# Patient Record
Sex: MALE | Race: WHITE | Employment: OTHER | URBAN - METROPOLITAN AREA
[De-identification: names, ages, dates, MRNs, and addresses within clinical notes are randomized per-mention and may not be internally consistent; named-entity substitution may affect disease eponyms.]

---

## 2017-01-04 PROBLEM — N13.8 BPH WITH OBSTRUCTION/LOWER URINARY TRACT SYMPTOMS: Status: ACTIVE | Noted: 2017-01-04

## 2017-01-04 PROBLEM — N40.1 BPH WITH OBSTRUCTION/LOWER URINARY TRACT SYMPTOMS: Status: ACTIVE | Noted: 2017-01-04

## 2017-01-05 ENCOUNTER — EPISODE CHANGES (OUTPATIENT)
Dept: CASE MANAGEMENT | Age: 82
End: 2017-01-05

## 2017-02-22 ENCOUNTER — OFFICE VISIT (OUTPATIENT)
Dept: FAMILY MEDICINE CLINIC | Age: 82
End: 2017-02-22

## 2017-02-22 VITALS
OXYGEN SATURATION: 98 % | HEIGHT: 68 IN | SYSTOLIC BLOOD PRESSURE: 132 MMHG | WEIGHT: 175 LBS | TEMPERATURE: 98.2 F | DIASTOLIC BLOOD PRESSURE: 80 MMHG | HEART RATE: 100 BPM | BODY MASS INDEX: 26.52 KG/M2 | RESPIRATION RATE: 18 BRPM

## 2017-02-22 DIAGNOSIS — J01.90 ACUTE SINUSITIS, RECURRENCE NOT SPECIFIED, UNSPECIFIED LOCATION: Primary | ICD-10-CM

## 2017-02-22 PROCEDURE — 1123F ACP DISCUSS/DSCN MKR DOCD: CPT | Performed by: FAMILY MEDICINE

## 2017-02-22 PROCEDURE — G8420 CALC BMI NORM PARAMETERS: HCPCS | Performed by: FAMILY MEDICINE

## 2017-02-22 PROCEDURE — G8598 ASA/ANTIPLAT THER USED: HCPCS | Performed by: FAMILY MEDICINE

## 2017-02-22 PROCEDURE — 99214 OFFICE O/P EST MOD 30 MIN: CPT | Performed by: FAMILY MEDICINE

## 2017-02-22 PROCEDURE — 1036F TOBACCO NON-USER: CPT | Performed by: FAMILY MEDICINE

## 2017-02-22 PROCEDURE — G8427 DOCREV CUR MEDS BY ELIG CLIN: HCPCS | Performed by: FAMILY MEDICINE

## 2017-02-22 PROCEDURE — 4040F PNEUMOC VAC/ADMIN/RCVD: CPT | Performed by: FAMILY MEDICINE

## 2017-02-22 PROCEDURE — G8484 FLU IMMUNIZE NO ADMIN: HCPCS | Performed by: FAMILY MEDICINE

## 2017-02-22 RX ORDER — AMOXICILLIN AND CLAVULANATE POTASSIUM 875; 125 MG/1; MG/1
1 TABLET, FILM COATED ORAL 2 TIMES DAILY
Qty: 20 TABLET | Refills: 0 | Status: SHIPPED | OUTPATIENT
Start: 2017-02-22 | End: 2017-03-04

## 2017-03-13 ENCOUNTER — OFFICE VISIT (OUTPATIENT)
Dept: FAMILY MEDICINE CLINIC | Age: 82
End: 2017-03-13

## 2017-03-13 VITALS
HEART RATE: 70 BPM | BODY MASS INDEX: 26.22 KG/M2 | DIASTOLIC BLOOD PRESSURE: 80 MMHG | RESPIRATION RATE: 16 BRPM | WEIGHT: 173 LBS | TEMPERATURE: 98.7 F | SYSTOLIC BLOOD PRESSURE: 134 MMHG | HEIGHT: 68 IN

## 2017-03-13 DIAGNOSIS — Z95.3 S/P AORTIC VALVE REPLACEMENT WITH PORCINE VALVE: ICD-10-CM

## 2017-03-13 DIAGNOSIS — E11.21 DIABETIC NEPHROPATHY ASSOCIATED WITH TYPE 2 DIABETES MELLITUS (HCC): ICD-10-CM

## 2017-03-13 DIAGNOSIS — I47.1 SVT (SUPRAVENTRICULAR TACHYCARDIA) (HCC): ICD-10-CM

## 2017-03-13 DIAGNOSIS — E78.1 HYPERTRIGLYCERIDEMIA: ICD-10-CM

## 2017-03-13 DIAGNOSIS — I25.10 CORONARY ARTERY DISEASE, NON-OCCLUSIVE: Chronic | ICD-10-CM

## 2017-03-13 DIAGNOSIS — I10 ESSENTIAL HYPERTENSION: ICD-10-CM

## 2017-03-13 DIAGNOSIS — E11.21 TYPE 2 DIABETES MELLITUS WITH DIABETIC NEPHROPATHY, WITHOUT LONG-TERM CURRENT USE OF INSULIN (HCC): Primary | ICD-10-CM

## 2017-03-13 LAB — HBA1C MFR BLD: 6.7 %

## 2017-03-13 PROCEDURE — 99214 OFFICE O/P EST MOD 30 MIN: CPT | Performed by: FAMILY MEDICINE

## 2017-03-13 PROCEDURE — 83036 HEMOGLOBIN GLYCOSYLATED A1C: CPT | Performed by: FAMILY MEDICINE

## 2017-03-13 PROCEDURE — G8420 CALC BMI NORM PARAMETERS: HCPCS | Performed by: FAMILY MEDICINE

## 2017-03-13 PROCEDURE — 4040F PNEUMOC VAC/ADMIN/RCVD: CPT | Performed by: FAMILY MEDICINE

## 2017-03-13 PROCEDURE — G8484 FLU IMMUNIZE NO ADMIN: HCPCS | Performed by: FAMILY MEDICINE

## 2017-03-13 PROCEDURE — G8598 ASA/ANTIPLAT THER USED: HCPCS | Performed by: FAMILY MEDICINE

## 2017-03-13 PROCEDURE — 1123F ACP DISCUSS/DSCN MKR DOCD: CPT | Performed by: FAMILY MEDICINE

## 2017-03-13 PROCEDURE — G8427 DOCREV CUR MEDS BY ELIG CLIN: HCPCS | Performed by: FAMILY MEDICINE

## 2017-03-13 PROCEDURE — 1036F TOBACCO NON-USER: CPT | Performed by: FAMILY MEDICINE

## 2017-03-13 RX ORDER — GLIMEPIRIDE 4 MG/1
4 TABLET ORAL
Qty: 90 TABLET | Refills: 1 | Status: SHIPPED | OUTPATIENT
Start: 2017-03-13 | End: 2017-09-18 | Stop reason: DRUGHIGH

## 2017-06-09 ENCOUNTER — OFFICE VISIT (OUTPATIENT)
Dept: FAMILY MEDICINE CLINIC | Age: 82
End: 2017-06-09

## 2017-06-09 VITALS
HEART RATE: 76 BPM | SYSTOLIC BLOOD PRESSURE: 136 MMHG | DIASTOLIC BLOOD PRESSURE: 82 MMHG | HEIGHT: 68 IN | TEMPERATURE: 97.7 F | WEIGHT: 176.8 LBS | RESPIRATION RATE: 16 BRPM | OXYGEN SATURATION: 96 % | BODY MASS INDEX: 26.8 KG/M2

## 2017-06-09 DIAGNOSIS — I10 ESSENTIAL HYPERTENSION: ICD-10-CM

## 2017-06-09 DIAGNOSIS — I25.10 CORONARY ARTERY DISEASE, NON-OCCLUSIVE: Chronic | ICD-10-CM

## 2017-06-09 DIAGNOSIS — E11.21 TYPE 2 DIABETES MELLITUS WITH DIABETIC NEPHROPATHY, WITHOUT LONG-TERM CURRENT USE OF INSULIN (HCC): Primary | ICD-10-CM

## 2017-06-09 DIAGNOSIS — Z95.3 S/P AORTIC VALVE REPLACEMENT WITH PORCINE VALVE: ICD-10-CM

## 2017-06-09 DIAGNOSIS — L57.0 AK (ACTINIC KERATOSIS): ICD-10-CM

## 2017-06-09 LAB — HBA1C MFR BLD: 6.4 %

## 2017-06-09 PROCEDURE — 83036 HEMOGLOBIN GLYCOSYLATED A1C: CPT | Performed by: FAMILY MEDICINE

## 2017-06-09 PROCEDURE — 1123F ACP DISCUSS/DSCN MKR DOCD: CPT | Performed by: FAMILY MEDICINE

## 2017-06-09 PROCEDURE — G8420 CALC BMI NORM PARAMETERS: HCPCS | Performed by: FAMILY MEDICINE

## 2017-06-09 PROCEDURE — G8598 ASA/ANTIPLAT THER USED: HCPCS | Performed by: FAMILY MEDICINE

## 2017-06-09 PROCEDURE — 4040F PNEUMOC VAC/ADMIN/RCVD: CPT | Performed by: FAMILY MEDICINE

## 2017-06-09 PROCEDURE — 99214 OFFICE O/P EST MOD 30 MIN: CPT | Performed by: FAMILY MEDICINE

## 2017-06-09 PROCEDURE — 1036F TOBACCO NON-USER: CPT | Performed by: FAMILY MEDICINE

## 2017-06-09 PROCEDURE — G8427 DOCREV CUR MEDS BY ELIG CLIN: HCPCS | Performed by: FAMILY MEDICINE

## 2017-07-05 DIAGNOSIS — E78.1 HYPERTRIGLYCERIDEMIA: ICD-10-CM

## 2017-07-05 RX ORDER — SIMVASTATIN 20 MG
TABLET ORAL
Qty: 90 TABLET | Refills: 1 | Status: SHIPPED | OUTPATIENT
Start: 2017-07-05 | End: 2018-01-25 | Stop reason: SDUPTHER

## 2017-09-18 ENCOUNTER — OFFICE VISIT (OUTPATIENT)
Dept: FAMILY MEDICINE CLINIC | Age: 82
End: 2017-09-18

## 2017-09-18 VITALS
BODY MASS INDEX: 26.83 KG/M2 | DIASTOLIC BLOOD PRESSURE: 86 MMHG | OXYGEN SATURATION: 97 % | TEMPERATURE: 97.6 F | HEART RATE: 80 BPM | RESPIRATION RATE: 16 BRPM | SYSTOLIC BLOOD PRESSURE: 136 MMHG | WEIGHT: 177 LBS | HEIGHT: 68 IN

## 2017-09-18 DIAGNOSIS — E11.21 TYPE 2 DIABETES MELLITUS WITH DIABETIC NEPHROPATHY, WITHOUT LONG-TERM CURRENT USE OF INSULIN (HCC): ICD-10-CM

## 2017-09-18 DIAGNOSIS — N39.41 URGE INCONTINENCE: ICD-10-CM

## 2017-09-18 DIAGNOSIS — E78.1 HYPERTRIGLYCERIDEMIA: ICD-10-CM

## 2017-09-18 DIAGNOSIS — Z00.00 MEDICARE ANNUAL WELLNESS VISIT, SUBSEQUENT: Primary | ICD-10-CM

## 2017-09-18 DIAGNOSIS — Z95.3 S/P AORTIC VALVE REPLACEMENT WITH PORCINE VALVE: ICD-10-CM

## 2017-09-18 DIAGNOSIS — I25.10 CORONARY ARTERY DISEASE, NON-OCCLUSIVE: Chronic | ICD-10-CM

## 2017-09-18 DIAGNOSIS — Z23 NEEDS FLU SHOT: ICD-10-CM

## 2017-09-18 DIAGNOSIS — I10 ESSENTIAL HYPERTENSION: ICD-10-CM

## 2017-09-18 LAB — HBA1C MFR BLD: 6.5 %

## 2017-09-18 PROCEDURE — 99214 OFFICE O/P EST MOD 30 MIN: CPT | Performed by: FAMILY MEDICINE

## 2017-09-18 PROCEDURE — G0439 PPPS, SUBSEQ VISIT: HCPCS | Performed by: FAMILY MEDICINE

## 2017-09-18 PROCEDURE — 1036F TOBACCO NON-USER: CPT | Performed by: FAMILY MEDICINE

## 2017-09-18 PROCEDURE — G8598 ASA/ANTIPLAT THER USED: HCPCS | Performed by: FAMILY MEDICINE

## 2017-09-18 PROCEDURE — G8417 CALC BMI ABV UP PARAM F/U: HCPCS | Performed by: FAMILY MEDICINE

## 2017-09-18 PROCEDURE — G8427 DOCREV CUR MEDS BY ELIG CLIN: HCPCS | Performed by: FAMILY MEDICINE

## 2017-09-18 PROCEDURE — 1123F ACP DISCUSS/DSCN MKR DOCD: CPT | Performed by: FAMILY MEDICINE

## 2017-09-18 PROCEDURE — 4040F PNEUMOC VAC/ADMIN/RCVD: CPT | Performed by: FAMILY MEDICINE

## 2017-09-18 PROCEDURE — 83036 HEMOGLOBIN GLYCOSYLATED A1C: CPT | Performed by: FAMILY MEDICINE

## 2017-09-18 RX ORDER — METFORMIN HYDROCHLORIDE 500 MG/1
TABLET, EXTENDED RELEASE ORAL
Qty: 270 TABLET | Refills: 1 | Status: SHIPPED | OUTPATIENT
Start: 2017-09-18 | End: 2018-06-26 | Stop reason: SDUPTHER

## 2017-09-18 RX ORDER — GLIMEPIRIDE 4 MG/1
2-4 TABLET ORAL
Qty: 90 TABLET | Refills: 1 | Status: SHIPPED | OUTPATIENT
Start: 2017-09-18 | End: 2017-12-29

## 2017-09-18 ASSESSMENT — LIFESTYLE VARIABLES
HOW OFTEN DURING THE LAST YEAR HAVE YOU HAD A FEELING OF GUILT OR REMORSE AFTER DRINKING: 0
HOW OFTEN DURING THE LAST YEAR HAVE YOU NEEDED AN ALCOHOLIC DRINK FIRST THING IN THE MORNING TO GET YOURSELF GOING AFTER A NIGHT OF HEAVY DRINKING: 0
AUDIT TOTAL SCORE: 2
HAS A RELATIVE, FRIEND, DOCTOR, OR ANOTHER HEALTH PROFESSIONAL EXPRESSED CONCERN ABOUT YOUR DRINKING OR SUGGESTED YOU CUT DOWN: 0
HOW OFTEN DURING THE LAST YEAR HAVE YOU FOUND THAT YOU WERE NOT ABLE TO STOP DRINKING ONCE YOU HAD STARTED: 0
HOW OFTEN DO YOU HAVE A DRINK CONTAINING ALCOHOL: 2
HOW OFTEN DURING THE LAST YEAR HAVE YOU FAILED TO DO WHAT WAS NORMALLY EXPECTED FROM YOU BECAUSE OF DRINKING: 0
HOW OFTEN DO YOU HAVE SIX OR MORE DRINKS ON ONE OCCASION: 0
HOW MANY STANDARD DRINKS CONTAINING ALCOHOL DO YOU HAVE ON A TYPICAL DAY: 0
HOW OFTEN DURING THE LAST YEAR HAVE YOU BEEN UNABLE TO REMEMBER WHAT HAPPENED THE NIGHT BEFORE BECAUSE YOU HAD BEEN DRINKING: 0
HAVE YOU OR SOMEONE ELSE BEEN INJURED AS A RESULT OF YOUR DRINKING: 0
AUDIT-C TOTAL SCORE: 2

## 2017-09-18 ASSESSMENT — PATIENT HEALTH QUESTIONNAIRE - PHQ9: SUM OF ALL RESPONSES TO PHQ QUESTIONS 1-9: 0

## 2017-09-18 ASSESSMENT — ANXIETY QUESTIONNAIRES: GAD7 TOTAL SCORE: 0

## 2017-10-09 ENCOUNTER — OFFICE VISIT (OUTPATIENT)
Dept: FAMILY MEDICINE CLINIC | Age: 82
End: 2017-10-09

## 2017-10-09 VITALS
WEIGHT: 180 LBS | SYSTOLIC BLOOD PRESSURE: 138 MMHG | RESPIRATION RATE: 20 BRPM | BODY MASS INDEX: 27.28 KG/M2 | DIASTOLIC BLOOD PRESSURE: 86 MMHG | HEIGHT: 68 IN | HEART RATE: 82 BPM | TEMPERATURE: 98.5 F

## 2017-10-09 DIAGNOSIS — L84 CALLUS OF FOOT: ICD-10-CM

## 2017-10-09 DIAGNOSIS — B07.9 VERRUCA WARTS (INFECTIOUS): Primary | ICD-10-CM

## 2017-10-09 PROCEDURE — 17110 DESTRUCTION B9 LES UP TO 14: CPT | Performed by: FAMILY MEDICINE

## 2017-10-09 PROCEDURE — G8427 DOCREV CUR MEDS BY ELIG CLIN: HCPCS | Performed by: FAMILY MEDICINE

## 2017-10-09 PROCEDURE — 1123F ACP DISCUSS/DSCN MKR DOCD: CPT | Performed by: FAMILY MEDICINE

## 2017-10-09 PROCEDURE — 4040F PNEUMOC VAC/ADMIN/RCVD: CPT | Performed by: FAMILY MEDICINE

## 2017-10-09 PROCEDURE — 1036F TOBACCO NON-USER: CPT | Performed by: FAMILY MEDICINE

## 2017-10-09 PROCEDURE — G8598 ASA/ANTIPLAT THER USED: HCPCS | Performed by: FAMILY MEDICINE

## 2017-10-09 PROCEDURE — 99213 OFFICE O/P EST LOW 20 MIN: CPT | Performed by: FAMILY MEDICINE

## 2017-10-09 PROCEDURE — G8484 FLU IMMUNIZE NO ADMIN: HCPCS | Performed by: FAMILY MEDICINE

## 2017-10-09 PROCEDURE — G8417 CALC BMI ABV UP PARAM F/U: HCPCS | Performed by: FAMILY MEDICINE

## 2017-10-09 PROCEDURE — 11055 PARING/CUTG B9 HYPRKER LES 1: CPT | Performed by: FAMILY MEDICINE

## 2017-10-09 NOTE — PROGRESS NOTES
Subjective:      Patient ID: Curly Kwan is a [de-identified] y.o. male. Blood pressure 138/86, pulse 82, temperature 98.5 °F (36.9 °C), temperature source Oral, resp. rate 20, height 5' 8\" (1.727 m), weight 180 lb (81.6 kg). HPI here for wart removal.  Pt of DR Devi Gottlieb, but she is not in today. Has had 2 lesions on his left hand over a year. Not getting bigger, but is not resolving with OTC remedy (salacylic acid). Is not painful, but is unsightly and people ask about it. Also has callus left 5th digit that is sometimes uncomfortable. Rubs on shoes. Has not tried any removal methods. Requests removal of both today    Takes asa 81 daily but no blood thinner. DM controlled well. Lab Results   Component Value Date    LABA1C 6.5 09/18/2017     Lab Results   Component Value Date    .9 12/20/2016      Patient Active Problem List   Diagnosis    HTN (hypertension)    Osteoarthritis    GERD (gastroesophageal reflux disease)    Benign prostatic hypertrophy    Allergic rhinitis, seasonal    Hypertriglyceridemia    Screening for eye condition    Colon polyps- last colon neg 1/13    Needs flu shot- declines    Diabetic nephropathy- pos microalb 10/11, on ARB    Urge incontinence    Coronary artery disease, non-occlusive    SVT (supraventricular tachycardia)- S/P ablation 6/13    Left knee DJD    S/P aortic valve replacement with porcine valve    Medicare annual wellness visit, subsequent    Type 2 diabetes mellitus with diabetic nephropathy, without long-term current use of insulin ([facility] [address]    BPH with obstruction/lower urinary tract symptoms    AK (actinic keratosis)      Body mass index is 27.37 kg/m².     Wt Readings from Last 3 Encounters:   10/09/17 180 lb (81.6 kg)   09/18/17 177 lb (80.3 kg)   06/09/17 176 lb 12.8 oz (80.2 kg)      BP Readings from Last 3 Encounters:   10/09/17 138/86   09/18/17 136/86   06/09/17 136/82      Current Outpatient Prescriptions   Medication Sig Dispense Refill    metFORMIN (GLUCOPHAGE-XR) 500 MG extended release tablet Take 2 in AM and 1 in  tablet 1    glimepiride (AMARYL) 4 MG tablet Take 0.5-1 tablets by mouth every morning (before breakfast) 90 tablet 1    simvastatin (ZOCOR) 20 MG tablet TAKE ONE TABLET BY MOUTH NIGHTLY 90 tablet 1    Calcium Carbonate Antacid (TUMS E-X PO) Take 2 tablets by mouth as needed      OLINDA CONTOUR TEST strip USE ONE STRIP TO CHECK GLUCOSE ONCE DAILY AS NEEDED 100 strip 5    lisinopril (PRINIVIL;ZESTRIL) 5 MG tablet Take 5 mg by mouth daily      tamsulosin (FLOMAX) 0.4 MG capsule Take 0.4 mg by mouth nightly       aspirin 81 MG EC tablet Take 81 mg by mouth daily. No current facility-administered medications for this visit. Immunization History   Administered Date(s) Administered    Pneumococcal 13-valent Conjugate ([id number]) 02/16/2016    Pneumococcal Polysaccharide ([id number]) 10/24/2008    Tetanus 08/01/2003        Family History   Problem Relation Age of Onset    Hypertension Mother     Stroke Mother      Social History     Social History    Marital status:      Spouse name: N/A    Number of children: N/A    Years of education: N/A     Occupational History    Not on file. Social History Main Topics    Smoking status: Never Smoker    Smokeless tobacco: Never Used      Comment: congrats, advised not to start    Alcohol use 1.2 oz/week     2 Standard drinks or equivalent per week      Comment: 1-2 beers/week    Drug use: No    Sexual activity: Not on file     Other Topics Concern    Not on file     Social History Narrative    Exercise: walking and cardiovascular equipment daily. Lives with spouse. Seatbelt use: Always. Living will:  yes,   copy on file.      Health Maintenance   Topic Date Due    DTaP/Tdap/Td vaccine (1 - Tdap) 08/02/2003    Flu vaccine (1) 09/01/2017    Lipid screen  12/20/2017    Diabetic hemoglobin A1C test  03/18/2018    Diabetic foot exam 06/09/2018    Diabetic retinal exam  09/29/2018    Zostavax vaccine  Addressed    Pneumococcal low/med risk  Completed         Review of Systems    Objective:   Physical Exam   Constitutional: He appears well-developed and well-nourished. No distress. Pulmonary/Chest: Effort normal.   Musculoskeletal: Normal range of motion. He exhibits no edema. Skin:   L dorsal hand with raised scaly hyperkeratotic pale lesion 1x0.7 cm, well described borders and no color variation or tenderness. Smaller 2 mm hyperkeratotic lesion at base of left thumb web space    L 5th toe, lateral hyperkeratotic 1 cm lesion that is quite thick. Nursing note and vitals reviewed. Assessment:      Verruca, multiple      Plan:       lesion(s) treated w/ liquid nitrogen x 2 cycles. Edu re: risk of blister formation,discomfort, scar, hypopigmentation. Discussed wound care. The toe lesion was pared with 15 blade first.  Advised OTC salicylic acid on this one with gentle file use (sterilize after each use) to keep form getting thick.

## 2017-11-10 ENCOUNTER — OFFICE VISIT (OUTPATIENT)
Dept: FAMILY MEDICINE CLINIC | Age: 82
End: 2017-11-10

## 2017-11-10 VITALS
HEART RATE: 66 BPM | RESPIRATION RATE: 22 BRPM | HEIGHT: 68 IN | SYSTOLIC BLOOD PRESSURE: 128 MMHG | DIASTOLIC BLOOD PRESSURE: 82 MMHG | WEIGHT: 179 LBS | BODY MASS INDEX: 27.13 KG/M2 | OXYGEN SATURATION: 98 %

## 2017-11-10 DIAGNOSIS — Z01.818 PRE-OP EXAM: Primary | ICD-10-CM

## 2017-11-10 DIAGNOSIS — H26.9 CATARACT OF BOTH EYES, UNSPECIFIED CATARACT TYPE: ICD-10-CM

## 2017-11-10 PROCEDURE — 93000 ELECTROCARDIOGRAM COMPLETE: CPT | Performed by: FAMILY MEDICINE

## 2017-11-10 PROCEDURE — 99214 OFFICE O/P EST MOD 30 MIN: CPT | Performed by: FAMILY MEDICINE

## 2017-11-10 ASSESSMENT — ENCOUNTER SYMPTOMS
COUGH: 0
SHORTNESS OF BREATH: 0
DIARRHEA: 0
CONSTIPATION: 0

## 2017-11-10 NOTE — PROGRESS NOTES
Subjective:      Ashley Villafuerte is a [de-identified] y.o.  male who presents to the office today for a preoperative consultation at the request of surgeon Dr. Sweetie Alcaraz who plans on performing cataract surgery on 11/21/17. This consultation is requested for the specific conditions prompting preoperative evaluation (i.e. because of potential affect on operative risk): ***.    Duration of problem:***  Assoc sx are:***   Alleviating factors are:***    Patient Active Problem List   Diagnosis    HTN (hypertension)    Osteoarthritis    GERD (gastroesophageal reflux disease)    Benign prostatic hyperplasia    Allergic rhinitis, seasonal    Hypertriglyceridemia    Screening for eye condition    Colon polyps- last colon neg 1/13    Needs flu shot- declines    Diabetic nephropathy- pos microalb 10/11, on ARB    Urge incontinence    Coronary artery disease, non-occlusive    SVT (supraventricular tachycardia)- S/P ablation 6/13    Left knee DJD    S/P aortic valve replacement with porcine valve    Medicare annual wellness visit, subsequent    Type 2 diabetes mellitus with diabetic nephropathy, without long-term current use of insulin ([facility] [address]    BPH with obstruction/lower urinary tract symptoms    AK (actinic keratosis)     Past Medical History:   Diagnosis Date    Allergic rhinitis, seasonal     Aortic regurgitation     2/12 ECHO EF 60%, mod AI    Colon polyps- last colon neg 11/06     Diabetic nephropathy- pos microalb 10/11, on ARB 10/5/2011    DM (diabetes mellitus), type 2 (HCC)     Enlarged prostate     GERD (gastroesophageal reflux disease)     Holosystolic murmur 8/0/4869    HTN (hypertension)     Hypertriglyceridemia     Osteoarthritis      Past Surgical History:   Procedure Laterality Date    ABLATION OF DYSRHYTHMIC FOCUS  2013    Dr. Mariah Barreto  2016    mosaic ultra porcine valve/medtronic    APPENDECTOMY  age 30    TONSILLECTOMY AND ADENOIDECTOMY  age 9   [facility] TOTAL KNEE ARTHROPLASTY Left 11/2015    TURP  01/03/2017    cysto, green light lazer TURP     Family History   Problem Relation Age of Onset    Hypertension Mother     Stroke Mother      Social History     Social History    Marital status:      Spouse name: N/A    Number of children: N/A    Years of education: N/A     Social History Main Topics    Smoking status: Never Smoker    Smokeless tobacco: Never Used      Comment: congrats, advised not to start    Alcohol use 1.2 oz/week     2 Standard drinks or equivalent per week      Comment: 1-2 beers/week    Drug use: No    Sexual activity: Not on file     Other Topics Concern    Not on file     Social History Narrative    Exercise: walking and cardiovascular equipment daily. Lives with spouse. Seatbelt use: Always. Living will:  yes,   copy on file. Allergies   Allergen Reactions    No Known Allergies      Prior to Visit Medications    Medication Sig Taking? Authorizing Provider   metFORMIN (GLUCOPHAGE-XR) 500 MG extended release tablet Take 2 in AM and 1 in PM Yes Ja Castillo MD   glimepiride (AMARYL) 4 MG tablet Take 0.5-1 tablets by mouth every morning (before breakfast) Yes Ja Castillo MD   simvastatin (ZOCOR) 20 MG tablet TAKE ONE TABLET BY MOUTH NIGHTLY Yes Ja Castillo MD   Calcium Carbonate Antacid (TUMS E-X PO) Take 2 tablets by mouth as needed Yes Historical Provider, MD   OLINDA CONTOUR TEST strip USE ONE STRIP  [address] AS NEEDED Yes Ja Castillo MD   lisinopril (PRINIVIL;ZESTRIL) 5 MG tablet Take 5 mg by mouth daily Yes Historical Provider, MD   tamsulosin (FLOMAX) 0.4 MG capsule Take 0.4 mg by mouth nightly  Yes Historical Provider, MD   oxybutynin (DITROPAN) 5 MG tablet Take 1 tablet by mouth 2 times daily as needed for Other (bladder). Ja Castillo MD   aspirin 81 MG EC tablet Take 81 mg by mouth daily. Historical Provider, MD       Planned anesthesia is {Procedures; anesthesia:812}.   The patient has the following known anesthesia issues: {problems:64193}  Patient has a bleeding risk of : {Ros, bleedin}    Review of Systems      Objective:     Physical Exam    Predictors of intubation difficulty:   Morbid obesity? {Responses; yes**/no:85146::\"no\"}   Anatomically abnormal facies? {Responses; yes**/no:97215::\"no\"}   Short, thick neck? {Responses; yes**/no:79901::\"no\"}   Neck range of motion: {Norm/abn:86061::\"normal\"}   Dentition: {Preopdent:24215}    Cardiographics  ECG: normal sinus rhythm, no changes from previous EKG***  Echocardiogram: ***    Lab Review   Office Visit on 2017   Component Date Value    Hemoglobin A1C 2017 6.5           Assessment:      *** with planned surgery as above. Difficulty with intubation {Actions; is/will/was/not:92161} anticipated. Cardiac Risk Estimation: per the Revised Cardiac Risk Index (Circ. 100:1043, 1999), the patient's risk factors for cardiac complications include {GKD6:45621}, putting him in: {BPU9:90468}       Plan:   Ok to proceed with the procedure. Low cardiac risk. Pt. advised to stop all Rx except anti-hypertensives the a.m. of surgery. ***Hold lisinopril the day of surgery    Patient advised to hold blood thinning medication (including aspirin and NSAIDs) 7 days prior to procedure. Prophylaxis for cardiac events with perioperative beta-blockers: {Preop beta:10668}    Deep vein thrombosis prophylaxis postoperatively:regimen to be chosen by surgical team if applicable. Other measures: Postoperative incentive spirometry to prevent pneumonia.

## 2017-12-04 DIAGNOSIS — E11.21 TYPE 2 DIABETES MELLITUS WITH DIABETIC NEPHROPATHY (HCC): ICD-10-CM

## 2017-12-08 ENCOUNTER — TELEPHONE (OUTPATIENT)
Dept: FAMILY MEDICINE CLINIC | Age: 82
End: 2017-12-08

## 2017-12-08 NOTE — TELEPHONE ENCOUNTER
BRITNI is calling to tell Dr. Jani Sandoval that the pt has blood in his stool the color was bright red. WOBIJU is staring that he her  has a stomach ache and wants to know what should she do. Please contact the pt please. BRITNI   CB# [phone]      Please advise. Thanks.

## 2017-12-19 ENCOUNTER — HOSPITAL ENCOUNTER (OUTPATIENT)
Dept: ENDOSCOPY | Age: 82
Discharge: HOME | End: 2017-12-19
Attending: INTERNAL MEDICINE | Admitting: INTERNAL MEDICINE

## 2017-12-19 VITALS
OXYGEN SATURATION: 100 % | SYSTOLIC BLOOD PRESSURE: 134 MMHG | RESPIRATION RATE: 16 BRPM | DIASTOLIC BLOOD PRESSURE: 82 MMHG | TEMPERATURE: 97.9 F | HEART RATE: 69 BPM

## 2017-12-19 LAB
GLUCOSE BLD-MCNC: 133 MG/DL (ref 70–99)
PERFORMED ON: ABNORMAL

## 2017-12-19 RX ORDER — SODIUM CHLORIDE 0.9 % (FLUSH) 0.9 %
10 SYRINGE (ML) INJECTION PRN
Status: DISCONTINUED | OUTPATIENT
Start: 2017-12-19 | End: 2017-12-20 | Stop reason: HOSPADM

## 2017-12-19 RX ORDER — SODIUM CHLORIDE 9 MG/ML
INJECTION, SOLUTION INTRAVENOUS CONTINUOUS
Status: DISCONTINUED | OUTPATIENT
Start: 2017-12-19 | End: 2017-12-20 | Stop reason: HOSPADM

## 2017-12-19 RX ORDER — SODIUM CHLORIDE 0.9 % (FLUSH) 0.9 %
10 SYRINGE (ML) INJECTION EVERY 12 HOURS SCHEDULED
Status: DISCONTINUED | OUTPATIENT
Start: 2017-12-19 | End: 2017-12-20 | Stop reason: HOSPADM

## 2017-12-19 RX ADMIN — SODIUM CHLORIDE: 9 INJECTION, SOLUTION INTRAVENOUS at 07:21

## 2017-12-19 ASSESSMENT — PAIN SCALES - GENERAL
PAINLEVEL_OUTOF10: 0
PAINLEVEL_OUTOF10: 0

## 2017-12-19 ASSESSMENT — PAIN SCALES - WONG BAKER: WONGBAKER_NUMERICALRESPONSE: 0

## 2017-12-19 ASSESSMENT — ENCOUNTER SYMPTOMS: SHORTNESS OF BREATH: 0

## 2017-12-19 ASSESSMENT — LIFESTYLE VARIABLES: SMOKING_STATUS: 0

## 2017-12-19 NOTE — ANESTHESIA PRE-OP
riri JIMENEZ     Social History   Substance Use Topics    Smoking status: Never Smoker    Smokeless tobacco: Never Used      Comment: congrats, advised not to start    Alcohol use 1.2 oz/week     2 Standard drinks or equivalent per week      Comment: 1-2 beers/week     Medications  Current Outpatient Prescriptions on File Prior to Encounter   Medication Sig Dispense Refill    OLINDA CONTOUR TEST strip USE ONE STRIP TO CHECK GLUCOSE ONCE DAILY AS NEEDED 50 strip 11    metFORMIN (GLUCOPHAGE-XR) 500 MG extended release tablet Take 2 in AM and 1 in  tablet 1    glimepiride (AMARYL) 4 MG tablet Take 0.5-1 tablets by mouth every morning (before breakfast) 90 tablet 1    simvastatin (ZOCOR) 20 MG tablet TAKE ONE TABLET BY MOUTH NIGHTLY 90 tablet 1    Calcium Carbonate Antacid (TUMS E-X PO) Take 2 tablets by mouth as needed      lisinopril (PRINIVIL;ZESTRIL) 5 MG tablet Take 5 mg by mouth daily      [DISCONTINUED] oxybutynin (DITROPAN) 5 MG tablet Take 1 tablet by mouth 2 times daily as needed for Other (bladder). 180 tablet 3    aspirin 81 MG EC tablet Take 81 mg by mouth daily. No current facility-administered medications on file prior to encounter. Current Outpatient Prescriptions   Medication Sig Dispense Refill    OLINDA CONTOUR TEST strip USE ONE STRIP TO CHECK GLUCOSE ONCE DAILY AS NEEDED 50 strip 11    metFORMIN (GLUCOPHAGE-XR) 500 MG extended release tablet Take 2 in AM and 1 in  tablet 1    glimepiride (AMARYL) 4 MG tablet Take 0.5-1 tablets by mouth every morning (before breakfast) 90 tablet 1    simvastatin (ZOCOR) 20 MG tablet TAKE ONE TABLET BY MOUTH NIGHTLY 90 tablet 1    Calcium Carbonate Antacid (TUMS E-X PO) Take 2 tablets by mouth as needed      lisinopril (PRINIVIL;ZESTRIL) 5 MG tablet Take 5 mg by mouth daily      aspirin 81 MG EC tablet Take 81 mg by mouth daily.          Current Facility-Administered Medications   Medication Dose Route Frequency Provider Last Rate Last Dose    0.9 % sodium chloride infusion   Intravenous Continuous Ayla Lane MD        sodium chloride flush 0.9 % injection 10 mL  10 mL Intravenous 2 times per day Ayla Lane MD        sodium chloride flush 0.9 % injection 10 mL  10 mL Intravenous PRN Ayla Lane MD         Vital Signs (Current) There were no vitals filed for this visit. Vital Signs Statistics (for past 48 hrs)     No Data Recorded    BP Readings from Last 3 Encounters:   12/08/17 (!) 160/88   11/10/17 128/82   10/09/17 138/86     BMI  There is no height or weight on file to calculate BMI. Estimated body mass index is 26.61 kg/m² as calculated from the following:    Height as of 12/13/17: 5' 8\" (1.727 m). Weight as of 12/13/17: 175 lb (79.4 kg). CBC   Lab Results   Component Value Date    WBC 5.7 12/08/2017    RBC 4.79 12/08/2017    HGB 12.0 12/08/2017    HCT 37.0 12/08/2017    MCV 77.3 12/08/2017    RDW 15.2 12/08/2017     12/08/2017     CMP    Lab Results   Component Value Date     12/08/2017    K 4.4 12/08/2017     12/08/2017    CO2 25 12/08/2017    BUN 24 12/08/2017    CREATININE 1.2 12/08/2017    GFRAA >60 12/08/2017    GFRAA >60 03/13/2013    AGRATIO 1.4 12/08/2017    LABGLOM 58 12/08/2017    LABGLOM 54.0 06/07/2011    GLUCOSE 299 12/08/2017    GLUCOSE 138 06/07/2011    PROT 6.3 12/08/2017    PROT 7.0 08/13/2012    CALCIUM 9.1 12/08/2017    BILITOT 0.3 12/08/2017    ALKPHOS 87 12/08/2017    AST 12 12/08/2017    ALT 7 12/08/2017     BMP    Lab Results   Component Value Date     12/08/2017    K 4.4 12/08/2017     12/08/2017    CO2 25 12/08/2017    BUN 24 12/08/2017    CREATININE 1.2 12/08/2017    CALCIUM 9.1 12/08/2017    GFRAA >60 12/08/2017    GFRAA >60 03/13/2013    LABGLOM 58 12/08/2017    LABGLOM 54.0 06/07/2011    GLUCOSE 299 12/08/2017    GLUCOSE 138 06/07/2011     POCGlucose  No results for input(s): GLUCOSE in the last 72 hours.    Coags    Lab Results Component Value Date    PROTIME 12.2 12/08/2017    INR 1.08 12/08/2017    APTT 30.5 43/63/4003     HCG (If Applicable) No results found for: PREGTESTUR, PREGSERUM, HCG, HCGQUANT   ABGs No results found for: PHART, PO2ART, GKT3VEB, JPJ1UFB, BEART, Z0WIKULK   Type & Screen (If Applicable)  No results found for: LABABO, LABRH                         BMI: Wt Readings from Last 3 Encounters:       NPO Status:  > 8h                          Anesthesia Evaluation  Patient summary reviewed and Nursing notes reviewed no history of anesthetic complications:   Airway: Mallampati: II  TM distance: >3 FB   Neck ROM: full  Mouth opening: > = 3 FB Dental: normal exam         Pulmonary:Negative Pulmonary ROS and normal exam  breath sounds clear to auscultation      (-) pneumonia, COPD, asthma, shortness of breath, recent URI, sleep apnea and not a current smoker                           Cardiovascular:  Exercise tolerance: good (>4 METS),   (+) hypertension:, valvular problems/murmurs (s/p AVR): AS, CAD:, dysrhythmias (s/p ablation): SVT, hyperlipidemia    (-) CABG/stent,  CHF, PND and murmur      Rhythm: regular  Rate: normal           Beta Blocker:  Not on Beta Blocker         Neuro/Psych:   Negative Neuro/Psych ROS     (-) seizures, neuromuscular disease, TIA, CVA, headaches, psychiatric history and depression/anxiety            GI/Hepatic/Renal:   (+) GERD:, bowel prep,      (-) hiatal hernia, liver disease and no renal disease       Endo/Other:    (+) Type II DM, , .    (-) hypothyroidism, hyperthyroidism, blood dyscrasia, arthritis               Abdominal:         (-) obese Abdomen: soft. Vascular: negative vascular ROS. - PVD, DVT and PE. Anesthesia Plan      MAC     ASA 3       Induction: intravenous. MIPS: Prophylactic antiemetics administered. Anesthetic plan and risks discussed with patient. Plan discussed with CRNA.                   This pre-anesthesia assessment may

## 2017-12-22 ENCOUNTER — TELEPHONE (OUTPATIENT)
Dept: FAMILY MEDICINE CLINIC | Age: 82
End: 2017-12-22

## 2017-12-29 DIAGNOSIS — E11.21 TYPE 2 DIABETES MELLITUS WITH DIABETIC NEPHROPATHY, WITHOUT LONG-TERM CURRENT USE OF INSULIN (HCC): ICD-10-CM

## 2017-12-29 RX ORDER — GLIMEPIRIDE 4 MG/1
TABLET ORAL
Qty: 90 TABLET | Refills: 1 | Status: SHIPPED | OUTPATIENT
Start: 2017-12-29 | End: 2018-10-01 | Stop reason: SDUPTHER

## 2018-01-02 ENCOUNTER — OFFICE VISIT (OUTPATIENT)
Dept: SURGERY | Age: 83
End: 2018-01-02

## 2018-01-02 VITALS — DIASTOLIC BLOOD PRESSURE: 90 MMHG | BODY MASS INDEX: 27.52 KG/M2 | SYSTOLIC BLOOD PRESSURE: 160 MMHG | WEIGHT: 181 LBS

## 2018-01-02 DIAGNOSIS — C18.6 MALIGNANT NEOPLASM OF DESCENDING COLON (HCC): Primary | ICD-10-CM

## 2018-01-02 PROCEDURE — 1123F ACP DISCUSS/DSCN MKR DOCD: CPT | Performed by: SURGERY

## 2018-01-02 PROCEDURE — 99204 OFFICE O/P NEW MOD 45 MIN: CPT | Performed by: SURGERY

## 2018-01-02 PROCEDURE — G8427 DOCREV CUR MEDS BY ELIG CLIN: HCPCS | Performed by: SURGERY

## 2018-01-02 PROCEDURE — 4040F PNEUMOC VAC/ADMIN/RCVD: CPT | Performed by: SURGERY

## 2018-01-02 PROCEDURE — 1036F TOBACCO NON-USER: CPT | Performed by: SURGERY

## 2018-01-02 PROCEDURE — G8598 ASA/ANTIPLAT THER USED: HCPCS | Performed by: SURGERY

## 2018-01-02 PROCEDURE — G8484 FLU IMMUNIZE NO ADMIN: HCPCS | Performed by: SURGERY

## 2018-01-02 PROCEDURE — G8417 CALC BMI ABV UP PARAM F/U: HCPCS | Performed by: SURGERY

## 2018-01-04 ENCOUNTER — PAT TELEPHONE (OUTPATIENT)
Dept: PREADMISSION TESTING | Age: 83
End: 2018-01-04

## 2018-01-05 ENCOUNTER — PAT TELEPHONE (OUTPATIENT)
Dept: PREADMISSION TESTING | Age: 83
End: 2018-01-05

## 2018-01-08 ENCOUNTER — SURG/PROC ORDERS (OUTPATIENT)
Dept: ANESTHESIOLOGY | Age: 83
End: 2018-01-08

## 2018-01-08 VITALS — HEIGHT: 68 IN | WEIGHT: 175 LBS | BODY MASS INDEX: 26.52 KG/M2

## 2018-01-08 RX ORDER — SODIUM CHLORIDE 0.9 % (FLUSH) 0.9 %
10 SYRINGE (ML) INJECTION EVERY 12 HOURS SCHEDULED
Status: CANCELLED | OUTPATIENT
Start: 2018-01-08

## 2018-01-08 RX ORDER — SODIUM CHLORIDE 0.9 % (FLUSH) 0.9 %
10 SYRINGE (ML) INJECTION PRN
Status: CANCELLED | OUTPATIENT
Start: 2018-01-08

## 2018-01-08 RX ORDER — SODIUM CHLORIDE 9 MG/ML
INJECTION, SOLUTION INTRAVENOUS CONTINUOUS
Status: CANCELLED | OUTPATIENT
Start: 2018-01-08

## 2018-01-08 NOTE — PRE-PROCEDURE INSTRUCTIONS
C-Difficile admission screening and protocol:     * Admitted with diarrhea? no     *Prior history of C-Diff. In last 3 months? no     *Antibiotic use in the past 6-8 weeks? no     *Prior hospitalization or nursing home in the last month?     no

## 2018-01-09 PROBLEM — C18.6 MALIGNANT NEOPLASM OF DESCENDING COLON (HCC): Status: ACTIVE | Noted: 2018-01-09

## 2018-01-09 ASSESSMENT — ENCOUNTER SYMPTOMS: ANAL BLEEDING: 1

## 2018-01-09 NOTE — PROGRESS NOTES
Subjective:      Patient ID: Cintia Berger is a 81 y.o. male. HPI  Chief Complaint: colon mass    Patient referred by Dr. Paige Tomlin (PCP is Dr. Jeneal Goodell)  for evaluation of a colon mass. Patient reports symptoms of blood per rectum. Symptoms were first noted last month prompting evaluation in ER. Previous evaluation includes CT showing a mass in the distal descending colon. Subsequent colonoscopy revealed a mass which is adenocarcinoma on biopsy. Patient has a history of polyps but now prior surgery on the abdomen. Will plan following treatment: left colectomy.     Past Medical History:   Diagnosis Date    Allergic rhinitis, seasonal     Aortic regurgitation     2/12 ECHO EF 60%, mod AI    Colon polyps- last colon neg 11/06     Diabetic nephropathy- pos microalb 10/11, on ARB 10/5/2011    DM (diabetes mellitus), type 2 (HCC)     Enlarged prostate     GERD (gastroesophageal reflux disease)     Holosystolic murmur 1/7/6942    HTN (hypertension)     Hypertriglyceridemia     Osteoarthritis        Past Surgical History:   Procedure Laterality Date    ABLATION OF DYSRHYTHMIC FOCUS  2013    Dr. Jeanne Tucker  2016    mosaic ultra porcine valve/medtronic    APPENDECTOMY  age 30    CATARACT REMOVAL Bilateral 11/2017    COLONOSCOPY  12/19/2017    EYE SURGERY      JOINT REPLACEMENT      TONSILLECTOMY AND ADENOIDECTOMY  age 10    TOTAL KNEE ARTHROPLASTY Left 11/2015    TURP  01/03/2017    cysto, green light lazer TURP       Current Outpatient Prescriptions   Medication Sig Dispense Refill    glimepiride (AMARYL) 4 MG tablet TAKE ONE TABLET BY MOUTH IN THE MORNING BEFORE BREAKFAST 90 tablet 1    OLINDA CONTOUR TEST strip USE ONE STRIP TO CHECK GLUCOSE ONCE DAILY AS NEEDED 50 strip 11    metFORMIN (GLUCOPHAGE-XR) 500 MG extended release tablet Take 2 in AM and 1 in PM (Patient taking differently: 1,000 mg daily (with breakfast) Take 2 in AM and 1 in PM) 270 tablet 1    simvastatin (ZOCOR)

## 2018-01-10 PROBLEM — C18.6 CANCER OF DESCENDING COLON (HCC): Status: ACTIVE | Noted: 2018-01-10

## 2018-01-25 ENCOUNTER — OFFICE VISIT (OUTPATIENT)
Dept: SURGERY | Age: 83
End: 2018-01-25

## 2018-01-25 VITALS — BODY MASS INDEX: 26 KG/M2 | WEIGHT: 171 LBS

## 2018-01-25 DIAGNOSIS — E78.1 HYPERTRIGLYCERIDEMIA: ICD-10-CM

## 2018-01-25 DIAGNOSIS — C18.6 MALIGNANT NEOPLASM OF DESCENDING COLON (HCC): Primary | ICD-10-CM

## 2018-01-25 PROCEDURE — 99024 POSTOP FOLLOW-UP VISIT: CPT | Performed by: SURGERY

## 2018-01-26 RX ORDER — SIMVASTATIN 20 MG
TABLET ORAL
Qty: 90 TABLET | Refills: 1 | Status: SHIPPED | OUTPATIENT
Start: 2018-01-26 | End: 2018-08-16 | Stop reason: SDUPTHER

## 2018-01-29 ENCOUNTER — OFFICE VISIT (OUTPATIENT)
Dept: FAMILY MEDICINE CLINIC | Age: 83
End: 2018-01-29

## 2018-01-29 VITALS
BODY MASS INDEX: 25.76 KG/M2 | HEIGHT: 68 IN | DIASTOLIC BLOOD PRESSURE: 80 MMHG | WEIGHT: 170 LBS | HEART RATE: 86 BPM | RESPIRATION RATE: 16 BRPM | SYSTOLIC BLOOD PRESSURE: 126 MMHG | TEMPERATURE: 97.7 F

## 2018-01-29 DIAGNOSIS — E78.1 HYPERTRIGLYCERIDEMIA: ICD-10-CM

## 2018-01-29 DIAGNOSIS — E11.21 TYPE 2 DIABETES MELLITUS WITH DIABETIC NEPHROPATHY, WITHOUT LONG-TERM CURRENT USE OF INSULIN (HCC): ICD-10-CM

## 2018-01-29 DIAGNOSIS — I25.10 CORONARY ARTERY DISEASE, NON-OCCLUSIVE: Chronic | ICD-10-CM

## 2018-01-29 DIAGNOSIS — N28.89 LEFT RENAL MASS: ICD-10-CM

## 2018-01-29 DIAGNOSIS — D64.9 ANEMIA, UNSPECIFIED TYPE: ICD-10-CM

## 2018-01-29 DIAGNOSIS — C18.6 MALIGNANT NEOPLASM OF DESCENDING COLON (HCC): Primary | Chronic | ICD-10-CM

## 2018-01-29 DIAGNOSIS — I10 ESSENTIAL HYPERTENSION: ICD-10-CM

## 2018-01-29 DIAGNOSIS — Z95.3 S/P AORTIC VALVE REPLACEMENT WITH PORCINE VALVE: ICD-10-CM

## 2018-01-29 DIAGNOSIS — C18.6 MALIGNANT NEOPLASM OF DESCENDING COLON (HCC): Chronic | ICD-10-CM

## 2018-01-29 DIAGNOSIS — Z23 NEEDS FLU SHOT: ICD-10-CM

## 2018-01-29 LAB
A/G RATIO: 1.1 (ref 1.1–2.2)
ALBUMIN SERPL-MCNC: 3.7 G/DL (ref 3.4–5)
ALP BLD-CCNC: 89 U/L (ref 40–129)
ALT SERPL-CCNC: 9 U/L (ref 10–40)
ANION GAP SERPL CALCULATED.3IONS-SCNC: 16 MMOL/L (ref 3–16)
AST SERPL-CCNC: 10 U/L (ref 15–37)
BASOPHILS ABSOLUTE: 0.1 K/UL (ref 0–0.2)
BASOPHILS RELATIVE PERCENT: 0.7 %
BILIRUB SERPL-MCNC: <0.2 MG/DL (ref 0–1)
BUN BLDV-MCNC: 20 MG/DL (ref 7–20)
CALCIUM SERPL-MCNC: 8.8 MG/DL (ref 8.3–10.6)
CHLORIDE BLD-SCNC: 100 MMOL/L (ref 99–110)
CO2: 26 MMOL/L (ref 21–32)
CREAT SERPL-MCNC: 1.2 MG/DL (ref 0.8–1.3)
EOSINOPHILS ABSOLUTE: 0.1 K/UL (ref 0–0.6)
EOSINOPHILS RELATIVE PERCENT: 1.8 %
FERRITIN: 29.2 NG/ML (ref 30–400)
GFR AFRICAN AMERICAN: >60
GFR NON-AFRICAN AMERICAN: 58
GLOBULIN: 3.3 G/DL
GLUCOSE BLD-MCNC: 231 MG/DL (ref 70–99)
HCT VFR BLD CALC: 32.7 % (ref 40.5–52.5)
HEMOGLOBIN: 10.4 G/DL (ref 13.5–17.5)
IRON SATURATION: 9 % (ref 20–50)
IRON: 33 UG/DL (ref 59–158)
LYMPHOCYTES ABSOLUTE: 1.6 K/UL (ref 1–5.1)
LYMPHOCYTES RELATIVE PERCENT: 20.4 %
MCH RBC QN AUTO: 23.1 PG (ref 26–34)
MCHC RBC AUTO-ENTMCNC: 31.8 G/DL (ref 31–36)
MCV RBC AUTO: 72.7 FL (ref 80–100)
MONOCYTES ABSOLUTE: 0.6 K/UL (ref 0–1.3)
MONOCYTES RELATIVE PERCENT: 7.2 %
NEUTROPHILS ABSOLUTE: 5.6 K/UL (ref 1.7–7.7)
NEUTROPHILS RELATIVE PERCENT: 69.9 %
PDW BLD-RTO: 15.5 % (ref 12.4–15.4)
PLATELET # BLD: 570 K/UL (ref 135–450)
PMV BLD AUTO: 8 FL (ref 5–10.5)
POTASSIUM SERPL-SCNC: 5 MMOL/L (ref 3.5–5.1)
RBC # BLD: 4.5 M/UL (ref 4.2–5.9)
SODIUM BLD-SCNC: 142 MMOL/L (ref 136–145)
TOTAL IRON BINDING CAPACITY: 357 UG/DL (ref 260–445)
TOTAL PROTEIN: 7 G/DL (ref 6.4–8.2)
WBC # BLD: 8 K/UL (ref 4–11)

## 2018-01-29 PROCEDURE — 99495 TRANSJ CARE MGMT MOD F2F 14D: CPT | Performed by: FAMILY MEDICINE

## 2018-01-30 LAB
ESTIMATED AVERAGE GLUCOSE: 139.9 MG/DL
HBA1C MFR BLD: 6.5 %

## 2018-01-31 ENCOUNTER — NURSE ONLY (OUTPATIENT)
Dept: FAMILY MEDICINE CLINIC | Age: 83
End: 2018-01-31

## 2018-01-31 DIAGNOSIS — Z23 NEEDS FLU SHOT: Primary | ICD-10-CM

## 2018-01-31 PROCEDURE — G0008 ADMIN INFLUENZA VIRUS VAC: HCPCS | Performed by: FAMILY MEDICINE

## 2018-01-31 PROCEDURE — 90662 IIV NO PRSV INCREASED AG IM: CPT | Performed by: FAMILY MEDICINE

## 2018-02-01 DIAGNOSIS — D50.8 OTHER IRON DEFICIENCY ANEMIA: Primary | ICD-10-CM

## 2018-02-01 PROBLEM — D64.9 ABSOLUTE ANEMIA: Status: ACTIVE | Noted: 2018-02-01

## 2018-02-01 RX ORDER — FERROUS SULFATE 325(65) MG
325 TABLET ORAL 2 TIMES DAILY
Qty: 200 TABLET | Refills: 3 | Status: SHIPPED | OUTPATIENT
Start: 2018-02-01 | End: 2018-05-01 | Stop reason: DRUGHIGH

## 2018-02-08 ENCOUNTER — OFFICE VISIT (OUTPATIENT)
Dept: SURGERY | Age: 83
End: 2018-02-08

## 2018-02-08 DIAGNOSIS — C18.6 MALIGNANT NEOPLASM OF DESCENDING COLON (HCC): Primary | ICD-10-CM

## 2018-02-08 PROCEDURE — 99024 POSTOP FOLLOW-UP VISIT: CPT | Performed by: SURGERY

## 2018-02-12 NOTE — PROGRESS NOTES
Subjective:      Patient ID: Maryse Head is a 81 y.o. male. HPI   Doing well. NO pain. Incision is well healed. Eating better, bowels are almost normal. Seeing Oncology this week. Follow up in three weeks. Review of Systems    Objective:   Physical Exam    Assessment:      1.  Malignant neoplasm of descending colon ([address]             Plan:      Follow up in three weeks

## 2018-03-01 ENCOUNTER — OFFICE VISIT (OUTPATIENT)
Dept: SURGERY | Age: 83
End: 2018-03-01

## 2018-03-01 DIAGNOSIS — C18.6 MALIGNANT NEOPLASM OF DESCENDING COLON (HCC): Primary | ICD-10-CM

## 2018-03-01 PROCEDURE — 99024 POSTOP FOLLOW-UP VISIT: CPT | Performed by: SURGERY

## 2018-03-20 NOTE — PROGRESS NOTES
Subjective:      Patient ID: Talia Tompkins is a 81 y.o. male. HPI  Doing well overall. No pain. Eating well. Bowels are almost back to normal. Incision is well healed. Follow up with me as needed    Review of Systems    Objective:   Physical Exam    Assessment:      1.  Malignant neoplasm of descending colon ([address]             Plan:      Follow up with me as needed

## 2018-04-17 ENCOUNTER — TELEPHONE (OUTPATIENT)
Dept: FAMILY MEDICINE CLINIC | Age: 83
End: 2018-04-17

## 2018-04-17 DIAGNOSIS — E11.69 TYPE 2 DIABETES MELLITUS WITH OTHER SPECIFIED COMPLICATION, WITHOUT LONG-TERM CURRENT USE OF INSULIN (HCC): Primary | ICD-10-CM

## 2018-05-01 ENCOUNTER — OFFICE VISIT (OUTPATIENT)
Dept: FAMILY MEDICINE CLINIC | Age: 83
End: 2018-05-01

## 2018-05-01 VITALS
WEIGHT: 173 LBS | DIASTOLIC BLOOD PRESSURE: 80 MMHG | BODY MASS INDEX: 26.22 KG/M2 | SYSTOLIC BLOOD PRESSURE: 144 MMHG | HEIGHT: 68 IN | HEART RATE: 70 BPM | TEMPERATURE: 98.5 F | RESPIRATION RATE: 16 BRPM

## 2018-05-01 DIAGNOSIS — E78.1 HYPERTRIGLYCERIDEMIA: ICD-10-CM

## 2018-05-01 DIAGNOSIS — I25.10 CORONARY ARTERY DISEASE, NON-OCCLUSIVE: Chronic | ICD-10-CM

## 2018-05-01 DIAGNOSIS — E11.21 TYPE 2 DIABETES MELLITUS WITH DIABETIC NEPHROPATHY, WITHOUT LONG-TERM CURRENT USE OF INSULIN (HCC): Primary | ICD-10-CM

## 2018-05-01 DIAGNOSIS — D50.9 IRON DEFICIENCY ANEMIA, UNSPECIFIED IRON DEFICIENCY ANEMIA TYPE: ICD-10-CM

## 2018-05-01 DIAGNOSIS — I10 ESSENTIAL HYPERTENSION: ICD-10-CM

## 2018-05-01 DIAGNOSIS — E11.21 TYPE 2 DIABETES MELLITUS WITH DIABETIC NEPHROPATHY, WITHOUT LONG-TERM CURRENT USE OF INSULIN (HCC): ICD-10-CM

## 2018-05-01 LAB
A/G RATIO: 1.5 (ref 1.1–2.2)
ALBUMIN SERPL-MCNC: 4.4 G/DL (ref 3.4–5)
ALP BLD-CCNC: 97 U/L (ref 40–129)
ALT SERPL-CCNC: 9 U/L (ref 10–40)
ANION GAP SERPL CALCULATED.3IONS-SCNC: 17 MMOL/L (ref 3–16)
AST SERPL-CCNC: 16 U/L (ref 15–37)
BASOPHILS ABSOLUTE: 0 K/UL (ref 0–0.2)
BASOPHILS RELATIVE PERCENT: 0.5 %
BILIRUB SERPL-MCNC: 0.5 MG/DL (ref 0–1)
BUN BLDV-MCNC: 20 MG/DL (ref 7–20)
CALCIUM SERPL-MCNC: 9.4 MG/DL (ref 8.3–10.6)
CHLORIDE BLD-SCNC: 101 MMOL/L (ref 99–110)
CHOLESTEROL, TOTAL: 157 MG/DL (ref 0–199)
CO2: 24 MMOL/L (ref 21–32)
CREAT SERPL-MCNC: 1.1 MG/DL (ref 0.8–1.3)
EOSINOPHILS ABSOLUTE: 0.1 K/UL (ref 0–0.6)
EOSINOPHILS RELATIVE PERCENT: 0.8 %
GFR AFRICAN AMERICAN: >60
GFR NON-AFRICAN AMERICAN: >60
GLOBULIN: 3 G/DL
GLUCOSE BLD-MCNC: 141 MG/DL (ref 70–99)
HCT VFR BLD CALC: 46.4 % (ref 40.5–52.5)
HDLC SERPL-MCNC: 59 MG/DL (ref 40–60)
HEMOGLOBIN: 15.4 G/DL (ref 13.5–17.5)
LDL CHOLESTEROL CALCULATED: 78 MG/DL
LYMPHOCYTES ABSOLUTE: 2.4 K/UL (ref 1–5.1)
LYMPHOCYTES RELATIVE PERCENT: 35.1 %
MCH RBC QN AUTO: 26.1 PG (ref 26–34)
MCHC RBC AUTO-ENTMCNC: 33.1 G/DL (ref 31–36)
MCV RBC AUTO: 78.8 FL (ref 80–100)
MONOCYTES ABSOLUTE: 0.7 K/UL (ref 0–1.3)
MONOCYTES RELATIVE PERCENT: 10 %
NEUTROPHILS ABSOLUTE: 3.7 K/UL (ref 1.7–7.7)
NEUTROPHILS RELATIVE PERCENT: 53.6 %
PDW BLD-RTO: 19.4 % (ref 12.4–15.4)
PLATELET # BLD: 237 K/UL (ref 135–450)
PMV BLD AUTO: 8.4 FL (ref 5–10.5)
POTASSIUM SERPL-SCNC: 5.3 MMOL/L (ref 3.5–5.1)
RBC # BLD: 5.89 M/UL (ref 4.2–5.9)
SODIUM BLD-SCNC: 142 MMOL/L (ref 136–145)
TOTAL PROTEIN: 7.4 G/DL (ref 6.4–8.2)
TRIGL SERPL-MCNC: 98 MG/DL (ref 0–150)
VLDLC SERPL CALC-MCNC: 20 MG/DL
WBC # BLD: 6.9 K/UL (ref 4–11)

## 2018-05-01 PROCEDURE — 1036F TOBACCO NON-USER: CPT | Performed by: FAMILY MEDICINE

## 2018-05-01 PROCEDURE — 4040F PNEUMOC VAC/ADMIN/RCVD: CPT | Performed by: FAMILY MEDICINE

## 2018-05-01 PROCEDURE — G8417 CALC BMI ABV UP PARAM F/U: HCPCS | Performed by: FAMILY MEDICINE

## 2018-05-01 PROCEDURE — G8427 DOCREV CUR MEDS BY ELIG CLIN: HCPCS | Performed by: FAMILY MEDICINE

## 2018-05-01 PROCEDURE — 99214 OFFICE O/P EST MOD 30 MIN: CPT | Performed by: FAMILY MEDICINE

## 2018-05-01 PROCEDURE — G8598 ASA/ANTIPLAT THER USED: HCPCS | Performed by: FAMILY MEDICINE

## 2018-05-01 PROCEDURE — 1123F ACP DISCUSS/DSCN MKR DOCD: CPT | Performed by: FAMILY MEDICINE

## 2018-05-01 RX ORDER — FERROUS SULFATE 325(65) MG
325 TABLET ORAL DAILY
Status: SHIPPED | COMMUNITY
Start: 2018-05-01 | End: 2018-09-19

## 2018-05-02 LAB
ESTIMATED AVERAGE GLUCOSE: 131.2 MG/DL
HBA1C MFR BLD: 6.2 %

## 2018-06-26 DIAGNOSIS — E11.21 TYPE 2 DIABETES MELLITUS WITH DIABETIC NEPHROPATHY, WITHOUT LONG-TERM CURRENT USE OF INSULIN (HCC): ICD-10-CM

## 2018-06-26 DIAGNOSIS — N39.41 URGE INCONTINENCE: ICD-10-CM

## 2018-06-27 RX ORDER — METFORMIN HYDROCHLORIDE 500 MG/1
TABLET, EXTENDED RELEASE ORAL
Qty: 270 TABLET | Refills: 1 | Status: SHIPPED | OUTPATIENT
Start: 2018-06-27 | End: 2019-03-20 | Stop reason: SDUPTHER

## 2018-08-07 ENCOUNTER — HOSPITAL ENCOUNTER (OUTPATIENT)
Dept: CT IMAGING | Age: 83
Discharge: HOME | End: 2018-08-07
Attending: INTERNAL MEDICINE | Admitting: INTERNAL MEDICINE

## 2018-08-07 DIAGNOSIS — C18.7 MALIGNANT NEOPLASM OF SIGMOID COLON (HCC): ICD-10-CM

## 2018-08-07 DIAGNOSIS — C18.7 ADENOCARCINOMA OF SIGMOID COLON (HCC): ICD-10-CM

## 2018-08-07 LAB
GFR AFRICAN AMERICAN: 59
GFR NON-AFRICAN AMERICAN: 49
PERFORMED ON: ABNORMAL
POC CREATININE: 1.4 MG/DL (ref 0.8–1.3)
POC SAMPLE TYPE: ABNORMAL

## 2018-08-16 DIAGNOSIS — E78.1 HYPERTRIGLYCERIDEMIA: ICD-10-CM

## 2018-08-16 RX ORDER — SIMVASTATIN 20 MG
TABLET ORAL
Qty: 90 TABLET | Refills: 1 | Status: SHIPPED | OUTPATIENT
Start: 2018-08-16 | End: 2019-03-04 | Stop reason: SDUPTHER

## 2018-09-19 ENCOUNTER — OFFICE VISIT (OUTPATIENT)
Dept: FAMILY MEDICINE CLINIC | Age: 83
End: 2018-09-19

## 2018-09-19 VITALS
SYSTOLIC BLOOD PRESSURE: 118 MMHG | BODY MASS INDEX: 26.83 KG/M2 | RESPIRATION RATE: 16 BRPM | HEIGHT: 68 IN | WEIGHT: 177 LBS | TEMPERATURE: 98.2 F | DIASTOLIC BLOOD PRESSURE: 70 MMHG | HEART RATE: 68 BPM

## 2018-09-19 DIAGNOSIS — J30.2 SEASONAL ALLERGIC RHINITIS, UNSPECIFIED TRIGGER: ICD-10-CM

## 2018-09-19 DIAGNOSIS — E11.21 TYPE 2 DIABETES MELLITUS WITH DIABETIC NEPHROPATHY, WITHOUT LONG-TERM CURRENT USE OF INSULIN (HCC): ICD-10-CM

## 2018-09-19 DIAGNOSIS — K21.9 GASTROESOPHAGEAL REFLUX DISEASE WITHOUT ESOPHAGITIS: ICD-10-CM

## 2018-09-19 DIAGNOSIS — R39.11 BENIGN PROSTATIC HYPERPLASIA WITH URINARY HESITANCY: ICD-10-CM

## 2018-09-19 DIAGNOSIS — C18.6 MALIGNANT NEOPLASM OF DESCENDING COLON (HCC): Chronic | ICD-10-CM

## 2018-09-19 DIAGNOSIS — M17.12 PRIMARY OSTEOARTHRITIS OF LEFT KNEE: ICD-10-CM

## 2018-09-19 DIAGNOSIS — E11.21 DIABETIC NEPHROPATHY ASSOCIATED WITH TYPE 2 DIABETES MELLITUS (HCC): ICD-10-CM

## 2018-09-19 DIAGNOSIS — E78.1 HYPERTRIGLYCERIDEMIA: ICD-10-CM

## 2018-09-19 DIAGNOSIS — I10 ESSENTIAL HYPERTENSION: ICD-10-CM

## 2018-09-19 DIAGNOSIS — Z23 NEED FOR VACCINATION FOR ZOSTER: ICD-10-CM

## 2018-09-19 DIAGNOSIS — I25.10 CORONARY ARTERY DISEASE, NON-OCCLUSIVE: Chronic | ICD-10-CM

## 2018-09-19 DIAGNOSIS — Z00.00 MEDICARE ANNUAL WELLNESS VISIT, SUBSEQUENT: Primary | ICD-10-CM

## 2018-09-19 DIAGNOSIS — D50.9 IRON DEFICIENCY ANEMIA, UNSPECIFIED IRON DEFICIENCY ANEMIA TYPE: ICD-10-CM

## 2018-09-19 DIAGNOSIS — Z95.3 S/P AORTIC VALVE REPLACEMENT WITH PORCINE VALVE: ICD-10-CM

## 2018-09-19 DIAGNOSIS — N40.1 BENIGN PROSTATIC HYPERPLASIA WITH URINARY HESITANCY: ICD-10-CM

## 2018-09-19 DIAGNOSIS — Z96.652 HISTORY OF KNEE REPLACEMENT, TOTAL, LEFT: ICD-10-CM

## 2018-09-19 PROBLEM — L57.0 AK (ACTINIC KERATOSIS): Status: RESOLVED | Noted: 2017-06-09 | Resolved: 2018-09-19

## 2018-09-19 PROBLEM — D64.9 ABSOLUTE ANEMIA: Status: RESOLVED | Noted: 2018-02-01 | Resolved: 2018-09-19

## 2018-09-19 LAB — HBA1C MFR BLD: 6 %

## 2018-09-19 PROCEDURE — 4040F PNEUMOC VAC/ADMIN/RCVD: CPT | Performed by: FAMILY MEDICINE

## 2018-09-19 PROCEDURE — G0439 PPPS, SUBSEQ VISIT: HCPCS | Performed by: FAMILY MEDICINE

## 2018-09-19 PROCEDURE — 99214 OFFICE O/P EST MOD 30 MIN: CPT | Performed by: FAMILY MEDICINE

## 2018-09-19 PROCEDURE — G8427 DOCREV CUR MEDS BY ELIG CLIN: HCPCS | Performed by: FAMILY MEDICINE

## 2018-09-19 PROCEDURE — 1101F PT FALLS ASSESS-DOCD LE1/YR: CPT | Performed by: FAMILY MEDICINE

## 2018-09-19 PROCEDURE — G8417 CALC BMI ABV UP PARAM F/U: HCPCS | Performed by: FAMILY MEDICINE

## 2018-09-19 PROCEDURE — G8598 ASA/ANTIPLAT THER USED: HCPCS | Performed by: FAMILY MEDICINE

## 2018-09-19 PROCEDURE — 1123F ACP DISCUSS/DSCN MKR DOCD: CPT | Performed by: FAMILY MEDICINE

## 2018-09-19 PROCEDURE — 1036F TOBACCO NON-USER: CPT | Performed by: FAMILY MEDICINE

## 2018-09-19 PROCEDURE — 83036 HEMOGLOBIN GLYCOSYLATED A1C: CPT | Performed by: FAMILY MEDICINE

## 2018-09-19 RX ORDER — LISINOPRIL 10 MG/1
10 TABLET ORAL DAILY
COMMUNITY
End: 2019-03-20 | Stop reason: SDUPTHER

## 2018-09-19 ASSESSMENT — LIFESTYLE VARIABLES
HOW OFTEN DURING THE LAST YEAR HAVE YOU HAD A FEELING OF GUILT OR REMORSE AFTER DRINKING: 0
HAVE YOU OR SOMEONE ELSE BEEN INJURED AS A RESULT OF YOUR DRINKING: 0
AUDIT-C TOTAL SCORE: 2
HAS A RELATIVE, FRIEND, DOCTOR, OR ANOTHER HEALTH PROFESSIONAL EXPRESSED CONCERN ABOUT YOUR DRINKING OR SUGGESTED YOU CUT DOWN: 0
HOW OFTEN DURING THE LAST YEAR HAVE YOU NEEDED AN ALCOHOLIC DRINK FIRST THING IN THE MORNING TO GET YOURSELF GOING AFTER A NIGHT OF HEAVY DRINKING: 0
HOW OFTEN DURING THE LAST YEAR HAVE YOU BEEN UNABLE TO REMEMBER WHAT HAPPENED THE NIGHT BEFORE BECAUSE YOU HAD BEEN DRINKING: 0
AUDIT TOTAL SCORE: 2
HOW OFTEN DO YOU HAVE A DRINK CONTAINING ALCOHOL: 2
HOW OFTEN DURING THE LAST YEAR HAVE YOU FOUND THAT YOU WERE NOT ABLE TO STOP DRINKING ONCE YOU HAD STARTED: 0
HOW MANY STANDARD DRINKS CONTAINING ALCOHOL DO YOU HAVE ON A TYPICAL DAY: 0
HOW OFTEN DURING THE LAST YEAR HAVE YOU FAILED TO DO WHAT WAS NORMALLY EXPECTED FROM YOU BECAUSE OF DRINKING: 0
HOW OFTEN DO YOU HAVE SIX OR MORE DRINKS ON ONE OCCASION: 0

## 2018-09-19 ASSESSMENT — ANXIETY QUESTIONNAIRES: GAD7 TOTAL SCORE: 0

## 2018-09-19 ASSESSMENT — PATIENT HEALTH QUESTIONNAIRE - PHQ9
SUM OF ALL RESPONSES TO PHQ QUESTIONS 1-9: 0
SUM OF ALL RESPONSES TO PHQ QUESTIONS 1-9: 0

## 2018-09-19 NOTE — PATIENT INSTRUCTIONS
FYI: While Medicare provides you with a FREE ANNUAL PREVENTIVE PHYSICAL, this visit does NOT include management of chronic medical problems or physical examination. Dr. Dago Medrano usually does a combination visit if you have other medical problems so you don't have to come back for another visit. However, this means that there will be a co-pay. INSTRUCTIONS  NEXT APPOINTMENT: Please schedule check-up in 6 months. · PLEASE TAKE THIS FORM TO CHECK-OUT WINDOW TO SCHEDULE NEXT VISIT. · Please get annual dilated eye exam to screen for diabetic retinopathy which can lead to vision loss. Ask for report to be faxed to [phone]. · OK to take ginko biloba for dementia prevention. Not harmful, unsure if helps. Patient Education       Personalized Preventive Plan for Dagoberto Smith - 9/19/2018  Medicare offers a range of preventive health benefits. Some of the tests and screenings are paid in full while other may be subject to a deductible, co-insurance, and/or copay. Some of these benefits include a comprehensive review of your medical history including lifestyle, illnesses that may run in your family, and various assessments and screenings as appropriate. After reviewing your medical record and screening and assessments performed today your provider may have ordered immunizations, labs, imaging, and/or referrals for you. A list of these orders (if applicable) as well as your Preventive Care list are included within your After Visit Summary for your review. Other Preventive Recommendations:    · A preventive eye exam performed by an eye specialist is recommended every 1-2 years to screen for glaucoma; cataracts, macular degeneration, and other eye disorders. · A preventive dental visit is recommended every 6 months. · Try to get at least 150 minutes of exercise per week or 10,000 steps per day on a pedometer .   · Order or download the FREE \"Exercise & Physical Activity: Your Everyday Guide\" from The

## 2018-09-19 NOTE — PROGRESS NOTES
Patient Care Team:  Nikita Haider MD as PCP - General (Family Medicine)  Nikita Haider MD as PCP - S Attributed Provider  Emery Jane MD as Consulting Physician (Cardiology)    The following problems were reviewed today and where indicated follow up appointments were made and/or referrals ordered. Positive Risk Factor Screenings with Interventions:     General Health:  General  In general, how would you say your health is?: Very Good  In the past 7 days, have you experienced any of the following?: (!) New or Increased Pain  Do you get the social and emotional support that you need?: Yes  Do you have a Living Will?: Yes  General Health Risk Interventions:  · Pain issues: see HPI and A/P about knee    Current Health Maintenance Status  Immunization History   Administered Date(s) Administered    Influenza, High Dose (Fluzone 65 yrs and older) 01/31/2018, 09/12/2018    Pneumococcal 13-valent Conjugate (Rangel Moons) 02/16/2016    Pneumococcal Polysaccharide ([id number]) 10/24/2008    Tetanus 08/01/2003     Health Maintenance   Topic Date Due    Shingles Vaccine (1 of 2 - 2 Dose Series) 11/11/1986    DTaP/Tdap/Td vaccine (1 - Tdap) 08/02/2003    Diabetic foot exam  06/09/2018    Diabetic retinal exam  09/29/2018    A1C test (Diabetic or Prediabetic)  11/01/2018    Lipid screen  05/01/2019    Potassium monitoring  05/01/2019    Creatinine monitoring  05/01/2019    Flu vaccine  Completed    Pneumococcal low/med risk  Completed     Recommendations for Preventive Services Due: see orders. Recommended screening schedule for the next 5-10 years is provided to the patient in written form: see Patient Instructions/AVS.    PHYSICAL EXAM:  VITALS:  Blood pressure is Excellent. BP Readings from Last 5 Encounters:   09/19/18 118/70   05/01/18 (!) 144/80   01/29/18 126/80   01/15/18 (!) 145/80   01/02/18 (!) 160/90     Weight is increased.    Wt Readings from Last 5 Encounters:   09/19/18 177 lb (80.3 kg)

## 2018-10-01 DIAGNOSIS — E11.21 TYPE 2 DIABETES MELLITUS WITH DIABETIC NEPHROPATHY, WITHOUT LONG-TERM CURRENT USE OF INSULIN (HCC): ICD-10-CM

## 2018-10-01 RX ORDER — GLIMEPIRIDE 4 MG/1
TABLET ORAL
Qty: 90 TABLET | Refills: 1 | Status: SHIPPED | OUTPATIENT
Start: 2018-10-01 | End: 2019-03-04 | Stop reason: SDUPTHER

## 2018-12-10 DIAGNOSIS — E11.21 TYPE 2 DIABETES MELLITUS WITH DIABETIC NEPHROPATHY (HCC): ICD-10-CM

## 2018-12-12 ENCOUNTER — ANESTHESIA EVENT (OUTPATIENT)
Dept: ENDOSCOPY | Age: 83
End: 2018-12-12
Payer: MEDICARE

## 2018-12-12 DIAGNOSIS — E11.21 TYPE 2 DIABETES MELLITUS WITH DIABETIC NEPHROPATHY, WITHOUT LONG-TERM CURRENT USE OF INSULIN (HCC): Primary | ICD-10-CM

## 2018-12-13 ENCOUNTER — ANESTHESIA (OUTPATIENT)
Dept: ENDOSCOPY | Age: 83
End: 2018-12-13
Payer: MEDICARE

## 2018-12-13 ENCOUNTER — HOSPITAL ENCOUNTER (OUTPATIENT)
Age: 83
Setting detail: OUTPATIENT SURGERY
Discharge: HOME | End: 2018-12-13
Attending: INTERNAL MEDICINE | Admitting: INTERNAL MEDICINE
Payer: MEDICARE

## 2018-12-13 ENCOUNTER — TELEPHONE (OUTPATIENT)
Dept: FAMILY MEDICINE CLINIC | Age: 83
End: 2018-12-13

## 2018-12-13 VITALS
HEIGHT: 68 IN | WEIGHT: 176 LBS | BODY MASS INDEX: 26.67 KG/M2 | RESPIRATION RATE: 18 BRPM | OXYGEN SATURATION: 100 % | SYSTOLIC BLOOD PRESSURE: 161 MMHG | DIASTOLIC BLOOD PRESSURE: 87 MMHG | HEART RATE: 65 BPM | TEMPERATURE: 98.5 F

## 2018-12-13 VITALS — SYSTOLIC BLOOD PRESSURE: 108 MMHG | DIASTOLIC BLOOD PRESSURE: 69 MMHG | OXYGEN SATURATION: 97 %

## 2018-12-13 DIAGNOSIS — E11.21 TYPE 2 DIABETES MELLITUS WITH DIABETIC NEPHROPATHY, WITHOUT LONG-TERM CURRENT USE OF INSULIN (HCC): ICD-10-CM

## 2018-12-13 DIAGNOSIS — Z86.010 HISTORY OF COLON POLYPS: ICD-10-CM

## 2018-12-13 LAB
GLUCOSE BLD-MCNC: 118 MG/DL (ref 70–99)
PERFORMED ON: ABNORMAL

## 2018-12-13 PROCEDURE — 7100000011 HC PHASE II RECOVERY - ADDTL 15 MIN: Performed by: INTERNAL MEDICINE

## 2018-12-13 PROCEDURE — 2709999900 HC NON-CHARGEABLE SUPPLY: Performed by: INTERNAL MEDICINE

## 2018-12-13 PROCEDURE — 2500000003 HC RX 250 WO HCPCS

## 2018-12-13 PROCEDURE — 88305 TISSUE EXAM BY PATHOLOGIST: CPT

## 2018-12-13 PROCEDURE — 2500000003 HC RX 250 WO HCPCS: Performed by: NURSE ANESTHETIST, CERTIFIED REGISTERED

## 2018-12-13 PROCEDURE — 3700000000 HC ANESTHESIA ATTENDED CARE: Performed by: INTERNAL MEDICINE

## 2018-12-13 PROCEDURE — 2580000003 HC RX 258: Performed by: ANESTHESIOLOGY

## 2018-12-13 PROCEDURE — 7100000010 HC PHASE II RECOVERY - FIRST 15 MIN: Performed by: INTERNAL MEDICINE

## 2018-12-13 PROCEDURE — 3609010600 HC COLONOSCOPY POLYPECTOMY SNARE/COLD BIOPSY: Performed by: INTERNAL MEDICINE

## 2018-12-13 PROCEDURE — 6360000002 HC RX W HCPCS: Performed by: NURSE ANESTHETIST, CERTIFIED REGISTERED

## 2018-12-13 RX ORDER — PROPOFOL 10 MG/ML
INJECTION, EMULSION INTRAVENOUS PRN
Status: DISCONTINUED | OUTPATIENT
Start: 2018-12-13 | End: 2018-12-13 | Stop reason: SDUPTHER

## 2018-12-13 RX ORDER — LABETALOL HYDROCHLORIDE 5 MG/ML
INJECTION, SOLUTION INTRAVENOUS
Status: COMPLETED
Start: 2018-12-13 | End: 2018-12-13

## 2018-12-13 RX ORDER — LIDOCAINE HYDROCHLORIDE 20 MG/ML
INJECTION, SOLUTION INFILTRATION; PERINEURAL PRN
Status: DISCONTINUED | OUTPATIENT
Start: 2018-12-13 | End: 2018-12-13 | Stop reason: SDUPTHER

## 2018-12-13 RX ORDER — SODIUM CHLORIDE 9 MG/ML
INJECTION, SOLUTION INTRAVENOUS CONTINUOUS
Status: DISCONTINUED | OUTPATIENT
Start: 2018-12-13 | End: 2018-12-13 | Stop reason: HOSPADM

## 2018-12-13 RX ORDER — SODIUM CHLORIDE 0.9 % (FLUSH) 0.9 %
10 SYRINGE (ML) INJECTION PRN
Status: DISCONTINUED | OUTPATIENT
Start: 2018-12-13 | End: 2018-12-13 | Stop reason: HOSPADM

## 2018-12-13 RX ORDER — SODIUM CHLORIDE 0.9 % (FLUSH) 0.9 %
10 SYRINGE (ML) INJECTION EVERY 12 HOURS SCHEDULED
Status: DISCONTINUED | OUTPATIENT
Start: 2018-12-13 | End: 2018-12-13 | Stop reason: HOSPADM

## 2018-12-13 RX ORDER — LABETALOL HYDROCHLORIDE 5 MG/ML
5 INJECTION, SOLUTION INTRAVENOUS ONCE
Status: COMPLETED | OUTPATIENT
Start: 2018-12-13 | End: 2018-12-13

## 2018-12-13 RX ADMIN — LIDOCAINE HYDROCHLORIDE 50 MG: 20 INJECTION, SOLUTION INFILTRATION; PERINEURAL at 12:48

## 2018-12-13 RX ADMIN — SODIUM CHLORIDE: 9 INJECTION, SOLUTION INTRAVENOUS at 12:15

## 2018-12-13 RX ADMIN — LABETALOL HYDROCHLORIDE 5 MG: 5 INJECTION, SOLUTION INTRAVENOUS at 12:20

## 2018-12-13 RX ADMIN — LABETALOL HYDROCHLORIDE 5 MG: 5 INJECTION INTRAVENOUS at 12:20

## 2018-12-13 RX ADMIN — PROPOFOL 50 MG: 10 INJECTION, EMULSION INTRAVENOUS at 12:55

## 2018-12-13 RX ADMIN — PROPOFOL 90 MG: 10 INJECTION, EMULSION INTRAVENOUS at 12:48

## 2018-12-13 ASSESSMENT — PAIN - FUNCTIONAL ASSESSMENT: PAIN_FUNCTIONAL_ASSESSMENT: 0-10

## 2018-12-13 ASSESSMENT — PAIN SCALES - GENERAL
PAINLEVEL_OUTOF10: 0

## 2018-12-13 NOTE — ANESTHESIA PRE PROCEDURE
chloride flush 0.9 % injection 10 mL  10 mL Intravenous PRN Montes Minor MD        famotidine (PEPCID) injection 20 mg  20 mg Intravenous Once Montse Minor MD           Allergies:     Allergies   Allergen Reactions    No Known Allergies        Problem List:    Patient Active Problem List   Diagnosis Code    HTN (hypertension) I10    Osteoarthritis M19.90    GERD (gastroesophageal reflux disease) K21.9    Allergic rhinitis, seasonal J30.2    Hypertriglyceridemia E78.1    Needs flu shot Z23    Diabetic nephropathy- pos microalb 10/11, on ARB E11.21    Urge incontinence N39.41    Coronary artery disease, non-occlusive I25.10    History of paroxysmal supraventricular tachycardia- S/P ablation 6/13 Z86.79    S/P aortic valve replacement with porcine valve Z95.3    Type 2 diabetes mellitus with diabetic nephropathy, 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    BPH with obstruction/lower urinary tract symptoms N40.1, N13.8    Malignant neoplasm of descending colon ([facility]- 1/18 left colectomy, T3N0 C18.6    Left renal mass 2.3 cm enhancing by CT, stable, per hemonc N28.89    History of knee replacement, total, left V37.103       Past Medical History:        Diagnosis Date    Allergic rhinitis, seasonal     Aortic regurgitation     2/12 ECHO EF 60%, mod AI    CAD (coronary artery disease)     Cancer ([facility]     Colon polyps- last colon neg 11/06     Diabetic nephropathy- pos microalb 10/11, on ARB 10/5/2011    DM (diabetes mellitus), type 2 (HCC)     Enlarged prostate     GERD (gastroesophageal reflux disease)     Holosystolic murmur 9/2/6178    HTN (hypertension)     Hypertriglyceridemia     Osteoarthritis        Past Surgical History:        Procedure Laterality Date    ABLATION OF DYSRHYTHMIC FOCUS  2013    Dr. John Beck  2016    mosaic ultra porcine valve/medtronic    APPENDECTOMY  age 30    CATARACT REMOVAL Bilateral 11/2017   

## 2018-12-13 NOTE — H&P
[facility]   Pre-operative History and Physical    Patient: Eladio Munguia  : 1935  Acct#:         HISTORY OF PRESENT ILLNESS:    The patient is a 81 y.o. male  who presents with history of colon cancer  Past Medical History:        Diagnosis Date    Allergic rhinitis, seasonal     Aortic regurgitation      ECHO EF 60%, mod AI    CAD (coronary artery disease)     Cancer ([facility]     Colon polyps- last colon neg      Diabetic nephropathy- pos microalb 10/11, on ARB 10/5/2011    DM (diabetes mellitus), type 2 ([address]     Enlarged prostate     GERD (gastroesophageal reflux disease)     Holosystolic murmur 3/8/1997    HTN (hypertension)     Hypertriglyceridemia     Osteoarthritis      Past Surgical History:        Procedure Laterality Date    ABLATION OF DYSRHYTHMIC FOCUS      Dr. Kacie West      mosaic ultra porcine valve/medtronic    APPENDECTOMY  age 30    CATARACT REMOVAL Bilateral 2017    COLONOSCOPY  2017    EYE SURGERY      JOINT REPLACEMENT      TONSILLECTOMY AND ADENOIDECTOMY  age 10    TOTAL KNEE ARTHROPLASTY Left 2015    TURP  2017    cysto, green light lazer TURP     Medications Prior to Admission:   No current facility-administered medications on file prior to encounter. Current Outpatient Prescriptions on File Prior to Encounter   Medication Sig Dispense Refill    glimepiride (AMARYL) 4 MG tablet TAKE ONE TABLET BY MOUTH IN THE MORNING BEFORE BREAKFAST 90 tablet 1    diclofenac sodium 1 % GEL Apply 2 g topically 2 times daily 1 Tube 5    Calcium Carbonate Antacid (TUMS E-X PO) Take 2 tablets by mouth as needed      aspirin 81 MG EC tablet Take 81 mg by mouth daily.         lisinopril (PRINIVIL;ZESTRIL) 10 MG tablet Take 10 mg by mouth daily      simvastatin (ZOCOR) 20 MG tablet TAKE ONE TABLET BY MOUTH NIGHTLY 90 tablet 1    metFORMIN (GLUCOPHAGE-XR) 500 MG extended release tablet TAKE TWO TABLETS BY MOUTH IN

## 2019-03-04 DIAGNOSIS — E11.21 TYPE 2 DIABETES MELLITUS WITH DIABETIC NEPHROPATHY, WITHOUT LONG-TERM CURRENT USE OF INSULIN (HCC): ICD-10-CM

## 2019-03-04 DIAGNOSIS — E78.1 HYPERTRIGLYCERIDEMIA: ICD-10-CM

## 2019-03-05 RX ORDER — SIMVASTATIN 20 MG
TABLET ORAL
Qty: 90 TABLET | Refills: 1 | Status: SHIPPED | OUTPATIENT
Start: 2019-03-05 | End: 2019-07-03 | Stop reason: SDUPTHER

## 2019-03-05 RX ORDER — GLIMEPIRIDE 4 MG/1
TABLET ORAL
Qty: 90 TABLET | Refills: 1 | Status: SHIPPED | OUTPATIENT
Start: 2019-03-05 | End: 2019-07-03 | Stop reason: SDUPTHER

## 2019-03-20 ENCOUNTER — OFFICE VISIT (OUTPATIENT)
Dept: FAMILY MEDICINE CLINIC | Age: 84
End: 2019-03-20
Payer: MEDICARE

## 2019-03-20 VITALS
SYSTOLIC BLOOD PRESSURE: 152 MMHG | WEIGHT: 178 LBS | RESPIRATION RATE: 18 BRPM | TEMPERATURE: 97.8 F | OXYGEN SATURATION: 97 % | HEIGHT: 68 IN | BODY MASS INDEX: 26.98 KG/M2 | HEART RATE: 77 BPM | DIASTOLIC BLOOD PRESSURE: 100 MMHG

## 2019-03-20 DIAGNOSIS — I10 ESSENTIAL HYPERTENSION: Primary | ICD-10-CM

## 2019-03-20 DIAGNOSIS — N39.41 URGE INCONTINENCE: ICD-10-CM

## 2019-03-20 DIAGNOSIS — E11.21 TYPE 2 DIABETES MELLITUS WITH DIABETIC NEPHROPATHY, WITHOUT LONG-TERM CURRENT USE OF INSULIN (HCC): ICD-10-CM

## 2019-03-20 DIAGNOSIS — E78.1 HYPERTRIGLYCERIDEMIA: ICD-10-CM

## 2019-03-20 DIAGNOSIS — I25.10 CORONARY ARTERY DISEASE, NON-OCCLUSIVE: Chronic | ICD-10-CM

## 2019-03-20 LAB — HBA1C MFR BLD: 6.1 %

## 2019-03-20 PROCEDURE — 99214 OFFICE O/P EST MOD 30 MIN: CPT | Performed by: FAMILY MEDICINE

## 2019-03-20 PROCEDURE — 83036 HEMOGLOBIN GLYCOSYLATED A1C: CPT | Performed by: FAMILY MEDICINE

## 2019-03-20 RX ORDER — METFORMIN HYDROCHLORIDE 500 MG/1
TABLET, EXTENDED RELEASE ORAL
Qty: 270 TABLET | Refills: 1 | COMMUNITY
Start: 2019-03-20 | End: 2019-07-03 | Stop reason: SDUPTHER

## 2019-03-20 RX ORDER — LISINOPRIL 20 MG/1
20 TABLET ORAL DAILY
Qty: 90 TABLET | Refills: 3 | Status: SHIPPED | OUTPATIENT
Start: 2019-03-20 | End: 2019-07-03 | Stop reason: SDUPTHER

## 2019-03-20 RX ORDER — BLOOD-GLUCOSE METER
1 EACH MISCELLANEOUS DAILY
Qty: 1 KIT | Refills: 0 | Status: SHIPPED | OUTPATIENT
Start: 2019-03-20 | End: 2022-01-01

## 2019-03-20 RX ORDER — LANCETS
1 EACH MISCELLANEOUS DAILY
Qty: 100 EACH | Refills: 5 | Status: SHIPPED | OUTPATIENT
Start: 2019-03-20 | End: 2022-01-01

## 2019-03-20 ASSESSMENT — PATIENT HEALTH QUESTIONNAIRE - PHQ9
SUM OF ALL RESPONSES TO PHQ QUESTIONS 1-9: 0
SUM OF ALL RESPONSES TO PHQ9 QUESTIONS 1 & 2: 0
1. LITTLE INTEREST OR PLEASURE IN DOING THINGS: 0
SUM OF ALL RESPONSES TO PHQ QUESTIONS 1-9: 0
2. FEELING DOWN, DEPRESSED OR HOPELESS: 0

## 2019-07-03 ENCOUNTER — OFFICE VISIT (OUTPATIENT)
Dept: FAMILY MEDICINE CLINIC | Age: 84
End: 2019-07-03
Payer: MEDICARE

## 2019-07-03 VITALS
BODY MASS INDEX: 27.62 KG/M2 | SYSTOLIC BLOOD PRESSURE: 140 MMHG | DIASTOLIC BLOOD PRESSURE: 90 MMHG | HEIGHT: 67 IN | HEART RATE: 67 BPM | WEIGHT: 176 LBS | OXYGEN SATURATION: 98 %

## 2019-07-03 DIAGNOSIS — E11.21 DIABETIC NEPHROPATHY ASSOCIATED WITH TYPE 2 DIABETES MELLITUS (HCC): Chronic | ICD-10-CM

## 2019-07-03 DIAGNOSIS — I25.10 CORONARY ARTERY DISEASE, NON-OCCLUSIVE: Chronic | ICD-10-CM

## 2019-07-03 DIAGNOSIS — E11.21 TYPE 2 DIABETES MELLITUS WITH DIABETIC NEPHROPATHY, WITHOUT LONG-TERM CURRENT USE OF INSULIN (HCC): Primary | Chronic | ICD-10-CM

## 2019-07-03 DIAGNOSIS — E78.1 HYPERTRIGLYCERIDEMIA: Chronic | ICD-10-CM

## 2019-07-03 LAB
CHP ED QC CHECK: NORMAL
GLUCOSE BLD-MCNC: 148 MG/DL
HBA1C MFR BLD: 6.4 %

## 2019-07-03 PROCEDURE — 99214 OFFICE O/P EST MOD 30 MIN: CPT | Performed by: FAMILY MEDICINE

## 2019-07-03 PROCEDURE — 83036 HEMOGLOBIN GLYCOSYLATED A1C: CPT | Performed by: FAMILY MEDICINE

## 2019-07-03 PROCEDURE — 82962 GLUCOSE BLOOD TEST: CPT | Performed by: FAMILY MEDICINE

## 2019-07-03 RX ORDER — SIMVASTATIN 20 MG
TABLET ORAL
Qty: 90 TABLET | Refills: 3 | Status: SHIPPED | OUTPATIENT
Start: 2019-07-03 | End: 2020-10-05

## 2019-07-03 RX ORDER — METFORMIN HYDROCHLORIDE 500 MG/1
TABLET, EXTENDED RELEASE ORAL
Qty: 180 TABLET | Refills: 0 | Status: ON HOLD | OUTPATIENT
Start: 2019-07-03 | End: 2019-09-27 | Stop reason: HOSPADM

## 2019-07-03 RX ORDER — GLIMEPIRIDE 4 MG/1
TABLET ORAL
Qty: 90 TABLET | Refills: 1 | Status: SHIPPED | OUTPATIENT
Start: 2019-07-03 | End: 2020-01-17 | Stop reason: SDUPTHER

## 2019-07-03 RX ORDER — LISINOPRIL 20 MG/1
20 TABLET ORAL DAILY
Qty: 90 TABLET | Refills: 1 | Status: SHIPPED | OUTPATIENT
Start: 2019-07-03 | End: 2019-08-23 | Stop reason: SDUPTHER

## 2019-07-03 NOTE — PATIENT INSTRUCTIONS
MA: Please recheck blood pressure. Let me know if > 140/90 before they leave. Thanks! INSTRUCTIONS  NEXT APPOINTMENT: Please schedule annual complete physical (30 minutes) in 3 months. · PLEASE TAKE THIS FORM TO CHECK-OUT WINDOW TO SCHEDULE NEXT VISIT. · PLEASE GET BLOODWORK DRAWN TODAY ON FIRST FLOOR in 170. Take orders with you. RESULTS- most blood tests back in couple days. We will call you if any problems. If bloodwork good, you will get letter in mail or notified thru [address] (if signed up) within 2 weeks. If you do not, please call office. · Please get flu vaccine when available in fall. Can get either at this office or at stores such as [employer].

## 2019-07-05 DIAGNOSIS — I25.10 CORONARY ARTERY DISEASE, NON-OCCLUSIVE: Chronic | ICD-10-CM

## 2019-07-05 DIAGNOSIS — E78.1 HYPERTRIGLYCERIDEMIA: Chronic | ICD-10-CM

## 2019-07-05 DIAGNOSIS — E11.21 DIABETIC NEPHROPATHY ASSOCIATED WITH TYPE 2 DIABETES MELLITUS (HCC): Chronic | ICD-10-CM

## 2019-07-05 DIAGNOSIS — E11.21 TYPE 2 DIABETES MELLITUS WITH DIABETIC NEPHROPATHY, WITHOUT LONG-TERM CURRENT USE OF INSULIN (HCC): Chronic | ICD-10-CM

## 2019-07-05 LAB
A/G RATIO: 1.5 (ref 1.1–2.2)
ALBUMIN SERPL-MCNC: 4 G/DL (ref 3.4–5)
ALP BLD-CCNC: 77 U/L (ref 40–129)
ALT SERPL-CCNC: 11 U/L (ref 10–40)
ANION GAP SERPL CALCULATED.3IONS-SCNC: 9 MMOL/L (ref 3–16)
AST SERPL-CCNC: 13 U/L (ref 15–37)
BASOPHILS ABSOLUTE: 0 K/UL (ref 0–0.2)
BASOPHILS RELATIVE PERCENT: 0.5 %
BILIRUB SERPL-MCNC: 0.6 MG/DL (ref 0–1)
BUN BLDV-MCNC: 20 MG/DL (ref 7–20)
CALCIUM SERPL-MCNC: 9.2 MG/DL (ref 8.3–10.6)
CHLORIDE BLD-SCNC: 105 MMOL/L (ref 99–110)
CHOLESTEROL, TOTAL: 130 MG/DL (ref 0–199)
CO2: 26 MMOL/L (ref 21–32)
CREAT SERPL-MCNC: 1.3 MG/DL (ref 0.8–1.3)
EOSINOPHILS ABSOLUTE: 0.1 K/UL (ref 0–0.6)
EOSINOPHILS RELATIVE PERCENT: 1.2 %
GFR AFRICAN AMERICAN: >60
GFR NON-AFRICAN AMERICAN: 53
GLOBULIN: 2.7 G/DL
GLUCOSE BLD-MCNC: 138 MG/DL (ref 70–99)
HCT VFR BLD CALC: 44.9 % (ref 40.5–52.5)
HDLC SERPL-MCNC: 55 MG/DL (ref 40–60)
HEMOGLOBIN: 15.1 G/DL (ref 13.5–17.5)
LDL CHOLESTEROL CALCULATED: 55 MG/DL
LYMPHOCYTES ABSOLUTE: 1.7 K/UL (ref 1–5.1)
LYMPHOCYTES RELATIVE PERCENT: 29.5 %
MCH RBC QN AUTO: 30.3 PG (ref 26–34)
MCHC RBC AUTO-ENTMCNC: 33.6 G/DL (ref 31–36)
MCV RBC AUTO: 90.2 FL (ref 80–100)
MONOCYTES ABSOLUTE: 0.5 K/UL (ref 0–1.3)
MONOCYTES RELATIVE PERCENT: 8.4 %
NEUTROPHILS ABSOLUTE: 3.5 K/UL (ref 1.7–7.7)
NEUTROPHILS RELATIVE PERCENT: 60.4 %
PDW BLD-RTO: 14.1 % (ref 12.4–15.4)
PLATELET # BLD: 247 K/UL (ref 135–450)
PMV BLD AUTO: 8.9 FL (ref 5–10.5)
POTASSIUM SERPL-SCNC: 5.2 MMOL/L (ref 3.5–5.1)
RBC # BLD: 4.98 M/UL (ref 4.2–5.9)
SODIUM BLD-SCNC: 140 MMOL/L (ref 136–145)
TOTAL PROTEIN: 6.7 G/DL (ref 6.4–8.2)
TRIGL SERPL-MCNC: 102 MG/DL (ref 0–150)
VLDLC SERPL CALC-MCNC: 20 MG/DL
WBC # BLD: 5.9 K/UL (ref 4–11)

## 2019-08-12 ENCOUNTER — HOSPITAL ENCOUNTER (OUTPATIENT)
Dept: CT IMAGING | Age: 84
Discharge: HOME | End: 2019-08-12
Payer: MEDICARE

## 2019-08-12 DIAGNOSIS — C18.7 MALIGNANT NEOPLASM OF SIGMOID COLON (HCC): ICD-10-CM

## 2019-08-12 PROCEDURE — 74177 CT ABD & PELVIS W/CONTRAST: CPT

## 2019-08-12 PROCEDURE — 6360000004 HC RX CONTRAST MEDICATION: Performed by: INTERNAL MEDICINE

## 2019-08-12 RX ADMIN — IOHEXOL 50 ML: 240 INJECTION, SOLUTION INTRATHECAL; INTRAVASCULAR; INTRAVENOUS; ORAL at 10:10

## 2019-08-12 RX ADMIN — IOPAMIDOL 75 ML: 755 INJECTION, SOLUTION INTRAVENOUS at 11:10

## 2019-08-14 ENCOUNTER — TELEPHONE (OUTPATIENT)
Dept: FAMILY MEDICINE CLINIC | Age: 84
End: 2019-08-14

## 2019-08-14 LAB
BASOPHILS ABSOLUTE: 0.01 /ΜL
BASOPHILS RELATIVE PERCENT: 0.2 %
EOSINOPHILS ABSOLUTE: 0.11 /ΜL
EOSINOPHILS RELATIVE PERCENT: 2.2 %
HCT VFR BLD CALC: 46.2 % (ref 41–53)
HEMOGLOBIN: 15.3 G/DL (ref 13.5–17.5)
LYMPHOCYTES ABSOLUTE: 1.35 /ΜL
LYMPHOCYTES RELATIVE PERCENT: 27.4 %
MCH RBC QN AUTO: 30.1 PG
MCHC RBC AUTO-ENTMCNC: 33.1 G/DL
MCV RBC AUTO: 90.9 FL
MONOCYTES ABSOLUTE: 0.39 /ΜL
MONOCYTES RELATIVE PERCENT: 7.9 %
NEUTROPHILS ABSOLUTE: 3.07 /ΜL
NEUTROPHILS RELATIVE PERCENT: 62.3 %
PLATELET # BLD: 166 K/ΜL
PMV BLD AUTO: NORMAL FL
RBC # BLD: 5.08 10^6/ΜL
WBC # BLD: 4.9 10^3/ML

## 2019-08-14 NOTE — TELEPHONE ENCOUNTER
Pt called in regards to his bp running high. Dr Mary Gloria office called earlier giving all the information. see previous note. Pt wants to see dr Magnus Mccloud and pt is aware dr Magnus Mccloud is out.         Please advise

## 2019-08-14 NOTE — TELEPHONE ENCOUNTER
Spoke with pt he will bring in readings that he has been taking at home as well as his bp cuff.  Spoke with radiology and they said radiologist reviewed scans this morning and said there were no signs of AAA

## 2019-08-14 NOTE — TELEPHONE ENCOUNTER
Please have patient bring in his home blood sugar readings for my review at his appt. If he monitors his BP at home, he should also bring in his BP readings and also his BP cuff. Patient has history of AAA. Had recent CT scan and/pelvis and his AAA was not described. Please call radiology department and ask that they review the scan for his AAA to see if it has enlarged.

## 2019-08-15 ENCOUNTER — OFFICE VISIT (OUTPATIENT)
Dept: FAMILY MEDICINE CLINIC | Age: 84
End: 2019-08-15
Payer: MEDICARE

## 2019-08-15 VITALS
DIASTOLIC BLOOD PRESSURE: 110 MMHG | SYSTOLIC BLOOD PRESSURE: 165 MMHG | BODY MASS INDEX: 28.19 KG/M2 | HEART RATE: 92 BPM | WEIGHT: 180 LBS | RESPIRATION RATE: 18 BRPM

## 2019-08-15 DIAGNOSIS — I10 ESSENTIAL HYPERTENSION: ICD-10-CM

## 2019-08-15 DIAGNOSIS — R73.9 HYPERGLYCEMIA: ICD-10-CM

## 2019-08-15 DIAGNOSIS — E11.21 TYPE 2 DIABETES MELLITUS WITH DIABETIC NEPHROPATHY, WITHOUT LONG-TERM CURRENT USE OF INSULIN (HCC): Chronic | ICD-10-CM

## 2019-08-15 LAB
ANION GAP SERPL CALCULATED.3IONS-SCNC: 13 MMOL/L (ref 3–16)
BUN BLDV-MCNC: 20 MG/DL (ref 7–20)
CALCIUM SERPL-MCNC: 9.2 MG/DL (ref 8.3–10.6)
CHLORIDE BLD-SCNC: 102 MMOL/L (ref 99–110)
CHP ED QC CHECK: NORMAL
CO2: 25 MMOL/L (ref 21–32)
CREAT SERPL-MCNC: 1.1 MG/DL (ref 0.8–1.3)
GFR AFRICAN AMERICAN: >60
GFR NON-AFRICAN AMERICAN: >60
GLUCOSE BLD-MCNC: 129 MG/DL (ref 70–99)
GLUCOSE BLD-MCNC: 141 MG/DL
POTASSIUM SERPL-SCNC: 5.3 MMOL/L (ref 3.5–5.1)
SODIUM BLD-SCNC: 140 MMOL/L (ref 136–145)

## 2019-08-15 PROCEDURE — 99214 OFFICE O/P EST MOD 30 MIN: CPT | Performed by: NURSE PRACTITIONER

## 2019-08-15 PROCEDURE — 82962 GLUCOSE BLOOD TEST: CPT | Performed by: NURSE PRACTITIONER

## 2019-08-15 RX ORDER — AMLODIPINE BESYLATE 5 MG/1
5 TABLET ORAL DAILY
Qty: 30 TABLET | Refills: 0 | Status: SHIPPED | OUTPATIENT
Start: 2019-08-15 | End: 2019-08-23 | Stop reason: SDUPTHER

## 2019-08-15 ASSESSMENT — ENCOUNTER SYMPTOMS
SHORTNESS OF BREATH: 0
NAUSEA: 0
CHEST TIGHTNESS: 0
VOMITING: 0
DIARRHEA: 0
CONSTIPATION: 0
ABDOMINAL PAIN: 0
COUGH: 0

## 2019-08-15 NOTE — PROGRESS NOTES
CHOL 130 07/05/2019    TRIG 102 07/05/2019    HDL 55 07/05/2019    LDLCALC 55 07/05/2019        Patient Active Problem List   Diagnosis    Hypertension, essential    Osteoarthritis    GERD (gastroesophageal reflux disease)    Allergic rhinitis, seasonal    Hypertriglyceridemia    Needs flu shot    [facility]    Urge incontinence    Coronary artery disease, non-occlusive    History of paroxysmal supraventricular tachycardia- S/P ablation 6/13    S/P aortic valve replacement with porcine valve    Type 2 diabetes mellitus with diabetic nephropathy, without long-term current use of insulin (HCC)    BPH with obstruction/lower urinary tract symptoms    Malignant neoplasm of descending colon ([facility]- 1/18 left colectomy, T3N0    Left renal mass 2.3 cm enhancing by CT, stable, per hemonc    History of knee replacement, total, left          Current Outpatient Medications   Medication Sig Dispense Refill    amLODIPine (NORVASC) 5 MG tablet Take 1 tablet by mouth daily 30 tablet 0    metFORMIN (GLUCOPHAGE-XR) 500 MG extended release tablet TAKE TWO TABLETS BY MOUTH IN THE MORNING 180 tablet 0    glimepiride (AMARYL) 4 MG tablet TAKE 1 TABLET BY MOUTH IN THE MORNING BEFORE BREAKFAST 90 tablet 1    simvastatin (ZOCOR) 20 MG tablet TAKE 1 TABLET BY MOUTH NIGHTLY 90 tablet 3    lisinopril (PRINIVIL;ZESTRIL) 20 MG tablet Take 1 tablet by mouth daily 90 tablet 1    LIFESCAN UNISTIK II LANCETS MISC 1 each by Does not apply route daily 100 each 5    blood glucose test strips (ASCENSIA AUTODISC VI;ONE TOUCH ULTRA TEST VI) strip Daily or as needed.  100 strip 3    Blood Glucose Monitoring Suppl (ONE TOUCH ULTRA 2) w/Device KIT 1 kit by Does not apply route daily 1 kit 0    Lancet Devices MISC 1 x daily for the Lifescan lancets 1 each 0    diclofenac sodium 1 % GEL Apply 2 g topically 2 times daily 1 Tube 5    Calcium Carbonate Antacid (TUMS E-X PO) Take 2 tablets by mouth as needed      aspirin 81 MG EC tablet Take 81 mg by mouth daily. No current facility-administered medications for this visit. Allergies   Allergen Reactions    No Known Allergies        Review of Systems   Constitutional: Negative for chills, fatigue and fever. HENT: Negative for congestion. Eyes: Negative for visual disturbance. Respiratory: Negative for cough, chest tightness and shortness of breath. Cardiovascular: Negative for chest pain, palpitations and leg swelling. Gastrointestinal: Negative for abdominal pain, constipation, diarrhea, nausea and vomiting. Endocrine: Negative for polydipsia, polyphagia and polyuria. Genitourinary: Negative for difficulty urinating. Musculoskeletal: Negative for arthralgias and myalgias. Neurological: Negative for dizziness, syncope, weakness, light-headedness and headaches. Vitals:    08/15/19 1521 08/15/19 1540   BP: (!) 150/90 (!) 165/110   Site: Right Upper Arm    Position: Sitting    Cuff Size: Medium Adult    Pulse: 92    Resp: 18    Weight: 180 lb (81.6 kg)        Body mass index is 28.19 kg/m². Wt Readings from Last 3 Encounters:   08/15/19 180 lb (81.6 kg)   07/03/19 176 lb (79.8 kg)   03/20/19 178 lb (80.7 kg)       BP Readings from Last 3 Encounters:   08/15/19 (!) 165/110   07/03/19 (!) 140/90   03/20/19 (!) 152/100       Physical Exam   Constitutional: He is oriented to person, place, and time. He appears well-developed and well-nourished. No distress. HENT:   Head: Normocephalic and atraumatic. Eyes: EOM are normal.   Neck: Neck supple. Cardiovascular: Normal rate, regular rhythm and normal heart sounds. Exam reveals no gallop and no friction rub. No murmur heard. Pulmonary/Chest: Effort normal and breath sounds normal. No respiratory distress. Musculoskeletal: He exhibits no edema. Neurological: He is alert and oriented to person, place, and time. Skin: Skin is warm and dry. Psychiatric: He has a normal mood and affect. His behavior is normal. Judgment and thought content normal.   Nursing note and vitals reviewed. [address] was seen today for high blood pressure and blood sugar readings that were detected yesterday during an office visit with Dr. Yun Anderson. His readings were 167/112 and then 161/83 with a blood sugar of 333. History/physical were performed an were unremarkable. Blood sugar drawn in office. Diagnoses and all orders for this visit:    Hyperglycemia/ DM   -     POCT Glucose: 141  -     Continue Metformin 500mg twice in the morning, Glimepiride 4mg daily for diabetes maintenance. Diabetes was controlled per last HgA1c of 6.4% on 7/3/19.   -     Take fasting blood sugar readings daily. Should call with results in a week. -     Work on low carb diet     Essential hypertension  -     Basic Metabolic Panel; Future  -     amLODIPine (NORVASC) 5 MG tablet; Take 1 tablet by mouth daily. Side effects of medication discussed. -     Continue taking Lisinopril 20mg daily with monitoring of potassium levels. Last level checked was 5.2, may need to decrease dose if potassium is too high  -     Take blood pressures throughout the week and return to  the office with the results in one week. - Has appt with cardiologist Dr. Melody Boyle next week and BP will be rechecked. Prominence of ascending aorta   -    CT scan in 8/2018 found prominence of the ascending aorta with diameter of 4.5cm but was not mentioned on the CT chest on 8/12/2019. Radiology was asked to look at CT scan again, but they did not read abnormality.   -    Continue Follow-up with cardiologist Dr. Melody Boyle     Patient seen along with ARCELIA Walker student     Return in about 1 week (around 8/22/2019), or if symptoms worsen or fail to improve, for blood pressure, with Dr. Uche Lubin.

## 2019-08-16 ENCOUNTER — TELEPHONE (OUTPATIENT)
Dept: FAMILY MEDICINE CLINIC | Age: 84
End: 2019-08-16

## 2019-08-16 DIAGNOSIS — E87.5 HYPERKALEMIA: Primary | ICD-10-CM

## 2019-08-16 NOTE — TELEPHONE ENCOUNTER
Pt is returning call from [address] regarding his lab results  Pt will be home rest of the day  Please advise

## 2019-08-23 ENCOUNTER — OFFICE VISIT (OUTPATIENT)
Dept: FAMILY MEDICINE CLINIC | Age: 84
End: 2019-08-23
Payer: MEDICARE

## 2019-08-23 VITALS
SYSTOLIC BLOOD PRESSURE: 138 MMHG | DIASTOLIC BLOOD PRESSURE: 82 MMHG | HEART RATE: 78 BPM | RESPIRATION RATE: 16 BRPM | BODY MASS INDEX: 27.94 KG/M2 | WEIGHT: 178 LBS | HEIGHT: 67 IN

## 2019-08-23 DIAGNOSIS — I25.10 CORONARY ARTERY DISEASE, NON-OCCLUSIVE: ICD-10-CM

## 2019-08-23 DIAGNOSIS — E87.5 HYPERKALEMIA: ICD-10-CM

## 2019-08-23 DIAGNOSIS — E11.21 DIABETIC NEPHROPATHY ASSOCIATED WITH TYPE 2 DIABETES MELLITUS (HCC): ICD-10-CM

## 2019-08-23 DIAGNOSIS — I10 ESSENTIAL HYPERTENSION: Primary | ICD-10-CM

## 2019-08-23 DIAGNOSIS — E11.21 TYPE 2 DIABETES MELLITUS WITH DIABETIC NEPHROPATHY, WITHOUT LONG-TERM CURRENT USE OF INSULIN (HCC): ICD-10-CM

## 2019-08-23 PROCEDURE — 99214 OFFICE O/P EST MOD 30 MIN: CPT | Performed by: FAMILY MEDICINE

## 2019-08-23 RX ORDER — LISINOPRIL 40 MG/1
40 TABLET ORAL DAILY
Qty: 90 TABLET | Refills: 1 | Status: SHIPPED | OUTPATIENT
Start: 2019-08-23 | End: 2019-10-08 | Stop reason: DRUGHIGH

## 2019-08-23 RX ORDER — AMLODIPINE BESYLATE 5 MG/1
5 TABLET ORAL DAILY
Qty: 90 TABLET | Refills: 1 | Status: ON HOLD | OUTPATIENT
Start: 2019-08-23 | End: 2019-09-27 | Stop reason: HOSPADM

## 2019-09-18 ENCOUNTER — HOSPITAL ENCOUNTER (INPATIENT)
Age: 84
LOS: 10 days | Discharge: HOME HEALTH | DRG: 560 | End: 2019-09-28
Attending: PHYSICAL MEDICINE & REHABILITATION | Admitting: PHYSICAL MEDICINE & REHABILITATION
Payer: MEDICARE

## 2019-09-18 DIAGNOSIS — S72.002S CLOSED LEFT HIP FRACTURE, SEQUELA: Primary | ICD-10-CM

## 2019-09-18 LAB
GLUCOSE BLD-MCNC: 284 MG/DL (ref 70–99)
GLUCOSE BLD-MCNC: 284 MG/DL (ref 70–99)
PERFORMED ON: ABNORMAL
PERFORMED ON: ABNORMAL

## 2019-09-18 PROCEDURE — 1280000000 HC REHAB R&B

## 2019-09-18 PROCEDURE — 94760 N-INVAS EAR/PLS OXIMETRY 1: CPT

## 2019-09-18 PROCEDURE — 6370000000 HC RX 637 (ALT 250 FOR IP): Performed by: PHYSICAL MEDICINE & REHABILITATION

## 2019-09-18 RX ORDER — GLIMEPIRIDE 1 MG/1
4 TABLET ORAL
Status: DISCONTINUED | OUTPATIENT
Start: 2019-09-19 | End: 2019-09-28 | Stop reason: HOSPADM

## 2019-09-18 RX ORDER — CETIRIZINE HYDROCHLORIDE 10 MG/1
10 TABLET ORAL DAILY
Status: DISCONTINUED | OUTPATIENT
Start: 2019-09-19 | End: 2019-09-28 | Stop reason: HOSPADM

## 2019-09-18 RX ORDER — ASPIRIN 81 MG/1
81 TABLET ORAL DAILY
Status: DISCONTINUED | OUTPATIENT
Start: 2019-09-19 | End: 2019-09-28 | Stop reason: HOSPADM

## 2019-09-18 RX ORDER — PROPAFENONE HYDROCHLORIDE 150 MG/1
150 TABLET, FILM COATED ORAL 2 TIMES DAILY
Status: DISCONTINUED | OUTPATIENT
Start: 2019-09-18 | End: 2019-09-28 | Stop reason: HOSPADM

## 2019-09-18 RX ORDER — METOPROLOL TARTRATE 50 MG/1
50 TABLET, FILM COATED ORAL 2 TIMES DAILY
Status: ON HOLD | COMMUNITY
End: 2019-09-27 | Stop reason: SDUPTHER

## 2019-09-18 RX ORDER — LISINOPRIL 40 MG/1
40 TABLET ORAL DAILY
Status: DISCONTINUED | OUTPATIENT
Start: 2019-09-19 | End: 2019-09-28 | Stop reason: HOSPADM

## 2019-09-18 RX ORDER — HYDROCODONE BITARTRATE AND ACETAMINOPHEN 5; 325 MG/1; MG/1
1 TABLET ORAL EVERY 4 HOURS PRN
Status: DISCONTINUED | OUTPATIENT
Start: 2019-09-18 | End: 2019-09-28 | Stop reason: HOSPADM

## 2019-09-18 RX ORDER — HYDROCODONE BITARTRATE AND ACETAMINOPHEN 5; 325 MG/1; MG/1
2 TABLET ORAL EVERY 4 HOURS PRN
Status: DISCONTINUED | OUTPATIENT
Start: 2019-09-18 | End: 2019-09-28 | Stop reason: HOSPADM

## 2019-09-18 RX ORDER — METOPROLOL TARTRATE 50 MG/1
50 TABLET, FILM COATED ORAL 2 TIMES DAILY
Status: DISCONTINUED | OUTPATIENT
Start: 2019-09-18 | End: 2019-09-28 | Stop reason: HOSPADM

## 2019-09-18 RX ORDER — DEXTROSE MONOHYDRATE 25 G/50ML
12.5 INJECTION, SOLUTION INTRAVENOUS PRN
Status: DISCONTINUED | OUTPATIENT
Start: 2019-09-18 | End: 2019-09-28 | Stop reason: HOSPADM

## 2019-09-18 RX ORDER — NICOTINE POLACRILEX 4 MG
15 LOZENGE BUCCAL PRN
Status: DISCONTINUED | OUTPATIENT
Start: 2019-09-18 | End: 2019-09-28 | Stop reason: HOSPADM

## 2019-09-18 RX ORDER — ACETAMINOPHEN 325 MG/1
650 TABLET ORAL EVERY 4 HOURS PRN
Status: DISCONTINUED | OUTPATIENT
Start: 2019-09-18 | End: 2019-09-28 | Stop reason: HOSPADM

## 2019-09-18 RX ORDER — PROPAFENONE HYDROCHLORIDE 150 MG/1
150 TABLET, FILM COATED ORAL 2 TIMES DAILY
Status: ON HOLD | COMMUNITY
End: 2019-09-27 | Stop reason: SDUPTHER

## 2019-09-18 RX ORDER — METFORMIN HYDROCHLORIDE 500 MG/1
1000 TABLET, EXTENDED RELEASE ORAL
Status: DISCONTINUED | OUTPATIENT
Start: 2019-09-19 | End: 2019-09-28 | Stop reason: HOSPADM

## 2019-09-18 RX ORDER — AMLODIPINE BESYLATE 5 MG/1
5 TABLET ORAL DAILY
Status: DISCONTINUED | OUTPATIENT
Start: 2019-09-19 | End: 2019-09-22

## 2019-09-18 RX ORDER — DEXTROSE MONOHYDRATE 50 MG/ML
100 INJECTION, SOLUTION INTRAVENOUS PRN
Status: DISCONTINUED | OUTPATIENT
Start: 2019-09-18 | End: 2019-09-28 | Stop reason: HOSPADM

## 2019-09-18 RX ORDER — BISACODYL 10 MG
10 SUPPOSITORY, RECTAL RECTAL DAILY PRN
Status: DISCONTINUED | OUTPATIENT
Start: 2019-09-18 | End: 2019-09-28 | Stop reason: HOSPADM

## 2019-09-18 RX ORDER — SENNA AND DOCUSATE SODIUM 50; 8.6 MG/1; MG/1
2 TABLET, FILM COATED ORAL 2 TIMES DAILY
Status: DISCONTINUED | OUTPATIENT
Start: 2019-09-18 | End: 2019-09-28 | Stop reason: HOSPADM

## 2019-09-18 RX ORDER — CALCIUM CARBONATE 200(500)MG
500 TABLET,CHEWABLE ORAL 3 TIMES DAILY PRN
Status: DISCONTINUED | OUTPATIENT
Start: 2019-09-18 | End: 2019-09-28 | Stop reason: HOSPADM

## 2019-09-18 RX ORDER — ONDANSETRON 4 MG/1
4 TABLET, FILM COATED ORAL EVERY 8 HOURS PRN
Status: DISCONTINUED | OUTPATIENT
Start: 2019-09-18 | End: 2019-09-28 | Stop reason: HOSPADM

## 2019-09-18 RX ORDER — ATORVASTATIN CALCIUM 20 MG/1
20 TABLET, FILM COATED ORAL NIGHTLY
Status: ON HOLD | COMMUNITY
End: 2019-09-27 | Stop reason: HOSPADM

## 2019-09-18 RX ORDER — LORATADINE 10 MG/1
10 TABLET ORAL DAILY
COMMUNITY
End: 2020-10-21

## 2019-09-18 RX ORDER — SIMVASTATIN 20 MG
20 TABLET ORAL NIGHTLY
Status: DISCONTINUED | OUTPATIENT
Start: 2019-09-18 | End: 2019-09-28 | Stop reason: HOSPADM

## 2019-09-18 RX ORDER — POLYETHYLENE GLYCOL 3350 17 G/17G
17 POWDER, FOR SOLUTION ORAL DAILY
Status: DISCONTINUED | OUTPATIENT
Start: 2019-09-19 | End: 2019-09-28 | Stop reason: HOSPADM

## 2019-09-18 RX ADMIN — INSULIN LISPRO 2 UNITS: 100 INJECTION, SOLUTION INTRAVENOUS; SUBCUTANEOUS at 21:24

## 2019-09-18 RX ADMIN — PROPAFENONE HYDROCHLORIDE 150 MG: 150 TABLET, COATED ORAL at 21:19

## 2019-09-18 RX ADMIN — HYDROCODONE BITARTRATE AND ACETAMINOPHEN 2 TABLET: 5; 325 TABLET ORAL at 21:26

## 2019-09-18 RX ADMIN — DICLOFENAC 2 G: 10 GEL TOPICAL at 21:19

## 2019-09-18 RX ADMIN — INSULIN LISPRO 3 UNITS: 100 INJECTION, SOLUTION INTRAVENOUS; SUBCUTANEOUS at 18:12

## 2019-09-18 RX ADMIN — METOPROLOL TARTRATE 50 MG: 50 TABLET ORAL at 21:19

## 2019-09-18 RX ADMIN — SIMVASTATIN 20 MG: 20 TABLET, FILM COATED ORAL at 21:19

## 2019-09-18 ASSESSMENT — PAIN DESCRIPTION - LOCATION
LOCATION: LEG
LOCATION: LEG

## 2019-09-18 ASSESSMENT — PAIN DESCRIPTION - PAIN TYPE
TYPE: SURGICAL PAIN
TYPE: SURGICAL PAIN

## 2019-09-18 ASSESSMENT — PAIN SCALES - GENERAL
PAINLEVEL_OUTOF10: 6
PAINLEVEL_OUTOF10: 6
PAINLEVEL_OUTOF10: 7
PAINLEVEL_OUTOF10: 0

## 2019-09-18 ASSESSMENT — PAIN DESCRIPTION - DESCRIPTORS: DESCRIPTORS: ACHING

## 2019-09-18 ASSESSMENT — PAIN DESCRIPTION - ORIENTATION
ORIENTATION: LEFT
ORIENTATION: LEFT

## 2019-09-18 NOTE — PROGRESS NOTES
Department [facility]  Dr. Higinio Quintana Progress Note  9/18/2019  11:51 AM    Patient Name:   Mikael Aguirre  YOB: 1935    Diagnosis: left hip fracture, syncope        Subjective: Patient is an 81-year old male who was in the [facility] and had a syncopal episode due to sudden onset of A. Fib with a rapid heart rate of 180. Patient fell down and landed on his left hip area, developing pain in the left hip. Patient was taken to the hospital and found to have A. Fib and a Left hip fracture. Patient was taken to surgery and had IM nailing of his left hip fracture for repair on 9/6/19. He is now toe-touch weight bearing on left leg. Patient was seen by cardiology, and had an EP study, with eventual ablation pprocedure. The patient also was known to have diabetes, hypertension, and CAD with AVR 5 years ago. Patient was started in therapies, but needs more therapy before discharge to home safely. Patient is mod to max assist and therapies for transfers, gait and ADLs. Patient lives here in [place], and was independent with his ADLs, and lives with his wife. Patient is admitted today from the [facility] .     There were no vitals taken for this visit. Last 24 hour lab  No results found for this or any previous visit (from the past 24 hour(s)). Plan: Will plan to admit our rehabilitation unit today.       Dr. Higinio Quintana

## 2019-09-19 LAB
ANION GAP SERPL CALCULATED.3IONS-SCNC: 11 MMOL/L (ref 3–16)
BUN BLDV-MCNC: 27 MG/DL (ref 7–20)
CALCIUM SERPL-MCNC: 8.6 MG/DL (ref 8.3–10.6)
CHLORIDE BLD-SCNC: 99 MMOL/L (ref 99–110)
CO2: 25 MMOL/L (ref 21–32)
CREAT SERPL-MCNC: 1.3 MG/DL (ref 0.8–1.3)
GFR AFRICAN AMERICAN: >60
GFR NON-AFRICAN AMERICAN: 53
GLUCOSE BLD-MCNC: 145 MG/DL (ref 70–99)
GLUCOSE BLD-MCNC: 158 MG/DL (ref 70–99)
GLUCOSE BLD-MCNC: 178 MG/DL (ref 70–99)
GLUCOSE BLD-MCNC: 214 MG/DL (ref 70–99)
GLUCOSE BLD-MCNC: 250 MG/DL (ref 70–99)
HCT VFR BLD CALC: 30.7 % (ref 40.5–52.5)
HEMOGLOBIN: 10.7 G/DL (ref 13.5–17.5)
MAGNESIUM: 2 MG/DL (ref 1.8–2.4)
MCH RBC QN AUTO: 31.8 PG (ref 26–34)
MCHC RBC AUTO-ENTMCNC: 34.8 G/DL (ref 31–36)
MCV RBC AUTO: 91.3 FL (ref 80–100)
PDW BLD-RTO: 15.7 % (ref 12.4–15.4)
PERFORMED ON: ABNORMAL
PLATELET # BLD: 298 K/UL (ref 135–450)
PMV BLD AUTO: 8 FL (ref 5–10.5)
POTASSIUM REFLEX MAGNESIUM: 4.3 MMOL/L (ref 3.5–5.1)
RBC # BLD: 3.37 M/UL (ref 4.2–5.9)
SODIUM BLD-SCNC: 135 MMOL/L (ref 136–145)
WBC # BLD: 7.6 K/UL (ref 4–11)

## 2019-09-19 PROCEDURE — 1280000000 HC REHAB R&B

## 2019-09-19 PROCEDURE — 97162 PT EVAL MOD COMPLEX 30 MIN: CPT | Performed by: PHYSICAL THERAPIST

## 2019-09-19 PROCEDURE — 6370000000 HC RX 637 (ALT 250 FOR IP): Performed by: PHYSICAL MEDICINE & REHABILITATION

## 2019-09-19 PROCEDURE — 85027 COMPLETE CBC AUTOMATED: CPT

## 2019-09-19 PROCEDURE — 80048 BASIC METABOLIC PNL TOTAL CA: CPT

## 2019-09-19 PROCEDURE — 36415 COLL VENOUS BLD VENIPUNCTURE: CPT

## 2019-09-19 PROCEDURE — 97167 OT EVAL HIGH COMPLEX 60 MIN: CPT

## 2019-09-19 PROCEDURE — 6360000002 HC RX W HCPCS: Performed by: PHYSICAL MEDICINE & REHABILITATION

## 2019-09-19 PROCEDURE — 97116 GAIT TRAINING THERAPY: CPT | Performed by: PHYSICAL THERAPIST

## 2019-09-19 PROCEDURE — 97530 THERAPEUTIC ACTIVITIES: CPT | Performed by: PHYSICAL THERAPIST

## 2019-09-19 PROCEDURE — 97535 SELF CARE MNGMENT TRAINING: CPT

## 2019-09-19 PROCEDURE — 97110 THERAPEUTIC EXERCISES: CPT | Performed by: PHYSICAL THERAPIST

## 2019-09-19 PROCEDURE — 83735 ASSAY OF MAGNESIUM: CPT

## 2019-09-19 PROCEDURE — 97530 THERAPEUTIC ACTIVITIES: CPT

## 2019-09-19 PROCEDURE — 94760 N-INVAS EAR/PLS OXIMETRY 1: CPT

## 2019-09-19 RX ADMIN — INSULIN LISPRO 1 UNITS: 100 INJECTION, SOLUTION INTRAVENOUS; SUBCUTANEOUS at 17:48

## 2019-09-19 RX ADMIN — AMLODIPINE BESYLATE 5 MG: 5 TABLET ORAL at 07:45

## 2019-09-19 RX ADMIN — INSULIN LISPRO 1 UNITS: 100 INJECTION, SOLUTION INTRAVENOUS; SUBCUTANEOUS at 20:45

## 2019-09-19 RX ADMIN — METOPROLOL TARTRATE 50 MG: 50 TABLET ORAL at 20:36

## 2019-09-19 RX ADMIN — DICLOFENAC 2 G: 10 GEL TOPICAL at 07:47

## 2019-09-19 RX ADMIN — GLIMEPIRIDE 4 MG: 1 TABLET ORAL at 07:46

## 2019-09-19 RX ADMIN — HYDROCODONE BITARTRATE AND ACETAMINOPHEN 2 TABLET: 5; 325 TABLET ORAL at 12:58

## 2019-09-19 RX ADMIN — PROPAFENONE HYDROCHLORIDE 150 MG: 150 TABLET, COATED ORAL at 20:45

## 2019-09-19 RX ADMIN — PROPAFENONE HYDROCHLORIDE 150 MG: 150 TABLET, COATED ORAL at 07:46

## 2019-09-19 RX ADMIN — ENOXAPARIN SODIUM 40 MG: 40 INJECTION SUBCUTANEOUS at 07:46

## 2019-09-19 RX ADMIN — SIMVASTATIN 20 MG: 20 TABLET, FILM COATED ORAL at 20:37

## 2019-09-19 RX ADMIN — SENNOSIDES, DOCUSATE SODIUM 2 TABLET: 50; 8.6 TABLET, FILM COATED ORAL at 07:45

## 2019-09-19 RX ADMIN — INSULIN LISPRO 3 UNITS: 100 INJECTION, SOLUTION INTRAVENOUS; SUBCUTANEOUS at 12:58

## 2019-09-19 RX ADMIN — CETIRIZINE HYDROCHLORIDE 10 MG: 10 TABLET, FILM COATED ORAL at 07:46

## 2019-09-19 RX ADMIN — INSULIN LISPRO 1 UNITS: 100 INJECTION, SOLUTION INTRAVENOUS; SUBCUTANEOUS at 07:47

## 2019-09-19 RX ADMIN — METFORMIN HYDROCHLORIDE 1000 MG: 500 TABLET, EXTENDED RELEASE ORAL at 07:46

## 2019-09-19 RX ADMIN — HYDROCODONE BITARTRATE AND ACETAMINOPHEN 2 TABLET: 5; 325 TABLET ORAL at 07:46

## 2019-09-19 RX ADMIN — ASPIRIN 81 MG: 81 TABLET, COATED ORAL at 07:46

## 2019-09-19 RX ADMIN — LISINOPRIL 40 MG: 40 TABLET ORAL at 07:45

## 2019-09-19 RX ADMIN — POLYETHYLENE GLYCOL 3350 17 G: 17 POWDER, FOR SOLUTION ORAL at 07:46

## 2019-09-19 RX ADMIN — HYDROCODONE BITARTRATE AND ACETAMINOPHEN 1 TABLET: 5; 325 TABLET ORAL at 20:37

## 2019-09-19 RX ADMIN — METOPROLOL TARTRATE 50 MG: 50 TABLET ORAL at 07:46

## 2019-09-19 ASSESSMENT — PAIN SCALES - GENERAL
PAINLEVEL_OUTOF10: 7
PAINLEVEL_OUTOF10: 5
PAINLEVEL_OUTOF10: 0
PAINLEVEL_OUTOF10: 2
PAINLEVEL_OUTOF10: 7

## 2019-09-19 ASSESSMENT — PAIN DESCRIPTION - ONSET
ONSET: ON-GOING

## 2019-09-19 ASSESSMENT — PAIN DESCRIPTION - PROGRESSION
CLINICAL_PROGRESSION: NOT CHANGED

## 2019-09-19 ASSESSMENT — PAIN DESCRIPTION - DESCRIPTORS
DESCRIPTORS: ACHING
DESCRIPTORS: ACHING;DISCOMFORT
DESCRIPTORS: ACHING

## 2019-09-19 ASSESSMENT — PAIN DESCRIPTION - FREQUENCY
FREQUENCY: CONTINUOUS
FREQUENCY: CONTINUOUS
FREQUENCY: INTERMITTENT

## 2019-09-19 ASSESSMENT — PAIN DESCRIPTION - LOCATION
LOCATION: HIP;LEG
LOCATION: LEG
LOCATION: LEG

## 2019-09-19 ASSESSMENT — PAIN - FUNCTIONAL ASSESSMENT
PAIN_FUNCTIONAL_ASSESSMENT: ACTIVITIES ARE NOT PREVENTED
PAIN_FUNCTIONAL_ASSESSMENT: PREVENTS OR INTERFERES SOME ACTIVE ACTIVITIES AND ADLS
PAIN_FUNCTIONAL_ASSESSMENT: PREVENTS OR INTERFERES SOME ACTIVE ACTIVITIES AND ADLS

## 2019-09-19 ASSESSMENT — PAIN DESCRIPTION - ORIENTATION
ORIENTATION: LEFT

## 2019-09-19 ASSESSMENT — PAIN DESCRIPTION - PAIN TYPE
TYPE: ACUTE PAIN;SURGICAL PAIN
TYPE: SURGICAL PAIN
TYPE: ACUTE PAIN;SURGICAL PAIN

## 2019-09-19 NOTE — PLAN OF CARE
[facility]U PATIENT TREATMENT PLAN  [facility]   [address]  [phone]    Negrita Risk    : 1935  Acct #: [de-identified]  MRN: [id number]   PHYSICIAN:  Mariela Gomez MD    Rehabilitation Diagnosis:    Left hip Fracture-  IM nailing of his left hip fracture for repair on 19, toe-touch weight bearing left leg     Diabetes- metformin, Amaryl , SSI     HLD- Zocor      Hypertension- lisinopril, Lopressor     CAD with AVR 5 years ago, new A. Fib- Ablation, rhythmol, Lopressor     Bowels: Schedule Miralax + Senna S. Follow bowel movements. Enema or suppository if needed.      Bladder: Check PVR x 3. [address] if PVR > 200ml or if any volume is > 500 ml.      Pain: Norco is ordered prn. ADMIT DATE:2019    Patient Goals: To return home with wife    Admitting Impairments: Decreased functional mobility ; Decreased safe awareness;Decreased balance;Decreased endurance;Decreased high-level IADLs;Decreased ADL status  Barriers:   Bilevel house with lots of steps, TTWB status, decreased strength, decreased endurance, decreased balance,  medical comorbidities   Participation: prior to admission patient lived with wife, was independent and enjoyed traveling, woodwork and [employer]       CARE PLAN     NURSING:  [place] while on this unit will:     [x] Be continent of bowel and bladder     [x] Have an adequate number of bowel movements  [x] Urinate with no urinary retention >300ml in bladder  [] Complete bladder protocol with stallings removal  [x] Maintain O2 SATs at 92%  [x] Have pain managed while on ARU       [] Be pain free by discharge   [x] Have no skin breakdown while on ARU  [] Have improved skin integrity via wound measurements  [x] Have no signs/symptoms of infection at the incision site  [x] Be free from injury during hospitalization   [x] Complete education with patient/family with understanding demonstrated for:  [x] Adjustment   [x] Other: limited weight bearing left hip, hip safety, diabetic needs   Nursing interventions may include bowel/bladder training, education for medical assistive devices, medication education, O2 saturation management, energy conservation, stress management techniques, fall prevention, alarms protocol, seating and positioning, skin/wound care, pressure relief instruction,dressing changes,  infection protection, DVT prophylaxis, and/or assistance with in room safety with transfers to bed, toilet, wheelchair, shower as well as bathroom activities and hygiene. Patient/caregiver education for:   [x] Disease/sustained injury/management      [x] Medication Use   [x] Surgical intervention   [x] Safety   [x] Body mechanics and or joint protection   [x] Health maintenance         PHYSICAL THERAPY:  Goals:                  Short term goals  Time Frame for Short term goals: 7-10 days  Short term goal 1: Bed mobility with Mod I  Short term goal 2: Transfers TTWB LLE with Mod I  Short term goal 3: Amb 48' with RW TTWB LLE and Mod I  Short term goal 4: Amb 150' with RW TTWB LLE and SBA  Short term goal 5: Amb up/down curb with CGA and TTWB LLE with RW  Short term goal 6: Amb up/down 5 steps with offset SW or on bottom and CGA TTWB LLE            Long term goals  Time Frame for Long term goals : STG=LTG  These goals were reviewed with this patient at the time of assessment and Marcos Alvarez is in agreement. Plan of Care: Pt to be seen 90 mins per day for 5-6 day/week 7-10 days.                    Current Treatment Recommendations: Strengthening, Functional Mobility Training, Home Exercise Program, Equipment Evaluation, Education, & procurement, ROM, Transfer Training, Gait Training, Safety Education & Training, Balance Training, Stair training, Pain Management, Patient/Caregiver Education & Training      OCCUPATIONAL THERAPY:  Goals:             Short term goals  Time Frame for Short term goals: 7-10 days pt will  Short term goal 1:  Patient was started in therapies, but needs more therapy before discharge to home safely. Patient is mod to max assist and therapies for transfers, gait and ADLs. Patient lives here in [place], and was independent with his ADLs, and lives with his wife. Carla Trivedi is admitted today from the [facility].   Repeat labs this morning look good. Blood sugars are slightly high. We will check his hemoglobin A1c.         I have reviewed this initial plan of care and agree with its contents:    Title   Name    Date    Time    Physician: Dr. Keiry Eubanks, 9/19/19, 2 pm    Case Mgmt:  Radha Garcia, [place]     Case Management   [phone]    9/19/2019  12:21 PM    OT: Sierra Sánchez, OTR/L  #[id number] 9/19/19 12:39 PM    PT:Electronically signed by Sim Queen PT on 9/19/19 at 6:11 PM    RN: Beryl Maldonado RN, CRRN 09/20/2019 2:06 pm    ST:    :  BARB Dixon  9/20/2019  1540    Other:

## 2019-09-19 NOTE — PROGRESS NOTES
Timed Code Treatment Minutes: 45 Minutes    Therapy Time PM:   Individual Concurrent Group Co-treatment   Time In 1430         Time Out 6173         Minutes 45         Timed Code Treatment Minutes: [address], [address] [address] [place] [address]

## 2019-09-19 NOTE — PROGRESS NOTES
Occupational Therapy   Occupational Therapy Initial Assessment  AM and PM session  Date: 2019   Patient Name: Shashank Santos  MRN: [id number]     : 1935    Date of Service: 2019    Discharge Recommendations:  Home with assist PRN, Home with Home health OT  OT Equipment Recommendations  Other: TBD    Assessment   Performance deficits / Impairments: Decreased functional mobility ; Decreased safe awareness;Decreased balance;Decreased endurance;Decreased high-level IADLs;Decreased ADL status  Assessment: Pt is an 80 yo male admitted from hospital in [place] after having a syncopal episode with A fib resulting in left hip fracture. He had a cardiac ablation and left hip ORIP with intermedullary  marisela, precautions are TTWB (25-50% as stated in discharge papers from [facility]). Pt stated he was indep PTA, lives with his wife, went to the [facility] to exercise and swim several times a week. He is currently functioning below baseline in above areas, requiring mod assist for bathing on shower chair, max assist for LB dressing due to difficulty reaching his feet. He requires education for TTWB and will most likely require LB bathing and dressing equipment, probable DME for bathroom TBD. Anticipate he will require 7-10 days inpt rehab with skilled OT services to maximize independence for safe return home with spouse and home OT services  Treatment Diagnosis: decreased ADl, IADL, balance  Prognosis: Good  Decision Making: High Complexity  History:  Patient is an 81-year old male who was in the [facility] and had a syncopal episode due to sudden onset of A. Fib with a rapid heart rate of 180. Patient fell down and landed on his left hip area, developing pain in the left hip. Patient was taken to the hospital and found to have A. Fib and a Left hip fracture. Patient was taken to surgery and had IM nailing of his left hip fracture for repair on 19. He is now toe-touch weight bearing on left leg. Spouse  Type of Home: House  Home Layout: Two level  Home Access: Stairs to enter with rails  Entrance Stairs - Number of Steps: 5 (L rail) to lower lever then 5 (R rail) more to main level, 5 step(R rail) to main level through front door  Entrance Stairs - Rails: Right  Bathroom Shower/Tub: Tub/Shower unit, Curtain  Bathroom Toilet: Handicap height  Bathroom Equipment: Grab bars in shower  Bathroom Accessibility: Walker accessible  ADL Assistance: Independent  Homemaking Assistance: Independent  Ambulation Assistance: Independent  Transfer Assistance: Independent  Active : Yes  Mode of Transportation: Car  Occupation: Retired  Type of occupation:   Leisure & Hobbies: Woodcrafts, handiwork  IADL Comments: pt gets his own simple meals, otherwise wife does most  Additional Comments: 2 sons in [address] [place] [address] Avenue: Impaired  Vision Exceptions: Wears glasses for reading  Hearing: Within functional limits    Orientation  Overall Orientation Status: Within Functional Limits     Balance  Sitting Balance: Supervision  Standing Balance: Contact guard assistance(SBA at sink, CGA in shower)  Standing Balance  Time: 2 min  Activity: grooming at sink  Comment: SBA  Functional Mobility  Functional - Mobility Device: Rolling Walker  Activity: To/from bathroom  Assist Level: Contact guard assistance  Functional Mobility Comments: Cues to maintain TTWB - 25 to 50% LLE  Toilet Transfers  Toilet - Technique: Ambulating  Equipment Used: Grab bars  Toilet Transfer: Contact guard assistance  Toilet Transfers Comments: cues for TTWB, proper use of walker  Shower Transfers  Shower - Transfer From: [facility] Rout - Transfer Type: To and From  Shower - Transfer To:  Shower seat with back  Shower - Technique: Ambulating  Shower Transfers: Contact Guard  Shower Transfers Comments: cues for [employer], proper use of walker  Wheelchair Bed Transfers  Wheelchair/Bed - Technique: Ambulating  Equipment Used: Bed  Level of Asssistance: Contact guard assistance  Wheelchair Transfers Comments: cues for TTWB, proper use of walker  ADL  Grooming: Stand by assistance;Verbal cueing(brushed teeth, combed hair at sink in stance)  UE Bathing: Setup  LE Bathing: Moderate assistance(seated on shower chair for majority of shower, stands with grab bar and CGA. Assisted with lower legs and buttocks)  UE Dressing: Setup  LE Dressing: Maximum assistance(able to place right foot into pants, right foot into shoe, otherwise assisted due to difficulty reaching feet. Assisted with DOC hose)  Toileting: Maximum assistance(assisted with pants up/down, hygiene per self after urination)  Additional Comments: Pt stands during ADL tasks with CGA, but needs grab bars. Cues to maintain TTWB - 25-50% LLE  Tone RUE  RUE Tone: Normotonic  Tone LUE  LUE Tone: Normotonic  Coordination  Movements Are Fluid And Coordinated: Yes     Bed mobility  Supine to Sit: Minimal assistance(asssist with left leg (minimal))        Cognition  Overall Cognitive Status: WFL  Cognition Comment: min cues for TTWB        Sensation  Overall Sensation Status: WFL        LUE AROM (degrees)  LUE AROM : WFL  RUE AROM (degrees)  RUE AROM : WFL  LUE Strength  Gross LUE Strength: WFL  RUE Strength  Gross RUE Strength: WFL           PM session  Pt met in dept, wife present, agreeable to OT  Wife asking about using shower chair which she used when she was NWB, but stall is in lower level bathroom. He has a tub/shower on first floor where he could stay without steps except to get in/out of home - has bilevel  Demonstrated transfser tub bench to patient and wife - she stated she can borrow one from a friend. Demonstrated toilet safety frames, pt is interested in purchasing if needed    Provided pt with longsponge for LB bathing.   He is moving better this afternoon and was able to doff/don shoes and tie per self without AD, so may be able to complete this without equipment prior  To discharge. Daphneybob pt reacher for next ADL    Explained that he has weight bearing limitations, but is able to bend forward if he does not have increased pain    Pt to room per transport at end of session.   Instructed pt to use ice when returning to room        Plan   Plan  Times per week: 5-6 times per week  Times per day: Twice a day  Plan weeks: 7-10 days  Current Treatment Recommendations: Balance Training, Endurance Training, Patient/Caregiver Education & Training, Home Management Training, Functional Mobility Training, Safety Education & Training, Equipment Evaluation, Education, & procurement, Self-Care / ADL                 Goals  Short term goals  Time Frame for Short term goals: 7-10 days pt will  Short term goal 1: bathe indep with AD  Short term goal 2: dress indep with AD  Short term goal 3: toilet indep with AD  Short term goal 4: transfer indep wth AD  Short term goal 5: functional mobility and simple meal prep indep with AD  Short term goal 6: improve activity tolerance to achieve above goals and stand 5 min for IADL tasks  Long term goals  Time Frame for Long term goals : same as stg  Patient Goals   Patient goals : \"I want to be able to walk better, be able to do my bath and get dressed\"       Therapy Time   Individual Concurrent Group Co-treatment   Time In 1045         Time Out 1215         Minutes 90         Timed Code Treatment Minutes: 75 Minutes(+15 eval)     Therapy Time     Individual Co-treatment   Time In 1515     Time Out 1545     Minutes MARIBELL Mijares/L 770

## 2019-09-19 NOTE — H&P
care, and prosthetics and orthotics. Given the patients complex condition  and risk of further medical complications, rehabilitation services cannot be  safely provided at a lower level of care such as a skilled nursing facility. I have compared the patients medical and functional status at the time of the  preadmission screening and the same on this date, and there are no significant changes. By signing this document, I acknowledge that I have personally performed a  full physical examination on this patient within 24 hours of admission to  this inpatient rehabilitation facility and have determined the patient to be  able to tolerate the above course of treatment at an intensive level for a  reasonable period of time. I will be completing a detailed individualized  Plan of Care for this patient by day four of the patients stay based upon the  Preadmission Screen, this Post-Admission Evaluation, and the therapy  evaluations. Assessment and Plan:    Left hip Fracture-  IM nailing of his left hip fracture for repair on 9/6/19, toe-touch weight bearing left leg    Diabetes- metformin, Amaryl , SSI    HLD- Zocor     Hypertension- lisinopril, Lopressor    CAD with AVR 5 years ago, new A. Fib- Ablation, rhythmol, Lopressor    Bowels: Schedule Miralax + Senna S. Follow bowel movements. Enema or suppository if needed. Bladder: Check PVR x 3. [address] if PVR > 200ml or if any volume is > 500 ml. Pain: Norco is ordered prn.        Kisha Rabago MD, 9/19/2019, 7:22 AM

## 2019-09-20 LAB
ESTIMATED AVERAGE GLUCOSE: 122.6 MG/DL
GLUCOSE BLD-MCNC: 135 MG/DL (ref 70–99)
GLUCOSE BLD-MCNC: 156 MG/DL (ref 70–99)
GLUCOSE BLD-MCNC: 210 MG/DL (ref 70–99)
GLUCOSE BLD-MCNC: 268 MG/DL (ref 70–99)
HBA1C MFR BLD: 5.9 %
PERFORMED ON: ABNORMAL

## 2019-09-20 PROCEDURE — 1280000000 HC REHAB R&B

## 2019-09-20 PROCEDURE — 83036 HEMOGLOBIN GLYCOSYLATED A1C: CPT

## 2019-09-20 PROCEDURE — 97535 SELF CARE MNGMENT TRAINING: CPT

## 2019-09-20 PROCEDURE — 36415 COLL VENOUS BLD VENIPUNCTURE: CPT

## 2019-09-20 PROCEDURE — 6360000002 HC RX W HCPCS: Performed by: PHYSICAL MEDICINE & REHABILITATION

## 2019-09-20 PROCEDURE — 97110 THERAPEUTIC EXERCISES: CPT | Performed by: PHYSICAL THERAPIST

## 2019-09-20 PROCEDURE — 94760 N-INVAS EAR/PLS OXIMETRY 1: CPT

## 2019-09-20 PROCEDURE — 6370000000 HC RX 637 (ALT 250 FOR IP): Performed by: PHYSICAL MEDICINE & REHABILITATION

## 2019-09-20 PROCEDURE — 97530 THERAPEUTIC ACTIVITIES: CPT

## 2019-09-20 PROCEDURE — 97530 THERAPEUTIC ACTIVITIES: CPT | Performed by: PHYSICAL THERAPIST

## 2019-09-20 PROCEDURE — 97116 GAIT TRAINING THERAPY: CPT | Performed by: PHYSICAL THERAPIST

## 2019-09-20 PROCEDURE — 97110 THERAPEUTIC EXERCISES: CPT

## 2019-09-20 RX ADMIN — CETIRIZINE HYDROCHLORIDE 10 MG: 10 TABLET, FILM COATED ORAL at 10:02

## 2019-09-20 RX ADMIN — LISINOPRIL 40 MG: 40 TABLET ORAL at 10:03

## 2019-09-20 RX ADMIN — METFORMIN HYDROCHLORIDE 1000 MG: 500 TABLET, EXTENDED RELEASE ORAL at 08:14

## 2019-09-20 RX ADMIN — INSULIN LISPRO 3 UNITS: 100 INJECTION, SOLUTION INTRAVENOUS; SUBCUTANEOUS at 12:32

## 2019-09-20 RX ADMIN — INSULIN LISPRO 1 UNITS: 100 INJECTION, SOLUTION INTRAVENOUS; SUBCUTANEOUS at 21:19

## 2019-09-20 RX ADMIN — ASPIRIN 81 MG: 81 TABLET, COATED ORAL at 10:02

## 2019-09-20 RX ADMIN — SIMVASTATIN 20 MG: 20 TABLET, FILM COATED ORAL at 20:40

## 2019-09-20 RX ADMIN — INSULIN LISPRO 1 UNITS: 100 INJECTION, SOLUTION INTRAVENOUS; SUBCUTANEOUS at 08:15

## 2019-09-20 RX ADMIN — HYDROCODONE BITARTRATE AND ACETAMINOPHEN 1 TABLET: 5; 325 TABLET ORAL at 20:40

## 2019-09-20 RX ADMIN — METOPROLOL TARTRATE 50 MG: 50 TABLET ORAL at 10:02

## 2019-09-20 RX ADMIN — ENOXAPARIN SODIUM 40 MG: 40 INJECTION SUBCUTANEOUS at 10:01

## 2019-09-20 RX ADMIN — ACETAMINOPHEN 650 MG: 325 TABLET ORAL at 10:13

## 2019-09-20 RX ADMIN — PROPAFENONE HYDROCHLORIDE 150 MG: 150 TABLET, COATED ORAL at 10:14

## 2019-09-20 RX ADMIN — POLYETHYLENE GLYCOL 3350 17 G: 17 POWDER, FOR SOLUTION ORAL at 10:03

## 2019-09-20 RX ADMIN — METOPROLOL TARTRATE 50 MG: 50 TABLET ORAL at 20:40

## 2019-09-20 RX ADMIN — DICLOFENAC 2 G: 10 GEL TOPICAL at 10:04

## 2019-09-20 RX ADMIN — AMLODIPINE BESYLATE 5 MG: 5 TABLET ORAL at 10:02

## 2019-09-20 RX ADMIN — PROPAFENONE HYDROCHLORIDE 150 MG: 150 TABLET, COATED ORAL at 21:53

## 2019-09-20 RX ADMIN — SENNOSIDES, DOCUSATE SODIUM 2 TABLET: 50; 8.6 TABLET, FILM COATED ORAL at 10:02

## 2019-09-20 RX ADMIN — GLIMEPIRIDE 4 MG: 1 TABLET ORAL at 08:14

## 2019-09-20 ASSESSMENT — PAIN - FUNCTIONAL ASSESSMENT
PAIN_FUNCTIONAL_ASSESSMENT: ACTIVITIES ARE NOT PREVENTED
PAIN_FUNCTIONAL_ASSESSMENT: ACTIVITIES ARE NOT PREVENTED

## 2019-09-20 ASSESSMENT — PAIN SCALES - GENERAL
PAINLEVEL_OUTOF10: 4
PAINLEVEL_OUTOF10: 0
PAINLEVEL_OUTOF10: 2
PAINLEVEL_OUTOF10: 0
PAINLEVEL_OUTOF10: 1

## 2019-09-20 ASSESSMENT — PAIN DESCRIPTION - PROGRESSION
CLINICAL_PROGRESSION: NOT CHANGED
CLINICAL_PROGRESSION: NOT CHANGED

## 2019-09-20 ASSESSMENT — PAIN DESCRIPTION - DESCRIPTORS
DESCRIPTORS: ACHING;DISCOMFORT
DESCRIPTORS: ACHING

## 2019-09-20 ASSESSMENT — PAIN DESCRIPTION - ONSET
ONSET: ON-GOING
ONSET: ON-GOING

## 2019-09-20 ASSESSMENT — PAIN DESCRIPTION - FREQUENCY
FREQUENCY: INTERMITTENT
FREQUENCY: INTERMITTENT

## 2019-09-20 ASSESSMENT — PAIN DESCRIPTION - LOCATION
LOCATION: HIP
LOCATION: HIP

## 2019-09-20 ASSESSMENT — PAIN DESCRIPTION - ORIENTATION
ORIENTATION: LEFT
ORIENTATION: LEFT

## 2019-09-20 ASSESSMENT — PAIN DESCRIPTION - PAIN TYPE
TYPE: SURGICAL PAIN
TYPE: SURGICAL PAIN

## 2019-09-20 NOTE — PLAN OF CARE
Patient has surgical incision to Lt hip intact with staples approximated and bruising to surrounding skin, open to air. Also has scattered bruising. Problem: Pain:  Goal: Pain level will decrease  Description  Pain level will decrease  9/20/2019 1059 by Aroldo Jacques RN  Outcome: Ongoing  Note:   Patient able to express pain and rate pain using pain scale. Medicate as needed per orders. Reassess patient pain level within one hour after oral pain medication/intervention to assure patient has reduced pain sensation and document outcome. Non pharmaceutical interventions as appropriate. 9/19/2019 2350 by Darion Hernadez RN  Outcome: Ongoing  Note:   Provided patient with pain medicine per request  for left hip pain. Patient stated pain is a 2 out of 10. Patient is using Norco for pain management. Pain will be reassessed within 30-60 minutes of pain medication administration. Problem: Activity:  Goal: Ability to ambulate will improve  Description  Ability to ambulate will improve  Note:   Patient working with therapy at least 3 hours/day to obtain maximal mobility. Safety devices used.

## 2019-09-20 NOTE — PATIENT CARE CONFERENCE
steadying assistance only <25% assist  and/or requires assist with one leg only  Walk: 2 - Maximal Assistance Requires up to Maximal Assistance AND requires assistance of one person to walk between  feet (Patient performs 25-49% of locomotion effort or goes between  feet)  Distance Walked: 50'  Wheel Chair: 5 - [address] stand supervision or cuing to operate wheelchair at least 150 feet  Stairs: 2- Maximal Assistance Performs 25-49% of the effort to go up and down 4 to 6 stairs and requires the assistance of one person only    PT Equipment Recommendations  Equipment Needed: Yes  Mobility Devices: Mo Blizzard: Rolling  Other: Pt doesn't own any equiptment    Assessment: Patient is an 81-year old male who was in the [facility] and had a syncopal episode due to sudden onset of A. Fib with a rapid heart rate of 180. Patient fell down and landed on his left hip area, developing pain in the left hip. Patient was taken to the hospital and found to have A. Fib and a Left hip fracture. Patient was taken to surgery and had IM nailing of his left hip fracture for repair on 9/6/19. He is now toe-touch weight bearing on left leg. Patient was seen by cardiology, and had an EP study, with eventual ablation procedure. The patient also was known to have diabetes, hypertension, L TKR and CAD with AVR 5 years ago. He lives with his wife in a bilevel home with 5 HETAL and another 5 steps to the main living area. He is functioning well below his PLOF of I walking a half mile a day and cutting the grass. After repeat demonstration and max cues to slow down and use UE more effectively, patient was more consistent with maintaining TTWB LLE status with short distance ambulation. Assessed wheelchair mobility which patient could do with geneally SBA, but wife reports limited use of wheelchair in their bilevel home, so patient will need to be able to ambulate and ascend/desend steps to access multiple levels. Maintain TTWB with occasional cues during ADL and mobility tasks  3. Patient goals : \"I want to be able to walk better, be able to do my bath and get dressed\"          Team Members Present at Conference:  Physician: DR Ortega   : [facility]    Occupational Therapist: Rogers Ramirez, OTR/L  Physical Therapist: Rayray Cadet, PT #[id number]  Speech Therapist:   Nurse:MARLON Gonzalez RN, CRRN  Dietician: Melany Villa RD, LD   BARB Hathaway        I led this team conference and I approve the established interdisciplinary plan of care as documented within the medical record of Aleyda Albarran     MD:  Dr. Sharon Law  9/23/2019 10:10 AM

## 2019-09-20 NOTE — PROGRESS NOTES
Patient was admitted on 9/18 for left hip fracture. Patient is A&O x4. Patient does complain of pain, rated 1-2 of 10 in left hip/leg. Patient transfers with walker x 1. All PM medications were given without issue. Patient is continent of bowels and bladder. Last BM 9/19. Patient has incision TONIE. Patient is sleeping soundly all shift. Call light is within reach. Patient calls as needed.

## 2019-09-20 NOTE — PROGRESS NOTES
weight bearing and up to min A for bed mobility due to pain and weakness in LLE. Will need to further work on balance and stairs as strength improves. Wife was able to attend the PM session and discussed tx plan with her with pt's permission. The pt would benefit from skilled PT on our ARU to work on these deficits so he can return home safely with his wife. Treatment Diagnosis: Decreased functional mobility  Prognosis: Good  Decision Making: Medium Complexity  History: Patient is an 81-year old male who was in the [facility] and had a syncopal episode due to sudden onset of A. Fib with a rapid heart rate of 180. Patient fell down and landed on his left hip area, developing pain in the left hip. Patient was taken to the hospital and found to have A. Fib and a Left hip fracture. Patient was taken to surgery and had IM nailing of his left hip fracture for repair on 9/6/19. He is now toe-touch weight bearing on left leg. Patient was seen by cardiology, and had an EP study, with eventual ablation procedure. The patient also was known to have diabetes, hypertension, L TKR and CAD with AVR 5 years ago. \"  Exam: bed mobility, transfers,gait, balance, stairs negotiation  Clinical Presentation: evolving  PT Education: Goals; Family Education;Weight-bearing Education;PT Role;Plan of Care;Transfer Training;Orientation;Gait Training;Precautions; Equipment; Adaptive Device Training;Functional Mobility Training  Barriers to Learning: memory  REQUIRES PT FOLLOW UP: Yes  Activity Tolerance  Activity Tolerance: Patient Tolerated treatment well;Patient limited by pain; Patient limited by fatigue     Patient Diagnosis(es): There were no encounter diagnoses.      has a past medical history of Allergic rhinitis, seasonal, Aortic regurgitation, CAD (coronary artery disease), Cancer ([address] Closed left hip fracture ([facility] [address] Colon polyps- last colon neg 11/06, Diabetic nephropathy- pos microalb 10/11, on ARB, DM (diabetes mellitus), type 2

## 2019-09-20 NOTE — PROGRESS NOTES
prostate, GERD (gastroesophageal reflux disease), Holosystolic murmur, HTN (hypertension), Hypertriglyceridemia, Osteoarthritis, and SVT (supraventricular tachycardia) ([facility] [address] has a past surgical history that includes Appendectomy (age 30); Tonsillectomy and adenoidectomy (age 9); ablation of dysrhythmic focus (2013); Aortic valve replacement (2016); Total knee arthroplasty (Left, 11/2015); TURP (01/03/2017); joint replacement; Colonoscopy (12/19/2017); eye surgery; Cataract removal (Bilateral, 11/2017); and Colonoscopy (N/A, 12/13/2018). Restrictions  Restrictions/Precautions  Restrictions/Precautions: Weight Bearing, Fall Risk  Lower Extremity Weight Bearing Restrictions  Right Lower Extremity Weight Bearing: Toe Touch Weight Bearing  Partial Weight Bearing Percentage Or Pounds: states 25-50 % on discharge paperwork from outside hospital in 1 Piyush Levin   Chart Reviewed: Yes, Progress Notes  Patient assessed for rehabilitation services?: Yes  Additional Pertinent Hx:  Patient is an 81-year old male who was in the [facility] and had a syncopal episode due to sudden onset of A. Fib with a rapid heart rate of 180. Patient fell down and landed on his left hip area, developing pain in the left hip. Patient was taken to the hospital and found to have A. Fib and a Left hip fracture. Patient was taken to surgery and had IM nailing of his left hip fracture for repair on 9/6/19. He is now toe-touch weight bearing on left leg. Patient was seen by cardiology, and had an EP study, with eventual ablation pprocedure. The patient also was known to have diabetes, hypertension, and CAD with AVR 5 years ago. Patient was started in therapies, but needs more therapy before discharge to home safely. Patient is mod to max assist and therapies for transfers, gait and ADLs. Patient lives here in [place], and was independent with his ADLs, and lives with his wife.   Patient is admitted today from the 10 ft, cues for TTWB to/from bathroom  Pt states he has been having some difficulty remembering all the new precautions he has.   Needs continued education with equipment and precautons    Pt to PT after OT  Plan   Plan  Times per week: 5-6 times per week  Times per day: Twice a day  Plan weeks: 7-10 days  Current Treatment Recommendations: Balance Training, Endurance Training, Patient/Caregiver Education & Training, Home Management Training, Functional Mobility Training, Safety Education & Training, Equipment Evaluation, Education, & procurement, Self-Care / ADL          Goals  Short term goals  Time Frame for Short term goals: 7-10 days pt will  Short term goal 1: bathe indep with AD  Short term goal 2: dress indep with AD  Short term goal 3: toilet indep with AD  Short term goal 4: transfer indep wth AD  Short term goal 5: functional mobility and simple meal prep indep with AD  Short term goal 6: improve activity tolerance to achieve above goals and stand 5 min for IADL tasks  Long term goals  Time Frame for Long term goals : same as stg  Patient Goals   Patient goals : \"I want to be able to walk better, be able to do my bath and get dressed\"       Therapy Time   Individual Concurrent Group Co-treatment   Time In 1115         Time Out 1200         Minutes 45         Timed Code Treatment Minutes: 45 Minutes     Therapy Time     Individual Co-treatment   Time In 52 [facility]     Time Out 1600     Minutes 305 [facility], OTr/L 770

## 2019-09-21 LAB
GLUCOSE BLD-MCNC: 115 MG/DL (ref 70–99)
GLUCOSE BLD-MCNC: 179 MG/DL (ref 70–99)
GLUCOSE BLD-MCNC: 222 MG/DL (ref 70–99)
GLUCOSE BLD-MCNC: 291 MG/DL (ref 70–99)
PERFORMED ON: ABNORMAL

## 2019-09-21 PROCEDURE — 6360000002 HC RX W HCPCS: Performed by: PHYSICAL MEDICINE & REHABILITATION

## 2019-09-21 PROCEDURE — 97530 THERAPEUTIC ACTIVITIES: CPT

## 2019-09-21 PROCEDURE — 6370000000 HC RX 637 (ALT 250 FOR IP): Performed by: PHYSICAL MEDICINE & REHABILITATION

## 2019-09-21 PROCEDURE — 97535 SELF CARE MNGMENT TRAINING: CPT

## 2019-09-21 PROCEDURE — 97116 GAIT TRAINING THERAPY: CPT

## 2019-09-21 PROCEDURE — 97110 THERAPEUTIC EXERCISES: CPT

## 2019-09-21 PROCEDURE — 1280000000 HC REHAB R&B

## 2019-09-21 RX ADMIN — LISINOPRIL 40 MG: 40 TABLET ORAL at 09:46

## 2019-09-21 RX ADMIN — METOPROLOL TARTRATE 50 MG: 50 TABLET ORAL at 09:46

## 2019-09-21 RX ADMIN — INSULIN LISPRO 1 UNITS: 100 INJECTION, SOLUTION INTRAVENOUS; SUBCUTANEOUS at 20:40

## 2019-09-21 RX ADMIN — ASPIRIN 81 MG: 81 TABLET, COATED ORAL at 09:45

## 2019-09-21 RX ADMIN — HYDROCODONE BITARTRATE AND ACETAMINOPHEN 1 TABLET: 5; 325 TABLET ORAL at 09:08

## 2019-09-21 RX ADMIN — ENOXAPARIN SODIUM 40 MG: 40 INJECTION SUBCUTANEOUS at 09:46

## 2019-09-21 RX ADMIN — CETIRIZINE HYDROCHLORIDE 10 MG: 10 TABLET, FILM COATED ORAL at 09:46

## 2019-09-21 RX ADMIN — INSULIN LISPRO 3 UNITS: 100 INJECTION, SOLUTION INTRAVENOUS; SUBCUTANEOUS at 09:51

## 2019-09-21 RX ADMIN — INSULIN LISPRO 2 UNITS: 100 INJECTION, SOLUTION INTRAVENOUS; SUBCUTANEOUS at 13:01

## 2019-09-21 RX ADMIN — PROPAFENONE HYDROCHLORIDE 150 MG: 150 TABLET, COATED ORAL at 09:51

## 2019-09-21 RX ADMIN — GLIMEPIRIDE 4 MG: 1 TABLET ORAL at 10:18

## 2019-09-21 RX ADMIN — SIMVASTATIN 20 MG: 20 TABLET, FILM COATED ORAL at 20:40

## 2019-09-21 RX ADMIN — POLYETHYLENE GLYCOL 3350 17 G: 17 POWDER, FOR SOLUTION ORAL at 09:46

## 2019-09-21 RX ADMIN — PROPAFENONE HYDROCHLORIDE 150 MG: 150 TABLET, COATED ORAL at 20:40

## 2019-09-21 RX ADMIN — METOPROLOL TARTRATE 50 MG: 50 TABLET ORAL at 20:40

## 2019-09-21 RX ADMIN — AMLODIPINE BESYLATE 5 MG: 5 TABLET ORAL at 09:46

## 2019-09-21 RX ADMIN — DICLOFENAC 2 G: 10 GEL TOPICAL at 09:51

## 2019-09-21 RX ADMIN — METFORMIN HYDROCHLORIDE 1000 MG: 500 TABLET, EXTENDED RELEASE ORAL at 09:45

## 2019-09-21 RX ADMIN — HYDROCODONE BITARTRATE AND ACETAMINOPHEN 1 TABLET: 5; 325 TABLET ORAL at 20:40

## 2019-09-21 ASSESSMENT — PAIN DESCRIPTION - FREQUENCY
FREQUENCY: INTERMITTENT
FREQUENCY: INTERMITTENT

## 2019-09-21 ASSESSMENT — PAIN SCALES - GENERAL
PAINLEVEL_OUTOF10: 2
PAINLEVEL_OUTOF10: 4
PAINLEVEL_OUTOF10: 0
PAINLEVEL_OUTOF10: 4
PAINLEVEL_OUTOF10: 0

## 2019-09-21 ASSESSMENT — PAIN DESCRIPTION - ORIENTATION
ORIENTATION: LEFT
ORIENTATION: LEFT

## 2019-09-21 ASSESSMENT — PAIN DESCRIPTION - ONSET
ONSET: GRADUAL
ONSET: PROGRESSIVE

## 2019-09-21 ASSESSMENT — PAIN DESCRIPTION - DESCRIPTORS
DESCRIPTORS: ACHING;DISCOMFORT
DESCRIPTORS: ACHING;DISCOMFORT

## 2019-09-21 ASSESSMENT — PAIN DESCRIPTION - PAIN TYPE
TYPE: SURGICAL PAIN
TYPE: SURGICAL PAIN

## 2019-09-21 ASSESSMENT — PAIN DESCRIPTION - PROGRESSION
CLINICAL_PROGRESSION: NOT CHANGED
CLINICAL_PROGRESSION: NOT CHANGED

## 2019-09-21 ASSESSMENT — PAIN DESCRIPTION - LOCATION
LOCATION: HIP
LOCATION: HIP

## 2019-09-21 NOTE — PROGRESS NOTES
also was known to have diabetes, hypertension, L TKR and CAD with AVR 5 years ago. \"  Response To Previous Treatment: Patient with no complaints from previous session. Family / Caregiver Present: No  Referring Practitioner: Dr Darion Stock  Subjective  Subjective: Pt supine in bed upon arrival, pt complains 4/10 pain with LLE, pt agreeable to PT treatment session. Pt speech WFL but due to language barrier occasionally tasks 2-3 times to cearify before moving. Orientation  Orientation  Overall Orientation Status: Within Functional Limits    Objective   Bed mobility  Supine to Sit: Minimal assistance(LLE due to increased stiffness. )  Sit to Supine: Stand by assistance(increased ability to move LLE with participation of therapy this morning. )  Scooting: Contact guard assistance  Comment: HOB flat, use of railing hospital bed   Transfers  Sit to Stand: Contact guard assistance  Stand to sit: Contact guard assistance  Bed to Chair: Contact guard assistance(RW EOB to w/c towards left, poor ability to [employer]; use of RW; w/c back to bed )  Comment: Pt with poor ability to maintain  TTWB with transfers despite max cues, tends to push through heel/keep foot flat  Ambulation  Ambulation?: Yes  Ambulation 1  Surface: level tile  Device: Rolling Walker  Assistance: Minimal assistance;Contact guard assistance  Quality of Gait: with max cues to remind patient to slow down and attend to maintaining limited weightbearing through LLE.     Gait Deviations: Decreased step length  Distance: 10' to assess WB (maintain with VC for proper sequencing), 76' (pt initially maintaing TTWB but with fatigue last ~10' and due to distraction began placing too much weight in LLE)  Comments:  Pt appears to maintain about 25-50% WB (mostly closer to 50% WB throughout), increased cues to maining TTWB though due to slight impulsive/easily distracted, initally with all mobility tasks maintains well but increased difficulty progression of throughout session to provide medication )  Restraints  Initially in place: No     Therapy Time   Individual Concurrent Group Co-treatment   Time In 0840         Time Out 0940         Minutes 60         Timed Code Treatment Minutes: [address]   Time In 1435     Time Out 364 [facility]     Minutes 1781 [facility], PT      Electronically signed by Juan Pablo Kumar PT on 9/21/19 at 9:55 AM

## 2019-09-21 NOTE — PLAN OF CARE
Problem: Falls - Risk of:  Goal: Will remain free from falls  Description  Will remain free from falls  Outcome: Ongoing  Note:   Patient is a high fall risk. Patient free of falls this shift. Bed low, locked and alarmed at all times. Call light and bedside table is within reach. Arm band on wrist, fall light on outside of room, and nonskid socks are on patient. Notified patient to ask for assistance when needed. Patient verbalized understanding. Problem: Activity:  Goal: Ability to ambulate will improve  Description  Ability to ambulate will improve  Outcome: Ongoing  Note:   Pt is able to ambulate with walker to bathroom and to bed. Will continue to monitor. Problem: Physical Regulation:  Goal: Will remain free from infection  Description  Will remain free from infection  Outcome: Ongoing  Note:   Pt is showing no signs and symptoms of infection. Pt educated on signs and symptoms of infection to watch out for. Pt is agreeable to notify nurse if signs start to show. Handwashing is performed upon entering patients to reduce the risk of infection. Will continue to assess and monitor. Problem: Sensory:  Goal: Pain level will decrease  Description  Pain level will decrease  Outcome: Ongoing  Note:   Provided patient with pain medicine per request  for left hip/leg pain. Patient stated pain is a 1-2 out of 10. Patient is using Norco for pain management. Pain will be reassessed within 30-60 minutes of pain medication administration. Problem: Pain:  Goal: Pain level will decrease  Description  Pain level will decrease  Outcome: Ongoing  Note:   Provided patient with pain medicine per request  for left hip/leg pain. Patient stated pain is a 1-2 out of 10. Patient is using Norco for pain management. Pain will be reassessed within 30-60 minutes of pain medication administration.

## 2019-09-21 NOTE — PROGRESS NOTES
with his wife. Patient is admitted today from the [address] [place] [address] (copied per note Dr Angelia Cruz 9/18/19)  Family / Caregiver Present: Yes(wife)  Referring Practitioner: Jordan  Diagnosis: left hip fracture, syncope - A fib with ablation  Subjective  Subjective: Pt met bedside, agreeable to OT       Objective    ADL  Equipment Provided: Reacher;Long-handled sponge  Feeding: Independent(had lunch at end of session, feeding self when OT completed)  Grooming: Stand by assistance(standing at sink to brush teeth, comb hair)  UE Bathing: Setup  LE Bathing: Contact guard assistance(seated on shower chair for majority of shower, grab bar for balance, cues for TTWB, Bathed buttocks in stance, long sponge for LE)  UE Dressing: Setup  LE Dressing: Verbal cueing;Setup;Minimal assistance(assisted with DOC hose and to tie left shoe)  Toileting: Contact guard assistance(CGA to manage pants, hygiene per self, only smear of BM)  Additional Comments: did not need reacher to assist with pants         Functional Mobility  Functional - Mobility Device: Rolling Walker  Activity: To/from bathroom  Assist Level: Contact guard assistance  Functional Mobility Comments: Cues to maintain TTWB - 25 to 50% LLE. Attempted to stand from commode and from shower chair without walker in front of him  Toilet Transfers  Toilet - Technique: Ambulating  Equipment Used: Grab bars  Toilet Transfer: Contact guard assistance  Toilet Transfers Comments: cues for TTWB, proper use of walker  Shower Transfers  Shower - Transfer From: [facility] - Transfer Type: To and From  Shower - Transfer To:  Shower seat with back  Shower - Technique: Ambulating  Shower Transfers: Contact Guard  Shower Transfers Comments: cues for [employer], proper use of walker  Wheelchair Bed Transfers  Wheelchair/Bed - Technique: Ambulating  Equipment Used: Bed  Level of Asssistance: Contact guard assistance  Wheelchair Transfers Comments: cues for TTWB, proper use of walker  Bed to be able to walk better, be able to do my bath and get dressed\"       Therapy Time   Individual Concurrent Group Co-treatment   Time In 1150         Time Out 1250         Minutes 60         Timed Code Treatment Minutes: 60 Minutes     Therapy Time     Individual Co-treatment   Time In [facility] 90     Minutes JELLY MijaresR/MARY Villegas

## 2019-09-22 LAB
GLUCOSE BLD-MCNC: 137 MG/DL (ref 70–99)
GLUCOSE BLD-MCNC: 178 MG/DL (ref 70–99)
GLUCOSE BLD-MCNC: 234 MG/DL (ref 70–99)
GLUCOSE BLD-MCNC: 95 MG/DL (ref 70–99)
PERFORMED ON: ABNORMAL
PERFORMED ON: NORMAL

## 2019-09-22 PROCEDURE — 6360000002 HC RX W HCPCS: Performed by: PHYSICAL MEDICINE & REHABILITATION

## 2019-09-22 PROCEDURE — 6370000000 HC RX 637 (ALT 250 FOR IP): Performed by: PHYSICAL MEDICINE & REHABILITATION

## 2019-09-22 PROCEDURE — 94760 N-INVAS EAR/PLS OXIMETRY 1: CPT

## 2019-09-22 PROCEDURE — 1280000000 HC REHAB R&B

## 2019-09-22 RX ORDER — AMLODIPINE BESYLATE 10 MG/1
10 TABLET ORAL DAILY
Status: DISCONTINUED | OUTPATIENT
Start: 2019-09-22 | End: 2019-09-28 | Stop reason: HOSPADM

## 2019-09-22 RX ADMIN — SENNOSIDES, DOCUSATE SODIUM 2 TABLET: 50; 8.6 TABLET, FILM COATED ORAL at 21:54

## 2019-09-22 RX ADMIN — INSULIN LISPRO 1 UNITS: 100 INJECTION, SOLUTION INTRAVENOUS; SUBCUTANEOUS at 21:53

## 2019-09-22 RX ADMIN — ENOXAPARIN SODIUM 40 MG: 40 INJECTION SUBCUTANEOUS at 09:17

## 2019-09-22 RX ADMIN — METOPROLOL TARTRATE 50 MG: 50 TABLET ORAL at 09:17

## 2019-09-22 RX ADMIN — METFORMIN HYDROCHLORIDE 1000 MG: 500 TABLET, EXTENDED RELEASE ORAL at 09:17

## 2019-09-22 RX ADMIN — ASPIRIN 81 MG: 81 TABLET, COATED ORAL at 09:17

## 2019-09-22 RX ADMIN — LISINOPRIL 40 MG: 40 TABLET ORAL at 09:17

## 2019-09-22 RX ADMIN — PROPAFENONE HYDROCHLORIDE 150 MG: 150 TABLET, COATED ORAL at 21:55

## 2019-09-22 RX ADMIN — INSULIN LISPRO 1 UNITS: 100 INJECTION, SOLUTION INTRAVENOUS; SUBCUTANEOUS at 13:00

## 2019-09-22 RX ADMIN — ACETAMINOPHEN 650 MG: 325 TABLET ORAL at 09:17

## 2019-09-22 RX ADMIN — METOPROLOL TARTRATE 50 MG: 50 TABLET ORAL at 22:01

## 2019-09-22 RX ADMIN — POLYETHYLENE GLYCOL 3350 17 G: 17 POWDER, FOR SOLUTION ORAL at 09:17

## 2019-09-22 RX ADMIN — CETIRIZINE HYDROCHLORIDE 10 MG: 10 TABLET, FILM COATED ORAL at 09:17

## 2019-09-22 RX ADMIN — GLIMEPIRIDE 4 MG: 1 TABLET ORAL at 09:17

## 2019-09-22 RX ADMIN — SIMVASTATIN 20 MG: 20 TABLET, FILM COATED ORAL at 21:54

## 2019-09-22 RX ADMIN — DICLOFENAC 2 G: 10 GEL TOPICAL at 09:19

## 2019-09-22 RX ADMIN — PROPAFENONE HYDROCHLORIDE 150 MG: 150 TABLET, COATED ORAL at 09:17

## 2019-09-22 RX ADMIN — AMLODIPINE BESYLATE 10 MG: 10 TABLET ORAL at 09:17

## 2019-09-22 RX ADMIN — DICLOFENAC 2 G: 10 GEL TOPICAL at 22:04

## 2019-09-22 RX ADMIN — SENNOSIDES, DOCUSATE SODIUM 2 TABLET: 50; 8.6 TABLET, FILM COATED ORAL at 09:17

## 2019-09-22 RX ADMIN — HYDROCODONE BITARTRATE AND ACETAMINOPHEN 1 TABLET: 5; 325 TABLET ORAL at 21:54

## 2019-09-22 ASSESSMENT — PAIN DESCRIPTION - LOCATION
LOCATION: HIP
LOCATION: HIP

## 2019-09-22 ASSESSMENT — PAIN DESCRIPTION - PAIN TYPE
TYPE: SURGICAL PAIN
TYPE: SURGICAL PAIN

## 2019-09-22 ASSESSMENT — PAIN DESCRIPTION - PROGRESSION
CLINICAL_PROGRESSION: NOT CHANGED
CLINICAL_PROGRESSION: NOT CHANGED

## 2019-09-22 ASSESSMENT — PAIN DESCRIPTION - ONSET
ONSET: ON-GOING
ONSET: ON-GOING

## 2019-09-22 ASSESSMENT — PAIN DESCRIPTION - ORIENTATION
ORIENTATION: LEFT
ORIENTATION: LEFT

## 2019-09-22 ASSESSMENT — PAIN SCALES - GENERAL
PAINLEVEL_OUTOF10: 0
PAINLEVEL_OUTOF10: 0
PAINLEVEL_OUTOF10: 3
PAINLEVEL_OUTOF10: 2
PAINLEVEL_OUTOF10: 4
PAINLEVEL_OUTOF10: 0

## 2019-09-22 ASSESSMENT — PAIN DESCRIPTION - FREQUENCY
FREQUENCY: INTERMITTENT
FREQUENCY: INTERMITTENT

## 2019-09-22 ASSESSMENT — PAIN DESCRIPTION - DESCRIPTORS
DESCRIPTORS: ACHING;DISCOMFORT
DESCRIPTORS: ACHING;DISCOMFORT

## 2019-09-22 NOTE — PLAN OF CARE
Problem: Falls - Risk of:  Goal: Will remain free from falls  Description  Will remain free from falls  Outcome: Ongoing  Note:   Fall risk band on patient. Orange light on outside of room. Non skid footwear in place. Alarms used appropriately. Patient instructed to call and wait for staff before getting up. Rounding done to anticipate needs. Appropriate safety devices used for transfers. Problem: Serum Glucose Level - Abnormal:  Goal: Ability to maintain appropriate glucose levels will improve  Description  Ability to maintain appropriate glucose levels will improve  Outcome: Ongoing  Note:   Blood glucose levels being monitored and treated per order. Dietary restrictions noted and followed. Instructed on signs/symptoms of hypoglycemia and hyperglycemia. Problem: Sensory:  Goal: Pain level will decrease  Description  Pain level will decrease  Outcome: Ongoing  Note:   Patient able to express pain and rate pain using pain scale. Medicate as needed per orders. Reassess patient pain level within one hour after oral pain medication/intervention to assure patient has reduced pain sensation and document outcome. Non pharmaceutical interventions as appropriate. Problem: Skin Integrity:  Goal: Risk for impaired skin integrity will decrease  Description  Risk for impaired skin integrity will decrease  Outcome: Ongoing  Note:   Skin assessment completed on admission and every shift. Barrier wipes used in the event of incontinence. Pressure relief techniques used as needed while in chair and in bed. Position changes encouraged at least every two hours while in bed.

## 2019-09-23 ENCOUNTER — APPOINTMENT (OUTPATIENT)
Dept: GENERAL RADIOLOGY | Age: 84
DRG: 560 | End: 2019-09-23
Attending: PHYSICAL MEDICINE & REHABILITATION
Payer: MEDICARE

## 2019-09-23 LAB
ANION GAP SERPL CALCULATED.3IONS-SCNC: 14 MMOL/L (ref 3–16)
BUN BLDV-MCNC: 20 MG/DL (ref 7–20)
CALCIUM SERPL-MCNC: 8.3 MG/DL (ref 8.3–10.6)
CHLORIDE BLD-SCNC: 103 MMOL/L (ref 99–110)
CO2: 24 MMOL/L (ref 21–32)
CREAT SERPL-MCNC: 1 MG/DL (ref 0.8–1.3)
GFR AFRICAN AMERICAN: >60
GFR NON-AFRICAN AMERICAN: >60
GLUCOSE BLD-MCNC: 139 MG/DL (ref 70–99)
GLUCOSE BLD-MCNC: 145 MG/DL (ref 70–99)
GLUCOSE BLD-MCNC: 169 MG/DL (ref 70–99)
GLUCOSE BLD-MCNC: 178 MG/DL (ref 70–99)
GLUCOSE BLD-MCNC: 200 MG/DL (ref 70–99)
HCT VFR BLD CALC: 32.6 % (ref 40.5–52.5)
HEMOGLOBIN: 11.3 G/DL (ref 13.5–17.5)
MCH RBC QN AUTO: 31.9 PG (ref 26–34)
MCHC RBC AUTO-ENTMCNC: 34.7 G/DL (ref 31–36)
MCV RBC AUTO: 91.9 FL (ref 80–100)
PDW BLD-RTO: 15.3 % (ref 12.4–15.4)
PERFORMED ON: ABNORMAL
PLATELET # BLD: 305 K/UL (ref 135–450)
PMV BLD AUTO: 7.5 FL (ref 5–10.5)
POTASSIUM SERPL-SCNC: 4.2 MMOL/L (ref 3.5–5.1)
RBC # BLD: 3.55 M/UL (ref 4.2–5.9)
SODIUM BLD-SCNC: 141 MMOL/L (ref 136–145)
WBC # BLD: 5.7 K/UL (ref 4–11)

## 2019-09-23 PROCEDURE — 97535 SELF CARE MNGMENT TRAINING: CPT

## 2019-09-23 PROCEDURE — 97530 THERAPEUTIC ACTIVITIES: CPT | Performed by: PHYSICAL THERAPIST

## 2019-09-23 PROCEDURE — 6370000000 HC RX 637 (ALT 250 FOR IP): Performed by: PHYSICAL MEDICINE & REHABILITATION

## 2019-09-23 PROCEDURE — 1280000000 HC REHAB R&B

## 2019-09-23 PROCEDURE — 6360000002 HC RX W HCPCS: Performed by: PHYSICAL MEDICINE & REHABILITATION

## 2019-09-23 PROCEDURE — 73502 X-RAY EXAM HIP UNI 2-3 VIEWS: CPT

## 2019-09-23 PROCEDURE — 97110 THERAPEUTIC EXERCISES: CPT | Performed by: PHYSICAL THERAPIST

## 2019-09-23 PROCEDURE — 97116 GAIT TRAINING THERAPY: CPT | Performed by: PHYSICAL THERAPIST

## 2019-09-23 PROCEDURE — 97110 THERAPEUTIC EXERCISES: CPT

## 2019-09-23 PROCEDURE — 85027 COMPLETE CBC AUTOMATED: CPT

## 2019-09-23 PROCEDURE — 36415 COLL VENOUS BLD VENIPUNCTURE: CPT

## 2019-09-23 PROCEDURE — 97530 THERAPEUTIC ACTIVITIES: CPT

## 2019-09-23 PROCEDURE — 80048 BASIC METABOLIC PNL TOTAL CA: CPT

## 2019-09-23 RX ORDER — DIPHENHYDRAMINE HCL 25 MG
50 TABLET ORAL NIGHTLY PRN
Status: DISCONTINUED | OUTPATIENT
Start: 2019-09-23 | End: 2019-09-28 | Stop reason: HOSPADM

## 2019-09-23 RX ORDER — ASPIRIN 81 MG/1
81 TABLET ORAL 2 TIMES DAILY
Qty: 60 TABLET | Refills: 0 | Status: SHIPPED | OUTPATIENT
Start: 2019-09-23 | End: 2019-09-27 | Stop reason: SDUPTHER

## 2019-09-23 RX ADMIN — LISINOPRIL 40 MG: 40 TABLET ORAL at 08:36

## 2019-09-23 RX ADMIN — DICLOFENAC 2 G: 10 GEL TOPICAL at 21:31

## 2019-09-23 RX ADMIN — SIMVASTATIN 20 MG: 20 TABLET, FILM COATED ORAL at 21:30

## 2019-09-23 RX ADMIN — PROPAFENONE HYDROCHLORIDE 150 MG: 150 TABLET, COATED ORAL at 21:31

## 2019-09-23 RX ADMIN — CETIRIZINE HYDROCHLORIDE 10 MG: 10 TABLET, FILM COATED ORAL at 08:36

## 2019-09-23 RX ADMIN — DIPHENHYDRAMINE HCL 50 MG: 25 TABLET ORAL at 21:31

## 2019-09-23 RX ADMIN — METOPROLOL TARTRATE 50 MG: 50 TABLET ORAL at 21:30

## 2019-09-23 RX ADMIN — METFORMIN HYDROCHLORIDE 1000 MG: 500 TABLET, EXTENDED RELEASE ORAL at 08:36

## 2019-09-23 RX ADMIN — AMLODIPINE BESYLATE 10 MG: 10 TABLET ORAL at 08:36

## 2019-09-23 RX ADMIN — DICLOFENAC 2 G: 10 GEL TOPICAL at 08:36

## 2019-09-23 RX ADMIN — GLIMEPIRIDE 4 MG: 1 TABLET ORAL at 08:36

## 2019-09-23 RX ADMIN — HYDROCODONE BITARTRATE AND ACETAMINOPHEN 1 TABLET: 5; 325 TABLET ORAL at 10:06

## 2019-09-23 RX ADMIN — INSULIN LISPRO 1 UNITS: 100 INJECTION, SOLUTION INTRAVENOUS; SUBCUTANEOUS at 17:12

## 2019-09-23 RX ADMIN — ENOXAPARIN SODIUM 40 MG: 40 INJECTION SUBCUTANEOUS at 08:36

## 2019-09-23 RX ADMIN — INSULIN LISPRO 1 UNITS: 100 INJECTION, SOLUTION INTRAVENOUS; SUBCUTANEOUS at 21:31

## 2019-09-23 RX ADMIN — INSULIN LISPRO 1 UNITS: 100 INJECTION, SOLUTION INTRAVENOUS; SUBCUTANEOUS at 08:35

## 2019-09-23 RX ADMIN — ASPIRIN 81 MG: 81 TABLET, COATED ORAL at 08:36

## 2019-09-23 RX ADMIN — PROPAFENONE HYDROCHLORIDE 150 MG: 150 TABLET, COATED ORAL at 08:36

## 2019-09-23 RX ADMIN — METOPROLOL TARTRATE 50 MG: 50 TABLET ORAL at 08:36

## 2019-09-23 RX ADMIN — INSULIN LISPRO 2 UNITS: 100 INJECTION, SOLUTION INTRAVENOUS; SUBCUTANEOUS at 12:35

## 2019-09-23 ASSESSMENT — PAIN DESCRIPTION - LOCATION: LOCATION: HIP

## 2019-09-23 ASSESSMENT — PAIN SCALES - GENERAL
PAINLEVEL_OUTOF10: 2
PAINLEVEL_OUTOF10: 3
PAINLEVEL_OUTOF10: 0
PAINLEVEL_OUTOF10: 0

## 2019-09-23 ASSESSMENT — PAIN DESCRIPTION - ORIENTATION: ORIENTATION: LEFT

## 2019-09-23 ASSESSMENT — PAIN DESCRIPTION - DESCRIPTORS: DESCRIPTORS: ACHING;DISCOMFORT

## 2019-09-23 NOTE — PROGRESS NOTES
Brakes Level of Assistance: Supervision  Propulsion: Yes  Propulsion 1  Propulsion: Manual  Level: Level Tile  Method: RUE;LUE  Level of Assistance: Stand by assistance  Description/ Details: able to steer straight, make 180* turn and transverse small threshold to enter/exit therapy department - wife is uncertain if WC would work with the set up of their home due to multiple levels with steps and narrow door way; typically reaches for railing in hallway on left cues to correct   Distance: 180'     PM session:  Bed mobility  Supine to Sit: Stand by assistance  Sit to Supine: Stand by assistance(Had to use UEs to lift L LE particularly with shoe on.)  Scooting: Stand by assistance  Comment: Flat mat 2 pillows    Exercises  Hamstring Sets: 20  Quad Sets: 20  Heelslides: 20 with maxi-tube  Gluteal Sets: 20  Hip Abduction: 20 with maxi-tube, 20 ADD sets  Knee Short Arc Quad: 20  Ankle Pumps: 20      Ambulation  Ambulation?: Yes  Ambulation 1  Surface: level tile  Device: Rolling Walker  Assistance: Minimal assistance;Contact guard assistance  Quality of Gait: with max cues to remind patient to slow down and attend to maintaining limited weightbearing through LLE.     Gait Deviations: Decreased step length  Distance: 35', 70'  Comments:  Pt appears to maintain about 25-50% WB (mostly closer to 50% WB throughout), increased cues to maining TTWB though due to slight impulsive/easily distracted, initally with all mobility tasks maintains well but increased difficulty progression of activity      Goals  Short term goals  Time Frame for Short term goals: 7-10 days - ONGOING  Short term goal 1: Bed mobility with Mod I  Short term goal 2: Transfers TTWB LLE with Mod I  Short term goal 3: Amb 48' with RW TTWB LLE and Mod I  Short term goal 4: Amb 150' with RW TTWB LLE and SBA  Short term goal 5: Amb up/down curb with CGA and TTWB LLE with RW  Short term goal 6: Amb up/down 5 steps with offset SW or on bottom and CGA TTWB LLE  Long

## 2019-09-23 NOTE — PROGRESS NOTES
Wt 170 lb 3.1 oz (77.2 kg)   SpO2 94%   BMI 26.66 kg/m²     Last 24 hour lab  Recent Results (from the past 24 hour(s))   POCT Glucose    Collection Time: 09/22/19 11:47 AM   Result Value Ref Range    POC Glucose 178 (H) 70 - 99 mg/dl    Performed on ACCU-CHEK    POCT Glucose    Collection Time: 09/22/19  5:17 PM   Result Value Ref Range    POC Glucose 95 70 - 99 mg/dl    Performed on ACCU-CHEK    POCT Glucose    Collection Time: 09/22/19  8:30 PM   Result Value Ref Range    POC Glucose 234 (H) 70 - 99 mg/dl    Performed on ACCU-CHEK    CBC    Collection Time: 09/23/19  5:56 AM   Result Value Ref Range    WBC 5.7 4.0 - 11.0 K/uL    RBC 3.55 (L) 4.20 - 5.90 M/uL    Hemoglobin 11.3 (L) 13.5 - 17.5 g/dL    Hematocrit 32.6 (L) 40.5 - 52.5 %    MCV 91.9 80.0 - 100.0 fL    MCH 31.9 26.0 - 34.0 pg    MCHC 34.7 31.0 - 36.0 g/dL    RDW 15.3 12.4 - 15.4 %    Platelets 853 233 - 693 K/uL    MPV 7.5 5.0 - 10.5 fL   Basic Metabolic Panel    Collection Time: 09/23/19  5:56 AM   Result Value Ref Range    Sodium 141 136 - 145 mmol/L    Potassium 4.2 3.5 - 5.1 mmol/L    Chloride 103 99 - 110 mmol/L    CO2 24 21 - 32 mmol/L    Anion Gap 14 3 - 16    Glucose 139 (H) 70 - 99 mg/dL    BUN 20 7 - 20 mg/dL    CREATININE 1.0 0.8 - 1.3 mg/dL    GFR Non-African American >60 >60    GFR African American >60 >60    Calcium 8.3 8.3 - 10.6 mg/dL   POCT Glucose    Collection Time: 09/23/19  7:09 AM   Result Value Ref Range    POC Glucose 145 (H) 70 - 99 mg/dl    Performed on ACCU-CHEK        Therapy progress:  PT     Objective     Sit to Stand: Contact guard assistance  Stand to sit: Contact guard assistance  Bed to Chair: Contact guard assistance(RW EOB to w/c towards left, poor ability to [employer]; use of RW; w/c back to bed )  Device: Rolling Walker  Assistance: Minimal assistance, Contact guard assistance  Distance: 10' to assess WB (maintain with VC for proper sequencing), 76' (pt initially maintaing TTWB but with fatigue last ~10' and

## 2019-09-23 NOTE — PROGRESS NOTES
Occupational Therapy  Facility/Department: [address] IP REHAB  Daily Treatment Note  NAME: Yuri Quijano  : 1935  MRN: [id number]    Date of Service: 2019    Discharge Recommendations:  Home with assist PRN, Home with Home health OT  OT Equipment Recommendations  Equipment Needed: Yes  Mobility Devices: ADL Assistive Devices  ADL Assistive Devices: Toilet Safety Frame; Reacher;Transfer Tub Bench  Other: continue to assess - wife states she can borrow TTB, does not feel there is room for TSF    Assessment   Performance deficits / Impairments: Decreased functional mobility ; Decreased safe awareness;Decreased balance;Decreased endurance;Decreased high-level IADLs;Decreased ADL status  Assessment: Pt able to js both shoes and tie today without equipment, did need assist with DOC hose. Transfers and mobility with rolling walker and SBA, cues for TTWB 25-50%. .  Pt able to stand and take step forward with scale under left foot - placed 60 lb on left foot while stepping through, but this is only one step. Needs cues to continue the sequence during mobility. Ortho - Dr Christiano Roth will be consulted due to surgury being done in [facility].   Plan discharge Sat with home OT  Treatment Diagnosis: decreased ADl, IADL, balance  Prognosis: Good  OT Education: ADL Adaptive Strategies;Transfer Training  Patient Education: TTWB  REQUIRES OT FOLLOW UP: Yes  Activity Tolerance  Activity Tolerance: Patient Tolerated treatment well  Safety Devices  Safety Devices in place: Yes  Type of devices: Gait belt              has a past medical history of Allergic rhinitis, seasonal, Aortic regurgitation, CAD (coronary artery disease), Cancer ([address] Closed left hip fracture ([address] Colon polyps- last colon neg , Diabetic nephropathy- pos microalb 10/11, on ARB, DM (diabetes mellitus), type 2 ([address] Enlarged prostate, GERD (gastroesophageal reflux disease), Holosystolic murmur, HTN (hypertension), Hypertriglyceridemia, Osteoarthritis, and SVT (supraventricular tachycardia) ([facility] has a past surgical history that includes Appendectomy (age 30); Tonsillectomy and adenoidectomy (age 9); ablation of dysrhythmic focus (2013); Aortic valve replacement (2016); Total knee arthroplasty (Left, 11/2015); TURP (01/03/2017); joint replacement; Colonoscopy (12/19/2017); eye surgery; Cataract removal (Bilateral, 11/2017); and Colonoscopy (N/A, 12/13/2018). Restrictions  Restrictions/Precautions  Restrictions/Precautions: Weight Bearing, Fall Risk  Lower Extremity Weight Bearing Restrictions  Right Lower Extremity Weight Bearing: Partial Weight Bearing(25-50% WB LLE)  Partial Weight Bearing Percentage Or Pounds: 25-50 % on discharge paperwork from outside hospital in [facility] and Dr. Kerrie Pedroza has confirmed and adjusted order accordingly  Subjective   General  Chart Reviewed: Yes, Progress Notes  Patient assessed for rehabilitation services?: Yes  Additional Pertinent Hx:  Patient is an 81-year old male who was in the [facility] and had a syncopal episode due to sudden onset of A. Fib with a rapid heart rate of 180. Patient fell down and landed on his left hip area, developing pain in the left hip. Patient was taken to the hospital and found to have A. Fib and a Left hip fracture. Patient was taken to surgery and had IM nailing of his left hip fracture for repair on 9/6/19. He is now toe-touch weight bearing on left leg. Patient was seen by cardiology, and had an EP study, with eventual ablation pprocedure. The patient also was known to have diabetes, hypertension, and CAD with AVR 5 years ago. Patient was started in therapies, but needs more therapy before discharge to home safely. Patient is mod to max assist and therapies for transfers, gait and ADLs. Patient lives here in [place], and was independent with his ADLs, and lives with his wife.   Patient is admitted today from the [address] [place] [address] (copied per note Dr Kerrie Pedroza 9/18/19)  Family / Caregiver Present: No - wife arrived mid session  Referring Practitioner: Jordan  Diagnosis: left hip fracture, syncope - A fib with ablation  Subjective  Subjective: pt met in dept, agreeable to OT, states pain is low, just had medication      Orientation  Orientation  Overall Orientation Status: Within Functional Limits  Objective    ADL  Additional Comments: pt able to doff shoes and slipper socks, crossed left leg to reach left foot for sock. OT assisted with DOC hose, pt donned shoes and tied both per self        Functional Mobility  Functional - Mobility Device: Rolling Walker  Assist Level: Stand by assistance  Functional Mobility Comments: Cues to maintain TTWB - 25 to 50% LLE. Toilet Transfers  Toilet - Technique: Ambulating  Toilet Transfer: Stand by assistance  Toilet Transfers Comments: cues for TTWB, proper use of walker, used sink and wall stand to sit, but needed grab bar to stand from commode  Wheelchair Bed Transfers  Wheelchair/Bed - Technique: Ambulating  Equipment Used: Bed;Wheelchair  Level of Asssistance: Stand by assistance  Wheelchair Transfers Comments: cues for TTWB, proper use of walker  Bed mobility  Supine to Sit: Stand by assistance  Sit to Supine: Stand by assistance(difficulty lifting left leg, but able with encouragement)                          Cognition  Overall Cognitive Status: Exceptions  Problem Solving: Assistance required to implement solutions;Assistance required to generate solutions  Insights: Decreased awareness of deficits  Cognition Comment: min cues for TTWB.   Should be more carryover day to day with limited weight bearing                    Type of ROM/Therapeutic Exercise  Type of ROM/Therapeutic Exercise: Free weights  Comment: 3 lb dumbbell to improve activity tolerance for ADL and to maintain TTWB LLE  Exercises  Shoulder Flexion: 15  Shoulder Extension: 15  Horizontal ABduction: 15  Horizontal ADduction: 15  Elbow Flexion: 15  Elbow Extension: 15  Supination:

## 2019-09-23 NOTE — PLAN OF CARE
Problem: Falls - Risk of:  Goal: Will remain free from falls  Description  Will remain free from falls  9/22/2019 2332 by David Meier  Note:   Patient will remain free from fall by asking health support staff for assistance when exiting his bed during the shift. 9/22/2019 1118 by Coleen Martins RN  Outcome: Ongoing     Problem: Physical Regulation:  Goal: Will remain free from infection  Description  Will remain free from infection  9/22/2019 2332 by David Meier  Note:   Patient will remain free from infection by performing hand hygiene, and proper wound care during the shift.

## 2019-09-23 NOTE — CONSULTS
[facility] Orthopedic Surgery  Consult Note    This patient is seen in consultation at the request of  [facility]    Reason for Consult:  Left hip fracture    CHIEF COMPLAINT:  Left hip fracture    History Obtained From:  patient, spouse, electronic medical record    HISTORY OF PRESENT ILLNESS:    The patient is a 81 y.o. male who presents with left hip fracture. He reports he was traveling to [place] to visit family and was on layover in [facility] when he got dizzy and fell. He was presumed to be in Afib. He sustained left IT hip fx with lesser trochanter fx displacement. He was taken to ER and underwent left hip IM nailing on 9/6/19 per Dr Tonny Whatley. Once stabilized he was transferred home to [place] to [facility] unit. He has been 50% WB and tolerating therapies. His wife has requested the pt see Dr Lan Jarvis for further orthopedics as he was her doctor when she broke her ankle. The pt is alert and oriented in no distress Pain is described in left hip and with the intensity of mild. Pain is described as aching. Discomfort is intermittent. He is using a walker with therapies he says. No other injuries or complaints per pt.      Past Medical History:        Diagnosis Date    Allergic rhinitis, seasonal     Aortic regurgitation     2/12 ECHO EF 60%, mod AI    CAD (coronary artery disease)     Cancer (HCC)     Closed left hip fracture (HCC)     Colon polyps- last colon neg 11/06     Diabetic nephropathy- pos microalb 10/11, on ARB 10/5/2011    DM (diabetes mellitus), type 2 (HCC)     Enlarged prostate     GERD (gastroesophageal reflux disease)     Holosystolic murmur 6/1/6854    HTN (hypertension)     Hypertriglyceridemia     Osteoarthritis     SV[facility]ricular tachycardia) ([facility]        Past Surgical History:        Procedure Laterality Date    ABLATION OF DYSRHYTHMIC FOCUS  2013    Dr. Ginger Ochoa  2016    mosaic ultra porcine valve/medtronic    APPENDECTOMY  age 32    CATARACT REMOVAL Bilateral 11/2017    COLONOSCOPY  12/19/2017    COLONOSCOPY N/A 12/13/2018    COLONOSCOPY POLYPECTOMY SNARE/COLD BIOPSY performed by Mindy Woods MD at [address] [facility] REPLACEMENT      TONSILLECTOMY AND ADENOIDECTOMY  age 9    TOTAL KNEE ARTHROPLASTY Left 11/2015    TURP  01/03/2017    cysto, green light lazer TURP       Social History     Tobacco Use    Smoking status: Never Smoker    Smokeless tobacco: Never Used    Tobacco comment: congrats, advised not to start   Substance Use Topics    Alcohol use:  Yes     Alcohol/week: 2.0 standard drinks     Types: 2 Standard drinks or equivalent per week     Comment: 1-2 beers/week       Family History   Problem Relation Age of Onset    Hypertension Mother     Stroke Mother            Current Medications:   Current Facility-Administered Medications: amLODIPine (NORVASC) tablet 10 mg, 10 mg, Oral, Daily  aspirin EC tablet 81 mg, 81 mg, Oral, Daily  lisinopril (PRINIVIL;ZESTRIL) tablet 40 mg, 40 mg, Oral, Daily  glimepiride (AMARYL) tablet 4 mg, 4 mg, Oral, Daily with breakfast  diclofenac sodium 1 % gel 2 g, 2 g, Topical, BID  metFORMIN (GLUCOPHAGE-XR) extended release tablet 1,000 mg, 1,000 mg, Oral, Daily with breakfast  simvastatin (ZOCOR) tablet 20 mg, 20 mg, Oral, Nightly  metoprolol tartrate (LOPRESSOR) tablet 50 mg, 50 mg, Oral, BID  calcium carbonate (TUMS) chewable tablet 500 mg, 500 mg, Oral, TID PRN  insulin lispro (HUMALOG) injection vial 0-6 Units, 0-6 Units, Subcutaneous, TID WC  insulin lispro (HUMALOG) injection vial 0-3 Units, 0-3 Units, Subcutaneous, Nightly  sennosides-docusate sodium (SENOKOT-S) 8.6-50 MG tablet 2 tablet, 2 tablet, Oral, BID  ondansetron (ZOFRAN) tablet 4 mg, 4 mg, Oral, Q8H PRN  HYDROcodone-acetaminophen (NORCO) 5-325 MG per tablet 1 tablet, 1 tablet, Oral, Q4H PRN  HYDROcodone-acetaminophen (NORCO) 5-325 MG per tablet 2 tablet, 2 tablet, Oral, Q4H PRN  polyethylene glycol (GLYCOLAX)

## 2019-09-23 NOTE — DISCHARGE INSTR - COC
Continuity of Care Form    Patient Name: Abiola Irene   :  1935  MRN:  [id number]    Admit date:  2019  Discharge date:  2019    Code Status Order: Full Code   Advance Directives: Yes  Advance Care Flowsheet Documentation     Date/Time Healthcare Directive Type of Healthcare Directive Copy in [address] Agent's Name Healthcare Agent's Phone Number    19 [phone]  Yes, patient has an advance directive for healthcare treatment  Durable power of  for health care; Health care treatment directive  Yes, copy in chart  --  --  --          Admitting Physician:  Rohith Rea MD  PCP: Helena Youssef MD    Discharging Nurse: [facility] Unit/Room#: H6K-3033/8059-36  Discharging Unit Phone Number: [phone]    Emergency Contact:   Extended Emergency Contact Information  Primary Emergency Contact: Joe Magdaleno  Address: [address] [place] [address] Phone: [phone]  Mobile Phone: [phone]  Relation: Spouse  Secondary Emergency Contact: Naila Harvey [address] Phone: [phone]  Relation: Other    Past Surgical History:  Past Surgical History:   Procedure Laterality Date    ABLATION OF DYSRHYTHMIC FOCUS      Dr. Mary Kumar      mosaic ultra porcine valve/medtronic    APPENDECTOMY  age 30   [address] Bilateral 2017    COLONOSCOPY  2017    COLONOSCOPY N/A 2018    COLONOSCOPY POLYPECTOMY SNARE/COLD BIOPSY performed by Deep Valverde MD at [place] [address]  age 9   [address] Left 2015    TURP  2017    cysto, green light lazer TURP       Immunization History:   Immunization History   Administered Date(s) Administered    Influenza, High Dose (Fluzone 65 yrs and older) 2018, 2018    whole    Wound Care Documentation and Therapy:        Elimination:  Continence:   · Bowel: Yes  · Bladder: Yes  Urinary Catheter: None   Colostomy/Ileostomy/Ileal Conduit: No       Date of Last BM: 9/26/19    Intake/Output Summary (Last 24 hours) at 9/23/2019 1306  Last data filed at 9/23/2019 0823  Gross per 24 hour   Intake 600 ml   Output 550 ml   Net 50 ml     I/O last 3 completed shifts: In: 65 [P.O.:680]  Out: 400 [Urine:400]    Safety Concerns:     History of Falls (last 30 days)    Impairments/Disabilities:      None    Nutrition Therapy:  Current Nutrition Therapy:   - Oral Diet:  Carb Control 5 carbs/meal (2000kcals/day)    Routes of Feeding: Oral  Liquids: Thin Liquids  Daily Fluid Restriction: no  Last Modified Barium Swallow with Video (Video Swallowing Test): not done    Treatments at the Time of Hospital Discharge:   Respiratory Treatments: none  Oxygen Therapy:  is not on home oxygen therapy.   Ventilator:    - No ventilator support    Rehab Therapies: Physical Therapy, Occupational Therapy and nursing  Weight Bearing Status/Restrictions: 50% WB left leg  Other Medical Equipment (for information only, NOT a DME order): rolling  walker  Other Treatments: ASA 81mg for 30 days after DC for DVT prophylaxis, DOC hose on daily for 6 weeks post hip fx and off at night  [facility]: [address] [place] [address] to establish plan of care for patient over 60 day period   Nursing  Initial home SN evaluation visit to occur within 24-48 hours for:  medication management  VS and clinical assessment  S&S chronic disease exacerbation education + when to contact MD / NP  care coordination  Medication Reconciliation during 1st SN visit       PT/OT   Evaluate with goal of regaining prior level of functioning   OT to evaluate if patient has [address] needs for personal care      PCP Visit scheduled within 7 days of hospital discharge     Patient's personal belongings (please select all that are

## 2019-09-24 LAB
GLUCOSE BLD-MCNC: 156 MG/DL (ref 70–99)
GLUCOSE BLD-MCNC: 202 MG/DL (ref 70–99)
GLUCOSE BLD-MCNC: 248 MG/DL (ref 70–99)
GLUCOSE BLD-MCNC: 281 MG/DL (ref 70–99)
GLUCOSE BLD-MCNC: 64 MG/DL (ref 70–99)
GLUCOSE BLD-MCNC: 68 MG/DL (ref 70–99)
PERFORMED ON: ABNORMAL

## 2019-09-24 PROCEDURE — 6360000002 HC RX W HCPCS: Performed by: PHYSICAL MEDICINE & REHABILITATION

## 2019-09-24 PROCEDURE — 97535 SELF CARE MNGMENT TRAINING: CPT

## 2019-09-24 PROCEDURE — 1280000000 HC REHAB R&B

## 2019-09-24 PROCEDURE — 97530 THERAPEUTIC ACTIVITIES: CPT

## 2019-09-24 PROCEDURE — 6370000000 HC RX 637 (ALT 250 FOR IP): Performed by: PHYSICAL MEDICINE & REHABILITATION

## 2019-09-24 PROCEDURE — 97110 THERAPEUTIC EXERCISES: CPT | Performed by: PHYSICAL THERAPIST

## 2019-09-24 PROCEDURE — 97116 GAIT TRAINING THERAPY: CPT | Performed by: PHYSICAL THERAPIST

## 2019-09-24 PROCEDURE — 97110 THERAPEUTIC EXERCISES: CPT

## 2019-09-24 PROCEDURE — 97530 THERAPEUTIC ACTIVITIES: CPT | Performed by: PHYSICAL THERAPIST

## 2019-09-24 PROCEDURE — 94760 N-INVAS EAR/PLS OXIMETRY 1: CPT

## 2019-09-24 RX ADMIN — AMLODIPINE BESYLATE 10 MG: 10 TABLET ORAL at 08:39

## 2019-09-24 RX ADMIN — DICLOFENAC 2 G: 10 GEL TOPICAL at 20:56

## 2019-09-24 RX ADMIN — INSULIN LISPRO 1 UNITS: 100 INJECTION, SOLUTION INTRAVENOUS; SUBCUTANEOUS at 20:52

## 2019-09-24 RX ADMIN — PROPAFENONE HYDROCHLORIDE 150 MG: 150 TABLET, COATED ORAL at 08:53

## 2019-09-24 RX ADMIN — ENOXAPARIN SODIUM 40 MG: 40 INJECTION SUBCUTANEOUS at 08:39

## 2019-09-24 RX ADMIN — METFORMIN HYDROCHLORIDE 1000 MG: 500 TABLET, EXTENDED RELEASE ORAL at 08:39

## 2019-09-24 RX ADMIN — CETIRIZINE HYDROCHLORIDE 10 MG: 10 TABLET, FILM COATED ORAL at 08:39

## 2019-09-24 RX ADMIN — DIPHENHYDRAMINE HCL 50 MG: 25 TABLET ORAL at 20:51

## 2019-09-24 RX ADMIN — METOPROLOL TARTRATE 50 MG: 50 TABLET ORAL at 08:39

## 2019-09-24 RX ADMIN — ACETAMINOPHEN 650 MG: 325 TABLET ORAL at 08:44

## 2019-09-24 RX ADMIN — INSULIN LISPRO 1 UNITS: 100 INJECTION, SOLUTION INTRAVENOUS; SUBCUTANEOUS at 08:44

## 2019-09-24 RX ADMIN — SIMVASTATIN 20 MG: 20 TABLET, FILM COATED ORAL at 20:51

## 2019-09-24 RX ADMIN — LISINOPRIL 40 MG: 40 TABLET ORAL at 08:39

## 2019-09-24 RX ADMIN — INSULIN LISPRO 3 UNITS: 100 INJECTION, SOLUTION INTRAVENOUS; SUBCUTANEOUS at 12:28

## 2019-09-24 RX ADMIN — PROPAFENONE HYDROCHLORIDE 150 MG: 150 TABLET, COATED ORAL at 20:51

## 2019-09-24 RX ADMIN — METOPROLOL TARTRATE 50 MG: 50 TABLET ORAL at 20:51

## 2019-09-24 RX ADMIN — ASPIRIN 81 MG: 81 TABLET, COATED ORAL at 08:39

## 2019-09-24 RX ADMIN — GLIMEPIRIDE 4 MG: 1 TABLET ORAL at 08:39

## 2019-09-24 ASSESSMENT — PAIN DESCRIPTION - LOCATION: LOCATION: HIP

## 2019-09-24 ASSESSMENT — PAIN SCALES - GENERAL
PAINLEVEL_OUTOF10: 0
PAINLEVEL_OUTOF10: 0
PAINLEVEL_OUTOF10: 3
PAINLEVEL_OUTOF10: 4

## 2019-09-24 ASSESSMENT — PAIN DESCRIPTION - DESCRIPTORS: DESCRIPTORS: ACHING

## 2019-09-24 ASSESSMENT — PAIN DESCRIPTION - ORIENTATION: ORIENTATION: LEFT

## 2019-09-24 ASSESSMENT — PAIN DESCRIPTION - PROGRESSION: CLINICAL_PROGRESSION: NOT CHANGED

## 2019-09-24 ASSESSMENT — PAIN DESCRIPTION - FREQUENCY: FREQUENCY: CONTINUOUS

## 2019-09-24 ASSESSMENT — PAIN DESCRIPTION - ONSET: ONSET: ON-GOING

## 2019-09-24 ASSESSMENT — PAIN DESCRIPTION - PAIN TYPE: TYPE: ACUTE PAIN

## 2019-09-24 NOTE — PROGRESS NOTES
Patient A&O. Tolerating PO. Pain controlled with pain medication (see MAR). Call light within reach. Will continue to monitor and reassess.  Electronically signed by Adolfo Muller RN on 9/24/2019

## 2019-09-24 NOTE — PROGRESS NOTES
Physical Therapy  Facility/Department: [address] IP REHAB  Daily Treatment Note  NAME: Martha Ramos  : 1935  MRN: [id number]    Date of Service: 2019    Discharge Recommendations:  Home with Home health PT, S Level 1, 2-3 sessions per week   PT Equipment Recommendations  Equipment Needed: No  Other: Pt doesn't own any equiptment- wife has SW and borrowed RW    Assessment   Body structures, Functions, Activity limitations: Decreased functional mobility ; Decreased strength;Decreased safe awareness;Decreased ROM; Decreased balance; Increased Pain  Assessment: After repeat demonstration and max cues to slow down and use UE more effectively, patient was more consistent with maintaining PWB LLE status with short distance ambulation. Transfers with CGA/min A with cues for safe management of LLE with limited weight bearing and SBA for sit<>supine on mat. Will need to further work on balance and stairs as strength improves. The pt would benefit from skilled PT on our ARU to work on these deficits so he can return home safely with his wife. Treatment Diagnosis: Decreased functional mobility  Prognosis: Good  PT Education: Family Education;Weight-bearing Education;Transfer Training;Gait Training;Precautions; Equipment; Adaptive Device Training;Functional Mobility Training;Home Exercise Program  Patient Education: stressing importance of maintaing PWB  Barriers to Learning: memory  Activity Tolerance  Activity Tolerance: Patient Tolerated treatment well;Patient limited by fatigue;Patient limited by pain  Activity Tolerance: difficulty maintain TTWB with fatigue and distractions      Patient Diagnosis(es): There were no encounter diagnoses.      has a past medical history of Allergic rhinitis, seasonal, Aortic regurgitation, CAD (coronary artery disease), Cancer ([facility] [address] Closed left hip fracture ([facility] [address] Colon polyps- last colon neg , Diabetic nephropathy- pos microalb 10/11, on ARB, DM (diabetes mellitus), type 2

## 2019-09-24 NOTE — PLAN OF CARE
Problem: Falls - Risk of:  Goal: Will remain free from falls  Description  Will remain free from falls  9/24/2019 1025 by Leonie Snellen, RN  Outcome: Ongoing  Note:   Fall risk assessment completed. Fall precautions in place. Call light within reach. Pt educated on calling for assistance before getting up. Walkway free of clutter. Will continue to monitor. Electronically signed by Leonie Snellen, RN on 9/24/2019 at 10:25 AM       Problem: Falls - Risk of:  Goal: Absence of physical injury  Description  Absence of physical injury  Outcome: Ongoing  Note:   Fall risk assessment completed. Fall precautions in place. Call light within reach. Pt educated on calling for assistance before getting up. Walkway free of clutter. Will continue to monitor. Electronically signed by Leonie Snellen, RN on 9/24/2019 at 10:25 AM       Problem: Discharge Planning:  Goal: Discharged to appropriate level of care  Description  Discharged to appropriate level of care  Outcome: Ongoing     Problem: Serum Glucose Level - Abnormal:  Goal: Ability to maintain appropriate glucose levels will improve  Description  Ability to maintain appropriate glucose levels will improve  Outcome: Ongoing     Problem: Sensory Perception - Impaired:  Goal: Ability to maintain a stable neurologic state will improve  Description  Ability to maintain a stable neurologic state will improve  Outcome: Ongoing     Problem:  Activity:  Goal: Ability to ambulate will improve  Description  Ability to ambulate will improve  Outcome: Ongoing     Problem: Health Behavior:  Goal: Identification of resources available to assist in meeting health care needs will improve  Description  Identification of resources available to assist in meeting health care needs will improve  Outcome: Ongoing     Problem: Nutritional:  Goal: Ability to attain and maintain optimal nutritional status will improve  Description  Ability to attain and maintain optimal nutritional status will

## 2019-09-25 LAB
GLUCOSE BLD-MCNC: 133 MG/DL (ref 70–99)
GLUCOSE BLD-MCNC: 133 MG/DL (ref 70–99)
GLUCOSE BLD-MCNC: 190 MG/DL (ref 70–99)
GLUCOSE BLD-MCNC: 262 MG/DL (ref 70–99)
PERFORMED ON: ABNORMAL

## 2019-09-25 PROCEDURE — 6360000002 HC RX W HCPCS: Performed by: PHYSICAL MEDICINE & REHABILITATION

## 2019-09-25 PROCEDURE — 97535 SELF CARE MNGMENT TRAINING: CPT

## 2019-09-25 PROCEDURE — 97530 THERAPEUTIC ACTIVITIES: CPT

## 2019-09-25 PROCEDURE — 97110 THERAPEUTIC EXERCISES: CPT

## 2019-09-25 PROCEDURE — 97116 GAIT TRAINING THERAPY: CPT | Performed by: PHYSICAL THERAPIST

## 2019-09-25 PROCEDURE — 6370000000 HC RX 637 (ALT 250 FOR IP): Performed by: PHYSICAL MEDICINE & REHABILITATION

## 2019-09-25 PROCEDURE — 94760 N-INVAS EAR/PLS OXIMETRY 1: CPT

## 2019-09-25 PROCEDURE — 1280000000 HC REHAB R&B

## 2019-09-25 PROCEDURE — 97110 THERAPEUTIC EXERCISES: CPT | Performed by: PHYSICAL THERAPIST

## 2019-09-25 PROCEDURE — 99222 1ST HOSP IP/OBS MODERATE 55: CPT | Performed by: ORTHOPAEDIC SURGERY

## 2019-09-25 PROCEDURE — 97530 THERAPEUTIC ACTIVITIES: CPT | Performed by: PHYSICAL THERAPIST

## 2019-09-25 RX ADMIN — CETIRIZINE HYDROCHLORIDE 10 MG: 10 TABLET, FILM COATED ORAL at 08:50

## 2019-09-25 RX ADMIN — DICLOFENAC 2 G: 10 GEL TOPICAL at 20:41

## 2019-09-25 RX ADMIN — INSULIN LISPRO 1 UNITS: 100 INJECTION, SOLUTION INTRAVENOUS; SUBCUTANEOUS at 20:41

## 2019-09-25 RX ADMIN — PROPAFENONE HYDROCHLORIDE 150 MG: 150 TABLET, COATED ORAL at 20:41

## 2019-09-25 RX ADMIN — METOPROLOL TARTRATE 50 MG: 50 TABLET ORAL at 20:41

## 2019-09-25 RX ADMIN — AMLODIPINE BESYLATE 10 MG: 10 TABLET ORAL at 08:51

## 2019-09-25 RX ADMIN — LISINOPRIL 40 MG: 40 TABLET ORAL at 08:50

## 2019-09-25 RX ADMIN — METOPROLOL TARTRATE 50 MG: 50 TABLET ORAL at 08:51

## 2019-09-25 RX ADMIN — DIPHENHYDRAMINE HCL 50 MG: 25 TABLET ORAL at 20:41

## 2019-09-25 RX ADMIN — PROPAFENONE HYDROCHLORIDE 150 MG: 150 TABLET, COATED ORAL at 08:54

## 2019-09-25 RX ADMIN — GLIMEPIRIDE 4 MG: 1 TABLET ORAL at 08:50

## 2019-09-25 RX ADMIN — ASPIRIN 81 MG: 81 TABLET, COATED ORAL at 08:50

## 2019-09-25 RX ADMIN — SIMVASTATIN 20 MG: 20 TABLET, FILM COATED ORAL at 20:41

## 2019-09-25 RX ADMIN — ENOXAPARIN SODIUM 40 MG: 40 INJECTION SUBCUTANEOUS at 08:51

## 2019-09-25 RX ADMIN — INSULIN LISPRO 3 UNITS: 100 INJECTION, SOLUTION INTRAVENOUS; SUBCUTANEOUS at 12:24

## 2019-09-25 RX ADMIN — ACETAMINOPHEN 650 MG: 325 TABLET ORAL at 20:41

## 2019-09-25 RX ADMIN — METFORMIN HYDROCHLORIDE 1000 MG: 500 TABLET, EXTENDED RELEASE ORAL at 08:50

## 2019-09-25 ASSESSMENT — PAIN DESCRIPTION - LOCATION: LOCATION: HIP

## 2019-09-25 ASSESSMENT — PAIN DESCRIPTION - FREQUENCY: FREQUENCY: CONTINUOUS

## 2019-09-25 ASSESSMENT — PAIN SCALES - GENERAL
PAINLEVEL_OUTOF10: 3
PAINLEVEL_OUTOF10: 0
PAINLEVEL_OUTOF10: 0

## 2019-09-25 ASSESSMENT — PAIN DESCRIPTION - PAIN TYPE: TYPE: ACUTE PAIN

## 2019-09-25 ASSESSMENT — PAIN DESCRIPTION - DESCRIPTORS: DESCRIPTORS: ACHING

## 2019-09-25 ASSESSMENT — PAIN DESCRIPTION - ORIENTATION: ORIENTATION: LEFT

## 2019-09-25 ASSESSMENT — PAIN DESCRIPTION - ONSET: ONSET: ON-GOING

## 2019-09-25 ASSESSMENT — PAIN DESCRIPTION - PROGRESSION: CLINICAL_PROGRESSION: NOT CHANGED

## 2019-09-25 NOTE — CARE COORDINATION
[facility] received referral from  for [address]. Will need PT notes and DME Orders. Per , patient may also wish to purchase a Transfer Tub Bench. Per PT Notes, patient's wife has borrowed a wheeled walker and may not need one. LVM on pt's home phone asking pt's wife to call [facility] to verify need for TTB & RW. Will verify patient's insurance and follow up with patient to deliver the ordered item(s) prior to discharge.     Thank you for the referral.  Electronically signed by Elida Small on 9/25/2019 at 11:17 AM  Cell ph# [phone]    ]

## 2019-09-25 NOTE — CONSULTS
cysto, green light lazer TURP       Medications prior to admission:   Prior to Admission medications    Medication Sig Start Date End Date Taking? Authorizing Provider   aspirin EC 81 MG EC tablet Take 1 tablet by mouth 2 times daily Take for DVT blood clot prophylaxis. Please avoid missing doses. 9/23/19 10/23/19 Yes Cedric Prakash APRN - CNP   atorvastatin (LIPITOR) 20 MG tablet Take 20 mg by mouth nightly   Yes Historical Provider, MD   enoxaparin (LOVENOX) 40 MG/0.4ML injection Inject into the skin daily   Yes Historical Provider, MD   metoprolol tartrate (LOPRESSOR) 50 MG tablet Take 50 mg by mouth 2 times daily   Yes Historical Provider, MD   propafenone (RYTHMOL) 150 MG tablet Take 150 mg by mouth 2 times daily   Yes Historical Provider, MD   loratadine (CLARITIN) 10 MG tablet Take 10 mg by mouth daily   Yes Historical Provider, MD   lisinopril (PRINIVIL;ZESTRIL) 40 MG tablet Take 1 tablet by mouth daily  Patient taking differently: Take 40 mg by mouth 2 times daily  8/23/19  Yes Stone Pritchard MD   amLODIPine (NORVASC) 5 MG tablet Take 1 tablet by mouth daily 8/23/19  Yes Stone Pritchard MD   metFORMIN (GLUCOPHAGE-XR) 500 MG extended release tablet TAKE TWO TABLETS BY MOUTH IN THE MORNING 7/3/19  Yes Stone Pritchard MD   glimepiride (AMARYL) 4 MG tablet TAKE 1 TABLET BY MOUTH IN THE MORNING BEFORE BREAKFAST 7/3/19  Yes Stone Pritchard MD   simvastatin (ZOCOR) 20 MG tablet TAKE 1 TABLET BY MOUTH NIGHTLY 7/3/19  Yes Stone Pritchard MD   diclofenac sodium 1 % GEL Apply 2 g topically 2 times daily 9/19/18  Yes Stone Pritchard MD   Calcium Carbonate Antacid (TUMS E-X PO) Take 2 tablets by mouth as needed   Yes Historical Provider, MD Ronna Ramirez II LANCETS MISC 1 each by Does not apply route daily 3/20/19   Stone Pritchard MD   blood glucose test strips (ASCENSIA AUTODISC VI;ONE TOUCH ULTRA TEST VI) strip Daily or as needed.  3/20/19   Stone Pritchard MD   Blood Glucose Monitoring Suppl (ONE TOUCH ULTRA 2) w/Device KIT 1 kit by Does not apply route daily 3/20/19   Princess Ramirez MD   Lancet Devices MISC 1 x daily for the [place] lancets 3/20/19   Princess Ramirez MD   oxybutynin (DITROPAN) 5 MG tablet Take 1 tablet by mouth 2 times daily as needed for Other (bladder).  10/21/13 12/30/16  Princess Ramirez MD       Current Medications:   Current Facility-Administered Medications: diphenhydrAMINE (BENADRYL) tablet 50 mg, 50 mg, Oral, Nightly PRN  amLODIPine (NORVASC) tablet 10 mg, 10 mg, Oral, Daily  aspirin EC tablet 81 mg, 81 mg, Oral, Daily  lisinopril (PRINIVIL;ZESTRIL) tablet 40 mg, 40 mg, Oral, Daily  glimepiride (AMARYL) tablet 4 mg, 4 mg, Oral, Daily with breakfast  diclofenac sodium 1 % gel 2 g, 2 g, Topical, BID  metFORMIN (GLUCOPHAGE-XR) extended release tablet 1,000 mg, 1,000 mg, Oral, Daily with breakfast  simvastatin (ZOCOR) tablet 20 mg, 20 mg, Oral, Nightly  metoprolol tartrate (LOPRESSOR) tablet 50 mg, 50 mg, Oral, BID  calcium carbonate (TUMS) chewable tablet 500 mg, 500 mg, Oral, TID PRN  insulin lispro (HUMALOG) injection vial 0-6 Units, 0-6 Units, Subcutaneous, TID WC  insulin lispro (HUMALOG) injection vial 0-3 Units, 0-3 Units, Subcutaneous, Nightly  sennosides-docusate sodium (SENOKOT-S) 8.6-50 MG tablet 2 tablet, 2 tablet, Oral, BID  ondansetron (ZOFRAN) tablet 4 mg, 4 mg, Oral, Q8H PRN  HYDROcodone-acetaminophen (NORCO) 5-325 MG per tablet 1 tablet, 1 tablet, Oral, Q4H PRN  HYDROcodone-acetaminophen (NORCO) 5-325 MG per tablet 2 tablet, 2 tablet, Oral, Q4H PRN  polyethylene glycol (GLYCOLAX) packet 17 g, 17 g, Oral, Daily  bisacodyl (DULCOLAX) suppository 10 mg, 10 mg, Rectal, Daily PRN  acetaminophen (TYLENOL) tablet 650 mg, 650 mg, Oral, Q4H PRN  magnesium hydroxide (MILK OF MAGNESIA) 400 MG/5ML suspension 30 mL, 30 mL, Oral, Daily PRN  enoxaparin (LOVENOX) injection 40 mg, 40 mg, Subcutaneous, Daily  cetirizine (ZYRTEC) tablet 10 mg, 10 mg, Oral, Daily  glucose (GLUTOSE) 40 % oral gel 15 g, 15 g,  Hypertension Mother     Stroke Mother          REVIEW OF SYSTEMS:   CONSTITUTIONAL: Denies unexplained weight loss, fevers, chills or fatigue  NEUROLOGICAL: Denies unsteady gait or progressive weakness    PSYCHOLOGICAL: Denies anxiety, depression   SKIN: Denies skin changes, delayed healing, rash, itching   HEMATOLOGIC: Denies easy bleeding or bruising  ENDOCRINE: Denies excessive thirst, urination, heat/cold  RESPIRATORY: Denies current dyspnea, cough  CARDIOVASCULAR: Negative for chest pain at this time. EYES: Negative for photophobia and visual disturbance. ENT:  Negative for rhinorrhea, epistaxis, sore throat, or hearing loss. GI: Denies nausea, vomiting, diarrhea   : Denies bowel or bladder issues   MUSCULOSKELETAL: Left hip pain/surgery for hip fracture  All other ROS reviewed in chart or with patient or family and are grossly negative. PHYSICAL EXAMINATION:  Mr. Jeniffer Phoenix is a very pleasant 81 y.o. male who seen today in no acute distress, awake, alert, and oriented. He is well nourished and groomed. Patient with normal affect. Body mass index is 26.73 kg/m². . Skin warm and dry. Resting respiratory rate is 16. Resp deep and easy. Pulse is with regular rate and rhythm    BP (!) 185/74   Pulse 64   Temp 98.4 °F (36.9 °C) (Oral)   Resp 16   Ht 5' 7\" (1.702 m)   Wt 170 lb 10.2 oz (77.4 kg)   SpO2 96%   BMI 26.73 kg/m²        Airway is intact  Chest: chest clear, no wheezing, rales, normal symmetric air entry, no tachypnea, retractions or cyanosis  Heart: regular rate and rhythm ; heart sounds normal   Hearing intact, pupil equal and reactive bilateral  Lymphatics; No groin or axillary enlarged lymph nodes. Neck; No swelling  Abdomen; soft, non distended. MUSCULOSKELETAL:   The incision is healed well, left hip. No signs of any erythema or drainage. He has no pain with the active or passive range of motion of the left hip.   He has intact sensation, distally, and is neurovascularly

## 2019-09-25 NOTE — PLAN OF CARE
Problem: Falls - Risk of:  Goal: Will remain free from falls  Description  Will remain free from falls  9/25/2019 1103 by Gian Farias RN  Outcome: Ongoing  Note:   Call light and personal belongings within reach. Frequent rounding on patient fall precautions in place  Outcome: Ongoing  Note:   Fall risk band on patient. Orange light on outside of room. Non skid footwear in place. Alarms used appropriately. Patient instructed to call and wait for staff before getting up. Rounding done to anticipate needs. Appropriate safety devices used for transfers. Goal: Absence of physical injury  Description  Absence of physical injury  9/25/2019 1103 by Gina Farias RN  Outcome: Ongoing    Outcome: Ongoing       Problem: Discharge Planning:  Goal: Discharged to appropriate level of care  Description  Discharged to appropriate level of care  9/25/2019 1103 by Gina Farias RN  Outcome: Ongoing    Outcome: Ongoing    Outcome: Ongoing     Problem: Serum Glucose Level - Abnormal:  Goal: Ability to maintain appropriate glucose levels will improve  Description  Ability to maintain appropriate glucose levels will improve  9/25/2019 1103 by Gina Farias RN  Outcome: Ongoing      Outcome: Ongoing     Problem: Sensory Perception - Impaired:  Goal: Ability to maintain a stable neurologic state will improve  Description  Ability to maintain a stable neurologic state will improve  9/25/2019 1103 by Gina Farias RN  Outcome: Ongoing    Outcome: Ongoing    Outcome: Ongoing     Problem:  Activity:  Goal: Ability to ambulate will improve  Description  Ability to ambulate will improve  9/25/2019 1103 by Gina Farias RN  Outcome: Ongoing    Outcome: Ongoing    Outcome: Ongoing  Goal: Ability to perform activities at highest level will improve  Description  Ability to perform activities at highest level will improve  9/25/2019 1103 by Gina Farias RN  Outcome: Ongoing    Outcome: Ongoing    Outcome: Ongoing     Problem: Health Behavior:  Goal: Identification of resources available to assist in meeting health care needs will improve  Description  Identification of resources available to assist in meeting health care needs will improve  9/25/2019 1103 by Wisam eHad RN  Outcome: Ongoing    Outcome: Ongoing    Outcome: Ongoing     Problem: Nutritional:  Goal: Ability to attain and maintain optimal nutritional status will improve  Description  Ability to attain and maintain optimal nutritional status will improve  9/25/2019 1103 by Wisam Head RN  Outcome: Ongoing    Outcome: Ongoing    Outcome: Ongoing  Note:   Vital signs monitored per unit routine. Monitoring intake and output. Daily weight. Dietary restrictions noted and followed.        Problem: Physical Regulation:  Goal: Will remain free from infection  Description  Will remain free from infection  9/25/2019 1103 by Wisam Head RN  Outcome: Ongoing    Outcome: OngoingGoal: Postoperative complications will be avoided or minimized  Description  Postoperative complications will be avoided or minimized  9/25/2019 1103 by Wisam Head RN  Outcome: Ongoing    Outcome: Ongoing    Outcome: Ongoing  Goal: Diagnostic test results will improve  Description  Diagnostic test results will improve  9/25/2019 1103 by Wisam Head RN  Outcome: Ongoing    Outcome: Ongoing  Outcome: Ongoing     Problem: Respiratory:  Goal: Ability to maintain adequate ventilation will improve  Description  Ability to maintain adequate ventilation will improve  9/25/2019 1103 by Wisam Head RN  Outcome: Ongoing    Outcome: Ongoing     Problem: Safety:  Goal: Ability to remain free from injury will improve  Description  Ability to remain free from injury will improve  9/25/2019 1103 by Wisam Head RN  Outcome: Ongoing    Outcome: Ongoing    Outcome: Ongoing     Problem: Self-Care:  Goal: Ability to meet self-care needs will improve  Description  Ability to meet self-care needs will

## 2019-09-25 NOTE — PROGRESS NOTES
Patient admitted to rehab on 9/18 left hip fracture. Alert and oriented x 4. Continent of bowel and bladder. LBM 9/25. Ledell Chris Patient is 50% TTWB. Patient is diabetic. Full code. Incision is clean dry and intact with steri strips TONIE. Will continue to monitor.

## 2019-09-25 NOTE — PROGRESS NOTES
(01/03/2017); joint replacement; Colonoscopy (12/19/2017); eye surgery; Cataract removal (Bilateral, 11/2017); and Colonoscopy (N/A, 12/13/2018). Restrictions  Restrictions/Precautions  Restrictions/Precautions: Weight Bearing, Fall Risk  Lower Extremity Weight Bearing Restrictions  Right Lower Extremity Weight Bearing: Partial Weight Bearing(25-50% WB LLE)  Partial Weight Bearing Percentage Or Pounds: 25-50 % on discharge paperwork from outside hospital in [place] and Dr. Jon Chavez has confirmed and adjusted order accordingly  Subjective   General  Chart Reviewed: Yes, Progress Notes  Patient assessed for rehabilitation services?: Yes  Additional Pertinent Hx:  Patient is an 81-year old male who was in the [facility] and had a syncopal episode due to sudden onset of A. Fib with a rapid heart rate of 180. Patient fell down and landed on his left hip area, developing pain in the left hip. Patient was taken to the hospital and found to have A. Fib and a Left hip fracture. Patient was taken to surgery and had IM nailing of his left hip fracture for repair on 9/6/19. He is now toe-touch weight bearing on left leg. Patient was seen by cardiology, and had an EP study, with eventual ablation pprocedure. The patient also was known to have diabetes, hypertension, and CAD with AVR 5 years ago. Patient was started in therapies, but needs more therapy before discharge to home safely. Patient is mod to max assist and therapies for transfers, gait and ADLs. Patient lives here in [place], and was independent with his ADLs, and lives with his wife.   Patient is admitted today from the [address] [place] [address] (copied per note Dr Jon Chavez 9/18/19)  Family / Caregiver Present: Yes(wife)  Referring Practitioner: Jordan  Diagnosis: left hip fracture, syncope - A fib with ablation  Subjective  Subjective: pt met in dept , agreeable to OT           Objective    ADL  Additional Comments: donned right DOC hose with cues, left with assist she will have someone at home when he is discharged in case they have difficulty with the steps      UE ex - 4 lb weighted ball for overhead press, chest press, diagonal patterns, chest to knees 15 reps each    Returned to room, amb to bathroom, SBA, transferred to commode SBA, urinated on commode , managed clothing with supervision. Amb to sink to wash hands, SBA  To recliner with SBA  Improved with memory of hand placement, safety - since pt is PWB, 50% it is easier to maintain. Chair alarm set, all needs met,ice to left hip. Pt eating desert with wife, provided with decaf coffee.          Plan   Plan  Times per week: 5-6 times per week  Times per day: Twice a day  Plan weeks: 7-10 days  Current Treatment Recommendations: Balance Training, Endurance Training, Patient/Caregiver Education & Training, Home Management Training, Functional Mobility Training, Safety Education & Training, Equipment Evaluation, Education, & procurement, Self-Care / ADL               Goals  Short term goals  Time Frame for Short term goals: 7-10 days pt will  Short term goal 1: bathe indep with AD  Short term goal 2: dress indep with AD  Short term goal 3: toilet indep with AD  Short term goal 4: transfer indep wth AD  Short term goal 5: functional mobility and simple meal prep indep with AD  Short term goal 6: improve activity tolerance to achieve above goals and stand 5 min for IADL tasks  Long term goals  Time Frame for Long term goals : same as [facility]  Patient Goals   Patient goals : \"I want to be able to walk better, be able to do my bath and get dressed\"       Therapy Time   Individual Concurrent Group Co-treatment   Time In [facility]         Time Out 1200         Minutes 45         Timed Code Treatment Minutes: 45 Minutes    Therapy Time     Individual Co-treatment   Time In [address] [place] [address]     Time Out 1600     Minutes [address]z, OTR/L 770

## 2019-09-25 NOTE — PROGRESS NOTES
Department of [facility] Progress Note    Patient Identification:  Carla Li  [id number]  : 1935  Admit date: 2019      Diagnosis:   Patient Active Problem List   Diagnosis    Essential hypertension    Osteoarthritis    GERD (gastroesophageal reflux disease)    Allergic rhinitis, seasonal    Hypertriglyceridemia    Needs flu shot    Diabetic nephropathy associated with type 2 diabetes mellitus ([address]    Urge incontinence    Coronary artery disease, non-occlusive    History of paroxysmal supraventricular tachycardia- S/P ablation     S/P aortic valve replacement with porcine valve    Type 2 diabetes mellitus with diabetic nephropathy, without long-term current use of insulin ([facility] [address]    BPH with obstruction/lower urinary tract symptoms    Malignant neoplasm of descending colon ([facility] [address]-  left colectomy, T3N0    Left renal mass 2.3 cm enhancing by CT, stable, per hemonc    History of knee replacement, total, left    Closed left hip fracture, sequela           Subjective:  Pt seen this AM. Patient is feeling better this morning, and thinks he is making progress in his therapies. We think patient will be ready for discharge to home at the end of the week on Saturday with continued therapies at home of PT, OT, and visiting nurse. He is working on his toe-touch weightbearing limitations of  50% in the left leg at this time in therapies. Patient seen by Dr. Heber Weeks, and repeat x-rays of the left hip fracture were done and recommendations are made for continuing to 50% weightbearing status on the left leg. He will follow-up with Dr. Heber Weeks 2 weeks after discharge. Repeat labs look good. His blood sugars are still running slightly high, so we will increase his metformin. He is in normal sinus rhythm on auscultation today.       BP (!) 185/74   Pulse 64   Temp 98.4 °F (36.9 °C) (Oral)   Resp 16   Ht 5' 7\" (1.702 m)   Wt 170 lb 10.2 oz (77.4 kg)

## 2019-09-25 NOTE — PROGRESS NOTES
Patient is a 80 y/o male admitted to rehab with left hip fracture. A/A/O x 4. Transfers with walker x 1. On [address] diet, tolerating well. Medications taken whole in water. On lovenox for DVT prophylaxis. On room air. Has been continent of bowel and bladder. LBM 9/23. Chair/bed alarms in use and call light in reach. Will monitor for safety.

## 2019-09-25 NOTE — PROGRESS NOTES
Holosystolic murmur, HTN (hypertension), Hypertriglyceridemia, Osteoarthritis, and SVT (supraventricular tachycardia) ([facility] [address] has a past surgical history that includes Appendectomy (age 30); Tonsillectomy and adenoidectomy (age 9); ablation of dysrhythmic focus (2013); Aortic valve replacement (2016); Total knee arthroplasty (Left, 11/2015); TURP (01/03/2017); joint replacement; Colonoscopy (12/19/2017); eye surgery; Cataract removal (Bilateral, 11/2017); and Colonoscopy (N/A, 12/13/2018). Restrictions  Restrictions/Precautions  Restrictions/Precautions: Weight Bearing, Fall Risk  Lower Extremity Weight Bearing Restrictions  Right Lower Extremity Weight Bearing: Partial Weight Bearing(25-50% WB LLE)  Partial Weight Bearing Percentage Or Pounds: 25-50 % on discharge paperwork from outside hospital in THE [facility] and Dr. Elo Little has confirmed and adjusted order accordingly  Subjective   General  Chart Reviewed: Yes  Additional Pertinent Hx: Per Dr Gale Dirk is an 81-year old male who was in the [facility] and had a syncopal episode due to sudden onset of A. Fib with a rapid heart rate of 180. Patient fell down and landed on his left hip area, developing pain in the left hip. Patient was taken to the hospital and found to have A. Fib and a Left hip fracture. Patient was taken to surgery and had IM nailing of his left hip fracture for repair on 9/6/19. He is now toe-touch weight bearing on left leg. Patient was seen by cardiology, and had an EP study, with eventual ablation procedure. The patient also was known to have diabetes, hypertension, L TKR and CAD with AVR 5 years ago. \"  Response To Previous Treatment: Patient with no complaints from previous session. Family / Caregiver Present: Jose Enrique Elena)  Referring Practitioner: Dr Elo Little  Subjective  Subjective: Pain 3/10 in sitting, pt slep well last night. He states he thinks they gave him a sleeping pill.      Orientation  Orientation  Overall Orientation HEP  Ambulation  Ambulation?: Yes  Ambulation 1  Surface: level tile  Device: Rolling Walker  Assistance: Contact guard assistance  Quality of Gait: with max cues to remind patient to slow down and attend to maintaining limited weightbearing through LLE. Gait Deviations: Decreased step length  Distance: 200'  Comments:  Pt appears to maintain about 25-50% WB (mostly closer to 50% WB throughout), increased cues to maining PWB though due to slight impulsive/easily distracted, initally with all mobility tasks maintains well but increased difficulty progression of activity     Goals  Short term goals  Time Frame for Short term goals: 7-10 days - ONGOING  Short term goal 1: Bed mobility with Mod I  Short term goal 2: Transfers TTWB LLE with Mod I  Short term goal 3: Amb 48' with RW TTWB LLE and Mod I  Short term goal 4: Amb 150' with RW TTWB LLE and SBA  Short term goal 5: Amb up/down curb with CGA and TTWB LLE with RW  Short term goal 6: Amb up/down 5 steps with offset SW or on bottom and CGA TTWB LLE  Long term goals  Time Frame for Long term goals : STG=LTG  Patient Goals   Patient goals : \"I want to be able to walk a half mile like I do every day. \" We discussed that this was unrealistic given his TTWB status but that we could work towaed 150' and that I had no doubt he could work up to that half mile in a couple of months. Plan    Plan  Times per week: 5-6  Times per day: Twice a day  Plan weeks: 7-10 days  Current Treatment Recommendations: Strengthening, Functional Mobility Training, Home Exercise Program, Equipment Evaluation, Education, & procurement, ROM, Transfer Training, Gait Training, Safety Education & Training, Balance Training, Stair training, Pain Management, Patient/Caregiver Education & Training  Safety Devices  Type of devices:  All fall risk precautions in place, Gait belt, Patient at risk for falls  Restraints  Initially in place: No     Therapy Time AM:   Individual Concurrent Group

## 2019-09-26 LAB
GLUCOSE BLD-MCNC: 113 MG/DL (ref 70–99)
GLUCOSE BLD-MCNC: 133 MG/DL (ref 70–99)
GLUCOSE BLD-MCNC: 165 MG/DL (ref 70–99)
GLUCOSE BLD-MCNC: 178 MG/DL (ref 70–99)
PERFORMED ON: ABNORMAL

## 2019-09-26 PROCEDURE — 97116 GAIT TRAINING THERAPY: CPT | Performed by: PHYSICAL THERAPIST

## 2019-09-26 PROCEDURE — 97535 SELF CARE MNGMENT TRAINING: CPT

## 2019-09-26 PROCEDURE — 97530 THERAPEUTIC ACTIVITIES: CPT

## 2019-09-26 PROCEDURE — 6360000002 HC RX W HCPCS: Performed by: PHYSICAL MEDICINE & REHABILITATION

## 2019-09-26 PROCEDURE — 97110 THERAPEUTIC EXERCISES: CPT | Performed by: PHYSICAL THERAPIST

## 2019-09-26 PROCEDURE — 97530 THERAPEUTIC ACTIVITIES: CPT | Performed by: PHYSICAL THERAPIST

## 2019-09-26 PROCEDURE — 6370000000 HC RX 637 (ALT 250 FOR IP): Performed by: PHYSICAL MEDICINE & REHABILITATION

## 2019-09-26 PROCEDURE — 1280000000 HC REHAB R&B

## 2019-09-26 PROCEDURE — 94760 N-INVAS EAR/PLS OXIMETRY 1: CPT

## 2019-09-26 PROCEDURE — 97110 THERAPEUTIC EXERCISES: CPT

## 2019-09-26 RX ADMIN — METOPROLOL TARTRATE 50 MG: 50 TABLET ORAL at 21:11

## 2019-09-26 RX ADMIN — CETIRIZINE HYDROCHLORIDE 10 MG: 10 TABLET, FILM COATED ORAL at 07:32

## 2019-09-26 RX ADMIN — DIPHENHYDRAMINE HCL 50 MG: 25 TABLET ORAL at 21:11

## 2019-09-26 RX ADMIN — GLIMEPIRIDE 4 MG: 1 TABLET ORAL at 07:32

## 2019-09-26 RX ADMIN — METOPROLOL TARTRATE 50 MG: 50 TABLET ORAL at 07:30

## 2019-09-26 RX ADMIN — ENOXAPARIN SODIUM 40 MG: 40 INJECTION SUBCUTANEOUS at 07:32

## 2019-09-26 RX ADMIN — LISINOPRIL 40 MG: 40 TABLET ORAL at 07:32

## 2019-09-26 RX ADMIN — DICLOFENAC 2 G: 10 GEL TOPICAL at 21:14

## 2019-09-26 RX ADMIN — INSULIN LISPRO 1 UNITS: 100 INJECTION, SOLUTION INTRAVENOUS; SUBCUTANEOUS at 12:46

## 2019-09-26 RX ADMIN — AMLODIPINE BESYLATE 10 MG: 10 TABLET ORAL at 07:32

## 2019-09-26 RX ADMIN — METFORMIN HYDROCHLORIDE 1000 MG: 500 TABLET, EXTENDED RELEASE ORAL at 07:32

## 2019-09-26 RX ADMIN — PROPAFENONE HYDROCHLORIDE 150 MG: 150 TABLET, COATED ORAL at 21:11

## 2019-09-26 RX ADMIN — ASPIRIN 81 MG: 81 TABLET, COATED ORAL at 07:32

## 2019-09-26 RX ADMIN — PROPAFENONE HYDROCHLORIDE 150 MG: 150 TABLET, COATED ORAL at 07:32

## 2019-09-26 RX ADMIN — SIMVASTATIN 20 MG: 20 TABLET, FILM COATED ORAL at 21:11

## 2019-09-26 RX ADMIN — INSULIN LISPRO 1 UNITS: 100 INJECTION, SOLUTION INTRAVENOUS; SUBCUTANEOUS at 21:19

## 2019-09-26 ASSESSMENT — PAIN SCALES - GENERAL
PAINLEVEL_OUTOF10: 0
PAINLEVEL_OUTOF10: 0

## 2019-09-26 NOTE — PLAN OF CARE
Problem: Falls - Risk of:  Goal: Will remain free from falls  Description  Will remain free from falls  9/26/2019 1033 by Segundo Sams RN  Outcome: Ongoing  Note:   Call light and personal belongings within reach. Patient rounded on frequently. Fall preventions in place    Outcome: Ongoing  Note:   Pt educated on falls precautions and safety. Call light and personal belongings within reach at all times. Non skid socks in use when up. Hourly rounding and alarms active. Goal: Absence of physical injury  Description  Absence of physical injury  9/26/2019 1033 by Segundo Sams RN  Outcome: Ongoing    Outcome: Ongoing     Problem: Discharge Planning:  Goal: Discharged to appropriate level of care  Description  Discharged to appropriate level of care  9/26/2019 1033 by Segundo Sams RN  Outcome: Ongoing    Outcome: Ongoing     Problem: Serum Glucose Level - Abnormal:  Goal: Ability to maintain appropriate glucose levels will improve  Description  Ability to maintain appropriate glucose levels will improve  9/26/2019 1033 by Segundo Sams RN  Outcome: Ongoing    Outcome: Ongoing     Problem: Sensory Perception - Impaired:  Goal: Ability to maintain a stable neurologic state will improve  Description  Ability to maintain a stable neurologic state will improve  9/26/2019 1033 by Segundo Sams RN    Outcome: Ongoing     Problem:  Activity:  Goal: Ability to ambulate will improve  Description  Ability to ambulate will improve  9/26/2019 1033 by Segundo Sams RN  Outcome: Ongoing    Outcome: Ongoing  Note:   Pt transferring with CGA and a walker   Goal: Ability to perform activities at highest level will improve  Description  Ability to perform activities at highest level will improve  9/26/2019 1033 by Segundo Sams RN  Outcome: Ongoing  Outcome: Ongoing     Problem: Health Behavior:  Goal: Identification of resources available to assist in meeting health care needs will improve  Description  Identification

## 2019-09-26 NOTE — PROGRESS NOTES
Occupational Therapy  Facility/Department: [address] REHAB  Daily Treatment Note  NAME: Neil Kimbrough  : 1935  MRN: [id number]    Date of Service: 2019    Discharge Recommendations:  Home with assist PRN, Home with Home health OT  OT Equipment Recommendations  ADL Assistive Devices: Toilet Safety Frame; Reacher;Transfer Tub Bench  Other: pt  has shower chair, decided not to use transfer tub bench. Assessment   Performance deficits / Impairments: Decreased functional mobility ; Decreased safe awareness;Decreased balance;Decreased endurance;Decreased high-level IADLs;Decreased ADL status  Assessment: Pt tolerated session well. Pt completed ADL's with SBA. completed transfers with SBA. Treatment Diagnosis: decreased ADl, IADL, balance  Prognosis: Good  History:  Patient is an 81-year old male who was in the [facility] and had a syncopal episode due to sudden onset of A. Fib with a rapid heart rate of 180. Patient fell down and landed on his left hip area, developing pain in the left hip. Patient was taken to the hospital and found to have A. Fib and a Left hip fracture. Patient was taken to surgery and had IM nailing of his left hip fracture for repair on 19. He is now toe-touch weight bearing on left leg. Patient was seen by cardiology, and had an EP study, with eventual ablation pprocedure. The patient also was known to have diabetes, hypertension, and CAD with AVR 5 years ago. Patient was started in therapies, but needs more therapy before discharge to home safely. Patient is mod to max assist and therapies for transfers, gait and ADLs. Patient lives here in [place], and was independent with his ADLs, and lives with his wife. Patient is admitted today from the [address] [place] [address] (copied per note Dr Angelia Cruz 19)  REQUIRES OT FOLLOW UP: Yes  Activity Tolerance  Activity Tolerance: Patient Tolerated treatment well  Safety Devices  Type of devices: Call light within reach; Chair alarm in place; Left in chair;Gait belt         Patient Diagnosis(es): There were no encounter diagnoses. has a past medical history of Allergic rhinitis, seasonal, Aortic regurgitation, CAD (coronary artery disease), Cancer ([address] Closed left hip fracture ([address] Colon polyps- last colon neg 11/06, Diabetic nephropathy- pos microalb 10/11, on ARB, DM (diabetes mellitus), type 2 ([address] Enlarged prostate, GERD (gastroesophageal reflux disease), Holosystolic murmur, HTN (hypertension), Hypertriglyceridemia, Osteoarthritis, and SVT (supraventricular tachycardia) ([address] has a past surgical history that includes Appendectomy (age 30); Tonsillectomy and adenoidectomy (age 9); ablation of dysrhythmic focus (2013); Aortic valve replacement (2016); Total knee arthroplasty (Left, 11/2015); TURP (01/03/2017); joint replacement; Colonoscopy (12/19/2017); eye surgery; Cataract removal (Bilateral, 11/2017); and Colonoscopy (N/A, 12/13/2018). Restrictions  Restrictions/Precautions  Restrictions/Precautions: Weight Bearing, Fall Risk  Lower Extremity Weight Bearing Restrictions  Right Lower Extremity Weight Bearing: Partial Weight Bearing  Partial Weight Bearing Percentage Or Pounds: 25-50 % on discharge paperwork from outside hospital in THE [facility] and Dr. Barry Hughes has confirmed and adjusted order accordingly. 50% weight bearing per Dr Keene July: Yes  Patient assessed for rehabilitation services?: Yes  Additional Pertinent Hx:  Patient is an 81-year old male who was in the [facility] and had a syncopal episode due to sudden onset of A. Fib with a rapid heart rate of 180. Patient fell down and landed on his left hip area, developing pain in the left hip. Patient was taken to the hospital and found to have A. Fib and a Left hip fracture. Patient was taken to surgery and had IM nailing of his left hip fracture for repair on 9/6/19. He is now toe-touch weight bearing on left leg.  Patient was

## 2019-09-26 NOTE — PROGRESS NOTES
Education, & procurement, ROM, Transfer Training, Gait Training, Safety Education & Training, Balance Training, Stair training, Pain Management, Patient/Caregiver Education & Training  Safety Devices  Type of devices:  All fall risk precautions in place, Gait belt, Patient at risk for falls  Restraints  Initially in place: No     Therapy Time   Individual Concurrent Group Co-treatment   Time In 1030         Time Out 1200         Minutes 90         Timed Code Treatment Minutes: 98 [address], [address] [address] [place] [address] 53

## 2019-09-27 LAB
GLUCOSE BLD-MCNC: 140 MG/DL (ref 70–99)
GLUCOSE BLD-MCNC: 151 MG/DL (ref 70–99)
GLUCOSE BLD-MCNC: 249 MG/DL (ref 70–99)
GLUCOSE BLD-MCNC: 80 MG/DL (ref 70–99)
PERFORMED ON: ABNORMAL
PERFORMED ON: NORMAL

## 2019-09-27 PROCEDURE — 97110 THERAPEUTIC EXERCISES: CPT

## 2019-09-27 PROCEDURE — 97116 GAIT TRAINING THERAPY: CPT | Performed by: PHYSICAL THERAPIST

## 2019-09-27 PROCEDURE — 94760 N-INVAS EAR/PLS OXIMETRY 1: CPT

## 2019-09-27 PROCEDURE — 97530 THERAPEUTIC ACTIVITIES: CPT | Performed by: PHYSICAL THERAPIST

## 2019-09-27 PROCEDURE — 6360000002 HC RX W HCPCS: Performed by: PHYSICAL MEDICINE & REHABILITATION

## 2019-09-27 PROCEDURE — 97110 THERAPEUTIC EXERCISES: CPT | Performed by: PHYSICAL THERAPIST

## 2019-09-27 PROCEDURE — 1280000000 HC REHAB R&B

## 2019-09-27 PROCEDURE — 97530 THERAPEUTIC ACTIVITIES: CPT

## 2019-09-27 PROCEDURE — 6370000000 HC RX 637 (ALT 250 FOR IP): Performed by: PHYSICAL MEDICINE & REHABILITATION

## 2019-09-27 PROCEDURE — 97535 SELF CARE MNGMENT TRAINING: CPT

## 2019-09-27 RX ORDER — METOPROLOL TARTRATE 50 MG/1
50 TABLET, FILM COATED ORAL 2 TIMES DAILY
Qty: 60 TABLET | Refills: 3 | Status: SHIPPED | OUTPATIENT
Start: 2019-09-27 | End: 2020-01-17 | Stop reason: SDUPTHER

## 2019-09-27 RX ORDER — AMLODIPINE BESYLATE 10 MG/1
10 TABLET ORAL DAILY
Qty: 30 TABLET | Refills: 3 | Status: SHIPPED | OUTPATIENT
Start: 2019-09-28 | End: 2020-01-17 | Stop reason: SDUPTHER

## 2019-09-27 RX ORDER — METFORMIN HYDROCHLORIDE EXTENDED-RELEASE TABLETS 1000 MG/1
1000 TABLET, FILM COATED, EXTENDED RELEASE ORAL
Qty: 30 TABLET | Refills: 3 | Status: SHIPPED | OUTPATIENT
Start: 2019-09-28 | End: 2019-10-01

## 2019-09-27 RX ORDER — HYDROCODONE BITARTRATE AND ACETAMINOPHEN 5; 325 MG/1; MG/1
2 TABLET ORAL EVERY 4 HOURS PRN
Qty: 50 TABLET | Refills: 0 | Status: SHIPPED | OUTPATIENT
Start: 2019-09-27 | End: 2019-10-11

## 2019-09-27 RX ORDER — PROPAFENONE HYDROCHLORIDE 150 MG/1
150 TABLET, FILM COATED ORAL 2 TIMES DAILY
Qty: 60 TABLET | Refills: 3 | Status: SHIPPED | OUTPATIENT
Start: 2019-09-27

## 2019-09-27 RX ORDER — ASPIRIN 81 MG/1
81 TABLET ORAL 2 TIMES DAILY
Qty: 28 TABLET | Refills: 0 | Status: SHIPPED | OUTPATIENT
Start: 2019-09-28 | End: 2022-01-01

## 2019-09-27 RX ADMIN — PROPAFENONE HYDROCHLORIDE 150 MG: 150 TABLET, COATED ORAL at 07:34

## 2019-09-27 RX ADMIN — INSULIN LISPRO 1 UNITS: 100 INJECTION, SOLUTION INTRAVENOUS; SUBCUTANEOUS at 20:48

## 2019-09-27 RX ADMIN — INSULIN LISPRO 1 UNITS: 100 INJECTION, SOLUTION INTRAVENOUS; SUBCUTANEOUS at 07:33

## 2019-09-27 RX ADMIN — PROPAFENONE HYDROCHLORIDE 150 MG: 150 TABLET, COATED ORAL at 20:25

## 2019-09-27 RX ADMIN — METFORMIN HYDROCHLORIDE 1000 MG: 500 TABLET, EXTENDED RELEASE ORAL at 07:27

## 2019-09-27 RX ADMIN — METOPROLOL TARTRATE 50 MG: 50 TABLET ORAL at 20:25

## 2019-09-27 RX ADMIN — DICLOFENAC 2 G: 10 GEL TOPICAL at 20:27

## 2019-09-27 RX ADMIN — ASPIRIN 81 MG: 81 TABLET, COATED ORAL at 07:27

## 2019-09-27 RX ADMIN — SENNOSIDES, DOCUSATE SODIUM 2 TABLET: 50; 8.6 TABLET, FILM COATED ORAL at 20:25

## 2019-09-27 RX ADMIN — INSULIN LISPRO 1 UNITS: 100 INJECTION, SOLUTION INTRAVENOUS; SUBCUTANEOUS at 11:38

## 2019-09-27 RX ADMIN — LISINOPRIL 40 MG: 40 TABLET ORAL at 07:27

## 2019-09-27 RX ADMIN — SIMVASTATIN 20 MG: 20 TABLET, FILM COATED ORAL at 20:25

## 2019-09-27 RX ADMIN — DIPHENHYDRAMINE HCL 50 MG: 25 TABLET ORAL at 20:25

## 2019-09-27 RX ADMIN — ENOXAPARIN SODIUM 40 MG: 40 INJECTION SUBCUTANEOUS at 07:27

## 2019-09-27 RX ADMIN — GLIMEPIRIDE 4 MG: 1 TABLET ORAL at 07:27

## 2019-09-27 RX ADMIN — CETIRIZINE HYDROCHLORIDE 10 MG: 10 TABLET, FILM COATED ORAL at 07:28

## 2019-09-27 RX ADMIN — SENNOSIDES, DOCUSATE SODIUM 2 TABLET: 50; 8.6 TABLET, FILM COATED ORAL at 07:27

## 2019-09-27 RX ADMIN — METOPROLOL TARTRATE 50 MG: 50 TABLET ORAL at 07:27

## 2019-09-27 RX ADMIN — AMLODIPINE BESYLATE 10 MG: 10 TABLET ORAL at 07:28

## 2019-09-27 ASSESSMENT — PAIN DESCRIPTION - PAIN TYPE: TYPE: ACUTE PAIN

## 2019-09-27 ASSESSMENT — PAIN DESCRIPTION - LOCATION: LOCATION: HIP

## 2019-09-27 ASSESSMENT — PAIN SCALES - GENERAL
PAINLEVEL_OUTOF10: 0
PAINLEVEL_OUTOF10: 0

## 2019-09-27 ASSESSMENT — PAIN DESCRIPTION - ORIENTATION: ORIENTATION: LEFT

## 2019-09-27 NOTE — PROGRESS NOTES
one rail at home)  Assistance: Stand by assistance  Comment: Ascended/descended curb step (up backward,down forward) with wheeled walker and close SBA, able to maintain less than 50% WBing LLE by effectively using UEs. Patient then ascended 12 steps backward with SBA, occasional verbal cues/reminders to ensure maintains less than 50% WBing and for sequencing about 4 out of 24 times. Balance  Comments: Standing with walker for UE support, patient was able to  pen from floor using a reacher. Exercises  Hip Flexion: 20, alternating hip flex/marching  Knee Long Arc Quad: 20  Ankle Pumps: 20  Comments: seated exercises in [facility]                      Second Session  Patient's wife is still at the airport since the flight was later than expected. Patient did not find out from his wife if the adjusted walker fits on their steps at home or not. Sit to stand with modified independence. Ambulated outdoors on uneven surfaces with supervision/modified independence with only very occasional cues for safe walker use on the most uneven surfaces. Ambulated up and down ramp with wheeled walker and SBA/supervision. Stand to sit, patient performed impulsively but with modified independence - afterwards given cues for a safer, more preferred approach with turning around to sit. Sit to supine independently. Patient was issued HEP previously, so PT reminded him to bring HEP and reading glasses to therapy session with him this afternoon. Reviewed written and illustrated HEP for supine mat exercises for BLEs x 10-15 reps - wrote additional cue words for patient to follow as patient was trying to imitate picture for position which is not always the preferred position - informed to perform in supine and not in long sitting position. Patient instructed to tell nurse and/or therapy later this evening or tomorrow before he is discharged if the adjusted walker does NOT fit on their steps at home.    PM Addendum at 46- Wife and daughter-in-law in during OT session, so showed them how to adjust the standard walker that patient intends to use at home and to ensure that it will fit the depth of their steps at home properly. Instructed them that if there are any issues to let nursing know and/or let primary PT, Larkin Heimlich, know as she is scheduled to be working tomorrow and this will need to be addressed prior to patient being discharged home in mid to late morning. Goals  Short term goals  Time Frame for Short term goals: 7-10 days - ONGOING  Short term goal 1: Bed mobility with Mod I - met - 9/27/19  Short term goal 2: Transfers TTWB LLE with Mod I  - met - 9/27/19  Short term goal 3: Amb 48' with RW TTWB/PWB LLE and Mod I  - met - 9/27/19  Short term goal 4: Amb 150' with RW TTWB LLE and SBA-met 9/26/19  Short term goal 5: Amb up/down curb with CGA and TTWB LLE with RW-met 9/26/19  Short term goal 6: Amb up/down 5 steps with offset SW or on bottom and CGA TTWB LLE-9/26/19 - met and exceeded on 9/27/19  Long term goals  Time Frame for Long term goals : STG=LTG  Patient Goals   Patient goals : \"I want to be able to walk a half mile like I do every day. \" We discussed that this was unrealistic given his TTWB status but that we could work towaed 150' and that I had no doubt he could work up to that half mile in a couple of months. Plan    Plan  Times per week: follow up with home PT after discharge from inpatient rehab  Times per day: Twice a day  Plan weeks: follow up with home PT after discharge from inpatient rehab  Current Treatment Recommendations: Strengthening, Functional Mobility Training, Home Exercise Program, Equipment Evaluation, Education, & procurement, ROM, Transfer Training, Gait Training, Safety Education & Training, Balance Training, Stair training, Pain Management, Patient/Caregiver Education & Training  Safety Devices  Type of devices:  All fall risk precautions in place, Gait belt, Patient at risk for

## 2019-09-27 NOTE — PROGRESS NOTES
walker which patient will use on level surfaces. Assessment: Patient met and exceeded all goals and is safe to return home with his wife providing intermittent cues for safety and to ensure patient continues to maintain limited weight bearing. Patient continues to be more consistent with maintaining PWB LLE status and ambulates community distances with RW and modified independence. Transfers with modified independence generally maintaining <50% weight bearing and independent with bed mobility. Wife was shown how to measure her steps and change the SW at home to fit the steps at home. She did not attend therapy this morning and patient did not know if the walker will fits on their steps at home, or if not he will need to go up on his bottom. The pt would benefit from continued skilled PT at home to ensure safe transition to home enviorment and progress strengthening activities. Anticipate D/C Saturday 9/28/19.     Electronically signed by Carla Landers PT #[id number] on 9/27/2019 at 1:29 PM

## 2019-09-27 NOTE — PROGRESS NOTES
Occupational Therapy  Facility/Department: [address] REHAB  Daily Treatment Note  NAME: Guy Huffman  : 1935  MRN: [id number]    Date of Service: 2019    Discharge Recommendations:  Home with assist PRN, Home with Home health OT(they declined home OT)  OT Equipment Recommendations  Equipment Needed: No  ADL Assistive Devices: Toilet Safety Frame; Reacher;Transfer Tub Bench  Other: pt  has shower chair, decided not to use transfer tub bench. Assessment   Performance deficits / Impairments: Decreased functional mobility ; Decreased safe awareness;Decreased balance;Decreased endurance;Decreased high-level IADLs;Decreased ADL status  Assessment: Pt able to complete self care, modified independence, transfers and functional mobility with rolling walker modified independent. He is still sometimes forgetful of using walker, but good balance. Pt has UE ex for home to improve activity tolerance for ADL skills. Recommended home OT, but patient and wife declined. no further DME will be needed - wife decided she wants to use the shower chair vs a transfer tub bench. Treatment Diagnosis: decreased ADl, IADL, balance  Prognosis: Good  History:  Patient is an 81-year old male who was in the [facility] and had a syncopal episode due to sudden onset of A. Fib with a rapid heart rate of 180. Patient fell down and landed on his left hip area, developing pain in the left hip. Patient was taken to the hospital and found to have A. Fib and a Left hip fracture. Patient was taken to surgery and had IM nailing of his left hip fracture for repair on 19. He is now toe-touch weight bearing on left leg. Patient was seen by cardiology, and had an EP study, with eventual ablation pprocedure. The patient also was known to have diabetes, hypertension, and CAD with AVR 5 years ago. Patient was started in therapies, but needs more therapy before discharge to home safely.  Patient is mod to max assist and therapies for transfers, gait and ADLs. Patient lives here in [place], and was independent with his ADLs, and lives with his wife. Patient is admitted today from the [address] (copied per note Dr Elo Little 9/18/19)  REQUIRES OT FOLLOW UP: Yes  Activity Tolerance  Activity Tolerance: Patient Tolerated treatment well  Safety Devices  Safety Devices in place: Yes  Type of devices: Gait belt         Patient Diagnosis(es): The encounter diagnosis was Closed left hip fracture, sequela. has a past medical history of Allergic rhinitis, seasonal, Aortic regurgitation, CAD (coronary artery disease), Cancer ([address] Closed left hip fracture ([address] Colon polyps- last colon neg 11/06, Diabetic nephropathy- pos microalb 10/11, on ARB, DM (diabetes mellitus), type 2 ([address] Enlarged prostate, GERD (gastroesophageal reflux disease), Holosystolic murmur, HTN (hypertension), Hypertriglyceridemia, Osteoarthritis, and SVT (supraventricular tachycardia) ([address] has a past surgical history that includes Appendectomy (age 30); Tonsillectomy and adenoidectomy (age 9); ablation of dysrhythmic focus (2013); Aortic valve replacement (2016); Total knee arthroplasty (Left, 11/2015); TURP (01/03/2017); joint replacement; Colonoscopy (12/19/2017); eye surgery; Cataract removal (Bilateral, 11/2017); and Colonoscopy (N/A, 12/13/2018). Restrictions  Restrictions/Precautions  Restrictions/Precautions: Weight Bearing, Fall Risk  Lower Extremity Weight Bearing Restrictions  Right Lower Extremity Weight Bearing: Partial Weight Bearing  Partial Weight Bearing Percentage Or Pounds: 25-50 % on discharge paperwork from outside hospital in [facility] and Dr. Elo Little has confirmed and adjusted order accordingly.   50% weight bearing per Dr Roger Kim: Yes  Patient assessed for rehabilitation services?: Yes  Additional Pertinent Hx:  Patient is an 81-year old male who was in the [facility] and had a syncopal

## 2019-09-27 NOTE — PROGRESS NOTES
with left DOC. Cues to dress left leg first.)  Toileting: Modified independent   Additional Comments: Pt given modified independence with ADL's due to safety concerns as pt does demonstrate decreased STM at times. Bed mobility  Bridging: Independent  Rolling to Left: Independent  Rolling to Right: Independent  Supine to Sit: Independent  Sit to Supine: Independent  Scooting: Independent  Comment: on flat, queen bed in OT ADL apartment    Functional Transfers: Toilet Transfers  Toilet - Technique: Ambulating  Equipment Used: Grab bars  Toilet Transfer: Modified independent  Toilet Transfers Comments: Used RW. Tub Transfers  Tub - Transfer From: Walker  Tub - Transfer Type: To and From  Tub - Transfer To: Shower seat with back  Tub - Technique: Ambulating  Tub Transfers: Supervision  Tub Transfers Comments: dry transfer    Shower Transfers  Shower - Transfer From: Perla Glass - Transfer Type: To and From  Shower - Transfer To: Shower seat with back  Shower - Technique: Ambulating  Shower Transfers: Modified independence  Shower Transfers Comments: Using grab bars and RW. Functional Mobility  Functional - Mobility Device: Rolling Walker  Activity: To/from bathroom  Assist Level: Modified independent   Functional Mobility Comments: Using RW. Pt with no LOB. Pt with safety concerns as he requires reminders for WB status, safety. UE Function:   WFL               Assessment:   Assessment: Pt able to complete self care, modified independence, transfers and functional mobility with rolling walker modified independent. He is still sometimes forgetful of using walker, but good balance. Pt has UE ex for home to improve activity tolerance for ADL skills. Recommended home OT, but patient and wife declined. no further DME will be needed - wife decided she wants to use the shower chair vs a transfer tub bench.     Prognosis: Good     REQUIRES OT FOLLOW UP: Yes  Discharge Recommendations: Home with assist PRN, Home with Home health OT(they declined home OT)    Equipment Recommendations:  No new equipment to be purchased         Home Exercise Program  Provided Pt with written exercise program for weights and resistance band    Electronically signed by Kodi Rocha OT on 9/27/2019 at 2:42 PM

## 2019-09-27 NOTE — PROGRESS NOTES
Occupational Therapy  Facility/Department: [address] IP REHAB  Daily Treatment Note  NAME: Mendel Spangler  : 1935  MRN: [id number]    Date of Service: 2019    Discharge Recommendations:  Home with assist PRN, Home with Home health OT  OT Equipment Recommendations  ADL Assistive Devices: Toilet Safety Frame; Reacher;Transfer Tub Bench  Other: pt  has shower chair, decided not to use transfer tub bench. Assessment   Performance deficits / Impairments: Decreased functional mobility ; Decreased safe awareness;Decreased balance;Decreased endurance;Decreased high-level IADLs;Decreased ADL status  Assessment: Pt tolerated session well. Pt modfied independent with ADL's due to safety concerns. He will be discharge home with assist from his wife as needed. Treatment Diagnosis: decreased ADl, IADL, balance  Prognosis: Good  History:  Patient is an 81-year old male who was in the [facility] and had a syncopal episode due to sudden onset of A. Fib with a rapid heart rate of 180. Patient fell down and landed on his left hip area, developing pain in the left hip. Patient was taken to the hospital and found to have A. Fib and a Left hip fracture. Patient was taken to surgery and had IM nailing of his left hip fracture for repair on 19. He is now toe-touch weight bearing on left leg. Patient was seen by cardiology, and had an EP study, with eventual ablation pprocedure. The patient also was known to have diabetes, hypertension, and CAD with AVR 5 years ago. Patient was started in therapies, but needs more therapy before discharge to home safely. Patient is mod to max assist and therapies for transfers, gait and ADLs. Patient lives here in [place], and was independent with his ADLs, and lives with his wife.   Patient is admitted today from the [address] [place] [address] (copied per note Dr Margot Hodgkins 19)  REQUIRES OT FOLLOW UP: Yes  Activity Tolerance  Activity Tolerance: Patient Tolerated treatment on 9/6/19. He is now toe-touch weight bearing on left leg. Patient was seen by cardiology, and had an EP study, with eventual ablation pprocedure. The patient also was known to have diabetes, hypertension, and CAD with AVR 5 years ago. Patient was started in therapies, but needs more therapy before discharge to home safely. Patient is mod to max assist and therapies for transfers, gait and ADLs. Patient lives here in [place], and was independent with his ADLs, and lives with his wife. Patient is admitted today from the [address] [place] [address] (copied per note Dr Easton Maradiaga 9/18/19)  Family / Caregiver Present: No  Referring Practitioner: Heis  Diagnosis: left hip fracture, syncope - A fib with ablation  Subjective  Subjective: Pt seen bedside for an ADL. Objective    ADL  Grooming: Modified independent (Shaved seated in recliner. Washed face and hands seated on shower chair. Brushed teeth standing at the sink.  )  UE Bathing: Independent(Seated on shower chair.)  LE Bathing: Modified independent (Used grab bar in standing to wash/dry posteior. Completed remainder seated on shower chair.)  UE Dressing: Modified independent (Safety concerns when gathering clothes.)  LE Dressing: Stand by assistance;Verbal cueing(Assist with left DOC. Cues to dress left leg first.)  Toileting: Modified independent   Additional Comments: Pt given modified independence with ADL's due to safety concerns as pt does demonstrate decreased STM at times. Functional Mobility  Functional - Mobility Device: Rolling Walker  Activity: To/from bathroom  Assist Level: Modified independent   Functional Mobility Comments: Using RW. Pt with no LOB. Pt with safety concerns as he requires reminders for WB status, safety. Toilet Transfers  Toilet - Technique: Ambulating  Equipment Used: Grab bars  Toilet Transfer: Modified independent  Toilet Transfers Comments: Used RW.   Shower Transfers  Shower - Transfer From: [email or web address] - Transfer

## 2019-09-27 NOTE — PROGRESS NOTES
Department of [facility]  Dr. Jaquez Hearing Progress Note    Patient Identification:  Kishore Burleson  [id number]  : 1935  Admit date: 2019      Diagnosis:   Patient Active Problem List   Diagnosis    Essential hypertension    Osteoarthritis    GERD (gastroesophageal reflux disease)    Allergic rhinitis, seasonal    Hypertriglyceridemia    Needs flu shot    Diabetic nephropathy associated with type 2 diabetes mellitus ([address]    Urge incontinence    Coronary artery disease, non-occlusive    History of paroxysmal supraventricular tachycardia- S/P ablation     S/P aortic valve replacement with porcine valve    Type 2 diabetes mellitus with diabetic nephropathy, without long-term current use of insulin ([address]    BPH with obstruction/lower urinary tract symptoms    Malignant neoplasm of descending colon ([facility]-  left colectomy, T3N0    Left renal mass 2.3 cm enhancing by CT, stable, per hemonc    History of knee replacement, total, left    Closed left hip fracture, sequela           Subjective:  Pt seen this AM. Patient thinks he is making progress in his therapies. He is having less pain in his left hip area. His blood sugars are better after the increase in his metformin. We think patient will be ready for discharge to home  tomorrow with continued therapies at home of PT, OT, and visiting nurse. He we will continue to maintain his toe-touch weightbearing limitations of  50% in the left leg after discharge. We will fill out his TETE of meds today for his discharge tomorrow. He will follow-up with Dr. Lacy De Anda 2 weeks after discharge. He will have some help at home from his wife at discharge.     /71   Pulse 73   Temp 97.1 °F (36.2 °C) (Oral)   Resp 16   Ht 5' 7\" (1.702 m)   Wt 171 lb 4.8 oz (77.7 kg)   SpO2 95%   BMI 26.83 kg/m²     Last 24 hour lab  Recent Results (from the past 24 hour(s))   POCT Glucose    Collection Time: 19 12:08 PM   Result Value

## 2019-09-28 VITALS
BODY MASS INDEX: 26.26 KG/M2 | RESPIRATION RATE: 18 BRPM | TEMPERATURE: 97.4 F | HEART RATE: 66 BPM | SYSTOLIC BLOOD PRESSURE: 166 MMHG | OXYGEN SATURATION: 99 % | DIASTOLIC BLOOD PRESSURE: 93 MMHG | WEIGHT: 167.33 LBS | HEIGHT: 67 IN

## 2019-09-28 LAB
GLUCOSE BLD-MCNC: 151 MG/DL (ref 70–99)
PERFORMED ON: ABNORMAL

## 2019-09-28 PROCEDURE — 6360000002 HC RX W HCPCS: Performed by: PHYSICAL MEDICINE & REHABILITATION

## 2019-09-28 PROCEDURE — 6370000000 HC RX 637 (ALT 250 FOR IP): Performed by: PHYSICAL MEDICINE & REHABILITATION

## 2019-09-28 RX ADMIN — CETIRIZINE HYDROCHLORIDE 10 MG: 10 TABLET, FILM COATED ORAL at 08:38

## 2019-09-28 RX ADMIN — ASPIRIN 81 MG: 81 TABLET, COATED ORAL at 08:38

## 2019-09-28 RX ADMIN — ACETAMINOPHEN 650 MG: 325 TABLET ORAL at 08:56

## 2019-09-28 RX ADMIN — INSULIN LISPRO 1 UNITS: 100 INJECTION, SOLUTION INTRAVENOUS; SUBCUTANEOUS at 08:39

## 2019-09-28 RX ADMIN — PROPAFENONE HYDROCHLORIDE 150 MG: 150 TABLET, COATED ORAL at 08:53

## 2019-09-28 RX ADMIN — LISINOPRIL 40 MG: 40 TABLET ORAL at 08:38

## 2019-09-28 RX ADMIN — METOPROLOL TARTRATE 50 MG: 50 TABLET ORAL at 08:38

## 2019-09-28 RX ADMIN — GLIMEPIRIDE 4 MG: 1 TABLET ORAL at 08:39

## 2019-09-28 RX ADMIN — ENOXAPARIN SODIUM 40 MG: 40 INJECTION SUBCUTANEOUS at 08:38

## 2019-09-28 RX ADMIN — AMLODIPINE BESYLATE 10 MG: 10 TABLET ORAL at 08:38

## 2019-09-28 RX ADMIN — METFORMIN HYDROCHLORIDE 1000 MG: 500 TABLET, EXTENDED RELEASE ORAL at 08:39

## 2019-09-28 ASSESSMENT — PAIN DESCRIPTION - ONSET: ONSET: ON-GOING

## 2019-09-28 ASSESSMENT — PAIN DESCRIPTION - PROGRESSION: CLINICAL_PROGRESSION: NOT CHANGED

## 2019-09-28 ASSESSMENT — PAIN SCALES - GENERAL
PAINLEVEL_OUTOF10: 0
PAINLEVEL_OUTOF10: 2

## 2019-09-28 ASSESSMENT — PAIN DESCRIPTION - PAIN TYPE: TYPE: ACUTE PAIN

## 2019-09-28 ASSESSMENT — PAIN DESCRIPTION - ORIENTATION: ORIENTATION: LEFT

## 2019-09-28 ASSESSMENT — PAIN DESCRIPTION - DESCRIPTORS: DESCRIPTORS: ACHING

## 2019-09-28 ASSESSMENT — PAIN - FUNCTIONAL ASSESSMENT: PAIN_FUNCTIONAL_ASSESSMENT: ACTIVITIES ARE NOT PREVENTED

## 2019-09-28 ASSESSMENT — PAIN DESCRIPTION - FREQUENCY: FREQUENCY: CONTINUOUS

## 2019-09-28 ASSESSMENT — PAIN DESCRIPTION - LOCATION: LOCATION: HIP

## 2019-09-28 NOTE — DISCHARGE SUMMARY
1  Surface: level tile  Device: Rolling Walker  Assistance: Modified Independent  Quality of Gait: cues to remind patient to slow down and attend to maintaining less than 50% weightbearing through LLE. Gait Deviations: Decreased step length  Distance: 180' x 2, shorter distances with multiple turns to access stairs and curb  Comments:  Pt appears to maintain about 25-50% WB (mostly closer to 50% WB throughout), occasional cues to ensure patient is attending to and maintaining PWB though due to slight impulsive/easily distracted but improved significantly compared to initial eval, Stairs  # Steps : 12  Stairs Height: 6\"  Rails: Bilateral  Curbs: 6\"  Device: Rolling walker, Standard walker(standard walker adjusted to fit steps since has only one rail at home)  Assistance: Stand by assistance  Comment: Ascended/descended curb step (up backward,down forward) with wheeled walker and close SBA, able to maintain less than 50% WBing LLE by effectively using UEs.  Patient then ascended 4 steps backward with SBA, occasional verbal cues/reminders to ensure maintains less than 50% WBing and for sequencing about 4 out of 24 times  Mobility: Bed, Chair, Wheel Chair: 6 - Requires assistive device (slide rail)  Walk: 6 - Modified [place]  Walks at least 150 feet with an ambulatory device, orthosis or prosthesis OR requires extra amount of time OR there is concern for safety  Distance Walked: 180'  Wheel Chair: 5 - Supervision Requires standby supervision or cuing to operate wheelchair at least 150 feet  Distance Traveled in Wheel Chair: 180'  Stairs: 5- Supervision Requires supervision(e.g., standing by, cuing, or coaxing) to go up and down one flight of stairs, PT Equipment Recommendations  Equipment Needed: No  Mobility Devices: Michaela Rashid: Rolling  Other: Pt doesn't own any equiptment- wife has SW and borrowed RW, Assessment: Patient met and exceeded all goals and is safe to return home with his wife providing appropriate behavior/relations 100% of the time  Problem Solvin - Patient able to solve simple/routine tasks  Memory: 5 - Patient requires prompting with stress/unfamiliar situations      Inpatient Rehabilitation Course:   Carola Rod is a 81 y.o. male admitted to inpatient rehabilitation on 2019 for s/p left hip fracture. The patient participated in an aggressive multidisciplinary inpatient rehabilitation program involving 3 hours per day, 5 days per week of rehabilitation. Patient is an 81-year old male who was in the [facility] and had a syncopal episode due to sudden onset of A. Fib with a rapid heart rate of 180.  Patient fell down and landed on his left hip area, developing pain in the left hip. Patient was taken to the hospital and found to have A. Fib and a Left hip fracture.  Patient was taken to surgery and had IM nailing of his left hip fracture for repair on 19. He is now toe-touch weight bearing on left leg. Patient was seen by cardiology, and had an EP study, with eventual ablation pprocedure.   The patient also was known to have diabetes, hypertension, and CAD with AVR 5 years ago. Anne Lezama was started in therapies, but needs more therapy before discharge to home safely. Patient is mod to max assist and therapies for transfers, gait and ADLs. Patient lives here in [place], and was independent with his ADLs, and lives with his wife. Anne Lezaam is admitted today from the [facility].   Repeat labs this morning look good. After admission to the Rehab Unit, he made steady progress in his therapies as listed above under each therapy section. His upper and lower extremity coordination and strength did improve in therapy, and he was starting to improve with his endurance and functional status as he  participated in his rehab therapies. His hip pain, blood sugars and blood pressure were monitored while on the rehabilitation unit.  Patient thinks he made good progress in his

## 2019-09-30 ENCOUNTER — TELEPHONE (OUTPATIENT)
Dept: FAMILY MEDICINE CLINIC | Age: 84
End: 2019-09-30

## 2019-09-30 DIAGNOSIS — E11.21 TYPE 2 DIABETES MELLITUS WITH DIABETIC NEPHROPATHY, WITHOUT LONG-TERM CURRENT USE OF INSULIN (HCC): ICD-10-CM

## 2019-10-01 RX ORDER — METFORMIN HYDROCHLORIDE 500 MG/1
TABLET, EXTENDED RELEASE ORAL
Qty: 180 TABLET | Refills: 0 | Status: SHIPPED | OUTPATIENT
Start: 2019-10-01 | End: 2020-02-03

## 2019-10-08 ENCOUNTER — OFFICE VISIT (OUTPATIENT)
Dept: FAMILY MEDICINE CLINIC | Age: 84
End: 2019-10-08
Payer: MEDICARE

## 2019-10-08 VITALS
RESPIRATION RATE: 16 BRPM | HEIGHT: 67 IN | BODY MASS INDEX: 27.94 KG/M2 | WEIGHT: 178 LBS | HEART RATE: 59 BPM | SYSTOLIC BLOOD PRESSURE: 118 MMHG | DIASTOLIC BLOOD PRESSURE: 82 MMHG

## 2019-10-08 DIAGNOSIS — S72.002S CLOSED LEFT HIP FRACTURE, SEQUELA: ICD-10-CM

## 2019-10-08 DIAGNOSIS — C18.6 MALIGNANT NEOPLASM OF DESCENDING COLON (HCC): Chronic | ICD-10-CM

## 2019-10-08 DIAGNOSIS — E11.21 TYPE 2 DIABETES MELLITUS WITH DIABETIC NEPHROPATHY, WITHOUT LONG-TERM CURRENT USE OF INSULIN (HCC): Chronic | ICD-10-CM

## 2019-10-08 DIAGNOSIS — E11.21 DIABETIC NEPHROPATHY ASSOCIATED WITH TYPE 2 DIABETES MELLITUS (HCC): ICD-10-CM

## 2019-10-08 DIAGNOSIS — I10 ESSENTIAL HYPERTENSION: Chronic | ICD-10-CM

## 2019-10-08 DIAGNOSIS — Z00.00 ROUTINE GENERAL MEDICAL EXAMINATION AT A HEALTH CARE FACILITY: Primary | ICD-10-CM

## 2019-10-08 DIAGNOSIS — Z86.79 HISTORY OF PAROXYSMAL SUPRAVENTRICULAR TACHYCARDIA: ICD-10-CM

## 2019-10-08 DIAGNOSIS — I25.10 CORONARY ARTERY DISEASE, NON-OCCLUSIVE: Chronic | ICD-10-CM

## 2019-10-08 DIAGNOSIS — N28.89 LEFT RENAL MASS: ICD-10-CM

## 2019-10-08 DIAGNOSIS — E78.1 HYPERTRIGLYCERIDEMIA: Chronic | ICD-10-CM

## 2019-10-08 PROCEDURE — 99214 OFFICE O/P EST MOD 30 MIN: CPT | Performed by: FAMILY MEDICINE

## 2019-10-08 PROCEDURE — G0008 ADMIN INFLUENZA VIRUS VAC: HCPCS | Performed by: FAMILY MEDICINE

## 2019-10-08 PROCEDURE — 90653 IIV ADJUVANT VACCINE IM: CPT | Performed by: FAMILY MEDICINE

## 2019-10-08 PROCEDURE — G0439 PPPS, SUBSEQ VISIT: HCPCS | Performed by: FAMILY MEDICINE

## 2019-10-08 RX ORDER — LISINOPRIL 40 MG/1
40 TABLET ORAL DAILY
Qty: 90 TABLET | Refills: 1 | Status: SHIPPED | COMMUNITY
Start: 2019-10-08

## 2019-10-08 RX ORDER — FINASTERIDE 5 MG/1
5 TABLET, FILM COATED ORAL DAILY
Qty: 90 TABLET | Refills: 1 | Status: SHIPPED | OUTPATIENT
Start: 2019-10-08 | End: 2020-06-01

## 2019-10-08 ASSESSMENT — LIFESTYLE VARIABLES
HOW OFTEN DO YOU HAVE A DRINK CONTAINING ALCOHOL: 1
HOW OFTEN DO YOU HAVE SIX OR MORE DRINKS ON ONE OCCASION: 0
HOW OFTEN DURING THE LAST YEAR HAVE YOU NEEDED AN ALCOHOLIC DRINK FIRST THING IN THE MORNING TO GET YOURSELF GOING AFTER A NIGHT OF HEAVY DRINKING: 0
HOW OFTEN DURING THE LAST YEAR HAVE YOU BEEN UNABLE TO REMEMBER WHAT HAPPENED THE NIGHT BEFORE BECAUSE YOU HAD BEEN DRINKING: 0
HOW OFTEN DURING THE LAST YEAR HAVE YOU HAD A FEELING OF GUILT OR REMORSE AFTER DRINKING: 0
HAS A RELATIVE, FRIEND, DOCTOR, OR ANOTHER HEALTH PROFESSIONAL EXPRESSED CONCERN ABOUT YOUR DRINKING OR SUGGESTED YOU CUT DOWN: 0
HAVE YOU OR SOMEONE ELSE BEEN INJURED AS A RESULT OF YOUR DRINKING: 0
HOW MANY STANDARD DRINKS CONTAINING ALCOHOL DO YOU HAVE ON A TYPICAL DAY: 0
HOW OFTEN DURING THE LAST YEAR HAVE YOU FOUND THAT YOU WERE NOT ABLE TO STOP DRINKING ONCE YOU HAD STARTED: 0
HOW OFTEN DURING THE LAST YEAR HAVE YOU FAILED TO DO WHAT WAS NORMALLY EXPECTED FROM YOU BECAUSE OF DRINKING: 0
AUDIT TOTAL SCORE: 1
AUDIT-C TOTAL SCORE: 1

## 2019-10-08 ASSESSMENT — PATIENT HEALTH QUESTIONNAIRE - PHQ9
SUM OF ALL RESPONSES TO PHQ QUESTIONS 1-9: 2
SUM OF ALL RESPONSES TO PHQ QUESTIONS 1-9: 2

## 2019-10-23 ENCOUNTER — OFFICE VISIT (OUTPATIENT)
Dept: ORTHOPEDIC SURGERY | Age: 84
End: 2019-10-23
Payer: MEDICARE

## 2019-10-23 VITALS
RESPIRATION RATE: 16 BRPM | WEIGHT: 169 LBS | DIASTOLIC BLOOD PRESSURE: 77 MMHG | HEART RATE: 65 BPM | BODY MASS INDEX: 26.53 KG/M2 | SYSTOLIC BLOOD PRESSURE: 128 MMHG | HEIGHT: 67 IN

## 2019-10-23 DIAGNOSIS — S72.002S CLOSED LEFT HIP FRACTURE, SEQUELA: Primary | ICD-10-CM

## 2019-10-23 PROCEDURE — 99213 OFFICE O/P EST LOW 20 MIN: CPT | Performed by: ORTHOPAEDIC SURGERY

## 2019-12-04 ENCOUNTER — OFFICE VISIT (OUTPATIENT)
Dept: ORTHOPEDIC SURGERY | Age: 84
End: 2019-12-04
Payer: MEDICARE

## 2019-12-04 VITALS
HEIGHT: 67 IN | DIASTOLIC BLOOD PRESSURE: 75 MMHG | SYSTOLIC BLOOD PRESSURE: 131 MMHG | WEIGHT: 169 LBS | RESPIRATION RATE: 16 BRPM | HEART RATE: 60 BPM | BODY MASS INDEX: 26.53 KG/M2

## 2019-12-04 DIAGNOSIS — S72.002S CLOSED LEFT HIP FRACTURE, SEQUELA: Primary | ICD-10-CM

## 2019-12-04 PROCEDURE — 99213 OFFICE O/P EST LOW 20 MIN: CPT | Performed by: NURSE PRACTITIONER

## 2020-01-08 ENCOUNTER — TELEPHONE (OUTPATIENT)
Dept: FAMILY MEDICINE CLINIC | Age: 85
End: 2020-01-08

## 2020-01-17 ENCOUNTER — TELEPHONE (OUTPATIENT)
Dept: FAMILY MEDICINE CLINIC | Age: 85
End: 2020-01-17

## 2020-01-17 RX ORDER — METOPROLOL TARTRATE 50 MG/1
50 TABLET, FILM COATED ORAL 2 TIMES DAILY
Qty: 60 TABLET | Refills: 0 | Status: SHIPPED | OUTPATIENT
Start: 2020-01-17 | End: 2020-03-05

## 2020-01-17 RX ORDER — GLIMEPIRIDE 4 MG/1
TABLET ORAL
Qty: 90 TABLET | Refills: 0 | Status: SHIPPED | OUTPATIENT
Start: 2020-01-17 | End: 2020-02-13

## 2020-01-17 RX ORDER — PROPAFENONE HYDROCHLORIDE 150 MG/1
150 TABLET, FILM COATED ORAL 2 TIMES DAILY
Qty: 60 TABLET | Refills: 0 | Status: CANCELLED | OUTPATIENT
Start: 2020-01-17

## 2020-01-17 RX ORDER — AMLODIPINE BESYLATE 10 MG/1
10 TABLET ORAL DAILY
Qty: 30 TABLET | Refills: 0 | Status: SHIPPED
Start: 2020-01-17 | End: 2020-03-10 | Stop reason: ALTCHOICE

## 2020-01-17 NOTE — TELEPHONE ENCOUNTER
Spoke with dr espino's office and read all the messages to the MA that called. They will take care of calling the pt making sure about his med and any changes that need to be made.     SINGH Munoz

## 2020-01-17 NOTE — TELEPHONE ENCOUNTER
Unsure if could find another medication that is less than $16 per month. Could send in for 90 days if that is less expensive. Could discuss with his cardiologist Dr. Aurea Peña to see if he could suggest an alternative.

## 2020-02-03 RX ORDER — METFORMIN HYDROCHLORIDE 500 MG/1
TABLET, EXTENDED RELEASE ORAL
Qty: 180 TABLET | Refills: 0 | Status: SHIPPED | OUTPATIENT
Start: 2020-02-03 | End: 2020-05-04

## 2020-02-13 ENCOUNTER — OFFICE VISIT (OUTPATIENT)
Dept: FAMILY MEDICINE CLINIC | Age: 85
End: 2020-02-13
Payer: MEDICARE

## 2020-02-13 VITALS
DIASTOLIC BLOOD PRESSURE: 84 MMHG | BODY MASS INDEX: 27 KG/M2 | RESPIRATION RATE: 16 BRPM | WEIGHT: 172 LBS | SYSTOLIC BLOOD PRESSURE: 122 MMHG | HEIGHT: 67 IN | HEART RATE: 61 BPM | OXYGEN SATURATION: 99 %

## 2020-02-13 LAB — HBA1C MFR BLD: 6.5 %

## 2020-02-13 PROCEDURE — 99214 OFFICE O/P EST MOD 30 MIN: CPT | Performed by: FAMILY MEDICINE

## 2020-02-13 PROCEDURE — 83036 HEMOGLOBIN GLYCOSYLATED A1C: CPT | Performed by: FAMILY MEDICINE

## 2020-02-13 RX ORDER — AMLODIPINE BESYLATE AND ATORVASTATIN CALCIUM 10; 10 MG/1; MG/1
1 TABLET, FILM COATED ORAL DAILY
Qty: 30 TABLET | Refills: 5 | Status: SHIPPED | OUTPATIENT
Start: 2020-02-13 | End: 2021-01-18

## 2020-02-13 ASSESSMENT — PATIENT HEALTH QUESTIONNAIRE - PHQ9
SUM OF ALL RESPONSES TO PHQ9 QUESTIONS 1 & 2: 0
1. LITTLE INTEREST OR PLEASURE IN DOING THINGS: 0
SUM OF ALL RESPONSES TO PHQ QUESTIONS 1-9: 0
2. FEELING DOWN, DEPRESSED OR HOPELESS: 0
SUM OF ALL RESPONSES TO PHQ QUESTIONS 1-9: 0

## 2020-02-13 NOTE — PATIENT INSTRUCTIONS
INSTRUCTIONS  NEXT APPOINTMENT: Please schedule check-up in 4 months FASTING. · PLEASE TAKE THIS FORM TO CHECK-OUT WINDOW TO SCHEDULE NEXT VISIT. · See is combined amlodipine and simvastatin pill a cost effective option. · Can stop glimepiride for now since sugar is good. · For knee pain, may take OTC tylenol arthritis (acetaminophen 8 hour) 2 tabs three times per day.   Patient Education

## 2020-02-13 NOTE — PROGRESS NOTES
DIABETES MELLITUS FOLOW-UP  Subjective:      Chief Complaint   Patient presents with    Diabetes     Anabell Fuller is an 81 y.o. male who presents for follow up of following chronic problems:  1. Type 2 diabetes mellitus with diabetic nephropathy, without long-term current use of insulin ([address]    2. Essential hypertension    3. Hypertriglyceridemia    4. Coronary artery disease, non-occlusive    5. At high risk for falls      Complaints: pt states his arthritis in his knees are getting bad. Pt wants to know if he has to stay on all of those medications but he showed me his med list and I didn't see finasteride on it. · Patient checks sugars 0  time(s) daily. Average: 130-140. · Any low sugars? No  · Patent follows diabetic diet? No  · Exercise: walking three times a week  · Taking medicines daily as directed? Yes  · Is the patient reporting any side effects of medications? No  · Patient checks bottom of feet daily? Yes  · Tobacco history updated:  reports that he has never smoked. He has never used smokeless tobacco.    A1c is 6.5 today. Review of Systems   General ROS: fever? No,   Night sweats? No  Ophthalmic ROS: change in vision? No  Endocrine ROS: fatigue? No  Unexpected weight changes? No  Respiratory ROS: Shortness of breath? No  Cardiovascular ROS: chest pain? No  Gastrointestinal ROS: abdominal pain? No  Change in stools? No  Genito-Urinary ROS: painful urination? No  Trouble urinating? No  Neurological ROS: TIA or stroke symptoms? No  Numbness/tingling? No  Dermatological ROS: rash or sores on feet? No  Changes in skin spots?     No    Health Maintenance Due   Topic Date Due    Hepatitis B vaccine (1 of 3 - Risk 3-dose series) 11/11/1954     LAST LABS  LDL Calculated   Date Value Ref Range Status   07/05/2019 55 <100 mg/dL Final     Lab Results   Component Value Date    HDL 55 07/05/2019     Lab Results   Component Value Date    TRIG 102 07/05/2019 Lab Results   Component Value Date    GLUCOSE 139 (H) 09/23/2019     Lab Results   Component Value Date     09/23/2019    K 4.2 09/23/2019    CREATININE 1.0 09/23/2019     Lab Results   Component Value Date    WBC 5.7 09/23/2019    HGB 11.3 (L) 09/23/2019     09/23/2019     Lab Results   Component Value Date    ALT 11 07/05/2019    AST 13 (L) 07/05/2019    ALKPHOS 77 07/05/2019    BILITOT 0.6 07/05/2019     TSH (uIU/mL)   Date Value   12/20/2016 1.69     Lab Results   Component Value Date    LABA1C 5.9 09/20/2019    LABA1C 6.4 07/03/2019    LABA1C 6.1 03/20/2019     *Chief complaint, HPI, History and ROS provided by the medical assistant has been reviewed and verified by provider- Zena Gordon MD    LAST LABS  LDL Calculated   Date Value Ref Range Status   07/05/2019 55 <100 mg/dL Final     Lab Results   Component Value Date    HDL 55 07/05/2019     Lab Results   Component Value Date    TRIG 102 07/05/2019     Lab Results   Component Value Date    GLUCOSE 139 (H) 09/23/2019     Lab Results   Component Value Date     09/23/2019    K 4.2 09/23/2019    CREATININE 1.0 09/23/2019     Lab Results   Component Value Date    WBC 5.7 09/23/2019    HGB 11.3 (L) 09/23/2019     09/23/2019     Lab Results   Component Value Date    ALT 11 07/05/2019    AST 13 (L) 07/05/2019    ALKPHOS 77 07/05/2019    BILITOT 0.6 07/05/2019     TSH (uIU/mL)   Date Value   12/20/2016 1.69     Lab Results   Component Value Date    LABA1C 5.9 09/20/2019    LABA1C 6.4 07/03/2019    LABA1C 6.1 03/20/2019     HISTORY:  Patient's medications, allergies, past medical, and social histories were reviewed and updated as appropriate.      CHART REVIEW  Health Maintenance Due   Topic Date Due    Hepatitis B vaccine (1 of 3 - Risk 3-dose series) 11/11/1954     Social History     Tobacco Use    Smoking status: Never Smoker    Smokeless tobacco: Never Used    Tobacco comment: congrats, advised not to start   Substance Use Topics amLODIPine-atorvastatatin (CADUET) 10-10 MG per tablet   4. Coronary artery disease, non-occlusive     5. At high risk for falls     Stable. Continue current Tx plan. Any changes marked below. INSTRUCTIONS  NEXT APPOINTMENT: Please schedule check-up in 4 months FASTING. · PLEASE TAKE THIS FORM TO CHECK-OUT WINDOW TO SCHEDULE NEXT VISIT. · See is combined amlodipine and simvastatin pill a cost effective option. · Can stop glimepiride for now since sugar is good. · For knee pain, may take OTC tylenol arthritis (acetaminophen 8 hour) 2 tabs three times per day. On the basis of positive falls risk screening, assessment and plan is as follows: patient declines any further evaluation/treatment for increased falls risk.

## 2020-03-05 RX ORDER — METOPROLOL TARTRATE 50 MG/1
TABLET, FILM COATED ORAL
Qty: 60 TABLET | Refills: 3 | Status: SHIPPED | OUTPATIENT
Start: 2020-03-05 | End: 2020-06-08

## 2020-03-06 ENCOUNTER — OFFICE VISIT (OUTPATIENT)
Dept: ORTHOPEDIC SURGERY | Age: 85
End: 2020-03-06
Payer: MEDICARE

## 2020-03-06 VITALS — BODY MASS INDEX: 27 KG/M2 | HEIGHT: 67 IN | WEIGHT: 172 LBS | RESPIRATION RATE: 16 BRPM

## 2020-03-06 PROCEDURE — 99214 OFFICE O/P EST MOD 30 MIN: CPT | Performed by: ORTHOPAEDIC SURGERY

## 2020-03-06 PROCEDURE — 20610 DRAIN/INJ JOINT/BURSA W/O US: CPT | Performed by: ORTHOPAEDIC SURGERY

## 2020-03-06 RX ORDER — TRIAMCINOLONE ACETONIDE 40 MG/ML
40 INJECTION, SUSPENSION INTRA-ARTICULAR; INTRAMUSCULAR ONCE
Status: COMPLETED | OUTPATIENT
Start: 2020-03-06 | End: 2020-03-06

## 2020-03-06 RX ORDER — LIDOCAINE HYDROCHLORIDE 10 MG/ML
40 INJECTION, SOLUTION INFILTRATION; PERINEURAL ONCE
Status: COMPLETED | OUTPATIENT
Start: 2020-03-06 | End: 2020-03-06

## 2020-03-06 RX ADMIN — TRIAMCINOLONE ACETONIDE 40 MG: 40 INJECTION, SUSPENSION INTRA-ARTICULAR; INTRAMUSCULAR at 10:13

## 2020-03-06 RX ADMIN — LIDOCAINE HYDROCHLORIDE 40 MG: 10 INJECTION, SOLUTION INFILTRATION; PERINEURAL at 10:13

## 2020-03-06 NOTE — PROGRESS NOTES
DIAGNOSIS:    1- Left intertrochanteric femur comminuted fracture, status post TFN in [address] 2- Right knee pain/ DJD. DATE OF SURGERY: 9/6/2019. HISTORY OF PRESENT ILLNESS: Mr. Huey Delacruzies 81 y.o.  male  who came in today for postoperative follow-up visit. He was traveling to [facility] through [address] when he fell and sustained his left hip fracture. The patient denies any pain in the left hip and rates a 0/10 VAS. He has been working on ROM and WB with no cane. He had physical therapy with improvement. No numbness or tingling sensation. No fever or Chills. He is c/o right knee pain from DJD, and also mild left knee pain with h/o left TKA.     Past Medical History:   Diagnosis Date    Allergic rhinitis, seasonal     Aortic regurgitation     2/12 ECHO EF 60%, mod AI    CAD (coronary artery disease)     Cancer (HCC)     Closed left hip fracture (HCC)     Colon polyps- last colon neg 11/06     Diabetic nephropathy- pos microalb 10/11, on ARB 10/5/2011    DM (diabetes mellitus), type 2 (HCC)     Enlarged prostate     GERD (gastroesophageal reflux disease)     Holosystolic murmur 7/0/5367    HTN (hypertension)     Hypertriglyceridemia     Osteoarthritis     SVT (supraventricular tachycardia) ([address]        Past Surgical History:   Procedure Laterality Date    ABLATION OF DYSRHYTHMIC FOCUS  2013    Dr. Fierro Appl DYSRHYTHMIC FOCUS  2019    SVT    AORTIC VALVE REPLACEMENT  2016    mosaic ultra porcine valve/medtronic    APPENDECTOMY  age 30    CATARACT REMOVAL Bilateral 11/2017    COLONOSCOPY  12/19/2017    COLONOSCOPY N/A 12/13/2018    COLONOSCOPY POLYPECTOMY SNARE/COLD BIOPSY performed by Sarah Christie MD at [address] [place] [address] Left 2019    JOINT REPLACEMENT      TONSILLECTOMY AND ADENOIDECTOMY  age 9   Shearon August TOTAL KNEE ARTHROPLASTY Left 11/2015    TURP  01/03/2017    cysto, green light kittyer TURP       Social oriented. He is well dressed, nourished and  groomed. Patient with normal affect. Height is  5' 7\" (1.702 m), weight is 172 lb (78 kg), Body mass index is 26.94 kg/m². Resting respiratory rate is 16. The patient walks with no limp using a walker  The incision is healed well, left hip. No signs of any erythema or drainage. He has no pain with the active or passive range of motion of the left hip. He has intact sensation, distally, and is neurovascularly intact. Both knees with full ROM, mild crepitus, tenderness on medial joint line, stable to varus and valgus stress. IMAGING:  Two views left hip and femur, and AP pelvis showed anatomic alignment of the intertrochanteric fracture, TFN in good position, no loosening, or hardware failure. Xray 3 views of the bilateral  knees was obtained today in the office and reviewed. These demonstrate right knee with moderate degenerative changes with narrowing of the joint space in the medial joint space compartment, subchondral sclerosis, and marginal osteophytosis. Left TKA in good position. IMPRESSION:     1- Left intertrochanteric femur comminuted fracture, status post TFN in [address] 2- Right knee pain/ DJD. PLAN:  I have told the patient to work on ROM, WBAT as well as strengthening exercises. No heavy impact activities. I believe he will benefit from cortisone injection right knee, and he agreed to have. PROCEDURE:    With the patient's permission, and under sterile condition, the right knee was prepared and draped with alcohol and injected with a mixture of 1 mL Kenalog 40mg and 4 mL of 1% lidocaine through the medial parapatellar port area. The patient tolerated the procedure well. A band-aid was applied and the patient was advised to ice the knee for 15-20 minutes to relieve any injection site related pain. He reported a good improvement immediatly after the injection. The patient will come back for a follow up in 3 months.

## 2020-03-10 ENCOUNTER — TELEPHONE (OUTPATIENT)
Dept: FAMILY MEDICINE CLINIC | Age: 85
End: 2020-03-10

## 2020-03-10 NOTE — TELEPHONE ENCOUNTER
Please call pt and let him know he has five refills left on this he just needs to call pharmacy and have them fill it

## 2020-03-21 RX ORDER — AMLODIPINE BESYLATE 10 MG/1
TABLET ORAL
Qty: 30 TABLET | Refills: 5 | Status: SHIPPED | OUTPATIENT
Start: 2020-03-21 | End: 2020-08-24

## 2020-05-04 RX ORDER — METFORMIN HYDROCHLORIDE 500 MG/1
TABLET, EXTENDED RELEASE ORAL
Qty: 180 TABLET | Refills: 0 | Status: SHIPPED | OUTPATIENT
Start: 2020-05-04 | End: 2020-07-27 | Stop reason: SDUPTHER

## 2020-05-05 ENCOUNTER — TELEPHONE (OUTPATIENT)
Dept: FAMILY MEDICINE CLINIC | Age: 85
End: 2020-05-05

## 2020-05-05 NOTE — TELEPHONE ENCOUNTER
Pt states he is taking 2 pills in morning and 2 pills in evening. Advised pt that he needs to take as Rx. No cell phone.scheduled phone appt.

## 2020-05-07 ENCOUNTER — VIRTUAL VISIT (OUTPATIENT)
Dept: FAMILY MEDICINE CLINIC | Age: 85
End: 2020-05-07
Payer: MEDICARE

## 2020-05-07 PROCEDURE — 99441 PR PHYS/QHP TELEPHONE EVALUATION 5-10 MIN: CPT | Performed by: FAMILY MEDICINE

## 2020-05-07 RX ORDER — GLIMEPIRIDE 2 MG/1
TABLET ORAL
Qty: 90 TABLET | Refills: 1 | Status: SHIPPED | OUTPATIENT
Start: 2020-05-07 | End: 2020-07-30

## 2020-05-07 NOTE — PROGRESS NOTES
Does not apply route daily  Palomo Andino MD   Lancet Devices MISC 1 x daily for the Lifescan lancets  Palomo Andino MD   diclofenac sodium 1 % GEL Apply 2 g topically 2 times daily  Palomo Andino MD   Calcium Carbonate Antacid (TUMS E-X PO) Take 2 tablets by mouth as needed  Historical Provider, MD   oxybutynin (DITROPAN) 5 MG tablet Take 1 tablet by mouth 2 times daily as needed for Other (bladder). Palomo Andino MD      Assessment and Plan:      Diagnosis Orders   1. Type 2 diabetes mellitus with diabetic nephropathy, without long-term current use of insulin (HCC)  glimepiride (AMARYL) 2 MG tablet     INSTRUCTIONS  NEXT APPOINTMENT: call in 2 weeks with sugar numbers  · Add glimeperide back but at 2 mg. I affirm this is a Patient Initiated Episode with an Established Patient who has not had a related appointment within my department in the past 7 days or scheduled within the next 24 hours.     Total Time: minutes: 5-10 minutes    Note: not billable if this call serves to triage the patient into an appointment for the relevant concern    Manny Mendes

## 2020-06-01 RX ORDER — FINASTERIDE 5 MG/1
TABLET, FILM COATED ORAL
Qty: 90 TABLET | Refills: 0 | Status: SHIPPED | OUTPATIENT
Start: 2020-06-01 | End: 2020-08-24

## 2020-06-08 RX ORDER — METOPROLOL TARTRATE 50 MG/1
TABLET, FILM COATED ORAL
Qty: 60 TABLET | Refills: 0 | Status: SHIPPED | OUTPATIENT
Start: 2020-06-08 | End: 2020-06-29

## 2020-06-19 ENCOUNTER — VIRTUAL VISIT (OUTPATIENT)
Dept: FAMILY MEDICINE CLINIC | Age: 85
End: 2020-06-19
Payer: MEDICARE

## 2020-06-19 PROCEDURE — 99442 PR PHYS/QHP TELEPHONE EVALUATION 11-20 MIN: CPT | Performed by: FAMILY MEDICINE

## 2020-06-19 NOTE — PROGRESS NOTES
PHONE VISIT during COVID pandemic    Jaymie Us is a 81 y.o. male evaluated via telephone on 6/19/2020. Physician and patient are not at the same location. Others present: none. Consent:  He and/or health care decision maker is aware that that he may receive a bill for this telephone service, depending on his insurance coverage, and has provided verbal consent to proceed: Yes    HPI    Knees painful. OTC topical voltaren, tylenol. Pain manageable    BS now low 100's  Urinating better with finasteride  BP this morning was 132/84    Review of Systems  HISTORY:  Patient's medications, allergies, past medical, and social histories were reviewed and updated as appropriate. CHART REVIEW  Health Maintenance   Topic Date Due    Diabetic foot exam  07/03/2020    DTaP/Tdap/Td vaccine (1 - Tdap) 08/[birth year]/2020 (Originally 11/11/1954)    Shingles Vaccine (1 of 2) 08/23/2020 (Originally [birth year]/1986)    Lipid screen  07/05/2020    A1C test (Diabetic or Prediabetic)  08/13/2020    Potassium monitoring  09/23/2020    Creatinine monitoring  09/23/2020    Annual Wellness Visit (AWV)  10/08/2020    Diabetic retinal exam  11/07/2020    Colon cancer screen colonoscopy  12/17/2020    Flu vaccine  Completed    Pneumococcal 65+ years Vaccine  Completed    Hepatitis A vaccine  Aged Out    Hib vaccine  Aged Out    Meningococcal (ACWY) vaccine  Aged Out     The ASCVD Risk score (Tesha Dutton., et al., 2013) failed to calculate for the following reasons: The 2013 ASCVD risk score is only valid for ages 36 to 78  Prior to Visit Medications    Medication Sig Taking?  Authorizing Provider   metoprolol tartrate (LOPRESSOR) 50 MG tablet Take 1 tablet by mouth twice daily Yes Cecile Suarez MD   finasteride (PROSCAR) 5 MG tablet Take 1 tablet by mouth once daily Yes Cecile Suarez MD   metFORMIN (GLUCOPHAGE-XR) 500 MG extended release tablet TAKE 2 TABLETS BY MOUTH IN THE MORNING Yes Cecile Suarez MD   blood glucose test strips (ASCENSIA AUTODISC VI;ONE TOUCH ULTRA TEST VI) strip Daily or as needed. Yes Jade Andrade MD   amLODIPine-atorvastatatin (CADUET) 10-10 MG per tablet Take 1 tablet by mouth daily REPLACING simvastatin and amlodipine Yes Jade Andrade MD   lisinopril (PRINIVIL;ZESTRIL) 40 MG tablet Take 1 tablet by mouth daily Yes Jade Andrade MD   aspirin EC 81 MG EC tablet Take 1 tablet by mouth 2 times daily for 14 days Take for DVT blood clot prophylaxis. Please avoid missing doses. Yes Janelle Serrano MD   propafenone (RYTHMOL) 150 MG tablet Take 1 tablet by mouth 2 times daily Yes Bridget Ortega MD   loratadine (CLARITIN) 10 MG tablet Take 10 mg by mouth daily Yes Historical Provider, MD   simvastatin (ZOCOR) 20 MG tablet TAKE 1 TABLET BY MOUTH NIGHTLY Yes Jade Andrade MD   LIFESCAN UNISTIK II LANCETS MISC 1 each by Does not apply route daily Yes Jade Andrade MD   Blood Glucose Monitoring Suppl (ONE TOUCH ULTRA 2) w/Device KIT 1 kit by Does not apply route daily Yes Jade Andrade MD   Lancet Devices MISC 1 x daily for the Lifescan lancets Yes Jade Andrade MD   diclofenac sodium 1 % GEL Apply 2 g topically 2 times daily Yes Jade Andrade MD   Calcium Carbonate Antacid (TUMS E-X PO) Take 2 tablets by mouth as needed Yes Historical Provider, MD   glimepiride (AMARYL) 2 MG tablet TAKE ONE TABLET BY MOUTH IN THE MORNING BEFORE BREAKFAST  Jade Andrade MD   amLODIPine (NORVASC) 10 MG tablet Take 1 tablet by mouth once daily  Patient not taking: Reported on 6/19/2020  Jade Andrade MD   oxybutynin (DITROPAN) 5 MG tablet Take 1 tablet by mouth 2 times daily as needed for Other (bladder). Jade Andrade MD      Family History   Problem Relation Age of Onset    Hypertension Mother     Stroke Mother      Social History     Tobacco Use    Smoking status: Never Smoker    Smokeless tobacco: Never Used    Tobacco comment: congrats, advised not to start   Substance Use Topics    Alcohol use:  Yes     Alcohol/week: 2.0 standard drinks     Types: 2 Standard drinks or equivalent per week     Comment: 1-2 beers/week    Drug use: No      LAST LABS  Cholesterol, Total   Date Value Ref Range Status   07/05/2019 130 0 - 199 mg/dL Final     LDL Calculated   Date Value Ref Range Status   07/05/2019 55 <100 mg/dL Final     HDL   Date Value Ref Range Status   07/05/2019 55 40 - 60 mg/dL Final     Triglycerides   Date Value Ref Range Status   07/05/2019 102 0 - 150 mg/dL Final     Lab Results   Component Value Date    GLUCOSE 139 (H) 09/23/2019     Lab Results   Component Value Date     09/23/2019    K 4.2 09/23/2019    CREATININE 1.0 09/23/2019     Lab Results   Component Value Date    WBC 5.7 09/23/2019    HGB 11.3 (L) 09/23/2019    HCT 32.6 (L) 09/23/2019    MCV 91.9 09/23/2019     09/23/2019     Lab Results   Component Value Date    ALT 11 07/05/2019    AST 13 (L) 07/05/2019    ALKPHOS 77 07/05/2019    BILITOT 0.6 07/05/2019     TSH (uIU/mL)   Date Value   12/20/2016 1.69     Lab Results   Component Value Date    LABA1C 6.5 02/13/2020     BP Readings from Last 3 Encounters:   02/13/20 122/84   12/04/19 131/75   10/23/19 128/77     Wt Readings from Last 3 Encounters:   03/06/20 172 lb (78 kg)   02/13/20 172 lb (78 kg)   12/04/19 169 lb (76.7 kg)      Assessment and Plan:      Diagnosis Orders   1. Essential hypertension     2. Type 2 diabetes mellitus with diabetic nephropathy, without long-term current use of insulin ([facility] [address]     3. Hypertriglyceridemia     4. Coronary artery disease, non-occlusive     5. BPH with obstruction/lower urinary tract symptoms  improved   nocturia 2-3x  Stable with current medications. INSTRUCTIONS  NEXT APPOINTMENT: Please schedule annual complete physical (30 minutes) in 3 months. I affirm this is a Patient Initiated Episode with an Established Patient who has not had a related appointment within my department in the past 7 days or scheduled within the next 24 hours.     Total Time: minutes: 11-20

## 2020-06-26 ENCOUNTER — OFFICE VISIT (OUTPATIENT)
Dept: ORTHOPEDIC SURGERY | Age: 85
End: 2020-06-26
Payer: MEDICARE

## 2020-06-26 VITALS — WEIGHT: 167 LBS | TEMPERATURE: 97.2 F | HEIGHT: 67 IN | BODY MASS INDEX: 26.21 KG/M2

## 2020-06-26 PROCEDURE — 20610 DRAIN/INJ JOINT/BURSA W/O US: CPT | Performed by: ORTHOPAEDIC SURGERY

## 2020-06-26 PROCEDURE — 99214 OFFICE O/P EST MOD 30 MIN: CPT | Performed by: ORTHOPAEDIC SURGERY

## 2020-06-26 RX ORDER — TRIAMCINOLONE ACETONIDE 40 MG/ML
40 INJECTION, SUSPENSION INTRA-ARTICULAR; INTRAMUSCULAR ONCE
Status: COMPLETED | OUTPATIENT
Start: 2020-06-26 | End: 2020-06-26

## 2020-06-26 RX ORDER — LIDOCAINE HYDROCHLORIDE 10 MG/ML
4 INJECTION, SOLUTION INFILTRATION; PERINEURAL ONCE
Status: COMPLETED | OUTPATIENT
Start: 2020-06-26 | End: 2020-06-26

## 2020-06-26 RX ADMIN — LIDOCAINE HYDROCHLORIDE 4 ML: 10 INJECTION, SOLUTION INFILTRATION; PERINEURAL at 11:05

## 2020-06-26 RX ADMIN — TRIAMCINOLONE ACETONIDE 40 MG: 40 INJECTION, SUSPENSION INTRA-ARTICULAR; INTRAMUSCULAR at 11:06

## 2020-06-27 NOTE — PROGRESS NOTES
the knee for 15-20 minutes to relieve any injection site related pain. He reported a good improvement immediatly after the injection. The patient will come back for a follow up in 3 months. At that time, we will take two views left hip, and AP pelvis. As this patient has demonstrated risk factors for osteoporosis, such as age greater than [de-identified] years and evidence of a fracture, I have referred the patient back to the primary care physician for evaluation for osteoporosis, including consideration for DEXA scanning, if this is felt to be clinically indicated. The patient is advised to contact the primary care physician to follow-up for further evaluation.        Daniela Goodwin MD

## 2020-06-29 RX ORDER — METOPROLOL TARTRATE 50 MG/1
TABLET, FILM COATED ORAL
Qty: 60 TABLET | Refills: 2 | Status: SHIPPED | OUTPATIENT
Start: 2020-06-29 | End: 2020-11-02

## 2020-07-16 ENCOUNTER — TELEPHONE (OUTPATIENT)
Dept: FAMILY MEDICINE CLINIC | Age: 85
End: 2020-07-16

## 2020-07-16 RX ORDER — INSULIN GLARGINE 100 [IU]/ML
10 INJECTION, SOLUTION SUBCUTANEOUS NIGHTLY
Qty: 5 PEN | Refills: 3 | Status: SHIPPED | OUTPATIENT
Start: 2020-07-16 | End: 2020-07-27 | Stop reason: SDUPTHER

## 2020-07-16 NOTE — TELEPHONE ENCOUNTER
It is probably time to start a little insulin. Wife can show him how if needed. It is a pen. Inject 10 units once a day. Please see me in a week to go over sugar log and check A1c.

## 2020-07-16 NOTE — TELEPHONE ENCOUNTER
Patient wife called stating patient sugar has been running the past week, and cannot get sugar to go down.  Has been in the 200's   Patient is very tired , no dizziness , takes metformin but not working    Please advise

## 2020-07-17 RX ORDER — PEN NEEDLE, DIABETIC 30 GX5/16"
1 NEEDLE, DISPOSABLE MISCELLANEOUS DAILY
Qty: 100 EACH | Refills: 3 | Status: SHIPPED | OUTPATIENT
Start: 2020-07-17 | End: 2020-10-21 | Stop reason: SDUPTHER

## 2020-07-21 ENCOUNTER — HOSPITAL ENCOUNTER (OUTPATIENT)
Dept: CT IMAGING | Age: 85
Discharge: HOME | End: 2020-07-21
Payer: MEDICARE

## 2020-07-21 LAB
GFR AFRICAN AMERICAN: 58
GFR NON-AFRICAN AMERICAN: 48
PERFORMED ON: ABNORMAL
POC CREATININE: 1.4 MG/DL (ref 0.8–1.3)
POC SAMPLE TYPE: ABNORMAL

## 2020-07-21 PROCEDURE — 6360000004 HC RX CONTRAST MEDICATION: Performed by: INTERNAL MEDICINE

## 2020-07-21 PROCEDURE — 71260 CT THORAX DX C+: CPT

## 2020-07-21 PROCEDURE — 82565 ASSAY OF CREATININE: CPT

## 2020-07-21 RX ADMIN — IOHEXOL 50 ML: 240 INJECTION, SOLUTION INTRATHECAL; INTRAVASCULAR; INTRAVENOUS; ORAL at 17:18

## 2020-07-21 RX ADMIN — IOPAMIDOL 75 ML: 755 INJECTION, SOLUTION INTRAVENOUS at 17:18

## 2020-07-27 ENCOUNTER — OFFICE VISIT (OUTPATIENT)
Dept: FAMILY MEDICINE CLINIC | Age: 85
End: 2020-07-27
Payer: MEDICARE

## 2020-07-27 VITALS
OXYGEN SATURATION: 97 % | WEIGHT: 164 LBS | HEART RATE: 71 BPM | SYSTOLIC BLOOD PRESSURE: 118 MMHG | DIASTOLIC BLOOD PRESSURE: 82 MMHG | BODY MASS INDEX: 25.69 KG/M2

## 2020-07-27 LAB
BILIRUBIN, POC: NEGATIVE
BLOOD URINE, POC: ABNORMAL
CLARITY, POC: ABNORMAL
COLOR, POC: YELLOW
GLUCOSE URINE, POC: 500
HBA1C MFR BLD: 8.8 %
KETONES, POC: NEGATIVE
LEUKOCYTE EST, POC: ABNORMAL
NITRITE, POC: POSITIVE
PH, POC: 5
PROTEIN, POC: ABNORMAL
SPECIFIC GRAVITY, POC: 1.02
UROBILINOGEN, POC: 0.2

## 2020-07-27 PROCEDURE — 83036 HEMOGLOBIN GLYCOSYLATED A1C: CPT | Performed by: FAMILY MEDICINE

## 2020-07-27 PROCEDURE — 3052F HG A1C>EQUAL 8.0%<EQUAL 9.0%: CPT | Performed by: FAMILY MEDICINE

## 2020-07-27 PROCEDURE — 99214 OFFICE O/P EST MOD 30 MIN: CPT | Performed by: FAMILY MEDICINE

## 2020-07-27 PROCEDURE — 81002 URINALYSIS NONAUTO W/O SCOPE: CPT | Performed by: FAMILY MEDICINE

## 2020-07-27 RX ORDER — METFORMIN HYDROCHLORIDE 500 MG/1
TABLET, EXTENDED RELEASE ORAL
Qty: 270 TABLET | Refills: 0 | Status: SHIPPED | OUTPATIENT
Start: 2020-07-27 | End: 2020-11-16

## 2020-07-27 RX ORDER — INSULIN GLARGINE 100 [IU]/ML
15 INJECTION, SOLUTION SUBCUTANEOUS NIGHTLY
Qty: 5 PEN | Refills: 3 | Status: SHIPPED | OUTPATIENT
Start: 2020-07-27 | End: 2020-10-21 | Stop reason: SDUPTHER

## 2020-07-27 NOTE — PATIENT INSTRUCTIONS
INSTRUCTIONS  NEXT APPOINTMENT: Please schedule annual complete physical (30 minutes) in 3 months. · PLEASE TAKE THIS FORM TO CHECK-OUT WINDOW TO SCHEDULE NEXT VISIT. · INCREASE Lantus to 15 units. · INCREASE metformin to 2 in AM AND 1 in PM  · Check sugar either before dinner or 2 hours after dinner. · May need to see urology to check bladder and prostate if urine culture negative. · Looks like UTI but will await culture results.   Patient Education

## 2020-07-27 NOTE — PROGRESS NOTES
DIABETES MELLITUS FOLOW-UP  Subjective:      Chief Complaint   Patient presents with    Diabetes     Nohemy Campos is an 81 y.o. male who presents for follow up of following chronic problems:  1. Type 2 diabetes mellitus with diabetic nephropathy, without long-term current use of insulin ([facility] [address]    2. BPH with obstruction/lower urinary tract symptoms    3. Coronary artery disease, non-occlusive    4. Essential hypertension    5. Malignant neoplasm of descending colon ([facility] [address]- 1/18 left colectomy, T3N0    6. History of changes in urinary output    7. Urinary frequency      Complaints: nocturia 3-4x at symptoms are at baseline. Sometimes up to 6x. Small volume. · Patient checks sugars 1  time(s) daily. Average: 230. · Patent follows diabetic diet? Yes  · Exercise: walking daily  · Taking medicines daily as directed? Yes  · Is the patient reporting any side effects of medications? No  · Patient checks bottom of feet daily? Yes  · Tobacco history updated:  reports that he has never smoked. He has never used smokeless tobacco.    A1c is 8.8 today. Review of Systems   General ROS: fever? No,   Night sweats? No  Ophthalmic ROS: change in vision? No  Endocrine ROS: fatigue? No  Unexpected weight changes? No  Respiratory ROS: Shortness of breath? No  Cardiovascular ROS: chest pain? No  Gastrointestinal ROS: abdominal pain? No  Change in stools? No  Genito-Urinary ROS: painful urination? No  Trouble urinating? No  Neurological ROS: TIA or stroke symptoms? No  Numbness/tingling? No  Dermatological ROS: rash or sores on feet? No  Changes in skin spots?     No     LAST LABS  Lab Results   Component Value Date    LABA1C 8.8 07/27/2020    LABA1C 6.5 02/13/2020    LABA1C 5.9 09/20/2019     LDL Calculated   Date Value Ref Range Status   07/05/2019 55 <100 mg/dL Final     Lab Results   Component Value Date    HDL 55 07/05/2019     Lab Results   Component Value Date    TRIG 102 07/05/2019 Lab Results   Component Value Date    GLUCOSE 139 (H) 09/23/2019     Lab Results   Component Value Date     09/23/2019    K 4.2 09/23/2019    CREATININE 1.4 (H) 07/21/2020     Lab Results   Component Value Date    WBC 5.7 09/23/2019    HGB 11.3 (L) 09/23/2019     09/23/2019     Lab Results   Component Value Date    ALT 11 07/05/2019    AST 13 (L) 07/05/2019    ALKPHOS 77 07/05/2019    BILITOT 0.6 07/05/2019     TSH (uIU/mL)   Date Value   12/20/2016 1.69     HISTORY:  Patient's medications, allergies, past medical, and social histories were reviewed and updated as appropriate. CHART REVIEW  Health Maintenance Due   Topic Date Due    Diabetic foot exam  07/03/2020    Lipid screen  07/05/2020     Social History     Tobacco Use    Smoking status: Never Smoker    Smokeless tobacco: Never Used    Tobacco comment: congrats, advised not to start   Substance Use Topics    Alcohol use: Yes     Alcohol/week: 2.0 standard drinks     Types: 2 Standard drinks or equivalent per week     Comment: 1-2 beers/week    Drug use: No      The ASCVD Risk score (Trent Bailey, et al., 2013) failed to calculate for the following reasons: The 2013 ASCVD risk score is only valid for ages 36 to 78  Prior to Visit Medications    Medication Sig Taking?  Authorizing Provider   insulin glargine (LANTUS SOLOSTAR) 100 UNIT/ML injection pen Inject 15 Units into the skin nightly Yes Jose R Thompson MD   metFORMIN (GLUCOPHAGE-XR) 500 MG extended release tablet Take 2 tabs in AM and 1 in PM Yes Jose R Thompson MD   Insulin Pen Needle (PEN NEEDLES 3/16\") 31G X 5 MM MISC 1 each by Does not apply route daily Yes Jose R Thompson MD   metoprolol tartrate (LOPRESSOR) 50 MG tablet Take 1 tablet by mouth twice daily Yes Jose R Thompson MD   finasteride (PROSCAR) 5 MG tablet Take 1 tablet by mouth once daily Yes Jose R Thompson MD   glimepiride (AMARYL) 2 MG tablet TAKE ONE TABLET BY MOUTH IN THE MORNING BEFORE BREAKFAST Yes Jose R Thompson MD blood glucose test strips (ASCENSIA AUTODISC VI;ONE TOUCH ULTRA TEST VI) strip Daily or as needed. Yes Ira Laurent MD   amLODIPine (NORVASC) 10 MG tablet Take 1 tablet by mouth once daily Yes Ira Laurent MD   lisinopril (PRINIVIL;ZESTRIL) 40 MG tablet Take 1 tablet by mouth daily Yes Ira Laurent MD   propafenone (RYTHMOL) 150 MG tablet Take 1 tablet by mouth 2 times daily Yes Cayetano Ortega MD   loratadine (CLARITIN) 10 MG tablet Take 10 mg by mouth daily Yes Historical Provider, MD   simvastatin (ZOCOR) 20 MG tablet TAKE 1 TABLET BY MOUTH NIGHTLY Yes Ira Laurent MD   LIFESCAN UNISTIK II LANCETS MISC 1 each by Does not apply route daily Yes Ira Laurent MD   Blood Glucose Monitoring Suppl (ONE TOUCH ULTRA 2) w/Device KIT 1 kit by Does not apply route daily Yes Ira Laurent MD   Lancet Devices MISC 1 x daily for the Lifescan lancets Yes Ira Laurent MD   diclofenac sodium 1 % GEL Apply 2 g topically 2 times daily Yes Ira Laurent MD   Calcium Carbonate Antacid (TUMS E-X PO) Take 2 tablets by mouth as needed Yes Historical Provider, MD   amLODIPine-atorvastatatin (CADUET) 10-10 MG per tablet Take 1 tablet by mouth daily REPLACING simvastatin and amlodipine  Ira Laurent MD   aspirin EC 81 MG EC tablet Take 1 tablet by mouth 2 times daily for 14 days Take for DVT blood clot prophylaxis. Please avoid missing doses. Cayetano Ortega MD   oxybutynin (DITROPAN) 5 MG tablet Take 1 tablet by mouth 2 times daily as needed for Other (bladder).   Ira Laurent MD      Family History   Problem Relation Age of Onset    Hypertension Mother     Stroke Mother      Objective:   PHYSICAL EXAM   /82   Pulse 71   Wt 164 lb (74.4 kg)   SpO2 97%   BMI 25.69 kg/m²   BP Readings from Last 5 Encounters:   07/27/20 118/82   02/13/20 122/84   12/04/19 131/75   10/23/19 128/77   10/08/19 118/82     Wt Readings from Last 5 Encounters:   07/27/20 164 lb (74.4 kg)   06/26/20 167 lb (75.8 kg)   03/06/20 172 lb (78 kg) 02/13/20 172 lb (78 kg)   12/04/19 169 lb (76.7 kg)      GENERAL:   · well-developed, well-nourished, alert, no distress. EYES:   · External findings: lids and lashes normal and conjunctivae and sclerae normal  LUNGS:    · Breathing unlabored  · clear to auscultation bilaterally and good air movement  CARDIOVASC:   · regular rate and rhythm  · LEGS:  Lower extremity edema: none    SKIN: warm and dry  PSYCH:    · Alert and oriented  · Normal reasoning, insight good  · Facial expressions full, mood appropriate  · No memory disturbance noted  MUSCULOSKEL:    · No significant finger or nail findings  NEURO:   · CN 2-12 intact     Assessment and Plan:      Diagnosis Orders   1. Type 2 diabetes mellitus with diabetic nephropathy, without long-term current use of insulin ([facility])  insulin glargine (LANTUS SOLOSTAR) 100 UNIT/ML injection pen    metFORMIN (GLUCOPHAGE-XR) 500 MG extended release tablet    POCT glycosylated hemoglobin (Hb A1C)   2. BPH with obstruction/lower urinary tract symptoms     3. Coronary artery disease, non-occlusive     4. Essential hypertension     5. Malignant neoplasm of descending colon ([facility] [address]- 1/18 left colectomy, T3N0     6. History of changes in urinary output     7. Urinary frequency  POCT Urinalysis no Micro    Culture, Urine   Stable. Continue current Tx plan. Any changes marked below. INSTRUCTIONS  NEXT APPOINTMENT: Please schedule annual complete physical (30 minutes) in 3 months. · PLEASE TAKE THIS FORM TO CHECK-OUT WINDOW TO SCHEDULE NEXT VISIT. · INCREASE Lantus to 15 units. · INCREASE metformin to 2 in AM AND 1 in PM  · Check sugar either before dinner or 2 hours after dinner. · May need to see urology to check bladder and prostate if urine culture negative. · Looks like UTI but will await culture results.

## 2020-07-29 LAB
ORGANISM: ABNORMAL
URINE CULTURE, ROUTINE: ABNORMAL

## 2020-07-30 RX ORDER — CIPROFLOXACIN 500 MG/1
500 TABLET, FILM COATED ORAL 2 TIMES DAILY
Qty: 20 TABLET | Refills: 0 | Status: SHIPPED | OUTPATIENT
Start: 2020-07-30 | End: 2020-08-09

## 2020-08-31 ENCOUNTER — TELEPHONE (OUTPATIENT)
Dept: FAMILY MEDICINE CLINIC | Age: 85
End: 2020-08-31

## 2020-08-31 NOTE — TELEPHONE ENCOUNTER
----- Message from Jade Mckinney sent at 8/31/2020  2:15 PM EDT -----  Subject: Message to Provider    QUESTIONS  Information for Provider? WANTED TO SEE IF HE COULD BE SEEN EARLIER AND   FEET ARE SLIGHTLY SWOLLEN WOULD LIKE TO KNOW IF HE SHOULD DO SOMETHING  ---------------------------------------------------------------------------  --------------  CALL BACK INFO  What is the best way for the office to contact you? OK to leave message on   voicemail  Preferred Call Back Phone Number? [phone]  ---------------------------------------------------------------------------  --------------  SCRIPT ANSWERS  Relationship to Patient?  Self

## 2020-09-01 ENCOUNTER — OFFICE VISIT (OUTPATIENT)
Dept: FAMILY MEDICINE CLINIC | Age: 85
End: 2020-09-01
Payer: MEDICARE

## 2020-09-01 VITALS
HEART RATE: 68 BPM | SYSTOLIC BLOOD PRESSURE: 116 MMHG | TEMPERATURE: 97 F | BODY MASS INDEX: 25.9 KG/M2 | WEIGHT: 165 LBS | HEIGHT: 67 IN | OXYGEN SATURATION: 96 % | RESPIRATION RATE: 14 BRPM | DIASTOLIC BLOOD PRESSURE: 68 MMHG

## 2020-09-01 PROCEDURE — 99213 OFFICE O/P EST LOW 20 MIN: CPT | Performed by: NURSE PRACTITIONER

## 2020-09-01 ASSESSMENT — ENCOUNTER SYMPTOMS
SHORTNESS OF BREATH: 0
CHEST TIGHTNESS: 0
WHEEZING: 0

## 2020-09-01 NOTE — PROGRESS NOTES
finasteride (PROSCAR) 5 MG tablet Take 1 tablet by mouth once daily 90 tablet 0    insulin glargine (LANTUS SOLOSTAR) 100 UNIT/ML injection pen Inject 15 Units into the skin nightly 5 pen 3    metFORMIN (GLUCOPHAGE-XR) 500 MG extended release tablet Take 2 tabs in AM and 1 in  tablet 0    Insulin Pen Needle (PEN NEEDLES 3/16\") 31G X 5 MM MISC 1 each by Does not apply route daily 100 each 3    metoprolol tartrate (LOPRESSOR) 50 MG tablet Take 1 tablet by mouth twice daily 60 tablet 2    blood glucose test strips (ASCENSIA AUTODISC VI;ONE TOUCH ULTRA TEST VI) strip Daily or as needed. 100 strip 3    amLODIPine-atorvastatatin (CADUET) 10-10 MG per tablet Take 1 tablet by mouth daily REPLACING simvastatin and amlodipine 30 tablet 5    lisinopril (PRINIVIL;ZESTRIL) 40 MG tablet Take 1 tablet by mouth daily 90 tablet 1    propafenone (RYTHMOL) 150 MG tablet Take 1 tablet by mouth 2 times daily 60 tablet 3    loratadine (CLARITIN) 10 MG tablet Take 10 mg by mouth daily      simvastatin (ZOCOR) 20 MG tablet TAKE 1 TABLET BY MOUTH NIGHTLY 90 tablet 3    LIFESCAN UNISTIK II LANCETS MISC 1 each by Does not apply route daily 100 each 5    Blood Glucose Monitoring Suppl (ONE TOUCH ULTRA 2) w/Device KIT 1 kit by Does not apply route daily 1 kit 0    Lancet Devices MISC 1 x daily for the Lifescan lancets 1 each 0    diclofenac sodium 1 % GEL Apply 2 g topically 2 times daily 1 Tube 5    Calcium Carbonate Antacid (TUMS E-X PO) Take 2 tablets by mouth as needed      aspirin EC 81 MG EC tablet Take 1 tablet by mouth 2 times daily for 14 days Take for DVT blood clot prophylaxis. Please avoid missing doses. 28 tablet 0     No current facility-administered medications for this visit. Allergies   Allergen Reactions    No Known Allergies        Review of Systems   Constitutional: Negative for activity change, fatigue, fever and unexpected weight change.    Respiratory: Negative for chest tightness, shortness of breath and wheezing. Cardiovascular: Positive for leg swelling. Negative for chest pain and palpitations. Musculoskeletal: Positive for arthralgias and joint swelling. Vitals:    20 1601   BP: 116/68   Site: Right Upper Arm   Position: Sitting   Cuff Size: Medium Adult   Pulse: 68   Resp: 14   Temp: 97 °F (36.1 °C)   TempSrc: Temporal   SpO2: 96%   Weight: 165 lb (74.8 kg)   Height: 5' 7\" (1.702 m)       Body mass index is 25.84 kg/m². Wt Readings from Last 3 Encounters:   20 165 lb (74.8 kg)   20 164 lb (74.4 kg)   20 167 lb (75.8 kg)       BP Readings from Last 3 Encounters:   20 116/68   20 118/82   20 122/84       Physical Exam  Constitutional:       General: He is not in acute distress. Appearance: Normal appearance. He is normal weight. HENT:      Head: Normocephalic and atraumatic. Eyes:      Conjunctiva/sclera: Conjunctivae normal.   Cardiovascular:      Rate and Rhythm: Normal rate and regular rhythm. Pulses:           Dorsalis pedis pulses are 2+ on the right side and 2+ on the left side. Posterior tibial pulses are 2+ on the right side and 2+ on the left side. Heart sounds: Normal heart sounds. No murmur. Comments: Edema in ankle  Pulmonary:      Effort: Pulmonary effort is normal. No respiratory distress. Breath sounds: Normal breath sounds. No wheezing. Musculoskeletal:      Right lower le+ Pitting Edema present. Left lower le+ Pitting Edema present. Comments: No calf tenderness. Skin:     General: Skin is warm and dry. Neurological:      Mental Status: He is alert and oriented to person, place, and time. Psychiatric:         Mood and Affect: Mood normal.         Behavior: Behavior normal.         Thought Content: Thought content normal.         Judgment: Judgment normal.       Assessmentand Plan  Cecilia Velázquez was seen today for leg swelling.     Diagnoses and all orders for this visit:    Dependent edema    I recommend wearing the knee high compression stockings daily. Continue to work on a low salt diet and staying hydrated with water  Keep legs elevated when you are at rest    Follow up if there is no improvement in the swelling and we can discuss decreasing your amlodipine dose. Primary osteoarthritis of both knees  You can continue to use the voltaren as needed up to 4 times/day for the pain associated with your arthritis. Return in about 7 weeks (around 10/21/2020), or if symptoms worsen or fail to improve, for chronic conditions, with Dr. Guille Menezes. Patient was seen along with Luna Green, NP student.

## 2020-09-08 RX ORDER — AMLODIPINE BESYLATE 10 MG/1
TABLET ORAL
Qty: 90 TABLET | Refills: 0 | Status: SHIPPED | OUTPATIENT
Start: 2020-09-08 | End: 2020-11-16

## 2020-09-08 RX ORDER — FINASTERIDE 5 MG/1
TABLET, FILM COATED ORAL
Qty: 90 TABLET | Refills: 0 | Status: SHIPPED | OUTPATIENT
Start: 2020-09-08 | End: 2021-03-11

## 2020-09-30 ENCOUNTER — OFFICE VISIT (OUTPATIENT)
Dept: ORTHOPEDIC SURGERY | Age: 85
End: 2020-09-30
Payer: MEDICARE

## 2020-09-30 VITALS — WEIGHT: 165 LBS | RESPIRATION RATE: 16 BRPM | BODY MASS INDEX: 25.9 KG/M2 | HEIGHT: 67 IN | TEMPERATURE: 97.2 F

## 2020-09-30 PROCEDURE — 99214 OFFICE O/P EST MOD 30 MIN: CPT | Performed by: ORTHOPAEDIC SURGERY

## 2020-09-30 PROCEDURE — 20610 DRAIN/INJ JOINT/BURSA W/O US: CPT | Performed by: ORTHOPAEDIC SURGERY

## 2020-09-30 NOTE — PROGRESS NOTES
DIAGNOSIS:    1- Left intertrochanteric femur comminuted fracture, status post TFN in [address] 2- Right knee pain/ DJD. DATE OF SURGERY: 9/6/2019. HISTORY OF PRESENT ILLNESS: Mr. Ian Farooq 81 y.o.  male  who came in today for postoperative and follow-up visit. He was traveling to [facility] through [address] when he fell and sustained his left hip fracture. The patient denies any pain in the left hip and rates a 0/10 VAS. He has been working on ROM and WB with cane for longer distance. He completed physical therapy with improvement. No numbness or tingling sensation. No fever or Chills. He is c/o right knee pain from DJD, and also mild left knee pain with h/o left TKA. He had right knee cortisone injection on 6/26/2020 with good improvement until a couple weeks ago. He would like to repeat the injection.      Past Medical History:   Diagnosis Date    Allergic rhinitis, seasonal     Aortic regurgitation     2/12 ECHO EF 60%, mod AI    CAD (coronary artery disease)     Cancer (HCC)     Closed left hip fracture (HCC)     Colon polyps- last colon neg 11/06     Diabetic nephropathy- pos microalb 10/11, on ARB 10/5/2011    DM (diabetes mellitus), type 2 (HCC)     Enlarged prostate     GERD (gastroesophageal reflux disease)     Holosystolic murmur 4/4/2949    HTN (hypertension)     Hypertriglyceridemia     Osteoarthritis     SVT (supraventricular tachycardia) (HCC)        Past Surgical History:   Procedure Laterality Date    ABLATION OF DYSRHYTHMIC FOCUS  2013    Dr. 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    APPENDECTOMY  age 30    CATARACT REMOVAL Bilateral 11/2017    COLONOSCOPY  12/19/2017    COLONOSCOPY N/A 12/13/2018    COLONOSCOPY POLYPECTOMY SNARE/COLD BIOPSY performed by Terri Dee MD at [address] [facility] SURGERY Left 2019    JOINT REPLACEMENT      TONSILLECTOMY AND ADENOIDECTOMY  age 10    TOTAL KNEE ARTHROPLASTY Left 11/2015    TURP  01/03/2017    cysto, green light lazer TURP       Social History     Socioeconomic History    Marital status:      Spouse name: Not on file    Number of children: Not on file    Years of education: Not on file    Highest education level: Not on file   Occupational History    Not on file   Social Needs    Financial resource strain: Not on file    Food insecurity     Worry: Not on file     Inability: Not on file   [employer] needs     Medical: Not on file     Non-medical: Not on file   Tobacco Use    Smoking status: Never Smoker    Smokeless tobacco: Never Used    Tobacco comment: congrats, advised not to start   Substance and Sexual Activity    Alcohol use: Yes     Alcohol/week: 2.0 standard drinks     Types: 2 Standard drinks or equivalent per week     Comment: 1-2 beers/week    Drug use: No    Sexual activity: Not on file   Lifestyle    Physical activity     Days per week: Not on file     Minutes per session: Not on file    Stress: Not on file   Relationships    Social connections     Talks on phone: Not on file     Gets together: Not on file     Attends Orthodoxy service: Not on file     Active member of club or organization: Not on file     Attends meetings of clubs or organizations: Not on file     Relationship status: Not on file    Intimate partner violence     Fear of current or ex partner: Not on file     Emotionally abused: Not on file     Physically abused: Not on file     Forced sexual activity: Not on file   Other Topics Concern    Not on file   Social History Narrative    Exercise: walking/swim/bike 3x/wk. Lives with spouse. Seatbelt use: Always. Living will:  yes,   copy on file.        Family History   Problem Relation Age of Onset    Hypertension Mother     Stroke Mother        Current Outpatient Medications on File Prior to Visit   Medication Sig Dispense Refill    amLODIPine (NORVASC) 10 MG tablet Take 1 tablet by mouth once daily 90 tablet 0    finasteride (PROSCAR) 5 MG tablet Take 1 tablet by mouth once daily 90 tablet 0    insulin glargine (LANTUS SOLOSTAR) 100 UNIT/ML injection pen Inject 15 Units into the skin nightly 5 pen 3    metFORMIN (GLUCOPHAGE-XR) 500 MG extended release tablet Take 2 tabs in AM and 1 in  tablet 0    Insulin Pen Needle (PEN NEEDLES 3/16\") 31G X 5 MM MISC 1 each by Does not apply route daily 100 each 3    metoprolol tartrate (LOPRESSOR) 50 MG tablet Take 1 tablet by mouth twice daily 60 tablet 2    blood glucose test strips (ASCENSIA AUTODISC VI;ONE TOUCH ULTRA TEST VI) strip Daily or as needed. 100 strip 3    amLODIPine-atorvastatatin (CADUET) 10-10 MG per tablet Take 1 tablet by mouth daily REPLACING simvastatin and amlodipine 30 tablet 5    lisinopril (PRINIVIL;ZESTRIL) 40 MG tablet Take 1 tablet by mouth daily 90 tablet 1    aspirin EC 81 MG EC tablet Take 1 tablet by mouth 2 times daily for 14 days Take for DVT blood clot prophylaxis. Please avoid missing doses. 28 tablet 0    propafenone (RYTHMOL) 150 MG tablet Take 1 tablet by mouth 2 times daily 60 tablet 3    loratadine (CLARITIN) 10 MG tablet Take 10 mg by mouth daily      simvastatin (ZOCOR) 20 MG tablet TAKE 1 TABLET BY MOUTH NIGHTLY 90 tablet 3    LIFESCAN UNISTIK II LANCETS MISC 1 each by Does not apply route daily 100 each 5    Blood Glucose Monitoring Suppl (ONE TOUCH ULTRA 2) w/Device KIT 1 kit by Does not apply route daily 1 kit 0    Lancet Devices MISC 1 x daily for the Lifescan lancets 1 each 0    diclofenac sodium 1 % GEL Apply 2 g topically 2 times daily 1 Tube 5    Calcium Carbonate Antacid (TUMS E-X PO) Take 2 tablets by mouth as needed      [DISCONTINUED] oxybutynin (DITROPAN) 5 MG tablet Take 1 tablet by mouth 2 times daily as needed for Other (bladder).  180 tablet 3     No current facility-administered medications on file prior to visit. Pertinent items are noted in HPI  Review of systems reviewed from Patient History Form dated on 10/23/2019 and available in the patient's chart under the Media tab. No change noted. PHYSICAL EXAMINATION:  Mr. Claude Call is a very pleasant 81 y.o.  male who presents today in no acute distress, awake, alert, and oriented. He is well dressed, nourished and  groomed. Patient with normal affect. Height is  5' 7\" (1.702 m), weight is 165 lb (74.8 kg), Body mass index is 25.84 kg/m². Resting respiratory rate is 16. The patient walks with no limp using a cane. The incision is healed well, left hip. No signs of any erythema or drainage. He has no pain with the active or passive range of motion of the left hip. He has intact sensation, distally, and is neurovascularly intact. Both knees with full ROM, mild crepitus, tenderness on medial joint line, stable to varus and valgus stress. Ankle reflex 1+ bilaterally. Good strength, and no instability both upper and lower extremities. IMAGING:  Two views left hip and femur, and AP pelvis showed anatomic alignment of the intertrochanteric fracture, TFN in good position, no loosening, or hardware failure. Xray 3 views of the bilateral  knees was obtained 3/6/2020 in the office and reviewed. These demonstrate right knee with moderate degenerative changes with narrowing of the joint space in the medial joint space compartment, subchondral sclerosis, and marginal osteophytosis. Left TKA in good position. IMPRESSION:     1- Left intertrochanteric femur comminuted fracture, status post TFN in [address] 2- Right knee pain/ DJD. PLAN: For the hip: He can go back to normal activity with no restrictions. He will be seen PRN. I told the patient that it is not unusual to have some achy pain and swelling for up to a year after a fracture. For the knee: I discussed with the patient the findings are reserved the x-ray results.   He was instructed to continue to work on quadriceps strengthening exercises given to him by physical therapy. Avoid heavy impact activities. I believe he will benefit from cortisone injection right knee, and he agreed to have. PROCEDURE:    With the patient's permission, and under sterile condition, the right knee was prepared and draped with alcohol and injected with a mixture of 1 mL Kenalog 40mg and 4 mL of 1% lidocaine through the medial parapatellar port area. The patient tolerated the procedure well. A band-aid was applied and the patient was advised to ice the knee for 15-20 minutes to relieve any injection site related pain. He reported a good improvement immediatly after the injection. The patient will come back for a follow up in 3 months or as needed. As this patient has demonstrated risk factors for osteoporosis, such as age greater than [de-identified] years and evidence of a fracture, I have referred the patient back to the primary care physician for evaluation for osteoporosis, including consideration for DEXA scanning, if this is felt to be clinically indicated. The patient is advised to contact the primary care physician to follow-up for further evaluation.        Lizzy Diego MD

## 2020-10-05 RX ORDER — TRIAMCINOLONE ACETONIDE 40 MG/ML
40 INJECTION, SUSPENSION INTRA-ARTICULAR; INTRAMUSCULAR ONCE
Status: COMPLETED | OUTPATIENT
Start: 2020-10-05 | End: 2020-10-05

## 2020-10-05 RX ORDER — LIDOCAINE HYDROCHLORIDE 10 MG/ML
40 INJECTION, SOLUTION INFILTRATION; PERINEURAL ONCE
Status: COMPLETED | OUTPATIENT
Start: 2020-10-05 | End: 2020-10-05

## 2020-10-05 RX ADMIN — TRIAMCINOLONE ACETONIDE 40 MG: 40 INJECTION, SUSPENSION INTRA-ARTICULAR; INTRAMUSCULAR at 16:12

## 2020-10-05 RX ADMIN — LIDOCAINE HYDROCHLORIDE 40 MG: 10 INJECTION, SOLUTION INFILTRATION; PERINEURAL at 16:12

## 2020-10-21 ENCOUNTER — OFFICE VISIT (OUTPATIENT)
Dept: FAMILY MEDICINE CLINIC | Age: 85
End: 2020-10-21
Payer: MEDICARE

## 2020-10-21 VITALS
WEIGHT: 169 LBS | HEART RATE: 64 BPM | BODY MASS INDEX: 26.53 KG/M2 | SYSTOLIC BLOOD PRESSURE: 122 MMHG | TEMPERATURE: 97.1 F | DIASTOLIC BLOOD PRESSURE: 80 MMHG | RESPIRATION RATE: 16 BRPM | OXYGEN SATURATION: 98 % | HEIGHT: 67 IN

## 2020-10-21 PROCEDURE — G0008 ADMIN INFLUENZA VIRUS VAC: HCPCS | Performed by: FAMILY MEDICINE

## 2020-10-21 PROCEDURE — 99214 OFFICE O/P EST MOD 30 MIN: CPT | Performed by: FAMILY MEDICINE

## 2020-10-21 PROCEDURE — 3052F HG A1C>EQUAL 8.0%<EQUAL 9.0%: CPT | Performed by: FAMILY MEDICINE

## 2020-10-21 PROCEDURE — 90694 VACC AIIV4 NO PRSRV 0.5ML IM: CPT | Performed by: FAMILY MEDICINE

## 2020-10-21 PROCEDURE — G0439 PPPS, SUBSEQ VISIT: HCPCS | Performed by: FAMILY MEDICINE

## 2020-10-21 RX ORDER — PEN NEEDLE, DIABETIC 30 GX5/16"
1 NEEDLE, DISPOSABLE MISCELLANEOUS DAILY
Qty: 100 EACH | Refills: 3 | Status: SHIPPED | OUTPATIENT
Start: 2020-10-21 | End: 2022-01-01 | Stop reason: SDUPTHER

## 2020-10-21 RX ORDER — INSULIN GLARGINE 100 [IU]/ML
15 INJECTION, SOLUTION SUBCUTANEOUS NIGHTLY
Qty: 5 PEN | Refills: 3 | Status: SHIPPED | OUTPATIENT
Start: 2020-10-21 | End: 2021-01-01

## 2020-10-21 ASSESSMENT — LIFESTYLE VARIABLES
HAVE YOU OR SOMEONE ELSE BEEN INJURED AS A RESULT OF YOUR DRINKING: 0
HOW MANY STANDARD DRINKS CONTAINING ALCOHOL DO YOU HAVE ON A TYPICAL DAY: 0
HOW OFTEN DURING THE LAST YEAR HAVE YOU FOUND THAT YOU WERE NOT ABLE TO STOP DRINKING ONCE YOU HAD STARTED: 0
HOW OFTEN DO YOU HAVE A DRINK CONTAINING ALCOHOL: 2
HOW OFTEN DURING THE LAST YEAR HAVE YOU HAD A FEELING OF GUILT OR REMORSE AFTER DRINKING: 0
AUDIT TOTAL SCORE: 2
HAS A RELATIVE, FRIEND, DOCTOR, OR ANOTHER HEALTH PROFESSIONAL EXPRESSED CONCERN ABOUT YOUR DRINKING OR SUGGESTED YOU CUT DOWN: 0
HOW OFTEN DURING THE LAST YEAR HAVE YOU BEEN UNABLE TO REMEMBER WHAT HAPPENED THE NIGHT BEFORE BECAUSE YOU HAD BEEN DRINKING: 0
AUDIT-C TOTAL SCORE: 2
HOW OFTEN DURING THE LAST YEAR HAVE YOU FAILED TO DO WHAT WAS NORMALLY EXPECTED FROM YOU BECAUSE OF DRINKING: 0
HOW OFTEN DO YOU HAVE SIX OR MORE DRINKS ON ONE OCCASION: 0
HOW OFTEN DURING THE LAST YEAR HAVE YOU NEEDED AN ALCOHOLIC DRINK FIRST THING IN THE MORNING TO GET YOURSELF GOING AFTER A NIGHT OF HEAVY DRINKING: 0

## 2020-10-21 ASSESSMENT — PATIENT HEALTH QUESTIONNAIRE - PHQ9
SUM OF ALL RESPONSES TO PHQ9 QUESTIONS 1 & 2: 0
2. FEELING DOWN, DEPRESSED OR HOPELESS: 0
1. LITTLE INTEREST OR PLEASURE IN DOING THINGS: 0
SUM OF ALL RESPONSES TO PHQ QUESTIONS 1-9: 0

## 2020-10-21 NOTE — PATIENT INSTRUCTIONS
FYI: While Medicare provides you with a FREE ANNUAL PREVENTIVE PHYSICAL, this visit does NOT include management of chronic medical problems or physical examination. Dr. Rodrigo Valenzuela usually does a combination visit if you have other medical problems so you don't have to come back for another visit. However, this means that there will be a co-pay. INSTRUCTIONS  NEXT APPOINTMENT: Please schedule check-up in 3 months. · PLEASE TAKE THIS FORM TO CHECK-OUT WINDOW TO SCHEDULE NEXT VISIT. PLEASE GET FASTING BLOODWORK DRAWN AFTER 10/28/20. Lab is on first floor in [address]. Hours Monday to Friday 7 AM to 5 PM.   · For pain, may take OTC tylenol arthritis (acetaminophen 8 hour) 2 tabs three times per day  · Keep walking and bike as much as possible. · Goal for fasting glucose is under 130. Goal for sugar 2 hours after meals is under 150. Sugar under 80 AND not feel well are too low. Please get dental exam to keep your teeth in good shape. ·  Elevate feet when sitting. Compression stockings if needed. · Can try heat on knees. Patient Education     VENOUS INSUFFICIENCY/EDEMA    Venous insufficiency is a condition in which the veins have problems sending blood from the legs back to the heart. Causes  Venous insufficiency is caused by problems in one or more deeper leg veins. Normally, valves in your veins keep your blood flowing back towards the heart so it does not collect in one place. But the valves in varicose veins are either damaged or missing. This causes the veins to remain filled with blood, especially when you are standing. The condition may also be caused by a blockage in a vein from a clot (deep vein thrombosis). Chronic venous insufficiency is a long-term condition. It occurs because of partial vein blockage or blood leakage around the valves of the veins.   Risk factors for venous insufficiency include:  History of deep vein thrombosis in the legs   Age   Being female (related to levels of the hormone progesterone)   Being tall   Genetic factors   Obesity   Pregnancy   Prolonged sitting or standing    Symptoms  Dull aching, heaviness, or cramping in legs   Itching and tingling   Pain that gets worse when standing   Pain that gets better when legs are raised   Swelling of the legs  People with chronic venous insufficiency may also have:  Redness of the legs and ankles   Skin color changes around the ankles   Varicose veins on the surface (superficial)   Thickening and hardening of the skin on the legs and ankles (lipodermatosclerosis)   Ulcers on the legs and ankles    Treatment  Take the following steps to help manage venous insufficiency:  Use compression stockings to decrease chronic swelling. Avoid long periods of sitting or standing. Even moving your legs slightly will help the blood in your veins return to your heart. Care for wounds aggressively if any skin breakdown or infection occurs. Surgery (varicose vein stripping) or noninvasive treatments for varicose veins may be recommended if you have:  Leg pain, often described as heavy or tired   Skin ulcers or sores caused by poor blood flow in the veins   Thickening and hardening of the skin on the legs and ankles (lipodermatosclerosis)                  Personalized Preventive Plan for Krishna Almazana - 10/21/2020  Medicare offers a range of preventive health benefits. Some of the tests and screenings are paid in full while other may be subject to a deductible, co-insurance, and/or copay. Some of these benefits include a comprehensive review of your medical history including lifestyle, illnesses that may run in your family, and various assessments and screenings as appropriate. After reviewing your medical record and screening and assessments performed today your provider may have ordered immunizations, labs, imaging, and/or referrals for you.   A list of these orders (if applicable) as well as your Preventive Care list are included within your After Visit Summary for your review. Other Preventive Recommendations:    · A preventive eye exam performed by an eye specialist is recommended every 1-2 years to screen for glaucoma; cataracts, macular degeneration, and other eye disorders. · A preventive dental visit is recommended every 6 months. · Try to get at least 150 minutes of exercise per week or 10,000 steps per day on a pedometer . · Order or download the FREE \"Exercise & Physical Activity: Your Everyday Guide\" from [employer]. Call 0-190.877.1927 or search [employer] online. · You need 3088-5162 mg of calcium and 9880-3755 IU of vitamin D per day. It is possible to meet your calcium requirement with diet alone, but a vitamin D supplement is usually necessary to meet this goal.  · When exposed to the sun, use a sunscreen that protects against both UVA and UVB radiation with an SPF of 30 or greater. Reapply every 2 to 3 hours or after sweating, drying off with a towel, or swimming. · Always wear a seat belt when traveling in a car. Always wear a helmet when riding a bicycle or motorcycle.

## 2020-10-21 NOTE — PROGRESS NOTES
Medicare Annual Wellness Visit  Name: Skinny Sotelo  YOB: 1935  Age: 81 y.o. Sex: male  MRN: <[id number]>     Date of Service:  10/21/2020    Chief Complaint:   Skinny Sotelo is a 81 y.o. male who presents for Medicare Annual Wellness Visit and check-up for:  1. Routine general medical examination at a health care facility    2. Type 2 diabetes mellitus with diabetic nephropathy, without long-term current use of insulin ([facility]    3. Essential hypertension    4. Hypertriglyceridemia    5. Needs flu shot    6. Coronary artery disease, non-occlusive    7. Malignant neoplasm of descending colon ([facility]- 1/18 left colectomy, T3N0    8. Left renal mass 2.3 cm enhancing by CT, stable, per hemonc    9. Malaise and fatigue      HPI    Chief Complaint   Patient presents with    Annual Exam     Complaints: pt has been tracking his sugars. Changed his lantus from 15 to 10. · He started voltaren gel for his arthritis on his knees. It helps a little but not much. He doesn't want to take pills every day. Is there anything else he can do other than take a pill every day. · Sometimes his feet and ankles swell. No hurting just swelling they tried stocking and they made it worse, x few months. · Low energy over a year    Review of Systems   General ROS: fever? No,    Night sweats? No  Ophthalmic ROS: change in vision? No  Endocrine ROS: fatigue? No   Unexpected weight changes? No  Hematologic/Lymphatic: easy bruising? No   Swollen lymph nodes? No  ENT ROS: headaches? No   Sore throat? No  Respiratory ROS: cough? No   Wheezing? No  Cardiovascular ROS: chest pain? No   Shortness of breath? No  Gastrointestinal ROS: abdominal pain? No   Change in stools? No  Genito-Urinary ROS: painful urination? No   Trouble urinating? No  Musculoskeletal ROS: trouble walking? No   Joint pain? No  Neurological ROS: TIA or stroke symptoms? No   Numbness/tingling? No  Dermatological ROS: rash? No   Changes in skin spots? No    Health Maintenance Due   Topic Date Due    DTaP/Tdap/Td vaccine (1 - Tdap) 11/11/1955    Annual Wellness Visit (AWV)  05/29/2019    Diabetic foot exam  07/03/2020    Lipid screen  07/05/2020    Flu vaccine (1) 09/01/2020    Potassium monitoring  09/23/2020    Creatinine monitoring  09/23/2020       *Chief complaint, HPI, History and ROS provided by the medical assistant has been reviewed and verified by provider- Brenton Granda MD    HISTORY:  Patient's medications, allergies, past medical, and social histories were reviewed and updated as appropriate. CHART REVIEW  Health Maintenance   Topic Date Due    DTaP/Tdap/Td vaccine (1 - Tdap) 11/11/1955    Annual Wellness Visit (AWV)  05/29/2019    Diabetic foot exam  07/03/2020    Lipid screen  07/05/2020    Flu vaccine (1) 09/01/2020    Potassium monitoring  09/23/2020    Creatinine monitoring  09/23/2020    Shingles Vaccine (1 of 2) 10/25/2030 (Originally 11/11/1986)    Diabetic retinal exam  11/07/2020    Colon cancer screen colonoscopy  12/17/2020    A1C test (Diabetic or Prediabetic)  01/27/2021    Pneumococcal 65+ years Vaccine  Completed    Hepatitis A vaccine  Aged Out    Hib vaccine  Aged Out    Meningococcal (ACWY) vaccine  Aged Out     The ASCVD Risk score (Hannah Jean., et al., 2013) failed to calculate for the following reasons: The 2013 ASCVD risk score is only valid for ages 36 to 78  Prior to Visit Medications    Medication Sig Taking?  Authorizing Provider   insulin glargine (LANTUS SOLOSTAR) 100 UNIT/ML injection pen Inject 15 Units into the skin nightly Yes Brenton Granda MD   Insulin Pen Needle (PEN NEEDLES 3/16\") 31G X 5 MM MISC 1 each by Does not apply route daily Yes Brenton Granda MD   simvastatin (ZOCOR) 20 MG tablet Take 1 tablet by mouth nightly Yes Brenton Granda MD   amLODIPine (NORVASC) 10 MG tablet Take 1 tablet by mouth once daily Yes Brenton Granda MD   finasteride (PROSCAR) 5 MG tablet Take 1 tablet by mouth once daily Yes Raphael Rowe MD   metFORMIN (GLUCOPHAGE-XR) 500 MG extended release tablet Take 2 tabs in AM and 1 in PM Yes Raphael Rowe MD   metoprolol tartrate (LOPRESSOR) 50 MG tablet Take 1 tablet by mouth twice daily Yes Raphael Rowe MD   blood glucose test strips (ASCENSIA AUTODISC VI;ONE TOUCH ULTRA TEST VI) strip Daily or as needed. Yes Raphael Rowe MD   lisinopril (PRINIVIL;ZESTRIL) 40 MG tablet Take 1 tablet by mouth daily Yes Raphael Rowe MD   aspirin EC 81 MG EC tablet Take 1 tablet by mouth 2 times daily for 14 days Take for DVT blood clot prophylaxis. Please avoid missing doses. Yes Anais Leyva MD   propafenone (RYTHMOL) 150 MG tablet Take 1 tablet by mouth 2 times daily Yes Anais Leyva MD   [facility] Medal II LANCETS MISC 1 each by Does not apply route daily Yes Raphael Rowe MD   Blood Glucose Monitoring Suppl (ONE TOUCH ULTRA 2) w/Device KIT 1 kit by Does not apply route daily Yes Raphael Rowe MD   Lancet Devices MISC 1 x daily for the Lifescan lancets Yes Raphael Rowe MD   diclofenac sodium 1 % GEL Apply 2 g topically 2 times daily Yes Raphael Rowe MD   Calcium Carbonate Antacid (TUMS E-X PO) Take 2 tablets by mouth as needed Yes Alexys Tomlin MD   amLODIPine-atorvastatatin (CADUET) 10-10 MG per tablet Take 1 tablet by mouth daily REPLACING simvastatin and amlodipine  Raphael Rowe MD   oxybutynin (DITROPAN) 5 MG tablet Take 1 tablet by mouth 2 times daily as needed for Other (bladder). Raphael Rowe MD      Family History   Problem Relation Age of Onset    Hypertension Mother     Stroke Mother      Social History     Tobacco Use    Smoking status: Never Smoker    Smokeless tobacco: Never Used    Tobacco comment: congrats, advised not to start   Substance Use Topics    Alcohol use:  Yes     Alcohol/week: 2.0 standard drinks     Types: 2 Standard drinks or equivalent per week     Comment: 1-2 beers/week    Drug use: No      Immunization History   Administered Date(s) Administered    Influenza, High Dose (Fluzone 65 yrs and older) 01/31/2018, 09/12/2018    Influenza, Triv, inactivated, subunit, adjuvanted, IM (Fluad 65 yrs and older) 10/08/2019    Pneumococcal Conjugate 13-valent ([id number]) 02/16/2016    Pneumococcal Polysaccharide ([id number]) 10/24/2008    Tetanus 08/01/2003     LAST LABS  Cholesterol, Total   Date Value Ref Range Status   07/05/2019 130 0 - 199 mg/dL Final     LDL Calculated   Date Value Ref Range Status   07/05/2019 55 <100 mg/dL Final     HDL   Date Value Ref Range Status   07/05/2019 55 40 - 60 mg/dL Final     Triglycerides   Date Value Ref Range Status   07/05/2019 102 0 - 150 mg/dL Final     Lab Results   Component Value Date    GLUCOSE 139 (H) 09/23/2019     Lab Results   Component Value Date     09/23/2019    K 4.2 09/23/2019    CREATININE 1.4 (H) 07/21/2020     Lab Results   Component Value Date    WBC 5.7 09/23/2019    HGB 11.3 (L) 09/23/2019    HCT 32.6 (L) 09/23/2019    MCV 91.9 09/23/2019     09/23/2019     Lab Results   Component Value Date    ALT 11 07/05/2019    AST 13 (L) 07/05/2019    ALKPHOS 77 07/05/2019    BILITOT 0.6 07/05/2019     TSH (uIU/mL)   Date Value   12/20/2016 1.69     Lab Results   Component Value Date    LABA1C 8.8 07/27/2020        CareTeam (Including outside providers/suppliers regularly involved in providing care):   Patient Care Team:  Viktor Calabrese MD as PCP - General (Family Medicine)  Viktor Calabrese MD as PCP - [facility] Empaneled Provider  Vadim Wayne MD as Consulting Physician (Cardiology)    The following problems were reviewed today and where indicated follow up appointments were made and/or referrals ordered. Positive Risk Factor Screenings with Interventions:       General Health and ACP:  General  In general, how would you say your health is?: Good  In the past 7 days, have you experienced any of the following?  New or Increased Pain, New or Increased Fatigue, Loneliness, Social Isolation, Stress or Anger?: (!) New or Increased Fatigue  Do you get the social and emotional support that you need?: Yes  Do you have a Living Will?: Yes  Advance Directives     Power of  Living Will ACP-Advance Directive ACP-Power of     Not on File [place] on 01/16/18 Filed [address] [facility] [place] Risk Interventions:  · Fatigue: see A/P      Health Habits/Nutrition:  Health Habits/Nutrition  Do you exercise for at least 20 minutes 2-3 times per week?: Yes  Have you lost any weight without trying in the past 3 months?: No  Do you eat fewer than 2 meals per day?: No  Have you seen a dentist within the past year?: (!) No  Body mass index: (!) 26.47  Health Habits/Nutrition Interventions:  · Dental exam overdue:  patient encouraged to make appointment with his/her dentist      Hearing/Vision:  No exam data present  Hearing/Vision  Do you or your family notice any trouble with your hearing?: No  Do you have difficulty driving, watching TV, or doing any of your daily activities because of your eyesight?: (!) Yes  Have you had an eye exam within the past year?: (!) No  Hearing/Vision Interventions:  · Vision concerns:  patient encouraged to make appointment with his/her eye specialist    Current Health Maintenance Status  Recommendations for Preventive Services Due: see orders.   Recommended screening schedule for the next 5-10 years is provided to the patient in written form: see Patient Instructions/AVS.    PHYSICAL EXAM:  VITALS:  /80 (Site: Right Upper Arm, Position: Sitting, Cuff Size: Large Adult)   Pulse 64   Temp 97.1 °F (36.2 °C) (Temporal)   Resp 16   Ht 5' 7\" (1.702 m)   Wt 169 lb (76.7 kg)   SpO2 98%   BMI 26.47 kg/m²   BP Readings from Last 5 Encounters:   10/21/20 122/80   09/01/20 116/68   07/27/20 118/82   02/13/20 122/84   12/04/19 131/75     Wt Readings from Last 5 Encounters:   10/21/20 169 lb (76.7 kg)   09/30/20 165 lb (74.8 kg)   09/01/20 165 lb (74.8 kg)   07/27/20 164 lb (74.4 kg)   06/26/20 167 lb (75.8 kg)   Body mass index is 26.47 kg/m². GENERAL: well-developed, well-nourished, alert, no distress, calm   EYES: negative findings: lids and lashes normal and conjunctivae and sclerae normal  ENT: normal TM's and external ear canals both ears  · External nose and ears appear normal  · Pharynx: normal. Exudates: None  · Lips, mucosa, and tongue normal  · Hearing grossly normal.    NECK: No adenopathy, supple, symmetrical, trachea midline  · Thyroid not enlarged, symmetric, no tenderness/mass/nodules  · no cervical nodes, no supraclavicular nodes  LUNGS:  Breathing unlabored  · clear to auscultation bilaterally and good air movement  CARDIOVASC: regular rate and rhythm, S1, S2 normal   LEGS:  Lower extremity edema: none     No carotid bruits  ABDOMEN: Soft, non-tender, no masses  · No hepatosplenomegaly  · No hernias noted. Exam limited by N/A  SKIN: warm and dry  · No rashes or suspicious lesions  PSYCH:  Alert and oriented  · Normal reasoning, insight good  · Facial expressions full, mood appropriate  · No memory disturbance noted  MUSCULOSKEL:  No significant finger or nail findings  · Spine symmetric, no deformities, no kyphosis   GAIT: UP and Go test: <30 seconds with gait: normal.  Speed Normal.  No significant balance checks. No extra steps on turn around. Assistive device: none        Assessment and Plan:      Diagnosis Orders   1. Routine general medical examination at a health care facility     2. Type 2 diabetes mellitus with diabetic nephropathy, without long-term current use of insulin (HCC)  HEMOGLOBIN A1C    insulin glargine (LANTUS SOLOSTAR) 100 UNIT/ML injection pen    Insulin Pen Needle (PEN NEEDLES 3/16\") 31G X 5 MM MISC    TSH without Reflex   3. Essential hypertension  COMPREHENSIVE METABOLIC PANEL   4. Hypertriglyceridemia  LIPID PANEL   5. Needs flu shot  INFLUENZA, QUADV, ADJUVANTED, 65 YRS =, IM, PF, PREFILL SYR, 0.5ML (FLUAD)   6.

## 2020-11-02 RX ORDER — METOPROLOL TARTRATE 50 MG/1
TABLET, FILM COATED ORAL
Qty: 180 TABLET | Refills: 0 | Status: SHIPPED | OUTPATIENT
Start: 2020-11-02 | End: 2021-01-28

## 2020-11-04 DIAGNOSIS — I25.10 CORONARY ARTERY DISEASE, NON-OCCLUSIVE: Chronic | ICD-10-CM

## 2020-11-04 DIAGNOSIS — R53.81 MALAISE AND FATIGUE: ICD-10-CM

## 2020-11-04 DIAGNOSIS — R53.83 MALAISE AND FATIGUE: ICD-10-CM

## 2020-11-04 DIAGNOSIS — E78.1 HYPERTRIGLYCERIDEMIA: Chronic | ICD-10-CM

## 2020-11-04 DIAGNOSIS — E11.21 TYPE 2 DIABETES MELLITUS WITH DIABETIC NEPHROPATHY, WITHOUT LONG-TERM CURRENT USE OF INSULIN (HCC): Chronic | ICD-10-CM

## 2020-11-04 DIAGNOSIS — I10 ESSENTIAL HYPERTENSION: Chronic | ICD-10-CM

## 2020-11-04 DIAGNOSIS — C18.6 MALIGNANT NEOPLASM OF DESCENDING COLON (HCC): Chronic | ICD-10-CM

## 2020-11-04 LAB
A/G RATIO: 1.9 (ref 1.1–2.2)
ALBUMIN SERPL-MCNC: 4.3 G/DL (ref 3.4–5)
ALP BLD-CCNC: 89 U/L (ref 40–129)
ALT SERPL-CCNC: 12 U/L (ref 10–40)
ANION GAP SERPL CALCULATED.3IONS-SCNC: 11 MMOL/L (ref 3–16)
AST SERPL-CCNC: 12 U/L (ref 15–37)
BASOPHILS ABSOLUTE: 0 K/UL (ref 0–0.2)
BASOPHILS RELATIVE PERCENT: 0.5 %
BILIRUB SERPL-MCNC: 0.6 MG/DL (ref 0–1)
BUN BLDV-MCNC: 19 MG/DL (ref 7–20)
CALCIUM SERPL-MCNC: 9.4 MG/DL (ref 8.3–10.6)
CHLORIDE BLD-SCNC: 99 MMOL/L (ref 99–110)
CHOLESTEROL, TOTAL: 160 MG/DL (ref 0–199)
CO2: 29 MMOL/L (ref 21–32)
CREAT SERPL-MCNC: 1.2 MG/DL (ref 0.8–1.3)
EOSINOPHILS ABSOLUTE: 0.1 K/UL (ref 0–0.6)
EOSINOPHILS RELATIVE PERCENT: 1.1 %
GFR AFRICAN AMERICAN: >60
GFR NON-AFRICAN AMERICAN: 58
GLOBULIN: 2.3 G/DL
GLUCOSE BLD-MCNC: 144 MG/DL (ref 70–99)
HCT VFR BLD CALC: 44 % (ref 40.5–52.5)
HDLC SERPL-MCNC: 61 MG/DL (ref 40–60)
HEMOGLOBIN: 15.1 G/DL (ref 13.5–17.5)
LDL CHOLESTEROL CALCULATED: 71 MG/DL
LYMPHOCYTES ABSOLUTE: 1.6 K/UL (ref 1–5.1)
LYMPHOCYTES RELATIVE PERCENT: 25.4 %
MCH RBC QN AUTO: 31.1 PG (ref 26–34)
MCHC RBC AUTO-ENTMCNC: 34.4 G/DL (ref 31–36)
MCV RBC AUTO: 90.3 FL (ref 80–100)
MONOCYTES ABSOLUTE: 0.5 K/UL (ref 0–1.3)
MONOCYTES RELATIVE PERCENT: 8 %
NEUTROPHILS ABSOLUTE: 4.1 K/UL (ref 1.7–7.7)
NEUTROPHILS RELATIVE PERCENT: 65 %
PDW BLD-RTO: 13.7 % (ref 12.4–15.4)
PLATELET # BLD: 243 K/UL (ref 135–450)
PMV BLD AUTO: 8.3 FL (ref 5–10.5)
POTASSIUM SERPL-SCNC: 4.4 MMOL/L (ref 3.5–5.1)
RBC # BLD: 4.87 M/UL (ref 4.2–5.9)
SODIUM BLD-SCNC: 139 MMOL/L (ref 136–145)
TOTAL PROTEIN: 6.6 G/DL (ref 6.4–8.2)
TRIGL SERPL-MCNC: 139 MG/DL (ref 0–150)
TSH SERPL DL<=0.05 MIU/L-ACNC: 1.24 UIU/ML (ref 0.27–4.2)
VLDLC SERPL CALC-MCNC: 28 MG/DL
WBC # BLD: 6.3 K/UL (ref 4–11)

## 2020-11-05 LAB
ESTIMATED AVERAGE GLUCOSE: 119.8 MG/DL
HBA1C MFR BLD: 5.8 %

## 2020-11-16 RX ORDER — AMLODIPINE BESYLATE 10 MG/1
TABLET ORAL
Qty: 90 TABLET | Refills: 0 | Status: SHIPPED | OUTPATIENT
Start: 2020-11-16 | End: 2021-03-15

## 2020-11-16 RX ORDER — METFORMIN HYDROCHLORIDE 500 MG/1
TABLET, EXTENDED RELEASE ORAL
Qty: 270 TABLET | Refills: 0 | Status: SHIPPED | OUTPATIENT
Start: 2020-11-16 | End: 2021-01-01 | Stop reason: SDUPTHER

## 2020-12-23 ENCOUNTER — OFFICE VISIT (OUTPATIENT)
Dept: ORTHOPEDIC SURGERY | Age: 85
End: 2020-12-23
Payer: MEDICARE

## 2020-12-23 VITALS — HEIGHT: 67 IN | BODY MASS INDEX: 26.47 KG/M2

## 2020-12-23 PROCEDURE — 99214 OFFICE O/P EST MOD 30 MIN: CPT | Performed by: ORTHOPAEDIC SURGERY

## 2020-12-23 PROCEDURE — 20610 DRAIN/INJ JOINT/BURSA W/O US: CPT | Performed by: ORTHOPAEDIC SURGERY

## 2020-12-23 RX ORDER — TRIAMCINOLONE ACETONIDE 40 MG/ML
40 INJECTION, SUSPENSION INTRA-ARTICULAR; INTRAMUSCULAR ONCE
Status: COMPLETED | OUTPATIENT
Start: 2020-12-23 | End: 2020-12-23

## 2020-12-23 RX ORDER — LIDOCAINE HYDROCHLORIDE 10 MG/ML
40 INJECTION, SOLUTION INFILTRATION; PERINEURAL ONCE
Status: COMPLETED | OUTPATIENT
Start: 2020-12-23 | End: 2020-12-23

## 2020-12-23 RX ADMIN — TRIAMCINOLONE ACETONIDE 40 MG: 40 INJECTION, SUSPENSION INTRA-ARTICULAR; INTRAMUSCULAR at 10:18

## 2020-12-23 RX ADMIN — LIDOCAINE HYDROCHLORIDE 40 MG: 10 INJECTION, SOLUTION INFILTRATION; PERINEURAL at 10:19

## 2020-12-23 NOTE — PROGRESS NOTES
DIAGNOSIS:    1- Left intertrochanteric femur comminuted fracture, status post TFN in [facility]. 2- Right knee pain/ DJD. DATE OF SURGERY: 9/6/2019. HISTORY OF PRESENT ILLNESS: Mr. Karan Blizzard 81 y.o.  male  who came in today for postoperative and follow-up visit. He was traveling to [facility] through [facility] when he fell and sustained his left hip fracture. The patient denies any pain in the left hip and rates a 0/10 VAS. He has been working on ROM and WB with cane for longer distance. He completed physical therapy with improvement. No numbness or tingling sensation. No fever or Chills. He is c/o right knee pain from DJD, and also mild left knee pain with h/o left TKA. He had right knee cortisone injection on 9/30/2020 with good improvement until a couple weeks ago. He would like to repeat the injection.      Past Medical History:   Diagnosis Date    Allergic rhinitis, seasonal     Aortic regurgitation     2/12 ECHO EF 60%, mod AI    CAD (coronary artery disease)     Cancer (HCC)     Closed left hip fracture (HCC)     Colon polyps- last colon neg 11/06     Diabetic nephropathy- pos microalb 10/11, on ARB 10/5/2011    DM (diabetes mellitus), type 2 (HCC)     Enlarged prostate     GERD (gastroesophageal reflux disease)     Holosystolic murmur 9/0/6751    HTN (hypertension)     Hypertriglyceridemia     Osteoarthritis     SVT (supraventricular tachycardia) (HCC)        Past Surgical History:   Procedure Laterality Date    ABLATION OF DYSRHYTHMIC FOCUS  2013    Dr. 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    APPENDECTOMY  age 30    CATARACT REMOVAL Bilateral 11/2017    COLONOSCOPY  12/19/2017    COLONOSCOPY N/A 12/13/2018    COLONOSCOPY POLYPECTOMY SNARE/COLD BIOPSY performed by Miguel Angel Bartholomew MD at [address] [facility] SURGERY Left 2019    JOINT REPLACEMENT      TONSILLECTOMY AND ADENOIDECTOMY  age 10    TOTAL KNEE ARTHROPLASTY Left 11/2015    TURP  01/03/2017    cysto, green light lazer TURP       Social History     Socioeconomic History    Marital status:      Spouse name: Not on file    Number of children: Not on file    Years of education: Not on file    Highest education level: Not on file   Occupational History    Not on file   Social Needs    Financial resource strain: Not on file    Food insecurity     Worry: Not on file     Inability: Not on file   [employer] needs     Medical: Not on file     Non-medical: Not on file   Tobacco Use    Smoking status: Never Smoker    Smokeless tobacco: Never Used    Tobacco comment: congrats, advised not to start   Substance and Sexual Activity    Alcohol use: Yes     Alcohol/week: 2.0 standard drinks     Types: 2 Standard drinks or equivalent per week     Comment: 1-2 beers/week    Drug use: No    Sexual activity: Not on file   Lifestyle    Physical activity     Days per week: Not on file     Minutes per session: Not on file    Stress: Not on file   Relationships    Social connections     Talks on phone: Not on file     Gets together: Not on file     Attends [employer] service: Not on file     Active member of club or organization: Not on file     Attends meetings of clubs or organizations: Not on file     Relationship status: Not on file    Intimate partner violence     Fear of current or ex partner: Not on file     Emotionally abused: Not on file     Physically abused: Not on file     Forced sexual activity: Not on file   Other Topics Concern    Not on file   Social History Narrative    Exercise: walking/swim/bike 3x/wk. Lives with spouse. Seatbelt use: Always. Living will:  yes,   copy on file.        Family History   Problem Relation Age of Onset    Hypertension Mother     Stroke Mother        Current Outpatient Medications on File Prior to Visit   Medication Sig Dispense Refill    metFORMIN (GLUCOPHAGE-XR) 500 MG extended release tablet TAKE 2 TABLETS BY MOUTH IN THE MORNING AND TAKE  1 TABLET  IN THE EVENING 270 tablet 0    amLODIPine (NORVASC) 10 MG tablet Take 1 tablet by mouth once daily 90 tablet 0    metoprolol tartrate (LOPRESSOR) 50 MG tablet Take 1 tablet by mouth twice daily 180 tablet 0    insulin glargine (LANTUS SOLOSTAR) 100 UNIT/ML injection pen Inject 15 Units into the skin nightly 5 pen 3    Insulin Pen Needle (PEN NEEDLES 3/16\") 31G X 5 MM MISC 1 each by Does not apply route daily 100 each 3    simvastatin (ZOCOR) 20 MG tablet Take 1 tablet by mouth nightly 90 tablet 1    finasteride (PROSCAR) 5 MG tablet Take 1 tablet by mouth once daily 90 tablet 0    blood glucose test strips (ASCENSIA AUTODISC VI;ONE TOUCH ULTRA TEST VI) strip Daily or as needed. 100 strip 3    amLODIPine-atorvastatatin (CADUET) 10-10 MG per tablet Take 1 tablet by mouth daily REPLACING simvastatin and amlodipine 30 tablet 5    lisinopril (PRINIVIL;ZESTRIL) 40 MG tablet Take 1 tablet by mouth daily 90 tablet 1    aspirin EC 81 MG EC tablet Take 1 tablet by mouth 2 times daily for 14 days Take for DVT blood clot prophylaxis. Please avoid missing doses. 28 tablet 0    propafenone (RYTHMOL) 150 MG tablet Take 1 tablet by mouth 2 times daily 60 tablet 3    LIFESCAN UNISTIK II LANCETS MISC 1 each by Does not apply route daily 100 each 5    Blood Glucose Monitoring Suppl (ONE TOUCH ULTRA 2) w/Device KIT 1 kit by Does not apply route daily 1 kit 0    Lancet Devices MISC 1 x daily for the Lifescan lancets 1 each 0    diclofenac sodium 1 % GEL Apply 2 g topically 2 times daily 1 Tube 5    Calcium Carbonate Antacid (TUMS E-X PO) Take 2 tablets by mouth as needed      [DISCONTINUED] oxybutynin (DITROPAN) 5 MG tablet Take 1 tablet by mouth 2 times daily as needed for Other (bladder). 180 tablet 3     No current facility-administered medications on file prior to visit.         Pertinent items are noted in HPI  Review of systems reviewed from Patient History Form dated on 10/23/2019 and available in the patient's chart under the Media tab. No change noted. PHYSICAL EXAMINATION:  Mr. Paty Galindo is a very pleasant 81 y.o.  male who presents today in no acute distress, awake, alert, and oriented. He is well dressed, nourished and  groomed. Patient with normal affect. Height is  5' 7\" (1.702 m), weight is  , Body mass index is 26.47 kg/m². Resting respiratory rate is 16. The patient walks with no limp using a cane. The incision is healed well, left hip. No signs of any erythema or drainage. He has no pain with the active or passive range of motion of the left hip. He has intact sensation, distally, and is neurovascularly intact. Both knees with full ROM, mild crepitus, tenderness on medial joint line, stable to varus and valgus stress. Ankle reflex 1+ bilaterally. Good strength, and no instability both upper and lower extremities. IMAGING:  Two views left hip and femur, and AP pelvis taken on 9/30/2020 showed anatomic alignment of the intertrochanteric fracture, TFN in good position, no loosening, or hardware failure. Xray 3 views of the bilateral  knees was obtained 3/6/2020 in the office and reviewed. These demonstrate right knee with moderate degenerative changes with narrowing of the joint space in the medial joint space compartment, subchondral sclerosis, and marginal osteophytosis. Left TKA in good position. IMPRESSION:     1- Left intertrochanteric femur comminuted fracture, status post TFN in [address] 2- Right knee pain/ DJD. PLAN: For the hip: He can go back to normal activity with no restrictions. He will be seen PRN. I told the patient that it is not unusual to have some achy pain and swelling for up to a year after a fracture. For the knee: I discussed with the patient the findings are reserved the x-ray results.   He was instructed to continue to work on quadriceps strengthening exercises given to him by physical therapy. Avoid heavy impact activities. I believe he will benefit from cortisone injection right knee, and he agreed to have. PROCEDURE:    With the patient's permission, and under sterile condition, the right knee was prepared and draped with alcohol and injected with a mixture of 1 mL Kenalog 40mg and 4 mL of 1% lidocaine through the medial parapatellar port area. The patient tolerated the procedure well. A band-aid was applied and the patient was advised to ice the knee for 15-20 minutes to relieve any injection site related pain. He reported a good improvement immediatly after the injection. The patient will come back for a follow up in 3-4 months or as needed. As this patient has demonstrated risk factors for osteoporosis, such as age greater than [de-identified] years and evidence of a fracture, I have referred the patient back to the primary care physician for evaluation for osteoporosis, including consideration for DEXA scanning, if this is felt to be clinically indicated. The patient is advised to contact the primary care physician to follow-up for further evaluation.        Mary Calderon MD

## 2021-01-01 ENCOUNTER — TELEPHONE (OUTPATIENT)
Dept: FAMILY MEDICINE CLINIC | Age: 86
End: 2021-01-01

## 2021-01-01 ENCOUNTER — APPOINTMENT (OUTPATIENT)
Dept: PHYSICAL THERAPY | Age: 86
End: 2021-01-01
Payer: MEDICARE

## 2021-01-01 ENCOUNTER — OFFICE VISIT (OUTPATIENT)
Dept: FAMILY MEDICINE CLINIC | Age: 86
End: 2021-01-01
Payer: MEDICARE

## 2021-01-01 ENCOUNTER — HOSPITAL ENCOUNTER (OUTPATIENT)
Dept: PHYSICAL THERAPY | Age: 86
Setting detail: THERAPIES SERIES
Discharge: HOME | End: 2021-09-09
Payer: MEDICARE

## 2021-01-01 ENCOUNTER — HOSPITAL ENCOUNTER (OUTPATIENT)
Dept: PHYSICAL THERAPY | Age: 86
Setting detail: THERAPIES SERIES
Discharge: HOME | End: 2021-12-02
Payer: MEDICARE

## 2021-01-01 ENCOUNTER — HOSPITAL ENCOUNTER (OUTPATIENT)
Dept: PHYSICAL THERAPY | Age: 86
Setting detail: THERAPIES SERIES
Discharge: HOME | End: 2021-11-18
Payer: MEDICARE

## 2021-01-01 ENCOUNTER — HOSPITAL ENCOUNTER (OUTPATIENT)
Dept: PHYSICAL THERAPY | Age: 86
Setting detail: THERAPIES SERIES
Discharge: HOME | End: 2021-09-16
Payer: MEDICARE

## 2021-01-01 ENCOUNTER — HOSPITAL ENCOUNTER (OUTPATIENT)
Dept: PHYSICAL THERAPY | Age: 86
Setting detail: THERAPIES SERIES
Discharge: HOME | End: 2021-11-08
Payer: MEDICARE

## 2021-01-01 ENCOUNTER — OFFICE VISIT (OUTPATIENT)
Dept: ORTHOPEDIC SURGERY | Age: 86
End: 2021-01-01
Payer: MEDICARE

## 2021-01-01 ENCOUNTER — HOSPITAL ENCOUNTER (OUTPATIENT)
Dept: PHYSICAL THERAPY | Age: 86
Setting detail: THERAPIES SERIES
Discharge: HOME | End: 2021-11-22
Payer: MEDICARE

## 2021-01-01 ENCOUNTER — HOSPITAL ENCOUNTER (OUTPATIENT)
Dept: PHYSICAL THERAPY | Age: 86
Setting detail: THERAPIES SERIES
Discharge: HOME | End: 2021-09-30
Payer: MEDICARE

## 2021-01-01 ENCOUNTER — HOSPITAL ENCOUNTER (OUTPATIENT)
Dept: PHYSICAL THERAPY | Age: 86
Setting detail: THERAPIES SERIES
Discharge: HOME | End: 2021-09-23
Payer: MEDICARE

## 2021-01-01 ENCOUNTER — HOSPITAL ENCOUNTER (OUTPATIENT)
Dept: PHYSICAL THERAPY | Age: 86
Setting detail: THERAPIES SERIES
Discharge: HOME | End: 2021-09-02
Payer: MEDICARE

## 2021-01-01 ENCOUNTER — HOSPITAL ENCOUNTER (OUTPATIENT)
Dept: PHYSICAL THERAPY | Age: 86
Setting detail: THERAPIES SERIES
Discharge: HOME | End: 2021-11-01
Payer: MEDICARE

## 2021-01-01 ENCOUNTER — HOSPITAL ENCOUNTER (OUTPATIENT)
Dept: PHYSICAL THERAPY | Age: 86
Setting detail: THERAPIES SERIES
Discharge: HOME | End: 2021-11-15
Payer: MEDICARE

## 2021-01-01 ENCOUNTER — NURSE ONLY (OUTPATIENT)
Dept: PRIMARY CARE CLINIC | Age: 86
End: 2021-01-01

## 2021-01-01 VITALS
RESPIRATION RATE: 18 BRPM | OXYGEN SATURATION: 98 % | HEART RATE: 67 BPM | HEIGHT: 67 IN | BODY MASS INDEX: 25.74 KG/M2 | DIASTOLIC BLOOD PRESSURE: 84 MMHG | SYSTOLIC BLOOD PRESSURE: 136 MMHG | WEIGHT: 164 LBS

## 2021-01-01 VITALS
HEART RATE: 74 BPM | HEIGHT: 67 IN | OXYGEN SATURATION: 97 % | SYSTOLIC BLOOD PRESSURE: 144 MMHG | RESPIRATION RATE: 12 BRPM | WEIGHT: 164 LBS | BODY MASS INDEX: 25.74 KG/M2 | DIASTOLIC BLOOD PRESSURE: 84 MMHG

## 2021-01-01 VITALS
WEIGHT: 164.2 LBS | BODY MASS INDEX: 25.77 KG/M2 | DIASTOLIC BLOOD PRESSURE: 82 MMHG | OXYGEN SATURATION: 97 % | HEART RATE: 62 BPM | HEIGHT: 67 IN | SYSTOLIC BLOOD PRESSURE: 145 MMHG

## 2021-01-01 VITALS — BODY MASS INDEX: 26.84 KG/M2 | RESPIRATION RATE: 16 BRPM | WEIGHT: 171 LBS | HEIGHT: 67 IN

## 2021-01-01 VITALS
RESPIRATION RATE: 16 BRPM | DIASTOLIC BLOOD PRESSURE: 70 MMHG | BODY MASS INDEX: 25.74 KG/M2 | HEART RATE: 63 BPM | OXYGEN SATURATION: 99 % | HEIGHT: 67 IN | WEIGHT: 164 LBS | SYSTOLIC BLOOD PRESSURE: 144 MMHG

## 2021-01-01 DIAGNOSIS — M25.561 CHRONIC PAIN OF BOTH KNEES: Primary | ICD-10-CM

## 2021-01-01 DIAGNOSIS — G89.29 CHRONIC PAIN OF BOTH KNEES: ICD-10-CM

## 2021-01-01 DIAGNOSIS — E78.1 HYPERTRIGLYCERIDEMIA: Chronic | ICD-10-CM

## 2021-01-01 DIAGNOSIS — C18.6 MALIGNANT NEOPLASM OF DESCENDING COLON (HCC): Primary | Chronic | ICD-10-CM

## 2021-01-01 DIAGNOSIS — E11.21 TYPE 2 DIABETES MELLITUS WITH DIABETIC NEPHROPATHY, WITHOUT LONG-TERM CURRENT USE OF INSULIN (HCC): ICD-10-CM

## 2021-01-01 DIAGNOSIS — I10 ESSENTIAL HYPERTENSION: ICD-10-CM

## 2021-01-01 DIAGNOSIS — M25.561 CHRONIC PAIN OF BOTH KNEES: ICD-10-CM

## 2021-01-01 DIAGNOSIS — R53.83 FATIGUE, UNSPECIFIED TYPE: ICD-10-CM

## 2021-01-01 DIAGNOSIS — G89.29 CHRONIC PAIN OF BOTH KNEES: Primary | ICD-10-CM

## 2021-01-01 DIAGNOSIS — R07.9 CHEST PAIN, UNSPECIFIED TYPE: ICD-10-CM

## 2021-01-01 DIAGNOSIS — M25.562 CHRONIC PAIN OF BOTH KNEES: ICD-10-CM

## 2021-01-01 DIAGNOSIS — E11.21 TYPE 2 DIABETES MELLITUS WITH DIABETIC NEPHROPATHY, WITHOUT LONG-TERM CURRENT USE OF INSULIN (HCC): Chronic | ICD-10-CM

## 2021-01-01 DIAGNOSIS — Z95.3 S/P AORTIC VALVE REPLACEMENT WITH PORCINE VALVE: ICD-10-CM

## 2021-01-01 DIAGNOSIS — M79.605 PAIN IN BOTH LOWER EXTREMITIES: ICD-10-CM

## 2021-01-01 DIAGNOSIS — C18.6 MALIGNANT NEOPLASM OF DESCENDING COLON (HCC): ICD-10-CM

## 2021-01-01 DIAGNOSIS — M79.604 PAIN IN BOTH LOWER EXTREMITIES: ICD-10-CM

## 2021-01-01 DIAGNOSIS — E11.21 TYPE 2 DIABETES MELLITUS WITH DIABETIC NEPHROPATHY, WITHOUT LONG-TERM CURRENT USE OF INSULIN (HCC): Primary | ICD-10-CM

## 2021-01-01 DIAGNOSIS — M17.0 PRIMARY OSTEOARTHRITIS OF BOTH KNEES: ICD-10-CM

## 2021-01-01 DIAGNOSIS — I10 ESSENTIAL HYPERTENSION: Chronic | ICD-10-CM

## 2021-01-01 DIAGNOSIS — I25.10 CORONARY ARTERY DISEASE, NON-OCCLUSIVE: Chronic | ICD-10-CM

## 2021-01-01 DIAGNOSIS — M25.562 CHRONIC PAIN OF BOTH KNEES: Primary | ICD-10-CM

## 2021-01-01 DIAGNOSIS — Z23 NEEDS FLU SHOT: ICD-10-CM

## 2021-01-01 DIAGNOSIS — I25.10 CORONARY ARTERY DISEASE, NON-OCCLUSIVE: ICD-10-CM

## 2021-01-01 DIAGNOSIS — M17.11 PRIMARY OSTEOARTHRITIS OF RIGHT KNEE: Primary | ICD-10-CM

## 2021-01-01 DIAGNOSIS — M17.0 PRIMARY OSTEOARTHRITIS OF BOTH KNEES: Primary | ICD-10-CM

## 2021-01-01 DIAGNOSIS — Z20.822 SUSPECTED COVID-19 VIRUS INFECTION: Primary | ICD-10-CM

## 2021-01-01 LAB
BILIRUBIN, POC: ABNORMAL
BLOOD URINE, POC: ABNORMAL
CATARACTS: NEGATIVE
CHOLESTEROL, TOTAL: 146 MG/DL (ref 0–199)
CLARITY, POC: CLEAR
COLOR, POC: YELLOW
DIABETIC RETINOPATHY: NEGATIVE
ESTIMATED AVERAGE GLUCOSE: 165.7 MG/DL
GLAUCOMA: NEGATIVE
GLUCOSE URINE, POC: ABNORMAL
HBA1C MFR BLD: 7.2 %
HBA1C MFR BLD: 7.4 %
HDLC SERPL-MCNC: 48 MG/DL (ref 40–60)
INTRAOCULAR PRESSURE EYE: NORMAL
KETONES, POC: ABNORMAL
LDL CHOLESTEROL CALCULATED: 58 MG/DL
LEUKOCYTE EST, POC: ABNORMAL
NITRITE, POC: ABNORMAL
PH, POC: 5
PROTEIN, POC: ABNORMAL
SARS-COV-2: NOT DETECTED
SPECIFIC GRAVITY, POC: 1.03
TRIGL SERPL-MCNC: 199 MG/DL (ref 0–150)
UROBILINOGEN, POC: 0.2
VISUAL ACUITY DISTANCE LEFT EYE: NORMAL
VISUAL ACUITY DISTANCE RIGHT EYE: NORMAL
VLDLC SERPL CALC-MCNC: 40 MG/DL

## 2021-01-01 PROCEDURE — 99214 OFFICE O/P EST MOD 30 MIN: CPT | Performed by: NURSE PRACTITIONER

## 2021-01-01 PROCEDURE — 1036F TOBACCO NON-USER: CPT | Performed by: ORTHOPAEDIC SURGERY

## 2021-01-01 PROCEDURE — 97140 MANUAL THERAPY 1/> REGIONS: CPT

## 2021-01-01 PROCEDURE — 4040F PNEUMOC VAC/ADMIN/RCVD: CPT | Performed by: INTERNAL MEDICINE

## 2021-01-01 PROCEDURE — 97112 NEUROMUSCULAR REEDUCATION: CPT

## 2021-01-01 PROCEDURE — 20610 DRAIN/INJ JOINT/BURSA W/O US: CPT | Performed by: ORTHOPAEDIC SURGERY

## 2021-01-01 PROCEDURE — 4040F PNEUMOC VAC/ADMIN/RCVD: CPT | Performed by: NURSE PRACTITIONER

## 2021-01-01 PROCEDURE — 97110 THERAPEUTIC EXERCISES: CPT

## 2021-01-01 PROCEDURE — 97530 THERAPEUTIC ACTIVITIES: CPT

## 2021-01-01 PROCEDURE — 81002 URINALYSIS NONAUTO W/O SCOPE: CPT | Performed by: INTERNAL MEDICINE

## 2021-01-01 PROCEDURE — G8417 CALC BMI ABV UP PARAM F/U: HCPCS | Performed by: INTERNAL MEDICINE

## 2021-01-01 PROCEDURE — 1123F ACP DISCUSS/DSCN MKR DOCD: CPT | Performed by: FAMILY MEDICINE

## 2021-01-01 PROCEDURE — 93000 ELECTROCARDIOGRAM COMPLETE: CPT | Performed by: INTERNAL MEDICINE

## 2021-01-01 PROCEDURE — 3051F HG A1C>EQUAL 7.0%<8.0%: CPT | Performed by: FAMILY MEDICINE

## 2021-01-01 PROCEDURE — G8417 CALC BMI ABV UP PARAM F/U: HCPCS | Performed by: FAMILY MEDICINE

## 2021-01-01 PROCEDURE — 3051F HG A1C>EQUAL 7.0%<8.0%: CPT | Performed by: INTERNAL MEDICINE

## 2021-01-01 PROCEDURE — G8427 DOCREV CUR MEDS BY ELIG CLIN: HCPCS | Performed by: INTERNAL MEDICINE

## 2021-01-01 PROCEDURE — 97162 PT EVAL MOD COMPLEX 30 MIN: CPT

## 2021-01-01 PROCEDURE — G8427 DOCREV CUR MEDS BY ELIG CLIN: HCPCS | Performed by: FAMILY MEDICINE

## 2021-01-01 PROCEDURE — 4040F PNEUMOC VAC/ADMIN/RCVD: CPT | Performed by: ORTHOPAEDIC SURGERY

## 2021-01-01 PROCEDURE — G0008 ADMIN INFLUENZA VIRUS VAC: HCPCS | Performed by: FAMILY MEDICINE

## 2021-01-01 PROCEDURE — G8417 CALC BMI ABV UP PARAM F/U: HCPCS | Performed by: NURSE PRACTITIONER

## 2021-01-01 PROCEDURE — G8427 DOCREV CUR MEDS BY ELIG CLIN: HCPCS | Performed by: NURSE PRACTITIONER

## 2021-01-01 PROCEDURE — 1036F TOBACCO NON-USER: CPT | Performed by: NURSE PRACTITIONER

## 2021-01-01 PROCEDURE — 99213 OFFICE O/P EST LOW 20 MIN: CPT | Performed by: FAMILY MEDICINE

## 2021-01-01 PROCEDURE — G8417 CALC BMI ABV UP PARAM F/U: HCPCS | Performed by: ORTHOPAEDIC SURGERY

## 2021-01-01 PROCEDURE — 99214 OFFICE O/P EST MOD 30 MIN: CPT | Performed by: FAMILY MEDICINE

## 2021-01-01 PROCEDURE — 90694 VACC AIIV4 NO PRSRV 0.5ML IM: CPT | Performed by: FAMILY MEDICINE

## 2021-01-01 PROCEDURE — 4040F PNEUMOC VAC/ADMIN/RCVD: CPT | Performed by: FAMILY MEDICINE

## 2021-01-01 PROCEDURE — 1123F ACP DISCUSS/DSCN MKR DOCD: CPT | Performed by: INTERNAL MEDICINE

## 2021-01-01 PROCEDURE — 1036F TOBACCO NON-USER: CPT | Performed by: FAMILY MEDICINE

## 2021-01-01 PROCEDURE — 99214 OFFICE O/P EST MOD 30 MIN: CPT | Performed by: INTERNAL MEDICINE

## 2021-01-01 PROCEDURE — 3051F HG A1C>EQUAL 7.0%<8.0%: CPT | Performed by: NURSE PRACTITIONER

## 2021-01-01 PROCEDURE — 97161 PT EVAL LOW COMPLEX 20 MIN: CPT

## 2021-01-01 PROCEDURE — G8427 DOCREV CUR MEDS BY ELIG CLIN: HCPCS | Performed by: ORTHOPAEDIC SURGERY

## 2021-01-01 PROCEDURE — 1123F ACP DISCUSS/DSCN MKR DOCD: CPT | Performed by: ORTHOPAEDIC SURGERY

## 2021-01-01 PROCEDURE — 1123F ACP DISCUSS/DSCN MKR DOCD: CPT | Performed by: NURSE PRACTITIONER

## 2021-01-01 PROCEDURE — 83036 HEMOGLOBIN GLYCOSYLATED A1C: CPT | Performed by: NURSE PRACTITIONER

## 2021-01-01 PROCEDURE — 1036F TOBACCO NON-USER: CPT | Performed by: INTERNAL MEDICINE

## 2021-01-01 PROCEDURE — G8484 FLU IMMUNIZE NO ADMIN: HCPCS | Performed by: INTERNAL MEDICINE

## 2021-01-01 PROCEDURE — G8484 FLU IMMUNIZE NO ADMIN: HCPCS | Performed by: FAMILY MEDICINE

## 2021-01-01 PROCEDURE — 99214 OFFICE O/P EST MOD 30 MIN: CPT | Performed by: ORTHOPAEDIC SURGERY

## 2021-01-01 RX ORDER — FUROSEMIDE 20 MG/1
20 TABLET ORAL DAILY
COMMUNITY
Start: 2021-01-01 | End: 2022-01-01

## 2021-01-01 RX ORDER — METFORMIN HYDROCHLORIDE 500 MG/1
TABLET, EXTENDED RELEASE ORAL
Qty: 270 TABLET | Refills: 0 | Status: SHIPPED | OUTPATIENT
Start: 2021-01-01 | End: 2021-01-01

## 2021-01-01 RX ORDER — FINASTERIDE 5 MG/1
TABLET, FILM COATED ORAL
Qty: 90 TABLET | Refills: 3 | Status: SHIPPED | OUTPATIENT
Start: 2021-01-01

## 2021-01-01 RX ORDER — METOPROLOL TARTRATE 50 MG/1
TABLET, FILM COATED ORAL
Qty: 180 TABLET | Refills: 1 | Status: SHIPPED | OUTPATIENT
Start: 2021-01-01 | End: 2022-01-01 | Stop reason: SDUPTHER

## 2021-01-01 RX ORDER — FINASTERIDE 5 MG/1
TABLET, FILM COATED ORAL
Qty: 90 TABLET | Refills: 0 | Status: SHIPPED | OUTPATIENT
Start: 2021-01-01 | End: 2021-01-01 | Stop reason: SDUPTHER

## 2021-01-01 RX ORDER — SIMVASTATIN 20 MG
TABLET ORAL
Qty: 90 TABLET | Refills: 0 | Status: SHIPPED | OUTPATIENT
Start: 2021-01-01 | End: 2021-01-01

## 2021-01-01 RX ORDER — LIDOCAINE HYDROCHLORIDE 10 MG/ML
10 INJECTION, SOLUTION INFILTRATION; PERINEURAL ONCE
Status: COMPLETED | OUTPATIENT
Start: 2021-01-01 | End: 2021-01-01

## 2021-01-01 RX ORDER — GLIMEPIRIDE 2 MG/1
TABLET ORAL
Qty: 90 TABLET | Refills: 0 | OUTPATIENT
Start: 2021-01-01

## 2021-01-01 RX ORDER — TRIAMCINOLONE ACETONIDE 40 MG/ML
40 INJECTION, SUSPENSION INTRA-ARTICULAR; INTRAMUSCULAR ONCE
Status: COMPLETED | OUTPATIENT
Start: 2021-01-01 | End: 2021-01-01

## 2021-01-01 RX ORDER — INSULIN GLARGINE 100 [IU]/ML
16 INJECTION, SOLUTION SUBCUTANEOUS NIGHTLY
Qty: 5 PEN | Refills: 1 | Status: SHIPPED | OUTPATIENT
Start: 2021-01-01 | End: 2022-01-01 | Stop reason: SDUPTHER

## 2021-01-01 RX ORDER — SIMVASTATIN 20 MG
TABLET ORAL
Qty: 90 TABLET | Refills: 0 | Status: SHIPPED | OUTPATIENT
Start: 2021-01-01 | End: 2022-01-01 | Stop reason: SDUPTHER

## 2021-01-01 RX ORDER — METFORMIN HYDROCHLORIDE 500 MG/1
TABLET, EXTENDED RELEASE ORAL
Qty: 270 TABLET | Refills: 1 | Status: SHIPPED | OUTPATIENT
Start: 2021-01-01

## 2021-01-01 RX ORDER — TRAMADOL HYDROCHLORIDE 50 MG/1
50 TABLET ORAL EVERY 8 HOURS PRN
Qty: 60 TABLET | Refills: 0 | Status: SHIPPED | OUTPATIENT
Start: 2021-01-01 | End: 2021-01-01

## 2021-01-01 RX ADMIN — LIDOCAINE HYDROCHLORIDE 10 MG: 10 INJECTION, SOLUTION INFILTRATION; PERINEURAL at 15:48

## 2021-01-01 RX ADMIN — TRIAMCINOLONE ACETONIDE 40 MG: 40 INJECTION, SUSPENSION INTRA-ARTICULAR; INTRAMUSCULAR at 15:48

## 2021-01-01 ASSESSMENT — ENCOUNTER SYMPTOMS
NAUSEA: 0
DIARRHEA: 0
CONSTIPATION: 0
COUGH: 1
NAUSEA: 0
ABDOMINAL PAIN: 0
COUGH: 0
SHORTNESS OF BREATH: 0
VOMITING: 0
VOMITING: 0

## 2021-01-18 ENCOUNTER — OFFICE VISIT (OUTPATIENT)
Dept: FAMILY MEDICINE CLINIC | Age: 86
End: 2021-01-18
Payer: COMMERCIAL

## 2021-01-18 VITALS
HEIGHT: 67 IN | RESPIRATION RATE: 16 BRPM | DIASTOLIC BLOOD PRESSURE: 74 MMHG | TEMPERATURE: 97.7 F | BODY MASS INDEX: 26.84 KG/M2 | WEIGHT: 171 LBS | SYSTOLIC BLOOD PRESSURE: 114 MMHG | HEART RATE: 66 BPM | OXYGEN SATURATION: 98 %

## 2021-01-18 DIAGNOSIS — Z12.11 COLON CANCER SCREENING: ICD-10-CM

## 2021-01-18 DIAGNOSIS — Z95.3 S/P AORTIC VALVE REPLACEMENT WITH PORCINE VALVE: ICD-10-CM

## 2021-01-18 DIAGNOSIS — E11.21 TYPE 2 DIABETES MELLITUS WITH DIABETIC NEPHROPATHY, WITHOUT LONG-TERM CURRENT USE OF INSULIN (HCC): Primary | Chronic | ICD-10-CM

## 2021-01-18 DIAGNOSIS — C18.6 MALIGNANT NEOPLASM OF DESCENDING COLON (HCC): Chronic | ICD-10-CM

## 2021-01-18 DIAGNOSIS — I10 ESSENTIAL HYPERTENSION: Chronic | ICD-10-CM

## 2021-01-18 LAB — HBA1C MFR BLD: 6 %

## 2021-01-18 PROCEDURE — 4040F PNEUMOC VAC/ADMIN/RCVD: CPT | Performed by: FAMILY MEDICINE

## 2021-01-18 PROCEDURE — 99213 OFFICE O/P EST LOW 20 MIN: CPT | Performed by: FAMILY MEDICINE

## 2021-01-18 PROCEDURE — 1036F TOBACCO NON-USER: CPT | Performed by: FAMILY MEDICINE

## 2021-01-18 PROCEDURE — G8510 SCR DEP NEG, NO PLAN REQD: HCPCS | Performed by: FAMILY MEDICINE

## 2021-01-18 PROCEDURE — 3288F FALL RISK ASSESSMENT DOCD: CPT | Performed by: FAMILY MEDICINE

## 2021-01-18 PROCEDURE — 1123F ACP DISCUSS/DSCN MKR DOCD: CPT | Performed by: FAMILY MEDICINE

## 2021-01-18 PROCEDURE — G8427 DOCREV CUR MEDS BY ELIG CLIN: HCPCS | Performed by: FAMILY MEDICINE

## 2021-01-18 PROCEDURE — 83036 HEMOGLOBIN GLYCOSYLATED A1C: CPT | Performed by: FAMILY MEDICINE

## 2021-01-18 PROCEDURE — G8417 CALC BMI ABV UP PARAM F/U: HCPCS | Performed by: FAMILY MEDICINE

## 2021-01-18 PROCEDURE — G8484 FLU IMMUNIZE NO ADMIN: HCPCS | Performed by: FAMILY MEDICINE

## 2021-01-18 ASSESSMENT — PATIENT HEALTH QUESTIONNAIRE - PHQ9
1. LITTLE INTEREST OR PLEASURE IN DOING THINGS: 0
SUM OF ALL RESPONSES TO PHQ QUESTIONS 1-9: 0
SUM OF ALL RESPONSES TO PHQ9 QUESTIONS 1 & 2: 0
SUM OF ALL RESPONSES TO PHQ QUESTIONS 1-9: 0

## 2021-01-18 NOTE — PROGRESS NOTES
DIABETES MELLITUS FOLOW-UP  Subjective:      Chief Complaint   Patient presents with    Diabetes     Pauline Camacho is an 81 y.o. male who presents for follow up of following chronic problems:  1. Type 2 diabetes mellitus with diabetic nephropathy, without long-term current use of insulin ([facility] [address]    2. Colon cancer screening    3. S/P aortic valve replacement with porcine valve    4. Malignant neoplasm of descending colon ([facility] [address]- 1/18 left colectomy, T3N0    5. Essential hypertension      Complaints: pt states he is tired all the time x 2 years  · he says he has   · he also has diahrrea twice a week x 1 yr, getting colon next month, worse if eats more fruit  · he is also having bad knee pain with arthritis. He puts cream on them and it doesn't help he sees ortho and they do injections but it doesn't help anymore. Still walks 30 min or more daily. · He is starting to forget things. Per wife short term loss x 3 yrs. Does brain activity games. · Patient checks sugars 1  time(s) daily. Average: 125. · Any low sugars? No  · Patent follows diabetic diet? No  · Exercise: walking intermittently  · Taking medicines daily as directed? Yes  · Is the patient reporting any side effects of medications? No  · Patient checks bottom of feet daily? Yes  · Tobacco history updated:  reports that he has never smoked. He has never used smokeless tobacco.    A1c is 6.0 today. Review of Systems   General ROS: fever? No,   Night sweats? No  Ophthalmic ROS: change in vision? No  Endocrine ROS: fatigue? Yes  Unexpected weight changes? No  Respiratory ROS: Shortness of breath? No  Cardiovascular ROS: chest pain? No  Gastrointestinal ROS: abdominal pain? No  Change in stools? No  Genito-Urinary ROS: painful urination? No  Trouble urinating? No  Neurological ROS: TIA or stroke symptoms? No  Numbness/tingling? No  Dermatological ROS: rash or sores on feet? No  Changes in skin spots?     No Health Maintenance Due   Topic Date Due    COVID-19 Vaccine (1 of 2) 11/11/1951    DTaP/Tdap/Td vaccine (1 - Tdap) 11/11/1955    Colon cancer screen colonoscopy  12/17/2020     *Chief complaint, HPI, History and ROS provided by the medical assistant has been reviewed and verified by provider- Isela Giraldo MD    LAST LABS  LDL Calculated   Date Value Ref Range Status   11/04/2020 71 <100 mg/dL Final     Lab Results   Component Value Date    HDL 61 (H) 11/04/2020     Lab Results   Component Value Date    TRIG 139 11/04/2020     Lab Results   Component Value Date    GLUCOSE 144 (H) 11/04/2020     Lab Results   Component Value Date     11/04/2020    K 4.4 11/04/2020    CREATININE 1.2 11/04/2020     Lab Results   Component Value Date    WBC 6.3 11/04/2020    HGB 15.1 11/04/2020     11/04/2020     Lab Results   Component Value Date    ALT 12 11/04/2020    AST 12 (L) 11/04/2020    ALKPHOS 89 11/04/2020    BILITOT 0.6 11/04/2020     TSH (uIU/mL)   Date Value   11/04/2020 1.24     Lab Results   Component Value Date    LABA1C 5.8 11/04/2020    LABA1C 8.8 07/27/2020    LABA1C 6.5 02/13/2020     REVIEW DOCUMENTATION: labs are reviewed, up to date and normal    HISTORY:  Patient's medications, allergies, past medical, and social histories were reviewed and updated as appropriate. CHART REVIEW  Health Maintenance Due   Topic Date Due    COVID-19 Vaccine (1 of 2) 11/11/1951    DTaP/Tdap/Td vaccine (1 - Tdap) 11/11/1955    Colon cancer screen colonoscopy  12/17/2020     Social History     Tobacco Use    Smoking status: Never Smoker    Smokeless tobacco: Never Used    Tobacco comment: congrats, advised not to start   Substance Use Topics    Alcohol use:  Yes     Alcohol/week: 2.0 standard drinks     Types: 2 Standard drinks or equivalent per week     Comment: 1-2 beers/week    Drug use: No      The ASCVD Risk score (Tonia Glover et al., 2013) failed to calculate for the following reasons: The 2013 ASCVD risk score is only valid for ages 36 to 78  Prior to Visit Medications    Medication Sig Taking? Authorizing Provider   metFORMIN (GLUCOPHAGE-XR) 500 MG extended release tablet TAKE 2 TABLETS BY MOUTH IN THE MORNING AND TAKE  1 TABLET  IN THE EVENING Yes Shayy Santacruz MD   amLODIPine (NORVASC) 10 MG tablet Take 1 tablet by mouth once daily Yes Shayy Santacruz MD   metoprolol tartrate (LOPRESSOR) 50 MG tablet Take 1 tablet by mouth twice daily Yes Shayy Santacruz MD   insulin glargine (LANTUS SOLOSTAR) 100 UNIT/ML injection pen Inject 15 Units into the skin nightly Yes Shayy Santarcuz MD   Insulin Pen Needle (PEN NEEDLES 3/16\") 31G X 5 MM MISC 1 each by Does not apply route daily Yes Shayy Santacruz MD   simvastatin (ZOCOR) 20 MG tablet Take 1 tablet by mouth nightly Yes Shayy Santacruz MD   finasteride (PROSCAR) 5 MG tablet Take 1 tablet by mouth once daily Yes Shayy Santacruz MD   blood glucose test strips (ASCENSIA AUTODISC VI;ONE TOUCH ULTRA TEST VI) strip Daily or as needed. Yes Shayy Santacruz MD   lisinopril (PRINIVIL;ZESTRIL) 40 MG tablet Take 1 tablet by mouth daily Yes Shayy Santacruz MD   aspirin EC 81 MG EC tablet Take 1 tablet by mouth 2 times daily for 14 days Take for DVT blood clot prophylaxis. Please avoid missing doses.  Yes Queta Purdy MD   propafenone (RYTHMOL) 150 MG tablet Take 1 tablet by mouth 2 times daily Yes Queta Purdy MD   [facility] LANCETS MISC 1 each by Does not apply route daily Yes Shayy Santacruz MD   Blood Glucose Monitoring Suppl (ONE TOUCH ULTRA 2) w/Device KIT 1 kit by Does not apply route daily Yes Shayy Santacruz MD   Lancet Devices MISC 1 x daily for the Lifescan lancets Yes Shayy Santacruz MD   diclofenac sodium 1 % GEL Apply 2 g topically 2 times daily Yes Shayy Santacruz MD   Calcium Carbonate Antacid (TUMS E-X PO) Take 2 tablets by mouth as needed Yes Historical Provider, MD oxybutynin (DITROPAN) 5 MG tablet Take 1 tablet by mouth 2 times daily as needed for Other (bladder). Daisy Parson MD      Family History   Problem Relation Age of Onset    Hypertension Mother     Stroke Mother      Objective:   PHYSICAL EXAM   /74 (Site: Right Upper Arm, Position: Sitting, Cuff Size: Large Adult)   Pulse 66   Temp 97.7 °F (36.5 °C) (Temporal)   Resp 16   Ht 5' 7\" (1.702 m)   Wt 171 lb (77.6 kg)   SpO2 98%   BMI 26.78 kg/m²   BP Readings from Last 5 Encounters:   01/18/21 114/74   10/21/20 122/80   09/01/20 116/68   07/27/20 118/82   02/13/20 122/84     Wt Readings from Last 5 Encounters:   01/18/21 171 lb (77.6 kg)   10/21/20 169 lb (76.7 kg)   09/30/20 165 lb (74.8 kg)   09/01/20 165 lb (74.8 kg)   07/27/20 164 lb (74.4 kg)      GENERAL:   · well-developed, well-nourished, alert, no distress. EYES:   · External findings: lids and lashes normal and conjunctivae and sclerae normal  LUNGS:    · Breathing unlabored  · clear to auscultation bilaterally and good air movement  CARDIOVASC:   · regular rate and rhythm  · LEGS:  Lower extremity edema: none    SKIN: warm and dry  PSYCH:    · Alert and oriented  · Normal reasoning, insight good  · Facial expressions full, mood appropriate  · No memory disturbance noted  MUSCULOSKEL:    · No significant finger or nail findings  NEURO:   · CN 2-12 intact     Assessment and Plan:      Diagnosis Orders   1. Type 2 diabetes mellitus with diabetic nephropathy, without long-term current use of insulin (HCC)  POCT glycosylated hemoglobin (Hb A1C)     DIABETES FOOT EXAM   2. Colon cancer screening     3. S/P aortic valve replacement with porcine valve     4. Malignant neoplasm of descending colon ([facility] [address]- 1/18 left colectomy, T3N0     5. Essential hypertension     Stable. Continue current Tx plan. Any changes marked below. INSTRUCTIONS  NEXT APPOINTMENT: Please schedule annual complete physical (30 minutes) in 6 months. · PLEASE TAKE THIS FORM TO CHECK-OUT WINDOW TO SCHEDULE NEXT VISIT.   · STOP glimeperide. · Get colonoscopy next month. · Get COVID vaccine soon.

## 2021-01-18 NOTE — PATIENT INSTRUCTIONS
INSTRUCTIONS  NEXT APPOINTMENT: Please schedule annual complete physical (30 minutes) in 6 months. · PLEASE TAKE THIS FORM TO CHECK-OUT WINDOW TO SCHEDULE NEXT VISIT.   · STOP glimeperide. · Get colonoscopy next month. · Get COVID vaccine soon.

## 2021-01-19 ENCOUNTER — IMMUNIZATION (OUTPATIENT)
Dept: PRIMARY CARE CLINIC | Age: 86
End: 2021-01-19
Payer: MEDICARE

## 2021-01-19 PROCEDURE — 0011A COVID-19, MODERNA VACCINE 100MCG/0.5ML DOSE: CPT | Performed by: FAMILY MEDICINE

## 2021-01-19 PROCEDURE — 91301 COVID-19, MODERNA VACCINE 100MCG/0.5ML DOSE: CPT | Performed by: FAMILY MEDICINE

## 2021-02-14 ENCOUNTER — IMMUNIZATION (OUTPATIENT)
Dept: PRIMARY CARE CLINIC | Age: 86
End: 2021-02-14
Payer: MEDICARE

## 2021-02-14 PROCEDURE — 0012A COVID-19, MODERNA VACCINE 100MCG/0.5ML DOSE: CPT | Performed by: FAMILY MEDICINE

## 2021-02-14 PROCEDURE — 91301 COVID-19, MODERNA VACCINE 100MCG/0.5ML DOSE: CPT | Performed by: FAMILY MEDICINE

## 2021-03-05 DIAGNOSIS — E11.21 TYPE 2 DIABETES MELLITUS WITH DIABETIC NEPHROPATHY, WITHOUT LONG-TERM CURRENT USE OF INSULIN (HCC): ICD-10-CM

## 2021-03-05 RX ORDER — BLOOD SUGAR DIAGNOSTIC
STRIP MISCELLANEOUS
Qty: 100 EACH | Refills: 0 | Status: SHIPPED | OUTPATIENT
Start: 2021-03-05 | End: 2021-01-01

## 2021-03-12 DIAGNOSIS — I10 ESSENTIAL HYPERTENSION: ICD-10-CM

## 2021-03-15 RX ORDER — AMLODIPINE BESYLATE 10 MG/1
TABLET ORAL
Qty: 90 TABLET | Refills: 0 | Status: SHIPPED | OUTPATIENT
Start: 2021-03-15 | End: 2022-01-01

## 2021-03-24 ENCOUNTER — OFFICE VISIT (OUTPATIENT)
Dept: ORTHOPEDIC SURGERY | Age: 86
End: 2021-03-24
Payer: MEDICARE

## 2021-03-24 VITALS
WEIGHT: 171 LBS | TEMPERATURE: 97.5 F | HEIGHT: 67 IN | BODY MASS INDEX: 26.84 KG/M2 | DIASTOLIC BLOOD PRESSURE: 80 MMHG | SYSTOLIC BLOOD PRESSURE: 128 MMHG

## 2021-03-24 DIAGNOSIS — M25.561 CHRONIC PAIN OF BOTH KNEES: ICD-10-CM

## 2021-03-24 DIAGNOSIS — M17.11 PRIMARY OSTEOARTHRITIS OF RIGHT KNEE: Primary | ICD-10-CM

## 2021-03-24 DIAGNOSIS — M25.562 CHRONIC PAIN OF BOTH KNEES: ICD-10-CM

## 2021-03-24 DIAGNOSIS — G89.29 CHRONIC PAIN OF BOTH KNEES: ICD-10-CM

## 2021-03-24 PROCEDURE — 20610 DRAIN/INJ JOINT/BURSA W/O US: CPT | Performed by: ORTHOPAEDIC SURGERY

## 2021-03-24 PROCEDURE — G8484 FLU IMMUNIZE NO ADMIN: HCPCS | Performed by: ORTHOPAEDIC SURGERY

## 2021-03-24 PROCEDURE — G8417 CALC BMI ABV UP PARAM F/U: HCPCS | Performed by: ORTHOPAEDIC SURGERY

## 2021-03-24 PROCEDURE — 1036F TOBACCO NON-USER: CPT | Performed by: ORTHOPAEDIC SURGERY

## 2021-03-24 PROCEDURE — 1123F ACP DISCUSS/DSCN MKR DOCD: CPT | Performed by: ORTHOPAEDIC SURGERY

## 2021-03-24 PROCEDURE — 99213 OFFICE O/P EST LOW 20 MIN: CPT | Performed by: ORTHOPAEDIC SURGERY

## 2021-03-24 PROCEDURE — 4040F PNEUMOC VAC/ADMIN/RCVD: CPT | Performed by: ORTHOPAEDIC SURGERY

## 2021-03-24 PROCEDURE — G8427 DOCREV CUR MEDS BY ELIG CLIN: HCPCS | Performed by: ORTHOPAEDIC SURGERY

## 2021-03-24 RX ORDER — TRIAMCINOLONE ACETONIDE 40 MG/ML
40 INJECTION, SUSPENSION INTRA-ARTICULAR; INTRAMUSCULAR ONCE
Status: COMPLETED | OUTPATIENT
Start: 2021-03-24 | End: 2021-03-24

## 2021-03-24 RX ORDER — LIDOCAINE HYDROCHLORIDE 10 MG/ML
40 INJECTION, SOLUTION INFILTRATION; PERINEURAL ONCE
Status: COMPLETED | OUTPATIENT
Start: 2021-03-24 | End: 2021-03-24

## 2021-03-24 RX ADMIN — LIDOCAINE HYDROCHLORIDE 40 MG: 10 INJECTION, SOLUTION INFILTRATION; PERINEURAL at 10:26

## 2021-03-24 RX ADMIN — TRIAMCINOLONE ACETONIDE 40 MG: 40 INJECTION, SUSPENSION INTRA-ARTICULAR; INTRAMUSCULAR at 10:26

## 2021-03-29 DIAGNOSIS — I10 ESSENTIAL HYPERTENSION: ICD-10-CM

## 2021-03-29 PROBLEM — G89.29 CHRONIC PAIN OF BOTH KNEES: Status: ACTIVE | Noted: 2021-03-29

## 2021-03-29 PROBLEM — M25.562 CHRONIC PAIN OF BOTH KNEES: Status: ACTIVE | Noted: 2021-03-29

## 2021-03-29 PROBLEM — M25.561 CHRONIC PAIN OF BOTH KNEES: Status: ACTIVE | Noted: 2021-03-29

## 2021-03-29 RX ORDER — AMLODIPINE BESYLATE 10 MG/1
TABLET ORAL
Qty: 90 TABLET | Refills: 0 | OUTPATIENT
Start: 2021-03-29

## 2021-03-29 RX ORDER — FINASTERIDE 5 MG/1
TABLET, FILM COATED ORAL
Qty: 90 TABLET | Refills: 0 | OUTPATIENT
Start: 2021-03-29

## 2021-03-29 NOTE — PROGRESS NOTES
DIAGNOSIS:    1- Left intertrochanteric femur comminuted fracture, status post TFN in [address] 2- Right knee pain/ DJD. DATE OF SURGERY: 9/6/2019. HISTORY OF PRESENT ILLNESS: Mr. Powell Primer 81 y.o.  male  who came in today for postoperative and follow-up visit. He was traveling to [facility] through [address] when he fell and sustained his left hip fracture. The patient denies any pain in the left hip and rates a 0/10 VAS. He has been working on ROM and WB with cane for longer distance. He completed physical therapy with improvement. No numbness or tingling sensation. No fever or Chills. He is c/o right knee pain from DJD, and also mild left knee pain with h/o left TKA. He had right knee cortisone injection on 12/23/2020 with good improvement until a couple weeks ago. He would like to repeat the injection.      Past Medical History:   Diagnosis Date    Allergic rhinitis, seasonal     Aortic regurgitation     2/12 ECHO EF 60%, mod AI    CAD (coronary artery disease)     Cancer (HCC)     Closed left hip fracture (HCC)     Colon polyps- last colon neg 11/06     Diabetic nephropathy- pos microalb 10/11, on ARB 10/5/2011    DM (diabetes mellitus), type 2 (HCC)     Enlarged prostate     GERD (gastroesophageal reflux disease)     Holosystolic murmur 0/2/9626    HTN (hypertension)     Hypertriglyceridemia     Osteoarthritis     SVT (supraventricular tachycardia) (HCC)        Past Surgical History:   Procedure Laterality Date    ABLATION OF DYSRHYTHMIC FOCUS  2013    Dr. Jaime Small DYSRHYTHMIC FOCUS  2019    SVT    AORTIC VALVE REPLACEMENT  2016    mosaic ultra porcine valve/medtronic    APPENDECTOMY  age 30    CATARACT REMOVAL Bilateral 11/2017    COLONOSCOPY  12/19/2017    COLONOSCOPY N/A 12/13/2018    COLONOSCOPY POLYPECTOMY SNARE/COLD BIOPSY performed by Kami Cintron MD at [address] [facility] SURGERY Left 2019    JOINT REPLACEMENT      TONSILLECTOMY AND ADENOIDECTOMY  age 10    TOTAL KNEE ARTHROPLASTY Left 11/2015    TURP  01/03/2017    cysto, green light lazer TURP       Social History     Socioeconomic History    Marital status:      Spouse name: Not on file    Number of children: Not on file    Years of education: Not on file    Highest education level: Not on file   Occupational History    Not on file   Social Needs    Financial resource strain: Not on file    Food insecurity     Worry: Not on file     Inability: Not on file   [employer] needs     Medical: Not on file     Non-medical: Not on file   Tobacco Use    Smoking status: Never Smoker    Smokeless tobacco: Never Used    Tobacco comment: congrats, advised not to start   Substance and Sexual Activity    Alcohol use: Yes     Alcohol/week: 2.0 standard drinks     Types: 2 Standard drinks or equivalent per week     Comment: 1-2 beers/week    Drug use: No    Sexual activity: Not on file   Lifestyle    Physical activity     Days per week: Not on file     Minutes per session: Not on file    Stress: Not on file   Relationships    Social connections     Talks on phone: Not on file     Gets together: Not on file     Attends [employer] service: Not on file     Active member of club or organization: Not on file     Attends meetings of clubs or organizations: Not on file     Relationship status: Not on file    Intimate partner violence     Fear of current or ex partner: Not on file     Emotionally abused: Not on file     Physically abused: Not on file     Forced sexual activity: Not on file   Other Topics Concern    Not on file   Social History Narrative    Exercise: walking/swim/bike 3x/wk. Lives with spouse. Seatbelt use: Always. Living will:  yes,   copy on file.        Family History   Problem Relation Age of Onset    Hypertension Mother     Stroke Mother        Current Outpatient Medications on File Prior to Visit   Medication Sig Dispense Refill    amLODIPine (NORVASC) 10 MG tablet Take 1 tablet by mouth once daily 90 tablet 0    finasteride (PROSCAR) 5 MG tablet Take 1 tablet by mouth once daily 90 tablet 1    blood glucose test strips (ONETOUCH ULTRA) strip DAILY OR AS NEEDED 100 each 0    metoprolol tartrate (LOPRESSOR) 50 MG tablet Take 1 tablet by mouth twice daily 180 tablet 1    metFORMIN (GLUCOPHAGE-XR) 500 MG extended release tablet TAKE 2 TABLETS BY MOUTH IN THE MORNING AND TAKE  1 TABLET  IN THE EVENING 270 tablet 0    insulin glargine (LANTUS SOLOSTAR) 100 UNIT/ML injection pen Inject 15 Units into the skin nightly 5 pen 3    Insulin Pen Needle (PEN NEEDLES 3/16\") 31G X 5 MM MISC 1 each by Does not apply route daily 100 each 3    simvastatin (ZOCOR) 20 MG tablet Take 1 tablet by mouth nightly 90 tablet 1    lisinopril (PRINIVIL;ZESTRIL) 40 MG tablet Take 1 tablet by mouth daily 90 tablet 1    propafenone (RYTHMOL) 150 MG tablet Take 1 tablet by mouth 2 times daily 60 tablet 3    LIFESCAN UNISTIK II LANCETS MISC 1 each by Does not apply route daily 100 each 5    Blood Glucose Monitoring Suppl (ONE TOUCH ULTRA 2) w/Device KIT 1 kit by Does not apply route daily 1 kit 0    Lancet Devices MISC 1 x daily for the Lifescan lancets 1 each 0    diclofenac sodium 1 % GEL Apply 2 g topically 2 times daily 1 Tube 5    Calcium Carbonate Antacid (TUMS E-X PO) Take 2 tablets by mouth as needed      aspirin EC 81 MG EC tablet Take 1 tablet by mouth 2 times daily for 14 days Take for DVT blood clot prophylaxis. Please avoid missing doses. 28 tablet 0    [DISCONTINUED] oxybutynin (DITROPAN) 5 MG tablet Take 1 tablet by mouth 2 times daily as needed for Other (bladder). 180 tablet 3     No current facility-administered medications on file prior to visit. Pertinent items are noted in HPI  Review of systems reviewed from Patient History Form dated on 10/23/2019 and available in the patient's chart under the Media tab. No change noted. PHYSICAL EXAMINATION:  Mr. Kelli Verma is a very pleasant 81 y.o.  male who presents today in no acute distress, awake, alert, and oriented. He is well dressed, nourished and  groomed. Patient with normal affect. Height is  5' 7\" (1.702 m), weight is 171 lb (77.6 kg), Body mass index is 26.78 kg/m². Resting respiratory rate is 16. The patient walks with no limp using a cane. The incision is healed well, left hip. No signs of any erythema or drainage. He has no pain with the active or passive range of motion of the left hip. He has intact sensation, distally, and is neurovascularly intact. Both knees with full ROM, mild crepitus, tenderness on medial joint line, stable to varus and valgus stress. Ankle reflex 1+ bilaterally. Good strength, and no instability both upper and lower extremities. IMAGING:  Two views left hip and femur, and AP pelvis taken on 9/30/2020 showed anatomic alignment of the intertrochanteric fracture, TFN in good position, no loosening, or hardware failure. Xray 3 views of the bilateral  knees was obtained 3/6/2020 in the office and reviewed. These demonstrate right knee with moderate degenerative changes with narrowing of the joint space in the medial joint space compartment, subchondral sclerosis, and marginal osteophytosis. Left TKA in good position. IMPRESSION:     1- Left intertrochanteric femur comminuted fracture, status post TFN in [address] 2- Right knee pain/ DJD. PLAN: For the hip: He can go back to normal activity with no restrictions. He will be seen PRN. I told the patient that it is not unusual to have some achy pain and swelling for up to a year after a fracture. For the knee: I discussed with the patient the findings are reserved the x-ray results. He was instructed to continue to work on quadriceps strengthening exercises given to him by physical therapy. Avoid heavy impact activities.   I believe he will benefit from cortisone injection right knee, and he agreed to have. PROCEDURE:    With the patient's permission, and under sterile condition, the right knee was prepared and draped with alcohol and injected with a mixture of 1 mL Kenalog 40mg and 4 mL of 1% lidocaine through the medial parapatellar port area. The patient tolerated the procedure well. A band-aid was applied and the patient was advised to ice the knee for 15-20 minutes to relieve any injection site related pain. He reported a good improvement immediatly after the injection. The patient will come back for a follow up in 3-4 months or as needed. As this patient has demonstrated risk factors for osteoporosis, such as age greater than [de-identified] years and evidence of a fracture, I have referred the patient back to the primary care physician for evaluation for osteoporosis, including consideration for DEXA scanning, if this is felt to be clinically indicated. The patient is advised to contact the primary care physician to follow-up for further evaluation.        Elma Medrano MD

## 2021-04-23 DIAGNOSIS — E78.1 HYPERTRIGLYCERIDEMIA: Chronic | ICD-10-CM

## 2021-04-23 RX ORDER — SIMVASTATIN 20 MG
TABLET ORAL
Qty: 90 TABLET | Refills: 1 | Status: SHIPPED | OUTPATIENT
Start: 2021-04-23 | End: 2021-01-01

## 2021-08-04 NOTE — PROGRESS NOTES
DIAGNOSIS:    1- Left intertrochanteric femur comminuted fracture, status post TFN in [address] 2- Right knee pain/ DJD. DATE OF SURGERY: 9/6/2019. HISTORY OF PRESENT ILLNESS: Mr. Rona Morrison 81 y.o.  male  who came in today for follow-up visit for persistent right knee pain. He was traveling to [facility] through [address] when he fell and sustained his left hip fracture, since his surgery he is doing well with that. The patient denies any pain in the left hip and rates a 0/10 VAS. He has been working on ROM and WB with cane for longer distance. He completed physical therapy with improvement. No numbness or tingling sensation. No fever or Chills. He is c/o right knee pain from DJD, and also mild left knee pain with h/o left TKA. He had right knee cortisone injection on 3/24/2021 with good improvement until a couple weeks ago. He would like to repeat the injection. Denies smoking.     Past Medical History:   Diagnosis Date    Allergic rhinitis, seasonal     Aortic regurgitation     2/12 ECHO EF 60%, mod AI    CAD (coronary artery disease)     Cancer (HCC)     Closed left hip fracture (HCC)     Colon polyps- last colon neg 11/06     Diabetic nephropathy- pos microalb 10/11, on ARB 10/5/2011    DM (diabetes mellitus), type 2 (HCC)     Enlarged prostate     GERD (gastroesophageal reflux disease)     Holosystolic murmur 3/7/2944    HTN (hypertension)     Hypertriglyceridemia     Osteoarthritis     SVT (supraventricular tachycardia) ([facility] [address]        Past Surgical History:   Procedure Laterality Date    ABLATION OF DYSRHYTHMIC FOCUS  2013    Dr. Santoyo Him DYSRHYTHMIC FOCUS  2019    SVT    AORTIC VALVE REPLACEMENT  2016    mosaic ultra porcine valve/medtronic    APPENDECTOMY  age 30    CATARACT REMOVAL Bilateral 11/2017    COLONOSCOPY  12/19/2017    COLONOSCOPY N/A 12/13/2018    COLONOSCOPY POLYPECTOMY SNARE/COLD BIOPSY performed by Mirza Chau MD at [place] [address] SURGERY      INTERTROCHANTERIC HIP FRACTURE SURGERY Left 2019    JOINT REPLACEMENT      TONSILLECTOMY AND ADENOIDECTOMY  age 9   [facility] TOTAL KNEE ARTHROPLASTY Left 11/2015    TURP  01/03/2017    cysto, green light lazer TURP       Social History     Socioeconomic History    Marital status:      Spouse name: Not on file    Number of children: Not on file    Years of education: Not on file    Highest education level: Not on file   Occupational History    Not on file   Tobacco Use    Smoking status: Never Smoker    Smokeless tobacco: Never Used    Tobacco comment: congrats, advised not to start   Vaping Use    Vaping Use: Never used   Substance and Sexual Activity    Alcohol use: Yes     Alcohol/week: 2.0 standard drinks     Types: 2 Standard drinks or equivalent per week     Comment: 1-2 beers/week    Drug use: No    Sexual activity: Not on file   Other Topics Concern    Not on file   Social History Narrative    Exercise: walking/swim/bike 3x/wk. Lives with spouse. Seatbelt use: Always. Living will:  yes,   copy on file. Social Determinants of Health     Financial Resource Strain:     Difficulty of Paying Living Expenses:    Food Insecurity:     Worried About Running Out of Food in the Last Year:     [address] in the Last Year:    Transportation Needs:     Lack of Transportation (Medical):      Lack of Transportation (Non-Medical):    Physical Activity:     Days of Exercise per Week:     Minutes of Exercise per Session:    Stress:     Feeling of Stress :    Social Connections:     Frequency of Communication with Friends and Family:     Frequency of Social Gatherings with Friends and Family:     Attends [employer] Services:     Active Member of Clubs or Organizations:     Attends Club or Organization Meetings:     Marital Status:    Intimate Partner Violence:     Fear of Current or Ex-Partner:     Emotionally Abused:     Physically Abused:     Sexually Abused: Family History   Problem Relation Age of Onset    Hypertension Mother     Stroke Mother        Current Outpatient Medications on File Prior to Visit   Medication Sig Dispense Refill    metoprolol tartrate (LOPRESSOR) 50 MG tablet Take 1 tablet by mouth twice daily 180 tablet 1    metFORMIN (GLUCOPHAGE-XR) 500 MG extended release tablet TAKE 2 TABLETS BY MOUTH IN THE MORNING AND TAKE  1 TABLET  IN THE EVENING 270 tablet 0    blood glucose test strips (ONETOUCH ULTRA) strip USE DAILY OR AS NEEDED 100 each 1    simvastatin (ZOCOR) 20 MG tablet Take 1 tablet by mouth nightly 90 tablet 1    amLODIPine (NORVASC) 10 MG tablet Take 1 tablet by mouth once daily 90 tablet 0    finasteride (PROSCAR) 5 MG tablet Take 1 tablet by mouth once daily 90 tablet 1    [DISCONTINUED] metoprolol tartrate (LOPRESSOR) 50 MG tablet Take 1 tablet by mouth twice daily 180 tablet 1    insulin glargine (LANTUS SOLOSTAR) 100 UNIT/ML injection pen Inject 15 Units into the skin nightly 5 pen 3    Insulin Pen Needle (PEN NEEDLES 3/16\") 31G X 5 MM MISC 1 each by Does not apply route daily 100 each 3    lisinopril (PRINIVIL;ZESTRIL) 40 MG tablet Take 1 tablet by mouth daily 90 tablet 1    aspirin EC 81 MG EC tablet Take 1 tablet by mouth 2 times daily for 14 days Take for DVT blood clot prophylaxis. Please avoid missing doses.  28 tablet 0    propafenone (RYTHMOL) 150 MG tablet Take 1 tablet by mouth 2 times daily 60 tablet 3    LIFESCAN UNISTIK II LANCETS MISC 1 each by Does not apply route daily 100 each 5    Blood Glucose Monitoring Suppl (ONE TOUCH ULTRA 2) w/Device KIT 1 kit by Does not apply route daily 1 kit 0    Lancet Devices MISC 1 x daily for the Lifescan lancets 1 each 0    diclofenac sodium 1 % GEL Apply 2 g topically 2 times daily 1 Tube 5    Calcium Carbonate Antacid (TUMS E-X PO) Take 2 tablets by mouth as needed      [DISCONTINUED] oxybutynin (DITROPAN) 5 MG tablet Take 1 tablet by mouth 2 times daily as needed for Other (bladder). 180 tablet 3     No current facility-administered medications on file prior to visit. Pertinent items are noted in HPI  Review of systems reviewed from Patient History Form dated on 12/23/2020 and available in the patient's chart under the Media tab. No change noted. PHYSICAL EXAMINATION:  Mr. Paty Galindo is a very pleasant 81 y.o.  male who presents today in no acute distress, awake, alert, and oriented. He is well dressed, nourished and  groomed. Patient with normal affect. Height is  5' 7\" (1.702 m), weight is 171 lb (77.6 kg), Body mass index is 26.78 kg/m². Resting respiratory rate is 16. The patient walks with no limp using a cane, slow gait. The incision is healed well, left hip. No signs of any erythema or drainage. He has no pain with the active or passive range of motion of the left hip. He has intact sensation, distally, and is neurovascularly intact. Both knees with full ROM, mild crepitus, tenderness on medial joint line, stable to varus and valgus stress. Ankle reflex 1+ bilaterally. Good strength, and no instability both upper and lower extremities. IMAGING:  Two views left hip and femur, and AP pelvis taken on 9/30/2020 showed anatomic alignment of the intertrochanteric fracture, TFN in good position, no loosening, or hardware failure. Xray 3 views of the bilateral  knees was obtained 3/6/2020 in the office and reviewed. These demonstrate right knee with moderate degenerative changes with narrowing of the joint space in the medial joint space compartment, subchondral sclerosis, and marginal osteophytosis. Left TKA in good position. IMPRESSION:     1- Left intertrochanteric femur comminuted fracture, status post TFN in [address] 2- Right knee pain/ DJD. PLAN: For the hip: He can go back to normal activity with no restrictions. He will be seen PRN.  I told the patient that it is not unusual to have some achy pain and swelling for up to a year or longer after a fracture. For the knee: I discussed with the patient the findings are reserved the x-ray results. He was instructed to continue to work on quadriceps strengthening exercises given to him by physical therapy. Avoid heavy impact activities. I believe he will benefit from cortisone injection right knee, and he agreed to have. PROCEDURE:    With the patient's permission, and under sterile condition, the right knee was prepared and draped with alcohol and injected with a mixture of 1 mL Kenalog 40mg and 4 mL of 1% lidocaine through the medial parapatellar port area. The patient tolerated the procedure well. A band-aid was applied and the patient was advised to ice the knee for 15-20 minutes to relieve any injection site related pain. He reported a good improvement immediatly after the injection. The patient will come back for a follow up in 3-4 months or as needed. As this patient has demonstrated risk factors for osteoporosis, such as age greater than [de-identified] years and evidence of a fracture, I have referred the patient back to the primary care physician for evaluation for osteoporosis, including consideration for DEXA scanning, if this is felt to be clinically indicated. The patient is advised to contact the primary care physician to follow-up for further evaluation.        Merlinda Goring, MD

## 2021-08-20 NOTE — PROGRESS NOTES
ortho Dr. Ruby Hearn. Had right knee cortisone injection on 8/4/21 and  3/24/21. Does not feel that it has helped. Denies radiating pain from back. Denies low back pain. Taking tylenol 325 mg BID PRN. Does not take very often.       Patient Active Problem List   Diagnosis    Essential hypertension    Osteoarthritis    GERD (gastroesophageal reflux disease)    Allergic rhinitis, seasonal    Hypertriglyceridemia    Needs flu shot    Diabetic nephropathy associated with type 2 diabetes mellitus ([address]    Urge incontinence    Coronary artery disease, non-occlusive    History of paroxysmal supraventricular tachycardia- S/P ablation 2013, 2019    S/P aortic valve replacement with porcine valve    Type 2 diabetes mellitus with diabetic nephropathy, without long-term current use of insulin ([address]    BPH with obstruction/lower urinary tract symptoms    Malignant neoplasm of descending colon ([facility] [address]- 1/18 left colectomy, T3N0    Left renal mass 2.3 cm enhancing by CT, stable, per hemonc    History of knee replacement, total, left    Closed left hip fracture, sequela    Chronic pain of both knees          Current Outpatient Medications   Medication Sig Dispense Refill    metoprolol tartrate (LOPRESSOR) 50 MG tablet Take 1 tablet by mouth twice daily 180 tablet 1    metFORMIN (GLUCOPHAGE-XR) 500 MG extended release tablet TAKE 2 TABLETS BY MOUTH IN THE MORNING AND TAKE  1 TABLET  IN THE EVENING 270 tablet 0    blood glucose test strips (ONETOUCH ULTRA) strip USE DAILY OR AS NEEDED 100 each 1    simvastatin (ZOCOR) 20 MG tablet Take 1 tablet by mouth nightly 90 tablet 1    amLODIPine (NORVASC) 10 MG tablet Take 1 tablet by mouth once daily 90 tablet 0    finasteride (PROSCAR) 5 MG tablet Take 1 tablet by mouth once daily 90 tablet 1    insulin glargine (LANTUS SOLOSTAR) 100 UNIT/ML injection pen Inject 15 Units into the skin nightly 5 pen 3    Insulin Pen Needle (PEN NEEDLES 3/16\") 31G X 5 MM MISC 1 each by Does not apply route daily 100 each 3    lisinopril (PRINIVIL;ZESTRIL) 40 MG tablet Take 1 tablet by mouth daily 90 tablet 1    aspirin EC 81 MG EC tablet Take 1 tablet by mouth 2 times daily for 14 days Take for DVT blood clot prophylaxis. Please avoid missing doses. 28 tablet 0    propafenone (RYTHMOL) 150 MG tablet Take 1 tablet by mouth 2 times daily 60 tablet 3    LIFESCAN UNISTIK II LANCETS MISC 1 each by Does not apply route daily 100 each 5    Blood Glucose Monitoring Suppl (ONE TOUCH ULTRA 2) w/Device KIT 1 kit by Does not apply route daily 1 kit 0    Lancet Devices MISC 1 x daily for the Lifescan lancets 1 each 0    diclofenac sodium 1 % GEL Apply 2 g topically 2 times daily 1 Tube 5    Calcium Carbonate Antacid (TUMS E-X PO) Take 2 tablets by mouth as needed       No current facility-administered medications for this visit. Allergies   Allergen Reactions    No Known Allergies        Review of Systems   Constitutional: Positive for activity change and fatigue. Respiratory: Negative for cough and shortness of breath. Cardiovascular: Positive for leg swelling. Negative for chest pain and palpitations. Gastrointestinal: Negative for abdominal pain, constipation, diarrhea, nausea and vomiting. Neurological: Negative for dizziness and headaches. Vitals:    08/20/21 0825 08/20/21 0858   BP: (!) 150/80 (!) 144/70   Site: Right Upper Arm Right Upper Arm   Position: Sitting Sitting   Cuff Size: Large Adult Medium Adult   Pulse: 63    Resp: 16    SpO2: 99%    Weight: 164 lb (74.4 kg)    Height: 5' 7\" (1.702 m)        Body mass index is 25.69 kg/m². Wt Readings from Last 3 Encounters:   08/20/21 164 lb (74.4 kg)   08/04/21 171 lb (77.6 kg)   03/24/21 171 lb (77.6 kg)       BP Readings from Last 3 Encounters:   08/20/21 (!) 150/80   03/24/21 128/80   01/18/21 114/74       Physical Exam  Vitals and nursing note reviewed. Constitutional:       General: He is not in acute distress. Appearance: He is well-developed. HENT:      Head: Normocephalic and atraumatic. Cardiovascular:      Rate and Rhythm: Normal rate and regular rhythm. Heart sounds: Normal heart sounds. No murmur heard. No friction rub. No gallop. Pulmonary:      Effort: Pulmonary effort is normal. No respiratory distress. Breath sounds: Normal breath sounds. Musculoskeletal:      Cervical back: Neck supple. Right lower leg: Edema present. Left lower leg: Edema present. Comments: +1 merritt ankle edema. No calf tenderness. Skin:     General: Skin is warm and dry. Neurological:      Mental Status: He is alert and oriented to person, place, and time. Psychiatric:         Behavior: Behavior normal.         Thought Content: Thought content normal.         Judgment: Judgment normal.         Assessmentand Plan  Michelle Berg was seen today for diabetes and leg pain. Diagnoses and all orders for this visit:    Type 2 diabetes mellitus with diabetic nephropathy, without long-term current use of insulin ([address]): controlled for age, but worsening.   -     POCT glycosylated hemoglobin (Hb A1C)- 7.2%  -     Comprehensive Metabolic Panel; Future  -     insulin glargine (LANTUS SOLOSTAR) 100 UNIT/ML injection pen; Inject 16 Units into the skin nightly  - metformin ER 1000 mg in AM and 500 mg in PM  Advised low carb diet    Essential hypertension  -     Comprehensive Metabolic Panel; Future  BP a little elevated today. Recently stopped amlodipine due to edema. Has seen improvement in edema off medication. Advised to wear compression stockings daily to help with dependent edema. Continue to work with cardiologist Dr. Lonnie Call on HTN control. Hs f/u scheduled on 9/1/21. Coronary artery disease, non-occlusive  Stable. Continue current medications.  Continue f/u with cardiologist Dr. Lonnie Call    Pain in both lower extremities/ Chronic pain of both knees  -     [facility] - [facility]    Refer to PT to help with strengthening and decrease pain  Has history of OA and follows with ortho Dr. Sarah Perry. Has tried cortisone injection in right knee without much improvement in symptoms. S/p left TKR in 2015. Can be more consistent with tylenol use to help with pain control. Malignant neoplasm of descending colon ([facility] [address]- 1/18 left colectomy, T3N0  -     CBC Auto Differential; Future  Advised to f/u with GI Dr. Noemi Alvarez for repeat colonoscopy       Return in about 3 months (around 11/20/2021), or if symptoms worsen or fail to improve, for chronic conditions, with Dr. Agnes Vicente.

## 2021-09-02 NOTE — PLAN OF CARE
[facility] [place]  Phone: [phone]   Fax:     [phone]                                                       Physical Therapy Certification    Dear Referring Practitioner: BELL White,    We had the pleasure of evaluating the following patient for physical therapy services at [facility]. A summary of our findings can be found in the initial assessment below. This includes our plan of care. If you have any questions or concerns regarding these findings, please do not hesitate to contact me at the office phone number checked above. Thank you for the referral.       Physician Signature:_______________________________Date:__________________  By signing above (or electronic signature), therapists plan is approved by physician          Patient: Wang Richter   : 1935   MRN: [id number]  Referring Physician: Referring Practitioner: BELL White      Evaluation Date: 2021      Medical Diagnosis Information:  Diagnosis: Pain in bilateral knees (M25.561, M25.562)   Treatment Diagnosis: Bilateral knee pain (M25.561 and M25.562), Weakness (R53.1)                                         Insurance information: PT Insurance Information: [facility]     Precautions/ Contra-indications: none  Latex Allergy:  [x]NO      []YES  Preferred Language for Healthcare:   [x]English       []other:    C-SSRS Triggered by Intake questionnaire (Past 2 wk assessment ):   [x] No, Questionnaire did not trigger screening.   [] Yes, Patient intake triggered C-SSRS Screening      [] C-SSRS Screening completed  [] PCP notified via Epic     SUBJECTIVE: Patient stated complaint: Patient reports he started having knee pain and bilateral leg weakness about 4 weeks ago.  Was using a bike at home 15-20 minutes every day for exercise but then it started hurting. Was also walking . 5 miles to 1 mile for exercise. Uses cane for ambulation for 6 months. Reports mostly feeling weak and some pain in both knees along down the legs. Was checked by vascular to clear any blood flow issues, no indication of any issues there. Relevant Medical History:Additional Pertinent Hx: Hx L intertrochanteric fx w/ ORIF 2019, hx L TKR ~2016,  Functional Outcome Measure: LEFS = 45/80 (44%)    Pain Scale: 6-8/10  Easing factors: tylenol, rest  Provocative factors: stairs, walking, standing, kneeling, bike    Type: [x]Constant   []Intermittent  []Radiating []Localized []other:     Numbness/Tingling: denies    Occupation/School: retired    Living Status/Prior Level of Function: Independent with ADLs and, difficulty with biking and walking    OBJECTIVE:     Posture: WFL    Functional Mobility/Transfers: slow transfer    Palpation: edema at R knee (mild)    Bandages/Dressings/Incisions:n/a    Gait: (include devices/WB status) Ambulates w/ decreased step length, inc lateral sway, use of single point cane for balance    ROM LEFT RIGHT   HIP Flex     HIP Abd     HIP Ext     HIP IR ~10 deg supine ~10 deg supine   HIP ER ~20 deg supine ~20 deg supine   Knee ext 2 deg hyper 1 deg hyper   Knee Flex 120 deg 125 deg mild pain   Ankle PF     Ankle DF     Ankle In     Ankle Ev     Strength  LEFT RIGHT   HIP Flexors 4-/5 4-/5   HIP Abductors     HIP Ext     Hip ER     Knee EXT (quad) 4-/5 pain 4-/5   Knee Flex (HS) 4/5 4-/5   Ankle DF     Ankle PF     Ankle Inv     Ankle EV          Circumference  Mid apex  7 cm prox             Reflexes/Sensation:    [x]Dermatomes/Myotomes intact    [x]Reflexes equal and normal bilaterally   []Other:    Joint mobility: PF joint bilat   [x]Normal    []Hypo   []Hyper    Orthopedic Special Tests:none                       [x] Patient history, allergies, meds reviewed. Medical chart reviewed. See intake form.      Review Of Systems (ROS):  [x]Performed Review of systems (Integumentary, CardioPulmonary, Neurological) by intake and observation. Intake form has been scanned into medical record. Patient has been instructed to contact their primary care physician regarding ROS issues if not already being addressed at this time. Co-morbidities/Complexities (which will affect course of rehabilitation):   []None           Arthritic conditions   []Rheumatoid arthritis (M05.9)  [x]Osteoarthritis (M19.91)   Cardiovascular conditions   []Hypertension (I10)  []Hyperlipidemia (E78.5)  []Angina pectoris (I20)  []Atherosclerosis (I70)  []CVA Musculoskeletal conditions   []Disc pathology   []Congenital spine pathologies   []Prior surgical intervention  []Osteoporosis (M81.8)  []Osteopenia (M85.8)   Endocrine conditions   []Hypothyroid (E03.9)  []Hyperthyroid Gastrointestinal conditions   []Constipation (I64.21)   Metabolic conditions   []Morbid obesity (E66.01)  [x]Diabetes type 1(E10.65) or 2 (E11.65)   []Neuropathy (G60.9)     Pulmonary conditions   []Asthma (J45)  []Coughing   []COPD (J44.9)   Psychological Disorders  []Anxiety (F41.9)  []Depression (F32.9)   []Other:   []Other:          Barriers to/and or personal factors that will affect rehab potential:              []Age  []Sex    []Smoker              []Motivation/Lack of Motivation                        []Co-Morbidities              []Cognitive Function, education/learning barriers              []Environmental, home barriers              []profession/work barriers  [x]past PT/medical experience  []other:  Justification: should progress well with PT    Falls Risk Assessment (30 days):   [x] Falls Risk assessed and no intervention required.   [] Falls Risk assessed and Patient requires intervention due to being higher risk   TUG score (>12s at risk):     [] Falls education provided, including         ASSESSMENT:   Functional Impairments:     []Noted lumbar/proximal hip/LE hypomobility   [x]Decreased LE functional ROM   []Decreased core/proximal hip strength and neuromuscular control   [x]Decreased LE functional strength   [x]Reduced balance/proprioceptive control   []other:      Functional Activity Limitations (from functional questionnaire and intake)   []Reduced ability to tolerate prolonged functional positions   []Reduced ability or difficulty with changes of positions or transfers between positions   []Reduced ability to maintain good posture and demonstrate good body mechanics with sitting, bending, and lifting   []Reduced ability to sleep   [] Reduced ability or tolerance with driving and/or computer work   []Reduced ability to perform lifting, carrying tasks   [x]Reduced ability to squat   []Reduced ability to forward bend   [x]Reduced ability to ambulate prolonged functional periods/distances/surfaces   [x]Reduced ability to ascend/descend stairs   [x]Reduced ability to run, hop or jump   []other:     Participation Restrictions   []Reduced participation in self care activities   []Reduced participation in home management activities   []Reduced participation in work activities   [x]Reduced participation in social activities. [x]Reduced participation in sport activities. Classification :    []Signs/symptoms consistent with post-surgical status including decreased ROM, strength and function.    []Signs/symptoms consistent with joint sprain/strain   []Signs/symptoms consistent with patella-femoral syndrome   [x]Signs/symptoms consistent with knee OA/hip OA   []Signs/symptoms consistent with internal derangement of knee/Hip   [x]Signs/symptoms consistent with functional hip weakness/NMR control      []Signs/symptoms consistent with tendinitis/tendinosis    []signs/symptoms consistent with pathology which may benefit from Dry needling      []other:      Prognosis/Rehab Potential:      []Excellent   [x]Good    []Fair   []Poor    Tolerance of evaluation/treatment:    []Excellent   [x]Good    []Fair   []Poor    Physical Therapy Evaluation Complexity Justification  [x] A history of present problem with:  [] no personal factors and/or comorbidities that impact the plan of care;  [x]1-2 personal factors and/or comorbidities that impact the plan of care  []3 personal factors and/or comorbidities that impact the plan of care  [x] An examination of body systems using standardized tests and measures addressing any of the following: body structures and functions (impairments), activity limitations, and/or participation restrictions;:  [x] a total of 1-2 or more elements   [] a total of 3 or more elements   [] a total of 4 or more elements   [x] A clinical presentation with:  [x] stable and/or uncomplicated characteristics   [] evolving clinical presentation with changing characteristics  [] unstable and unpredictable characteristics;   [x] Clinical decision making of [x] low, [] moderate, [] high complexity using standardized patient assessment instrument and/or measurable assessment of functional outcome. [x] EVAL (LOW) 59624 (typically 20 minutes face-to-face)  [] EVAL (MOD) 82176 (typically 30 minutes face-to-face)  [] EVAL (HIGH) 97827 (typically 45 minutes face-to-face)  [] RE-EVAL     PLAN:  Frequency/Duration:  1-2 days per week for 4-6 Weeks:  Interventions:  [x]  Therapeutic exercise including: strength training, ROM, for Lower extremity and core   [x]  NMR activation and proprioception for LE, Glutes and Core   [x]  Manual therapy as indicated for LE, Hip and spine to include: Dry Needling/IASTM, STM, PROM, Gr I-IV mobilizations, manipulation. [x] Modalities as needed that may include: thermal agents, E-stim, Biofeedback, US, iontophoresis as indicated  [x] Patient education on joint protection, postural re-education, activity modification, progression of HEP. HEP instruction:   Access Code: Dmitry Winters: [email or web address]/  Date: 09/02/2021  Prepared by: Allyn Phelps    Exercises  Seated Table Hamstring Stretch - 2 x daily - 7 x weekly - 3 sets - 30 hold  Long Sitting Calf Stretch with Strap - 2 x daily - 7 x weekly - 3 sets - 30 hold  Supine Active Straight Leg Raise - 2 x daily - 7 x weekly - 1-2 sets - 10 reps  Supine Bridge - 2 x daily - 7 x weekly - 2 sets - 10 reps - 3-5 hold  Heel rises with counter support - 2 x daily - 7 x weekly - 2 sets - 10 reps  Standing Hip Abduction with Counter Support - 2 x daily - 7 x weekly - 1-2 sets - 10 reps    GOALS:  Patient stated goal: \"Walking normally\"  [] Progressing: [] Met: [] Not Met: [] Adjusted    Therapist goals for Patient:   Short Term Goals: To be achieved in: 2 weeks  1. Independent in HEP and progression per patient tolerance, in order to prevent re-injury. [] Progressing: [] Met: [] Not Met: [] Adjusted  2. Patient will have a decrease in pain to facilitate improvement in movement, function, and ADLs as indicated by Functional Deficits. [] Progressing: [] Met: [] Not Met: [] Adjusted    Long Term Goals: To be achieved in: 6 weeks  1. Disability index score of 22% or less for the LEFS to assist with reaching prior level of function. [] Progressing: [] Met: [] Not Met: [] Adjusted  2. Patient will demonstrate increased AROM to 125 deg flexion bilateral knee flexion to allow for proper joint functioning as indicated by patients Functional Deficits. [] Progressing: [] Met: [] Not Met: [] Adjusted  3. Patient will demonstrate an increase in Strength to 4+/5 hip flexion, knee flexion and knee extension in BLE to allow for proper functional mobility as indicated by patients Functional Deficits. [] Progressing: [] Met: [] Not Met: [] Adjusted  4. Patient will return to walking w/o AD for at least 200 ft without increased symptoms or restriction. [] Progressing: [] Met: [] Not Met: [] Adjusted  5.  Patient will return to recreational biking and walking for exercise without increased symptoms or restriction (patient specific functional goal)    [] Progressing: [] Met: [] Not Met: [] Adjusted     Electronically signed by:  Davon Hector, PT,DPT [id number]      Note: If patient does not return for scheduled/recommended follow up visits, this note will serve as a discharge from care along with the most recent update on progress.

## 2021-09-02 NOTE — FLOWSHEET NOTE
[facility]  [address]  Phone: [phone]   Fax:     [phone]      Physical Therapy Treatment Note/ Progress Report:     Date:  2021    Patient Name:  Krishna Paris    :  1935  MRN: [id number]    Pertinent Medical History:Additional Pertinent Hx: Hx L intertrochanteric fx w/ ORIF , hx L TKR ~,    Medical/Treatment Diagnosis Information:  · Diagnosis: Pain in bilateral knees (M25.561, M25.562)  · Treatment Diagnosis: Bilateral knee pain (M25.561 and M25.562), Weakness (X70.9)    Insurance/Certification information:  PT Insurance Information: [facility]  Physician Information:  Referring Practitioner: BELL Rich  Plan of care signed (Y/N):     Date of Patient follow up with Physician:      Progress Report: []  Yes  [x]  No     Date Range for reporting period:  Beginnin2021  Ending:      Progress report due (10 Rx/or 30 days whichever is less):     Recertification due (POC duration/ or 90 days whichever is less): 10/14/21    Visit # POC/Insurance Allowable Auth Needed   1 BMN []Yes   [x]No     Latex Allergy:  [x]NO      []YES  Preferred Language for Healthcare:   [x]English       []Other:    Functional Scale:      Date assessed: at eval  Test: LEFS  Score:45/80 (44%)    Pain level:  6-8/10     History of Injury: Worsening bilateral knee pain and lower leg weakness in last month. Has been using cane for ambulation for 6 months. SUBJECTIVE:  See eval    OBJECTIVE:   Observation:    Test measurements:      RESTRICTIONS/PRECAUTIONS: hx L hip surgery , L TKR     Exercises/Interventions:    All therex performed bilaterally  Therapeutic Ex (63584)   Min: Reps/Resistance Notes/CUES   HS stretch  30\" x 3    Calf stretch strap 30\" x 3    SLR flex 10x ea Cues for quad activation   Bridge 3\" x 2 x 10    DHR 2 x 10    Standing hip ABD 10x ea               Pt/wife ed 5' Ice, HEP, POC, bike only 5 min for now at home prior to therex             Manual Intervention (56909)  Min:     Knee mobs/PROM     Tib/Fem Mobs     Patella Mobs     Ankle mobs               NMR re-education (14333)  Min:  CUES NEEDED             Therapeutic Activity (55130)  Min:               Modalities  Min:     Ed on ice 2'                     Other Therapeutic Activities: Pt was educated on PT POC, Diagnosis, Prognosis, pathomechanics as well as frequency and duration of scheduling future physical therapy appointments. Time was also taken on this day to answer all patient questions and participation in PT. Reviewed appointment policy in detail with patient and patient verbalized understanding. Home Exercise Program: Patient was instructed in the following for HEP:       HEP instruction:   Access Code: Sumit Lugo: [email or web address]/  Date: 09/02/2021  Prepared by: Mj Chanel     Exercises  Seated Table Hamstring Stretch - 2 x daily - 7 x weekly - 3 sets - 30 hold  Long Sitting Calf Stretch with Strap - 2 x daily - 7 x weekly - 3 sets - 30 hold  Supine Active Straight Leg Raise - 2 x daily - 7 x weekly - 1-2 sets - 10 reps  Supine Bridge - 2 x daily - 7 x weekly - 2 sets - 10 reps - 3-5 hold  Heel rises with counter support - 2 x daily - 7 x weekly - 2 sets - 10 reps  Standing Hip Abduction with Counter Support - 2 x daily - 7 x weekly - 1-2 sets - 10 reps   Patient verbalized/demonstrated understanding and was issued written handout.       Therapeutic Exercise and NMR EXR  [x] (57517) Provided verbal/tactile cueing for activities related to strengthening, flexibility, endurance, ROM for improvements in LE, proximal hip, and core control with self care, mobility, lifting, ambulation.  [] (93722) Provided verbal/tactile cueing for activities related to improving balance, coordination, kinesthetic sense, posture, motor skill, proprioception  to assist with LE, proximal hip, and core control in self care, mobility, lifting, ambulation and eccentric single leg control. NMR and Therapeutic Activities:    [] (10139 or 59376) Provided verbal/tactile cueing for activities related to improving balance, coordination, kinesthetic sense, posture, motor skill, proprioception and motor activation to allow for proper function of core, proximal hip and LE with self care and ADLs and functional mobility.   [] (42885) Gait Re-education- Provided training and instruction to the patient for proper LE, core and proximal hip recruitment and positioning and eccentric body weight control with ambulation re-education including up and down stairs     Home Exercise Program:    [x] (16437) Reviewed/Progressed HEP activities related to strengthening, flexibility, endurance, ROM of core, proximal hip and LE for functional self-care, mobility, lifting and ambulation/stair navigation   [] (72871)Reviewed/Progressed HEP activities related to improving balance, coordination, kinesthetic sense, posture, motor skill, proprioception of core, proximal hip and LE for self care, mobility, lifting, and ambulation/stair navigation      Manual Treatments:  PROM / STM / Oscillations-Mobs:  G-I, II, III, IV (PA's, Inf., Post.)  [] (24057) Provided manual therapy to mobilize LE, proximal hip and/or LS spine soft tissue/joints for the purpose of modulating pain, promoting relaxation,  increasing ROM, reducing/eliminating soft tissue swelling/inflammation/restriction, improving soft tissue extensibility and allowing for proper ROM for normal function with self care, mobility, lifting and ambulation.      Charges:  Timed Code Treatment Minutes: 23   Total Treatment Minutes: 45      [x] EVAL (LOW) 50040 (typically 20 minutes face-to-face)  [] EVAL (MOD) 20476 (typically 30 minutes face-to-face)  [] EVAL (HIGH) 72133 (typically 45 minutes face-to-face)  [] RE-EVAL     [x] FK(91674) x  2   [] Dry needle 1 or 2 Muscles (92488)  [] NMR (88381) x     [] Dry needle 3+ Muscles (27880)  [] Manual (75398) x     [] Ultrasound (99611) x  [] TA (91329) x     [] Mech Traction (37137)  [] ES(attended) (55386)     [] ES (un) (28846):   [] Vasopump (74269) [] Ionto (45323)   [] Other:         GOALS:  Patient stated goal: \"Walking normally\"  []? Progressing: []? Met: []? Not Met: []? Adjusted     Therapist goals for Patient:   Short Term Goals: To be achieved in: 2 weeks  1. Independent in HEP and progression per patient tolerance, in order to prevent re-injury. []? Progressing: []? Met: []? Not Met: []? Adjusted  2. Patient will have a decrease in pain to facilitate improvement in movement, function, and ADLs as indicated by Functional Deficits. []? Progressing: []? Met: []? Not Met: []? Adjusted     Long Term Goals: To be achieved in: 6 weeks  1. Disability index score of 22% or less for the LEFS to assist with reaching prior level of function. []? Progressing: []? Met: []? Not Met: []? Adjusted  2. Patient will demonstrate increased AROM to 125 deg flexion bilateral knee flexion to allow for proper joint functioning as indicated by patients Functional Deficits. []? Progressing: []? Met: []? Not Met: []? Adjusted  3. Patient will demonstrate an increase in Strength to 4+/5 hip flexion, knee flexion and knee extension in BLE to allow for proper functional mobility as indicated by patients Functional Deficits. []? Progressing: []? Met: []? Not Met: []? Adjusted  4. Patient will return to walking w/o AD for at least 200 ft without increased symptoms or restriction. []? Progressing: []? Met: []? Not Met: []? Adjusted  5. Patient will return to recreational biking and walking for exercise without increased symptoms or restriction (patient specific functional goal)    []? Progressing: []? Met: []? Not Met: []?  Adjusted         ASSESSMENT:  See eval    Treatment/Activity Tolerance:  [x] Patient tolerated treatment well [] Patient limited

## 2021-09-09 NOTE — FLOWSHEET NOTE
[facility] - Outpatient Rehabilitation & Therapy  [address]. [place]  Phone: [phone]   Fax:     [phone]      Physical Therapy Treatment Note/ Progress Report:     Date:  2021    Patient Name:  Janey Tong    :  1935  MRN: [id number]    Pertinent Medical History:Additional Pertinent Hx: Hx L intertrochanteric fx w/ ORIF , hx L TKR ~,    Medical/Treatment Diagnosis Information:  · Diagnosis: Pain in bilateral knees (M25.561, M25.562)  · Treatment Diagnosis: Bilateral knee pain (M25.561 and M25.562), Weakness (A92.5)    Insurance/Certification information:  PT Insurance Information: [facility]  Physician Information:  Referring Practitioner: BELL Bill  Plan of care signed (Y/N):     Date of Patient follow up with Physician:      Progress Report: []  Yes  [x]  No     Date Range for reporting period:  Beginnin2021  Ending:      Progress report due (10 Rx/or 30 days whichever is less): 83/3/06    Recertification due (POC duration/ or 90 days whichever is less): 10/14/21    Visit # POC/Insurance Allowable Auth Needed   2 BMN []Yes   [x]No     Latex Allergy:  [x]NO      []YES  Preferred Language for Healthcare:   [x]English       []Other:    Functional Scale:      Date assessed: at eval  Test: LEFS  Score:45/80 (44%)    Pain level:  6-7/10     History of Injury: Worsening bilateral knee pain and lower leg weakness in last month. Has been using cane for ambulation for 6 months. SUBJECTIVE:  See eval  21: Pt reports that his knees and right leg are worse since doing the exercises. OBJECTIVE:   Observation:    Test measurements:      RESTRICTIONS/PRECAUTIONS: hx L hip surgery , L TKR     Exercises/Interventions:    All therex performed bilaterally  Therapeutic Ex (92055)   Min: Reps/Resistance Notes/CUES   HS stretch  30\" x 3    Calf stretch strap 30\" x 3    SLR flex 10x ea Cues for quad activation   Bridge 3\" x 2 x 10    DHR 2 x 10    Standing hip ABD 10x ea     Nu Step 5 min, WL 1 Added 9/9   TKE 20X each Added 9/9   Pt/wife ed 5' Ice, HEP, POC, bike only 5 min for now at home prior to therex             Manual Intervention (00571)  Min:     Knee mobs/PROM     Tib/Fem Mobs     Patella Mobs     Ankle mobs               NMR re-education (29991)  Min:  CUES NEEDED    SLS 5 sec x 10 each     Sit to stand 2 x 6  3 x with arms and 3 x without pushing on arms on chair    Wobble board - 2 min    Therapeutic Activity (92080)  Min:               Modalities  Min:     Ed on ice                      Other Therapeutic Activities: Pt was educated on PT POC, Diagnosis, Prognosis, pathomechanics as well as frequency and duration of scheduling future physical therapy appointments. Time was also taken on this day to answer all patient questions and participation in PT. Reviewed appointment policy in detail with patient and patient verbalized understanding. Home Exercise Program: Patient was instructed in the following for HEP:       HEP instruction:   Access Code: Kirstin Kim: [email or web address]  Date: 09/02/2021  Prepared by: Chasidy Bench     Exercises  Seated Table Hamstring Stretch - 2 x daily - 7 x weekly - 3 sets - 30 hold  Long Sitting Calf Stretch with Strap - 2 x daily - 7 x weekly - 3 sets - 30 hold  Supine Active Straight Leg Raise - 2 x daily - 7 x weekly - 1-2 sets - 10 reps  Supine Bridge - 2 x daily - 7 x weekly - 2 sets - 10 reps - 3-5 hold  Heel rises with counter support - 2 x daily - 7 x weekly - 2 sets - 10 reps  Standing Hip Abduction with Counter Support - 2 x daily - 7 x weekly - 1-2 sets - 10 reps   Patient verbalized/demonstrated understanding and was issued written handout.       Therapeutic Exercise and NMR EXR  [x] (57497) Provided verbal/tactile cueing for activities related to strengthening, flexibility, endurance, ROM for improvements in LE, proximal hip, and core control with self care, mobility, lifting, ambulation.  [] (92256) Provided verbal/tactile cueing for activities related to improving balance, coordination, kinesthetic sense, posture, motor skill, proprioception  to assist with LE, proximal hip, and core control in self care, mobility, lifting, ambulation and eccentric single leg control. NMR and Therapeutic Activities:    [] (11954 or 76703) Provided verbal/tactile cueing for activities related to improving balance, coordination, kinesthetic sense, posture, motor skill, proprioception and motor activation to allow for proper function of core, proximal hip and LE with self care and ADLs and functional mobility.   [] (92132) Gait Re-education- Provided training and instruction to the patient for proper LE, core and proximal hip recruitment and positioning and eccentric body weight control with ambulation re-education including up and down stairs     Home Exercise Program:    [x] (39932) Reviewed/Progressed HEP activities related to strengthening, flexibility, endurance, ROM of core, proximal hip and LE for functional self-care, mobility, lifting and ambulation/stair navigation   [] (98993)Reviewed/Progressed HEP activities related to improving balance, coordination, kinesthetic sense, posture, motor skill, proprioception of core, proximal hip and LE for self care, mobility, lifting, and ambulation/stair navigation      Manual Treatments:  PROM / STM / Oscillations-Mobs:  G-I, II, III, IV (PA's, Inf., Post.)  [] (81521) Provided manual therapy to mobilize LE, proximal hip and/or LS spine soft tissue/joints for the purpose of modulating pain, promoting relaxation,  increasing ROM, reducing/eliminating soft tissue swelling/inflammation/restriction, improving soft tissue extensibility and allowing for proper ROM for normal function with self care, mobility, lifting and ambulation.      Charges:  Timed Code Treatment Minutes: 50   Total Treatment Minutes: 54      [] EVAL (LOW) 53769 (typically 20 minutes face-to-face)  [] EVAL (MOD) 14588 (typically 30 minutes face-to-face)  [] EVAL (HIGH) 82211 (typically 45 minutes face-to-face)  [] RE-EVAL     [x] KJ(74125) x  2   [] Dry needle 1 or 2 Muscles (17570)  [x] NMR (89429) x 1    [] Dry needle 3+ Muscles (11449)  [] Manual (59219) x     [] Ultrasound (20985) x  [] TA (00322) x     [] Mech Traction (88458)  [] ES(attended) (83118)     [] ES (un) (17674):   [] Vasopump (01646) [] Ionto (47494)   [] Other:         GOALS:  Patient stated goal: \"Walking normally\"  []? Progressing: []? Met: []? Not Met: []? Adjusted     Therapist goals for Patient:   Short Term Goals: To be achieved in: 2 weeks  1. Independent in HEP and progression per patient tolerance, in order to prevent re-injury. []? Progressing: []? Met: []? Not Met: []? Adjusted  2. Patient will have a decrease in pain to facilitate improvement in movement, function, and ADLs as indicated by Functional Deficits. []? Progressing: []? Met: []? Not Met: []? Adjusted     Long Term Goals: To be achieved in: 6 weeks  1. Disability index score of 22% or less for the LEFS to assist with reaching prior level of function. []? Progressing: []? Met: []? Not Met: []? Adjusted  2. Patient will demonstrate increased AROM to 125 deg flexion bilateral knee flexion to allow for proper joint functioning as indicated by patients Functional Deficits. []? Progressing: []? Met: []? Not Met: []? Adjusted  3. Patient will demonstrate an increase in Strength to 4+/5 hip flexion, knee flexion and knee extension in BLE to allow for proper functional mobility as indicated by patients Functional Deficits. []? Progressing: []? Met: []? Not Met: []? Adjusted  4. Patient will return to walking w/o AD for at least 200 ft without increased symptoms or restriction. []? Progressing: []? Met: []? Not Met: []? Adjusted  5.  Patient will return to recreational biking and walking for exercise without increased symptoms or restriction (patient specific functional goal)    []? Progressing: []? Met: []? Not Met: []? Adjusted         ASSESSMENT:  Pt using SPC for ambulation and states that he has had this LE  pain just recently - about the last month or so. Treatment/Activity Tolerance:  [x] Patient tolerated treatment well [] Patient limited by fatique  [] Patient limited by pain  [] Patient limited by other medical complications  [] Other:     Overall Progression Towards Functional goals/ Treatment Progress Update:  [] Patient is progressing as expected towards functional goals listed. [] Progression is slowed due to complexities/Impairments listed. [] Progression has been slowed due to co-morbidities. [x] Plan just implemented, too soon to assess goals progression <30days   [] Goals require adjustment due to lack of progress  [] Patient is not progressing as expected and requires additional follow up with physician  [] Other    Prognosis for POC: [x] Good [] Fair  [] Poor    Patient requires continued skilled intervention: [x] Yes  [] No        PLAN:   [x] Continue per plan of care [] Alter current plan (see comments)  [] Plan of care initiated [] Hold pending MD visit [] Discharge    Electronically signed by: Claudine Varela, PT,    Note: If patient does not return for scheduled/recommended follow up visits, this note will serve as a discharge from care along with the most recent update on progress.

## 2021-09-16 NOTE — PROGRESS NOTES
CHRONIC CONDITION NOTE   Subjective:   HPI CHRONIC:   Chief Complaint   Patient presents with    Diabetes     f/u      Patient here for follow-up of multiple chronic conditions includin. Primary osteoarthritis of both knees    2. Needs flu shot      Complaints: had leg swelling that improved off amlodipine, ECHO and doppler legs OK    Pain in both legs x 6 weeks, sharp, worse when first get up. Goes from mid calf to mid thigh. Saw ortho. Denies awakening from sleep. Pain makes it feel weak. Injection resolved pain for 2 weeks. Starting PT. Taking tylenol 4 h for pain. Health Maintenance Due   Topic Date Due    DTaP/Tdap/Td vaccine (1 - Tdap) Never done    Colon cancer screen colonoscopy  2020    Flu vaccine (1) 2021     CHART REVIEW   reports that he has never smoked. He has never used smokeless tobacco.  Health Maintenance Due   Topic Date Due    DTaP/Tdap/Td vaccine (1 - Tdap) Never done    Colon cancer screen colonoscopy  2020    Flu vaccine (1) 2021     Current Outpatient Medications   Medication Instructions    amLODIPine (NORVASC) 10 MG tablet Take 1 tablet by mouth once daily    aspirin EC 81 mg, Oral, 2 TIMES DAILY, Take for DVT blood clot prophylaxis. Please avoid missing doses.     Blood Glucose Monitoring Suppl (ONE TOUCH ULTRA 2) w/Device KIT 1 kit, Does not apply, DAILY    blood glucose test strips (ONETOUCH ULTRA) strip USE DAILY OR AS NEEDED    Calcium Carbonate Antacid (TUMS E-X PO) 2 tablets, Oral, PRN    diclofenac sodium (VOLTAREN) 2 g, Topical, 2 TIMES DAILY    finasteride (PROSCAR) 5 MG tablet Take 1 tablet by mouth once daily    Insulin Pen Needle (PEN NEEDLES 3/16\") 31G X 5 MM MISC 1 each, Does not apply, DAILY    Lancet Devices MISC 1 x daily for the Lifescan lancets    Lantus SoloStar 16 Units, SubCUTAneous, NIGHTLY    LIFESCAN UNISTIK II LANCETS MISC 1 each, Does not apply, DAILY    lisinopril (PRINIVIL;ZESTRIL) 40 mg, Oral, DAILY  metFORMIN (GLUCOPHAGE-XR) 500 MG extended release tablet TAKE 2 TABLETS BY MOUTH IN THE MORNING AND TAKE  1 TABLET  IN THE EVENING    metoprolol tartrate (LOPRESSOR) 50 MG tablet Take 1 tablet by mouth twice daily    propafenone (RYTHMOL) 150 mg, Oral, 2 TIMES DAILY    simvastatin (ZOCOR) 20 MG tablet Take 1 tablet by mouth nightly    traMADol (ULTRAM) 50 mg, Oral, EVERY 8 HOURS PRN, Intended supply: 7 days. Take lowest dose possible to manage pain     LAST LABS  LDL Calculated   Date Value Ref Range Status   11/04/2020 71 <100 mg/dL Final     Lab Results   Component Value Date    HDL 61 (H) 11/04/2020     Lab Results   Component Value Date    TRIG 139 11/04/2020     Lab Results   Component Value Date     08/23/2021    K 4.7 08/23/2021    CREATININE 1.4 (H) 08/23/2021     Lab Results   Component Value Date    WBC 8.3 08/23/2021    HGB 15.1 08/23/2021     08/23/2021     Lab Results   Component Value Date    ALT 11 08/23/2021    AST 15 08/23/2021    ALKPHOS 91 08/23/2021    BILITOT 0.7 08/23/2021     TSH (uIU/mL)   Date Value   11/04/2020 1.24     No components found for: GLU  Lab Results   Component Value Date    LABA1C 7.2 08/20/2021    LABA1C 6.0 01/18/2021    LABA1C 5.8 11/04/2020     Objective:   PHYSICAL EXAM   BP (!) 145/82 (Site: Right Upper Arm, Position: Sitting, Cuff Size: Medium Adult)   Pulse 62   Ht 5' 7\" (1.702 m)   Wt 164 lb 3.2 oz (74.5 kg)   SpO2 97%   BMI 25.72 kg/m²   BP Readings from Last 5 Encounters:   09/16/21 (!) 145/82   08/20/21 (!) 144/70   03/24/21 128/80   01/18/21 114/74   10/21/20 122/80     Wt Readings from Last 5 Encounters:   09/16/21 164 lb 3.2 oz (74.5 kg)   08/20/21 164 lb (74.4 kg)   08/04/21 171 lb (77.6 kg)   03/24/21 171 lb (77.6 kg)   01/18/21 171 lb (77.6 kg)      GENERAL:   · well-developed, well-nourished, alert, no distress. LUNGS:    · Breathing unlabored  SKIN: warm and dry     Assessment and Plan:      Diagnosis Orders   1.  Primary osteoarthritis of both knees  traMADol (ULTRAM) 50 MG tablet   2. Needs flu shot  INFLUENZA, QUADV, ADJUVANTED, 65 YRS =, IM, PF, PREFILL SYR, 0.5ML (FLUAD)      Continue current Tx plan. Any changes marked below. INSTRUCTIONS  NEXT APPOINTMENT: After 11/20 for diabetes  · Go to PT as planned. The will help with walking ability. · Get tylenol 8 hour arthritis- take 2 tabs three times per day. · Take tramadol in AM and then again in 6-8 hours if needed. · Get OTC lidocaine patches to put on knees in AM and remove at bedtime. · Consider rolling walker with seat.

## 2021-09-16 NOTE — FLOWSHEET NOTE
[facility] - Outpatient Rehabilitation & Therapy  [address]. [place]  Phone: [phone]   Fax:     [phone]      Physical Therapy Treatment Note/ Progress Report:     Date:  2021    Patient Name:  Eric Hernandez    :  1935  MRN: [id number]    Pertinent Medical History:Additional Pertinent Hx: Hx L intertrochanteric fx w/ ORIF , hx L TKR ~,    Medical/Treatment Diagnosis Information:  · Diagnosis: Pain in bilateral knees (M25.561, M25.562)  · Treatment Diagnosis: Bilateral knee pain (M25.561 and M25.562), Weakness (R30.9)    Insurance/Certification information:  PT Insurance Information: [facility]  Physician Information:  Referring Practitioner: BELL Norris  Plan of care signed (Y/N):     Date of Patient follow up with Physician:      Progress Report: []  Yes  [x]  No     Date Range for reporting period:  Beginnin2021  Ending:      Progress report due (10 Rx/or 30 days whichever is less): 52    Recertification due (POC duration/ or 90 days whichever is less): 10/14/21    Visit # POC/Insurance Allowable Auth Needed   3 BMN []Yes   [x]No     Latex Allergy:  [x]NO      []YES  Preferred Language for Healthcare:   [x]English       []Other:    Functional Scale:      Date assessed: at eval  Test: LEFS  Score:45/80 (44%)    Pain level:  6-7/10     History of Injury: Worsening bilateral knee pain and lower leg weakness in last month. Has been using cane for ambulation for 6 months. SUBJECTIVE:  See eval  21: Pt reports that his knees and right leg are worse since doing the exercises. 21: \"A little bit better. \" Patient is essentially the same regarding pain so far.     OBJECTIVE: 21   Observation: Gait with single point cane   Test measurements:  Right knee: 0-135   Left knee: 0-131    RESTRICTIONS/PRECAUTIONS: hx L hip surgery , L TKR     Exercises/Interventions: All therex performed bilaterally  Therapeutic Ex (10184)   Min: Reps/Resistance Notes/CUES   HS stretch  30\" x 3    Calf stretch strap 30\" x 3    SLR flex 10x ea Cues for quad activation    3\" x 2 x 10    DHR 2 x 10     10x ea     Nu Step 5 min, WL 1 Added 9/9   TKE 20X each Added 9/9    5' Ice, HEP, POC, bike only 5 min for now at home prior to therex             Manual Intervention (38970)  Min: 12     Knee mobs/PROM 3' to each knee    Tib/Fem Mobs     Patella Mobs 3' to each knee    Ankle mobs                    NMR re-education (90197)  Min:  CUES NEEDED    SLS 5 sec x 10 each on airex     DD balance 2 min     Step taps 20X     Sit to stand 5X  3 x with arms and 2 x without pushing on arms on chair    Wobble board - 2 min    Therapeutic Activity (63480)  Min:               Modalities  Min:     Ed on ice                      Other Therapeutic Activities: Pt was educated on PT POC, Diagnosis, Prognosis, pathomechanics as well as frequency and duration of scheduling future physical therapy appointments. Time was also taken on this day to answer all patient questions and participation in PT. Reviewed appointment policy in detail with patient and patient verbalized understanding. Home Exercise Program: Patient was instructed in the following for HEP:       HEP instruction:   Access Code: Toy Buzzard: [email or web address] [email or web address]/  Date: 09/02/2021  Prepared by: Maggie Mckinney     Exercises  Seated Table Hamstring Stretch - 2 x daily - 7 x weekly - 3 sets - 30 hold  Long Sitting Calf Stretch with Strap - 2 x daily - 7 x weekly - 3 sets - 30 hold  Supine Active Straight Leg Raise - 2 x daily - 7 x weekly - 1-2 sets - 10 reps  Supine Bridge - 2 x daily - 7 x weekly - 2 sets - 10 reps - 3-5 hold  Heel rises with counter support - 2 x daily - 7 x weekly - 2 sets - 10 reps  Standing Hip Abduction with Counter Support - 2 x daily - 7 x weekly - 1-2 sets - 10 reps   Patient verbalized/demonstrated understanding and was issued written handout. Therapeutic Exercise and NMR EXR  [x] (13197) Provided verbal/tactile cueing for activities related to strengthening, flexibility, endurance, ROM for improvements in LE, proximal hip, and core control with self care, mobility, lifting, ambulation.  [] (57016) Provided verbal/tactile cueing for activities related to improving balance, coordination, kinesthetic sense, posture, motor skill, proprioception  to assist with LE, proximal hip, and core control in self care, mobility, lifting, ambulation and eccentric single leg control.      NMR and Therapeutic Activities:    [] (74917 or 48662) Provided verbal/tactile cueing for activities related to improving balance, coordination, kinesthetic sense, posture, motor skill, proprioception and motor activation to allow for proper function of core, proximal hip and LE with self care and ADLs and functional mobility.   [] (71746) Gait Re-education- Provided training and instruction to the patient for proper LE, core and proximal hip recruitment and positioning and eccentric body weight control with ambulation re-education including up and down stairs     Home Exercise Program:    [x] (71344) Reviewed/Progressed HEP activities related to strengthening, flexibility, endurance, ROM of core, proximal hip and LE for functional self-care, mobility, lifting and ambulation/stair navigation   [] (03053)Reviewed/Progressed HEP activities related to improving balance, coordination, kinesthetic sense, posture, motor skill, proprioception of core, proximal hip and LE for self care, mobility, lifting, and ambulation/stair navigation      Manual Treatments:  PROM / STM / Oscillations-Mobs:  G-I, II, III, IV (PA's, Inf., Post.)  [] (77168) Provided manual therapy to mobilize LE, proximal hip and/or LS spine soft tissue/joints for the purpose of modulating pain, promoting relaxation,  increasing ROM, reducing/eliminating soft tissue swelling/inflammation/restriction, improving soft tissue extensibility and allowing for proper ROM for normal function with self care, mobility, lifting and ambulation. Charges:  Timed Code Treatment Minutes: 54   Total Treatment Minutes: 56      [] EVAL (LOW) 13629 (typically 20 minutes face-to-face)  [] EVAL (MOD) 09189 (typically 30 minutes face-to-face)  [] EVAL (HIGH) 63458 (typically 45 minutes face-to-face)  [] RE-EVAL     [x] PL(26463) x  2   [] Dry needle 1 or 2 Muscles (91078)  [x] NMR (15537) x 1    [] Dry needle 3+ Muscles (41543)  [x] Manual (48429) x  1   [] Ultrasound (04049) x  [] TA (51750) x     [] Mech Traction (94689)  [] ES(attended) (68218)     [] ES (un) (75473):   [] Vasopump (12770) [] Ionto (22729)   [] Other:         GOALS:  Patient stated goal: \"Walking normally\"  []? Progressing: []? Met: []? Not Met: []? Adjusted     Therapist goals for Patient:   Short Term Goals: To be achieved in: 2 weeks  1. Independent in HEP and progression per patient tolerance, in order to prevent re-injury. []? Progressing: []? Met: []? Not Met: []? Adjusted  2. Patient will have a decrease in pain to facilitate improvement in movement, function, and ADLs as indicated by Functional Deficits. []? Progressing: []? Met: []? Not Met: []? Adjusted     Long Term Goals: To be achieved in: 6 weeks  1. Disability index score of 22% or less for the LEFS to assist with reaching prior level of function. []? Progressing: []? Met: []? Not Met: []? Adjusted  2. Patient will demonstrate increased AROM to 125 deg flexion bilateral knee flexion to allow for proper joint functioning as indicated by patients Functional Deficits. []? Progressing: []? Met: []? Not Met: []? Adjusted  3. Patient will demonstrate an increase in Strength to 4+/5 hip flexion, knee flexion and knee extension in BLE to allow for proper functional mobility as indicated by patients Functional Deficits. []? Progressing: []? Met: []?  Not Met: []? Adjusted  4. Patient will return to walking w/o AD for at least 200 ft without increased symptoms or restriction. []? Progressing: []? Met: []? Not Met: []? Adjusted  5. Patient will return to recreational biking and walking for exercise without increased symptoms or restriction (patient specific functional goal)    []? Progressing: []? Met: []? Not Met: []? Adjusted         ASSESSMENT:  Pt using SPC for ambulation and states that he has had this LE  pain just recently - about the last month or so. Treatment/Activity Tolerance:  [x] Patient tolerated treatment well [] Patient limited by fatique  [] Patient limited by pain  [] Patient limited by other medical complications  [] Other:     Overall Progression Towards Functional goals/ Treatment Progress Update:  [] Patient is progressing as expected towards functional goals listed. [] Progression is slowed due to complexities/Impairments listed. [] Progression has been slowed due to co-morbidities. [x] Plan just implemented, too soon to assess goals progression <30days   [] Goals require adjustment due to lack of progress  [] Patient is not progressing as expected and requires additional follow up with physician  [] Other    Prognosis for POC: [x] Good [] Fair  [] Poor    Patient requires continued skilled intervention: [x] Yes  [] No        PLAN: Continue to work on strengthening and balance. [x] Continue per plan of care [] Alter current plan (see comments)  [] Plan of care initiated [] Hold pending MD visit [] Discharge    Electronically signed by: Juan Francisco Wong PT,    Note: If patient does not return for scheduled/recommended follow up visits, this note will serve as a discharge from care along with the most recent update on progress.

## 2021-09-23 NOTE — FLOWSHEET NOTE
[facility] - Outpatient Rehabilitation & Therapy  [address]. [place]  Phone: [phone]   Fax:     [phone]      Physical Therapy Treatment Note/ Progress Report:     Date:  2021    Patient Name:  Zac Freeman    :  1935  MRN: [id number]    Pertinent Medical History:Additional Pertinent Hx: Hx L intertrochanteric fx w/ ORIF , hx L TKR ~,    Medical/Treatment Diagnosis Information:  · Diagnosis: Pain in bilateral knees (M25.561, M25.562)  · Treatment Diagnosis: Bilateral knee pain (M25.561 and M25.562), Weakness (H04.0)    Insurance/Certification information:  PT Insurance Information: [facility]  Physician Information:  Referring Practitioner: BELL Aguillon  Plan of care signed (Y/N):     Date of Patient follow up with Physician:      Progress Report: []  Yes  [x]  No     Date Range for reporting period:  Beginnin2021  Ending:      Progress report due (10 Rx/or 30 days whichever is less):     Recertification due (POC duration/ or 90 days whichever is less): 10/14/21    Visit # POC/Insurance Allowable Auth Needed   4 BMN []Yes   [x]No     Latex Allergy:  [x]NO      []YES  Preferred Language for Healthcare:   [x]English       []Other:    Functional Scale:      Date assessed: at eval  Test: LEFS  Score:45/80 (44%)    Pain level:  6-7/10     History of Injury: Worsening bilateral knee pain and lower leg weakness in last month. Has been using cane for ambulation for 6 months. SUBJECTIVE:  See eval  21: Pt reports that his knees and right leg are worse since doing the exercises. 21: \"A little bit better.  \" Patient is essentially the same regarding pain so far.  21  Pt states, \"     OBJECTIVE: 21   Observation: Gait with single point cane   Test measurements:  Right knee: 0-135   Left knee: 0-131    RESTRICTIONS/PRECAUTIONS: hx L hip surgery , L TKR 2016    Exercises/Interventions: All therex performed bilaterally  Therapeutic Ex (71764)   Min: Reps/Resistance Notes/CUES   HS stretch  30\" x 3    Calf stretch strap 30\" x 3    SLR flex 10x ea Cues for quad activation    3\" x 2 x 10    DHR 2 x 10     10x ea     Nu Step 5 min, WL 1 Added 9/9   TKE 20X each Added 9/9    5' Ice, HEP, POC, bike only 5 min for now at home prior to therex   Piriformis stretch 60 x 2    Prone knee flex 60 x 2, 4# x 20 ea    crossover 60 x 1    incline 60 x 2              Manual Intervention (81408)  Min: 27 Mfr to bilateral quads, add, itb, hams,, gastroc, peroneals hip prom,     Knee mobs/PROM 3' to each knee    Tib/Fem Mobs     Patella Mobs 3' to each knee    Ankle mobs                    NMR re-education (21979)  Min:  CUES NEEDED                3 x with arms and 2 x without pushing on arms on chair       Therapeutic Activity (81107)  Min:               Modalities  Min:     Ed on ice     kineseVideoflot runners 9/23/21  both                Other Therapeutic Activities: Pt was educated on PT POC, Diagnosis, Prognosis, pathomechanics as well as frequency and duration of scheduling future physical therapy appointments. Time was also taken on this day to answer all patient questions and participation in PT. Reviewed appointment policy in detail with patient and patient verbalized understanding. Home Exercise Program: Patient was instructed in the following for HEP:       HEP instruction:   Access Code: Nusrat Lomeli: [email or web address]  Date: 09/02/2021  Prepared by: West Tobar     Exercises  Seated Table Hamstring Stretch - 2 x daily - 7 x weekly - 3 sets - 30 hold  Long Sitting Calf Stretch with Strap - 2 x daily - 7 x weekly - 3 sets - 30 hold  Supine Active Straight Leg Raise - 2 x daily - 7 x weekly - 1-2 sets - 10 reps  Supine Bridge - 2 x daily - 7 x weekly - 2 sets - 10 reps - 3-5 hold  Heel rises with counter support - 2 x daily - 7 x weekly - 2 sets - 10 reps  Standing Hip Abduction with Counter Support - 2 x daily - 7 x weekly - 1-2 sets - 10 reps   Patient verbalized/demonstrated understanding and was issued written handout. Therapeutic Exercise and NMR EXR  [x] (09307) Provided verbal/tactile cueing for activities related to strengthening, flexibility, endurance, ROM for improvements in LE, proximal hip, and core control with self care, mobility, lifting, ambulation.  [] (92693) Provided verbal/tactile cueing for activities related to improving balance, coordination, kinesthetic sense, posture, motor skill, proprioception  to assist with LE, proximal hip, and core control in self care, mobility, lifting, ambulation and eccentric single leg control.      NMR and Therapeutic Activities:    [] (19565 or 83445) Provided verbal/tactile cueing for activities related to improving balance, coordination, kinesthetic sense, posture, motor skill, proprioception and motor activation to allow for proper function of core, proximal hip and LE with self care and ADLs and functional mobility.   [] (57883) Gait Re-education- Provided training and instruction to the patient for proper LE, core and proximal hip recruitment and positioning and eccentric body weight control with ambulation re-education including up and down stairs     Home Exercise Program:    [x] (67939) Reviewed/Progressed HEP activities related to strengthening, flexibility, endurance, ROM of core, proximal hip and LE for functional self-care, mobility, lifting and ambulation/stair navigation   [] (38835)Reviewed/Progressed HEP activities related to improving balance, coordination, kinesthetic sense, posture, motor skill, proprioception of core, proximal hip and LE for self care, mobility, lifting, and ambulation/stair navigation      Manual Treatments:  PROM / STM / Oscillations-Mobs:  G-I, II, III, IV (PA's, Inf., Post.)  [] (74115) Provided manual therapy to mobilize LE, proximal hip and/or LS spine soft proper functional mobility as indicated by patients Functional Deficits. []? Progressing: []? Met: []? Not Met: []? Adjusted  4. Patient will return to walking w/o AD for at least 200 ft without increased symptoms or restriction. []? Progressing: []? Met: []? Not Met: []? Adjusted  5. Patient will return to recreational biking and walking for exercise without increased symptoms or restriction (patient specific functional goal)    []? Progressing: []? Met: []? Not Met: []? Adjusted         ASSESSMENT:  Pt using SPC for ambulation and states that he has had this LE  pain just recently - about the last month or so. Treatment/Activity Tolerance:  [x] Patient tolerated treatment well [] Patient limited by fatique  [] Patient limited by pain  [] Patient limited by other medical complications  [] Other:     Overall Progression Towards Functional goals/ Treatment Progress Update:  [] Patient is progressing as expected towards functional goals listed. [] Progression is slowed due to complexities/Impairments listed. [] Progression has been slowed due to co-morbidities. [x] Plan just implemented, too soon to assess goals progression <30days   [] Goals require adjustment due to lack of progress  [] Patient is not progressing as expected and requires additional follow up with physician  [] Other    Prognosis for POC: [x] Good [] Fair  [] Poor    Patient requires continued skilled intervention: [x] Yes  [] No        PLAN: Continue to work on strengthening and balance. [x] Continue per plan of care [] Alter current plan (see comments)  [] Plan of care initiated [] Hold pending MD visit [] Discharge    Electronically signed by: Lynda Lopez PT,    Note: If patient does not return for scheduled/recommended follow up visits, this note will serve as a discharge from care along with the most recent update on progress.

## 2021-09-30 NOTE — FLOWSHEET NOTE
[facility]  Outpatient Rehabilitation & Therapy  [address] [place] [address]  Phone: [phone]   Fax:     [phone]      Physical Therapy Treatment Note/ Progress Report:     Date:  2021    Patient Name:  Winter Still    :  1935  MRN: [id number]    Pertinent Medical History:Additional Pertinent Hx: Hx L intertrochanteric fx w/ ORIF , hx L TKR ~,    Medical/Treatment Diagnosis Information:  · Diagnosis: Pain in bilateral knees (M25.561, M25.562)  · Treatment Diagnosis: Bilateral knee pain (M25.561 and M25.562), Weakness (Z25.8)    Insurance/Certification information:  PT Insurance Information: [facility]  Physician Information:  Referring Practitioner: BELL Galan  Plan of care signed (Y/N):     Date of Patient follow up with Physician:      Progress Report: []  Yes  [x]  No     Date Range for reporting period:  Beginnin2021  Ending:      Progress report due (10 Rx/or 30 days whichever is less): 42/3/69    Recertification due (POC duration/ or 90 days whichever is less): 10/14/21    Visit # POC/Insurance Allowable Auth Needed   5 BMN []Yes   [x]No     Latex Allergy:  [x]NO      []YES  Preferred Language for Healthcare:   [x]English       []Other:    Functional Scale:      Date assessed: at eval  Test: LEFS  Score:45/80 (44%)    Pain level:  6-7/10     History of Injury: Worsening bilateral knee pain and lower leg weakness in last month. Has been using cane for ambulation for 6 months. SUBJECTIVE:  See eval  21: Pt reports that his knees and right leg are worse since doing the exercises. 21: \"A little bit better. \" Patient is essentially the same regarding pain so far.  21  Pt states, \"   21: Pt states both knees are hurting him, left more than right.  States it hurts above the knee on both sides (same area)    OBJECTIVE: 21   Observation: Gait with single point cane   Test measurements:  Right knee: 0-135   Left knee: 0-131    RESTRICTIONS/PRECAUTIONS: hx L hip surgery 2019, L TKR 2016    Exercises/Interventions: All therex performed bilaterally  Therapeutic Ex (08942)   Min: Reps/Resistance Notes/CUES   HS stretch  30\" x 3 Seated      SLR flex 10x ea Cues for quad activation    3\" x 2 x 10    DHR 2 x 10     10 x ea     Nu Step 5 min, WL 1 Added 9/9   TKE 20 X 2 each Added 9/9    5' Ice, HEP, POC, bike only 5 min for now at home prior to therex   Piriformis stretch    Prone knee flex    crossover    incline    LAQ 2.5# 2 x 20 B         Manual Intervention (30516)  Min: 30     Knee mobs/PROM 3' to each knee    STM B quads    Patella Mobs 3' to each knee    Ankle mobs                    NMR re-education (19734)  Min:  CUES NEEDED                3 x with arms and 2 x without pushing on arms on chair       Therapeutic Activity (49896)  Min:               Modalities  Min:     Ed on ice     kineseo runners 9/23/21  both                Other Therapeutic Activities: Pt was educated on PT POC, Diagnosis, Prognosis, pathomechanics as well as frequency and duration of scheduling future physical therapy appointments. Time was also taken on this day to answer all patient questions and participation in PT. Reviewed appointment policy in detail with patient and patient verbalized understanding. Home Exercise Program: Patient was instructed in the following for HEP:       HEP instruction:   Access Code: An Fernandez: [email or web address]/  Date: 09/02/2021  Prepared by: Joni Ramos     Exercises  Seated Table Hamstring Stretch - 2 x daily - 7 x weekly - 3 sets - 30 hold  Long Sitting Calf Stretch with Strap - 2 x daily - 7 x weekly - 3 sets - 30 hold  Supine Active Straight Leg Raise - 2 x daily - 7 x weekly - 1-2 sets - 10 reps  Supine Bridge - 2 x daily - 7 x weekly - 2 sets - 10 reps - 3-5 hold  Heel rises with counter support - 2 x daily - 7 x weekly - 2 sets - 10 reps  Standing Hip Abduction with Counter Support - 2 x daily - 7 x weekly - 1-2 sets - 10 reps   Patient verbalized/demonstrated understanding and was issued written handout. Therapeutic Exercise and NMR EXR  [x] (33375) Provided verbal/tactile cueing for activities related to strengthening, flexibility, endurance, ROM for improvements in LE, proximal hip, and core control with self care, mobility, lifting, ambulation.  [] (51145) Provided verbal/tactile cueing for activities related to improving balance, coordination, kinesthetic sense, posture, motor skill, proprioception  to assist with LE, proximal hip, and core control in self care, mobility, lifting, ambulation and eccentric single leg control.      NMR and Therapeutic Activities:    [] (41152 or 53605) Provided verbal/tactile cueing for activities related to improving balance, coordination, kinesthetic sense, posture, motor skill, proprioception and motor activation to allow for proper function of core, proximal hip and LE with self care and ADLs and functional mobility.   [] (04655) Gait Re-education- Provided training and instruction to the patient for proper LE, core and proximal hip recruitment and positioning and eccentric body weight control with ambulation re-education including up and down stairs     Home Exercise Program:    [x] (08669) Reviewed/Progressed HEP activities related to strengthening, flexibility, endurance, ROM of core, proximal hip and LE for functional self-care, mobility, lifting and ambulation/stair navigation   [] (46243)Reviewed/Progressed HEP activities related to improving balance, coordination, kinesthetic sense, posture, motor skill, proprioception of core, proximal hip and LE for self care, mobility, lifting, and ambulation/stair navigation      Manual Treatments:  PROM / STM / Oscillations-Mobs:  G-I, II, III, IV (PA's, Inf., Post.)  [] (13397) Provided manual therapy to mobilize LE, proximal hip and/or LS spine soft tissue/joints for the purpose of modulating pain, promoting relaxation,  increasing ROM, reducing/eliminating soft tissue swelling/inflammation/restriction, improving soft tissue extensibility and allowing for proper ROM for normal function with self care, mobility, lifting and ambulation. Charges:  Timed Code Treatment Minutes: 45   Total Treatment Minutes: 45      [] EVAL (LOW) 39191 (typically 20 minutes face-to-face)  [] EVAL (MOD) 52550 (typically 30 minutes face-to-face)  [] EVAL (HIGH) 32498 (typically 45 minutes face-to-face)  [] RE-EVAL     [x] HO(30949) x  2   [] Dry needle 1 or 2 Muscles (44861)  [] NMR (55278) x  [] Dry needle 3+ Muscles (57138)  [x] Manual (40583) x    [] Ultrasound (70179) x  [] TA (51427) x     [] Mech Traction (35695)  [] ES(attended) (50216)     [] ES (un) (81920):   [] Vasopump (14954) [] Ionto (35615)   [] Other:         GOALS:  Patient stated goal: \"Walking normally\"  []? Progressing: []? Met: []? Not Met: []? Adjusted     Therapist goals for Patient:   Short Term Goals: To be achieved in: 2 weeks  1. Independent in HEP and progression per patient tolerance, in order to prevent re-injury. []? Progressing: []? Met: []? Not Met: []? Adjusted  2. Patient will have a decrease in pain to facilitate improvement in movement, function, and ADLs as indicated by Functional Deficits. []? Progressing: []? Met: []? Not Met: []? Adjusted     Long Term Goals: To be achieved in: 6 weeks  1. Disability index score of 22% or less for the LEFS to assist with reaching prior level of function. []? Progressing: []? Met: []? Not Met: []? Adjusted  2. Patient will demonstrate increased AROM to 125 deg flexion bilateral knee flexion to allow for proper joint functioning as indicated by patients Functional Deficits. []? Progressing: []? Met: []? Not Met: []? Adjusted  3.  Patient will demonstrate an increase in Strength to 4+/5 hip flexion, knee flexion and knee extension in BLE to allow for

## 2021-10-06 NOTE — PROGRESS NOTES
10/6/2021    Chief Complaint   Patient presents with    Fatigue     high glucose readings, more tired over the past week, forgetful    Pain     joint pain, knee pain       HPI    Glucose  160-190, 137  227   has been very tired for  3 days no nausea or vomiting  Coughing or shortness of breath. No myalgias    Has pain both pain. Lab Results   Component Value Date    LABA1C 7.2 08/20/2021    LABA1C 6.0 01/18/2021    LABA1C 5.8 11/04/2020     Last  Not wanting to exercise like usual    One of her sons wants him to see a specialist  Arthritis in knees is worse    Review of Systems   Constitutional: Negative for chills and fever. Respiratory: Positive for cough (chronic). Gastrointestinal: Negative for nausea and vomiting. Health Maintenance   Topic Date Due    DTaP/Tdap/Td vaccine (1 - Tdap) Never done    Colon cancer screen colonoscopy  12/17/2020    Annual Wellness Visit (AWV)  10/22/2021    Shingles Vaccine (1 of 2) 10/25/2030 (Originally 11/11/1985)    Lipid screen  11/04/2021    Diabetic retinal exam  11/09/2021    Diabetic foot exam  01/18/2022    A1C test (Diabetic or Prediabetic)  02/20/2022    Potassium monitoring  08/23/2022    Creatinine monitoring  08/23/2022    Flu vaccine  Completed    Pneumococcal 65+ years Vaccine  Completed    COVID-19 Vaccine  Completed    Hepatitis A vaccine  Aged Out    Hib vaccine  Aged Out    Meningococcal (ACWY) vaccine  Aged Out      Social History     Tobacco Use    Smoking status: Never Smoker    Smokeless tobacco: Never Used    Tobacco comment: congrats, advised not to start   Vaping Use    Vaping Use: Never used   Substance Use Topics    Alcohol use: Yes     Alcohol/week: 2.0 standard drinks     Types: 2 Standard drinks or equivalent per week     Comment: 1-2 beers/week    Drug use: No      Family History   Problem Relation Age of Onset    Hypertension Mother     Stroke Mother      Prior to Visit Medications    Medication Sig Taking? Authorizing Provider   furosemide (LASIX) 20 MG tablet Take 20 mg by mouth daily Yes Historical Provider, MD   finasteride (PROSCAR) 5 MG tablet Take 1 tablet by mouth once daily Yes Feliz Cleveland MD   metFORMIN (GLUCOPHAGE-XR) 500 MG extended release tablet TAKE 2 TABLETS BY MOUTH IN THE MORNING AND TAKE  1 TABLET  IN THE EVENING Yes Feliz Cleveland MD   insulin glargine (LANTUS SOLOSTAR) 100 UNIT/ML injection pen Inject 16 Units into the skin nightly Yes DEONNA Klein - LENCHO   metoprolol tartrate (LOPRESSOR) 50 MG tablet Take 1 tablet by mouth twice daily Yes Feliz Cleveland MD   blood glucose test strips (ONETOUCH ULTRA) strip USE DAILY OR AS NEEDED Yes Feliz Cleveland MD   simvastatin (ZOCOR) 20 MG tablet Take 1 tablet by mouth nightly Yes Feliz Cleveland MD   Insulin Pen Needle (PEN NEEDLES 3/16\") 31G X 5 MM MISC 1 each by Does not apply route daily Yes Feliz Cleveland MD   lisinopril (PRINIVIL;ZESTRIL) 40 MG tablet Take 1 tablet by mouth daily Yes Feliz Cleveland MD   aspirin EC 81 MG EC tablet Take 1 tablet by mouth 2 times daily for 14 days Take for DVT blood clot prophylaxis. Please avoid missing doses. Yes Carolina Ortega MD   propafenone (RYTHMOL) 150 MG tablet Take 1 tablet by mouth 2 times daily Yes Katie Ponce MD   [facility] II LANCETS MISC 1 each by Does not apply route daily Yes Feliz Cleveland MD   Blood Glucose Monitoring Suppl (ONE TOUCH ULTRA 2) w/Device KIT 1 kit by Does not apply route daily Yes Feliz Cleveland MD   Lancet Devices MISC 1 x daily for the Lifescan lancets Yes Feliz Cleveland MD   diclofenac sodium 1 % GEL Apply 2 g topically 2 times daily Yes Feliz Cleveland MD   Calcium Carbonate Antacid (TUMS E-X PO) Take 2 tablets by mouth as needed Yes Historical Provider, MD   traMADol (ULTRAM) 50 MG tablet Take 1 tablet by mouth every 8 hours as needed for Pain for up to 30 days. Intended supply: 7 days.  Take lowest dose possible to manage pain  Patient not taking: Reported on 10/6/2021 Jose Saldana MD   amLODIPine (NORVASC) 10 MG tablet Take 1 tablet by mouth once daily  Patient not taking: Reported on 9/16/2021  Jose Saldana MD   metoprolol tartrate (LOPRESSOR) 50 MG tablet Take 1 tablet by mouth twice daily  Perea Fam Day, MD   oxybutynin (DITROPAN) 5 MG tablet Take 1 tablet by mouth 2 times daily as needed for Other (bladder).   Jose Saldana MD     Patient Active Problem List   Diagnosis    Essential hypertension    Osteoarthritis    GERD (gastroesophageal reflux disease)    Allergic rhinitis, seasonal    Hypertriglyceridemia    Needs flu shot    Diabetic nephropathy associated with type 2 diabetes mellitus ([facility] [address] [address]    Urge incontinence    Coronary artery disease, non-occlusive    History of paroxysmal supraventricular tachycardia- S/P ablation 2013, 2019    S/P aortic valve replacement with porcine valve    Type 2 diabetes mellitus with diabetic nephropathy, without long-term current use of insulin ([facility] [address]    BPH with obstruction/lower urinary tract symptoms    Malignant neoplasm of descending colon ([facility] [address]- 1/18 left colectomy, T3N0    Left renal mass 2.3 cm enhancing by CT, stable, per hemonc    History of knee replacement, total, left    Closed left hip fracture, sequela    Chronic pain of both knees        LABS:     Lab Results   Component Value Date    LABA1C 7.2 08/20/2021    LABA1C 6.0 01/18/2021    LABA1C 5.8 11/04/2020    LABMICR 2.65 (H) 10/03/2011       Lab Results   Component Value Date     08/23/2021     11/04/2020     09/23/2019    K 4.7 08/23/2021    K 4.4 11/04/2020    K 4.2 09/23/2019    CL 99 08/23/2021    CL 99 11/04/2020     09/23/2019    CO2 24 08/23/2021    CO2 29 11/04/2020    CO2 24 09/23/2019    BUN 16 08/23/2021    BUN 19 11/04/2020    BUN 20 09/23/2019    CREATININE 1.4 (H) 08/23/2021    CREATININE 1.2 11/04/2020    CREATININE 1.4 (H) 07/21/2020    GLUCOSE 216 (H) 08/23/2021    GLUCOSE 144 (H) 11/04/2020    GLUCOSE 139 (H) 09/23/2019 CALCIUM 9.6 08/23/2021    CALCIUM 9.4 11/04/2020    CALCIUM 8.3 09/23/2019       Lab Results   Component Value Date    CHOL 160 11/04/2020    CHOL 130 07/05/2019    CHOL 157 05/01/2018    TRIG 139 11/04/2020    TRIG 102 07/05/2019    TRIG 98 05/01/2018    HDL 61 (H) 11/04/2020    HDL 55 07/05/2019    HDL 59 05/01/2018    LDLCALC 71 11/04/2020    LDLCALC 55 07/05/2019    LDLCALC 78 05/01/2018       Lab Results   Component Value Date    ALT 11 08/23/2021    ALT 12 11/04/2020    ALT 11 07/05/2019    AST 15 08/23/2021    AST 12 (L) 11/04/2020    AST 13 (L) 07/05/2019       Lab Results   Component Value Date    TSH 1.24 11/04/2020    TSH 1.69 12/20/2016    TSH 0.95 08/13/2012       Lab Results   Component Value Date    WBC 8.3 08/23/2021    WBC 6.3 11/04/2020    WBC 5.7 09/23/2019    HGB 15.1 08/23/2021    HGB 15.1 11/04/2020    HGB 11.3 (L) 09/23/2019    HCT 44.0 08/23/2021    HCT 44.0 11/04/2020    HCT 32.6 (L) 09/23/2019    MCV 91.3 08/23/2021    MCV 90.3 11/04/2020    MCV 91.9 09/23/2019     08/23/2021     11/04/2020     09/23/2019       Lab Results   Component Value Date    INR 1.08 12/08/2017    INR 1.08 06/28/2013        Lab Results   Component Value Date    PSA 1.69 06/07/2011        No results found for: [facility]      Lab Results   Component Value Date    PSA 1.69 06/07/2011        PHYSICAL EXAM:  BP (!) 144/86 (Site: Right Upper Arm, Position: Sitting, Cuff Size: Medium Adult)   Pulse 74   Resp 12   Ht 5' 7\" (1.702 m)   Wt 164 lb (74.4 kg)   SpO2 97%   BMI 25.69 kg/m²    Physical Exam  Constitutional:       Appearance: Normal appearance. He is well-developed. HENT:      Head: Normocephalic and atraumatic. Cardiovascular:      Rate and Rhythm: Normal rate and regular rhythm. Heart sounds: No murmur heard. Pulmonary:      Breath sounds: Normal breath sounds. No wheezing. Skin:     General: Skin is warm and dry. Neurological:      Mental Status: He is alert. Psychiatric:         Mood and Affect: Mood normal.         Behavior: Behavior normal.         Thought Content: Thought content normal.         Judgment: Judgment normal.       BP Readings from Last 5 Encounters:   10/06/21 (!) 144/86   09/16/21 (!) 145/82   08/20/21 (!) 144/70   03/24/21 128/80   01/18/21 114/74       Wt Readings from Last 5 Encounters:   10/06/21 164 lb (74.4 kg)   09/16/21 164 lb 3.2 oz (74.5 kg)   08/20/21 164 lb (74.4 kg)   08/04/21 171 lb (77.6 kg)   03/24/21 171 lb (77.6 kg)      Minal Cardona was seen today for fatigue and pain. Diagnoses and all orders for this visit:    Flank pain  -     POCT Urinalysis no Micro    Fatigue, unspecified type  -     Comprehensive Metabolic Panel; Future  -     CBC Auto Differential; Future  -     TROPONIN; Future    Type 2 diabetes mellitus with diabetic nephropathy, without long-term current use of insulin ([facility])    Chest pain, unspecified type  -     EKG 12 Lead  -     TROPONIN; Future    Patient the safest thing to do would be to go to the ER with his chest pain yesterday. He is not interested in that. We will have him get lab work today and will add a stat troponin to his labs. I will also have him get a Covid test.  He is 81years old and had fatigue for 3 days.     UA   100 glucose , 100 protein , trace blood  Will need to be repeated   Was done bc he told the ma he had back pain

## 2021-10-06 NOTE — TELEPHONE ENCOUNTER
----- Message from Radha Ranjith sent at 10/6/2021 10:46 AM EDT -----  Subject: Appointment Request    Reason for Call: Routine Existing Condition Follow Up (Diabetes)    QUESTIONS  Type of Appointment? Established Patient  Reason for appointment request? No appointments available during search  Additional Information for Provider? Patient's diabetes levels are high,   190 - 200, Please call and advise. Patient refused to see someone else,   and wants to see her as soon as possible. Thank you.   ---------------------------------------------------------------------------  --------------  CALL BACK INFO  What is the best way for the office to contact you? OK to leave message on   voicemail  Preferred Call Back Phone Number? [phone]  ---------------------------------------------------------------------------  --------------  SCRIPT ANSWERS  Relationship to Patient? Self  (Is the patient requesting to be seen urgently for their symptoms?)? No  Is this follow up request related to routine Diabetes Management? Yes  Have you been diagnosed with, awaiting test results for, or told that you   are suspected of having COVID-19 (Coronavirus)? (If patient has tested   negative or was tested as a requirement for work, school, or travel and   not based on symptoms, answer no)? No  Within the past two weeks have you developed any of the following symptoms   (answer no if symptoms have been present longer than 2 weeks or began   more than 2 weeks ago)? Fever or Chills, Cough, Shortness of breath or   difficulty breathing, Loss of taste or smell, Sore throat, Nasal   congestion, Sneezing or runny nose, Fatigue or generalized body aches   (answer no if pain is specific to a body part e.g. back pain), Diarrhea,   Headache? No  Have you had close contact with someone with COVID-19 in the last 14 days? No  (Service Expert  click yes below to proceed with [employer] As Usual   Scheduling)?  Yes

## 2021-10-06 NOTE — PROGRESS NOTES
Jin Pederson received a viral test for COVID-19. They were educated on isolation and quarantine as appropriate. For any symptoms, they were directed to seek care from their PCP, given contact information to establish with a doctor, directed to an urgent care or the emergency room.

## 2021-11-01 NOTE — PLAN OF CARE
[address] [facility], [place]  [address] [place] [address]  Phone: [phone]   Fax:     [phone]                                                       Physical Therapy Certification    Dear Referring Practitioner: DEONNA Duke CNP,    We had the pleasure of evaluating the following patient for physical therapy services at [facility]. A summary of our findings can be found in the initial assessment below. This includes our plan of care. If you have any questions or concerns regarding these findings, please do not hesitate to contact me at the office phone number checked above. Thank you for the referral.       Physician Signature:_______________________________Date:__________________  By signing above (or electronic signature), therapists plan is approved by physician              Patient: Newton Borden   : 1935   MRN: [id number]  Referring Physician: Referring Practitioner: DEONNA Duke CNP      Evaluation Date: 2021      Medical Diagnosis Information:  Diagnosis: M25.561, M25.562, G89.29 (ICD-10-CM) - Chronic pain of both knstxW73.0 (ICD-10-CM) - Primary osteoarthritis of both knees   Treatment Diagnosis: Bilat knee pain, progressive LE weakness?                                          Insurance information: PT Insurance Information: [facility] Medicare     Precautions/ Contra-indications: n/a  Latex Allergy:  [x]NO      []YES  Preferred Language for Healthcare:   [x]English       []other:    C-SSRS Triggered by Intake questionnaire (Past 2 wk assessment ):   [x] No, Questionnaire did not trigger screening.   [] Yes, Patient intake triggered C-SSRS Screening      [] C-SSRS Screening completed  [] PCP notified via Epic     SUBJECTIVE: Patient stated complaint: *Eval time somewhat limited due to filling out paperwork at least 15 min into his 45min appmt time. Pt here for evaluation of chronic knee pain and worsening gait, which seems to be worsening significantly over the past 6 months. He has history of left knee replacement about 4 yrs ago, and R knee has OA, but he doesn't want it replaced. He also has history of left hip fx with ORIF in 2019. He reports onset of back pain starting about 2 months ago, and he seems to assimilate the back pain with bilateral knee pain. He rides a bike at home for about 20 minutes at home nearly every day, and this does not cause him pain. Prior to 6 months ago, he was able to walk 30 minutes without a cane, but now his walking tolerance is only a few minutes, with very slow gait with wide base of support and he uses a quad cane. He is here with wife, and they state they want to try aquatic therapy because they knew someone who did it and it helped. He did attend therapy at the [address] [place] [address] for 5 visits, but denies any significant change in knee pain. He denies any specific hip pain, but states he has some back pain that does worsen with standing and walking. He has not had any lumbar work-up at this point. He hopes to decrease bilat leg pain with walking and improve walking and functional mobility back to previous levels. Relevant Medical History:Additional Pertinent Hx: 6 months progressive decline; gait, balance, L TKR 2016, R knee OA, L hip ORIF 2019   Functional Scale/Score: womac = 48/96 = 50%     Pain Scale: 6/10  Easing factors: sitting, resting  Provocative factors: standing, walking     Type: [x]Constant   [x]Intermittent  [x]Radiating []Localized []other:     Numbness/Tingling: slight numbness bilat L3-4 region    Occupation/School: retired    Living Status/Prior Level of Function: Independent with ADLs and IADLs, community ambulator w/o AD prior to 6 mo ago. OBJECTIVE:   Palpation: no tenderness lower back, no severe knee/ hip tenderness.      Functional Mobility/Transfers: slow and guarded with bed mobility    Posture: forward trunk posture in standing    Bandages/Dressings/Incisions: n/a    Gait: (include devices/WB status) wide base, use of quad cane, short stride    ROM LEFT RIGHT comments   HIP Flex   Hip WFL bilat, except mod deficit left hip PERLA and mild deficit extension   HIP Abd      HIP Ext      HIP IR      HIP ER      Knee ext   Knee ROM WNL bilat, nonpainful   Knee Flex      Ankle PF      Ankle DF      Ankle In      Ankle Ev      Strength  LEFT RIGHT    HIP Flexors      HIP Abductors 4-/5 4-/5    HIP Ext      Hip ER      Knee EXT (quad) 5/5 5/5    Knee Flex (HS) 4/5 4/5    Ankle DF 4+/5 4+/5    Ankle PF      Ankle Inv      Ankle EV            Circumference  Mid apex  7 cm prox              Reflexes/Sensation:    []Dermatomes/Myotomes intact    []Reflexes equal and normal bilaterally   [x]Other:  patellar reflex: 3+ bilat  Achilles 2+ bilat  Numbness bilat ant thigh L3-4 dermatomes  Pos babinski bilat   To be assessed next visit: cervical myelopathy cluster      Joint mobility:    []Normal    []Hypo   []Hyper    Orthopedic Special Tests:   - knee pain reproduced with lumbar extension, better with flexion  - unable to rise from standard chair w/o use of arms. - neg SLR x2  - neg hip scour                         [x] Patient history, allergies, meds reviewed. Medical chart reviewed. See intake form. Review Of Systems (ROS):  [x]Performed Review of systems (Integumentary, CardioPulmonary, Neurological) by intake and observation. Intake form has been scanned into medical record. Patient has been instructed to contact their primary care physician regarding ROS issues if not already being addressed at this time.       Co-morbidities/Complexities (which will affect course of rehabilitation):   []None           Arthritic conditions   []Rheumatoid arthritis (M05.9)  [x]Osteoarthritis (M19.91)   Cardiovascular conditions   [x]Hypertension (I10)  []Hyperlipidemia (E78.5)  []Angina pectoris (I20)  []Atherosclerosis (I70)  []CVA Musculoskeletal conditions   []Disc pathology   []Congenital spine pathologies   []Prior surgical intervention  []Osteoporosis (M81.8)  []Osteopenia (M85.8)   Endocrine conditions   []Hypothyroid (E03.9)  []Hyperthyroid Gastrointestinal conditions   []Constipation (K83.76)   Metabolic conditions   []Morbid obesity (E66.01)  [x]Diabetes type 1(E10.65) or 2 (E11.65)   [x]Neuropathy (G60.9)     Pulmonary conditions   []Asthma (J45)  []Coughing   []COPD (J44.9)   Psychological Disorders  []Anxiety (F41.9)  []Depression (F32.9)   []Other:   []Other:          Barriers to/and or personal factors that will affect rehab potential:              []Age  []Sex    []Smoker              []Motivation/Lack of Motivation                        [x]Co-Morbidities              []Cognitive Function, education/learning barriers              []Environmental, home barriers              []profession/work barriers  [x]past PT/medical experience  []other:  Justification:     Falls Risk Assessment (30 days):   [] Falls Risk assessed and no intervention required. [x] Falls Risk assessed and Patient requires intervention due to being higher risk   TUG score (>12s at risk):     [] Falls education provided, including        ASSESSMENT: Pt's c/o seem most related to lumbar and/ or cervical stenosis. Given significant functional decline in the past 6 months, there is some concern for cervical or lumbar myelopathy and I plan to further assess for cervical myelopathy next visit to determine appropriateness of physical therapy at this time.    Functional Impairments:     [x]Noted lumbar/proximal hip/LE hypomobility   []Decreased LE functional ROM   []Decreased core/proximal hip strength and neuromuscular control   [x]Decreased LE functional strength   []Reduced balance/proprioceptive control   []other:      Functional Activity Limitations (from functional questionnaire and intake)   []Reduced ability to impact the plan of care  []3 personal factors and/or comorbidities that impact the plan of care  [x] An examination of body systems using standardized tests and measures addressing any of the following: body structures and functions (impairments), activity limitations, and/or participation restrictions;:  [] a total of 1-2 or more elements   [x] a total of 3 or more elements   [] a total of 4 or more elements   [x] A clinical presentation with:  [] stable and/or uncomplicated characteristics   [x] evolving clinical presentation with changing characteristics  [] unstable and unpredictable characteristics;   [x] Clinical decision making of [] low, [x] moderate, [] high complexity using standardized patient assessment instrument and/or measurable assessment of functional outcome. [] EVAL (LOW) 37702 (typically 20 minutes face-to-face)  [x] EVAL (MOD) 06534 (typically 30 minutes face-to-face)  [] EVAL (HIGH) 32126 (typically 45 minutes face-to-face)  [] RE-EVAL     PLAN:  Frequency/Duration:  Up to 2 days per week for up to 8-10 Weeks:  Interventions:  [x]  Therapeutic exercise including: strength training, ROM, for Lower extremity and core   [x]  NMR activation and proprioception for LE, Glutes and Core   [x]  Manual therapy as indicated for LE, Hip and spine to include: Dry Needling/IASTM, STM, PROM, Gr I-IV mobilizations, manipulation. [x] Modalities as needed that may include: thermal agents, E-stim, Biofeedback, US, iontophoresis as indicated  [x] Patient education on joint protection, postural re-education, activity modification, progression of HEP. [x] Aquatic exercise including: strength training, ROM, and balance for Lower extremity and core      HEP instruction: deferred     GOALS:  Patient stated goal: Able to walk community distances w/o AD. [] Progressing: [] Met: [] Not Met: [] Adjusted    Therapist goals for Patient:   Short Term Goals: To be achieved in: 2 weeks  1.  Independent in HEP and progression per patient tolerance, in order to prevent re-injury. [] Progressing: [] Met: [] Not Met: [] Adjusted  2. Patient will have a decrease in pain to facilitate improvement in movement, function, and ADLs as indicated by Functional Deficits. [] Progressing: [] Met: [] Not Met: [] Adjusted    Long Term Goals: To be achieved in: up to 8-10 weeks  1. Disability index score improvement by 10% or better on WOMAC to assist with reaching prior level of function. [] Progressing: [] Met: [] Not Met: [] Adjusted  3. Patient will demonstrate an increase in Strength to good proximal hip strength and control, within 5lb HHD in LE to allow for proper functional mobility as indicated by patients Functional Deficits. [] Progressing: [] Met: [] Not Met: [] Adjusted  4. Patient will be able to perform at least 10 reps on 30 sec sit to stand test to demonstrate improved lower quarter functional strength. [] Progressing: [] Met: [] Not Met: [] Adjusted      Electronically signed by: Lina Roberto, PT , DPT, [facility] #[id number]        Note: If patient does not return for scheduled/recommended follow up visits, this note will serve as a discharge from care along with the most recent update on progress.

## 2021-11-01 NOTE — FLOWSHEET NOTE
[facility], [facility]  [address] [facility], [facility]  Phone: [phone]   Fax:     [phone]    Physical Therapy Treatment Note/ Progress Report:     Date:  2021    Patient Name:  Josefina Torre    :  1935  MRN: [id number]    Pertinent Medical History: Additional Pertinent Hx: 6 months progressive decline; gait, balance, L TKR 2016, R knee OA, L hip ORIF     Medical/Treatment Diagnosis Information:  Diagnosis: M25.561, M25.562, G89.29 (ICD-10-CM) - Chronic pain of both tofloD41.0 (ICD-10-CM) - Primary osteoarthritis of both knees  Treatment Diagnosis: Bilat knee pain, progressive LE weakness? Insurance/Certification information:  PT Insurance Information: [facility] Medicare  Physician Information:  Referring Practitioner: DEONAN Beckford CNP  Plan of care signed (Y/N):     Date of Patient follow up with Physician:      Progress Report: []  Yes  [x]  No     Date Range for reporting period:  Beginnin2021  Ending:    Progress report due (10 Rx/or 30 days whichever is less):      Recertification due (POC duration/ or 90 days whichever is less):     Visit # POC/ Insurance Allowable Auth Needed   1 [facility] medicare []Yes    []No     Functional Outcomes Measure:   Date Assessed: at eval  Test: womac  Score:    Pain level:  See eval /10     History of Injury: Pt here for evaluation of chronic knee pain and worsening gait, which seems to be worsening significantly over the past 6 months. He has history of left knee replacement about 4 yrs ago, and R knee has OA, but he doesn't want it replaced. He also has history of left hip fx with ORIF in 2019. He reports onset of back pain starting about 2 months ago, and he seems to assimilate the back pain with bilateral knee pain. He rides a bike at home for about 20 minutes at home nearly every day, and this does not cause him pain.  Prior to 6 months ago, he was able to walk 30 minutes without a cane, but now his walking tolerance is only a few minutes, with very slow gait with wide base of support and he uses a quad cane. He is here with wife, and they state they want to try aquatic therapy because they knew someone who did it and it helped. He did attend therapy at the [address] [place] [address] for 5 visits, but denies any significant change in knee pain. He denies any specific hip pain, but states he has some back pain that does worsen with standing and walking. He has not had any lumbar work-up at this point. He hopes to decrease bilat leg pain with walking and improve walking and functional mobility back to previous levels. SUBJECTIVE:  See eval    OBJECTIVE:    Observation:    Test measurements:      RESTRICTIONS/PRECAUTIONS:     Exercises/Interventions:     Therapeutic Ex (73465)   Min: Resistance/Reps Notes/Cues   Sit to stand     Standing hip ex's     Heel/ toe raises                    Therapeutic Activity (79741) Min: 15'     Pt edu Pathology, role of PT, knee vs lumbar vs hip diff dx, findings of physical exam.                    NMR re-education (73135)   Min:     Mf Activation- re-ed     TrA Re-ed activation     Glute Max re-ed activation                    Manual Intervention (99116) Min:      Lumbar/ hip manual >prn         Modalities  Min:             Other Therapeutic Activities:  Pt was educated on PT POC, Diagnosis, Prognosis, pathomechanics as well as frequency and duration of scheduling future physical therapy appointments. Time was also taken on this day to answer all patient questions and participation in PT. Reviewed appointment policy in detail with patient and patient verbalized understanding. Home Exercise Program: Patient instructed in the following for HEP:       . Patient verbalized/demonstrated understanding and was issued written handout.       Therapeutic Exercise and NMR EXR  [x] (72316) Provided verbal/tactile cueing for activities related to strengthening, flexibility, endurance, ROM  for improvements in proximal hip and core control with self care, mobility, lifting and ambulation. [x] (60347) Provided verbal/tactile cueing for activities related to improving balance, coordination, kinesthetic sense, posture, motor skill, proprioception  to assist with core control in self care, mobility, lifting, and ambulation. Therapeutic Activities:    [x] (42970 or 98729) Provided verbal/tactile cueing for activities related to improving balance, coordination, kinesthetic sense, posture, motor skill, proprioception and motor activation to allow for proper function  with self care and ADLs  [x] (04311) Provided training and instruction to the patient for proper core and proximal hip recruitment and positioning with ambulation re-education     Home Exercise Program:    [x] (96805) Reviewed/Progressed HEP activities related to strengthening, flexibility, endurance, ROM of core, proximal hip and LE for functional self-care, mobility, lifting and ambulation   [x] (24848) Reviewed/Progressed HEP activities related to improving balance, coordination, kinesthetic sense, posture, motor skill, proprioception of core, proximal hip and LE for self care, mobility, lifting, and ambulation      Manual Treatments:  PROM / STM / Oscillations-Mobs:  G-I, II, III, IV (PA's, Inf., Post.)  [x] (99922) Provided manual therapy to mobilize proximal hip and LS spine soft tissue/joints for the purpose of modulating pain, promoting relaxation,  increasing ROM, reducing/eliminating soft tissue swelling/inflammation/restriction, improving soft tissue extensibility and allowing for proper ROM for normal function with self care, mobility, lifting and ambulation.      Approval Dates:  CPT Code Units Approved Units Used  Date Updated:                     Charges:  Timed Code Treatment Minutes: 15'   Total Treatment Minutes: 39'     [] EVAL (LOW) 455 8833 (typically 20 minutes face-to-face)  [x] EVAL (MOD) 53817 (typically 30 minutes face-to-face)  [] EVAL (HIGH) 65823 (typically 45 minutes face-to-face)  [] RE-EVAL     [] LS(33941) x     [] Dry needle 1 or 2 Muscles (18234)  [] NMR (71510) x     [] Dry needle 3+ Muscles (89553)  [] Manual (69538) x     [] Ultrasound (54798) x  [x] TA (15272) x     [] Mech Traction (96929)  [] ES(attended) (27194)     [] ES (un) (19898):   [] Vasopump (60725) [] Ionto (49527)   [] Other:    GOALS:  Patient stated goal: Able to walk community distances w/o AD. [] Progressing: [] Met: [] Not Met: [] Adjusted    Therapist goals for Patient:   Short Term Goals: To be achieved in: 2 weeks  1. Independent in HEP and progression per patient tolerance, in order to prevent re-injury. [] Progressing: [] Met: [] Not Met: [] Adjusted  2. Patient will have a decrease in pain to facilitate improvement in movement, function, and ADLs as indicated by Functional Deficits. [] Progressing: [] Met: [] Not Met: [] Adjusted    Long Term Goals: To be achieved in: up to 8-10 weeks  1. Disability index score improvement by 10% or better on WOMAC to assist with reaching prior level of function. [] Progressing: [] Met: [] Not Met: [] Adjusted  3. Patient will demonstrate an increase in Strength to good proximal hip strength and control, within 5lb HHD in LE to allow for proper functional mobility as indicated by patients Functional Deficits. [] Progressing: [] Met: [] Not Met: [] Adjusted  4. Patient will be able to perform at least 10 reps on 30 sec sit to stand test to demonstrate improved lower quarter functional strength.    [] Progressing: [] Met: [] Not Met: [] Adjusted    ASSESSMENT:  See eval    Treatment/Activity Tolerance:  [x] Patient tolerated treatment well [] Patient limited by fatique  [] Patient limited by pain  [] Patient limited by other medical complications  [] Other:     Overall Progression Towards Functional goals/ Treatment Progress Update:  [] Patient is progressing as expected towards functional goals listed. [] Progression is slowed due to complexities/Impairments listed. [] Progression has been slowed due to co-morbidities. [x] Plan just implemented, too soon to assess goals progression <30days   [] Goals require adjustment due to lack of progress  [] Patient is not progressing as expected and requires additional follow up with physician  [] Other:    Prognosis for POC: [x] Good [] Fair  [] Poor    Patient requires continued skilled intervention: [x] Yes  [] No        PLAN: See eval  [] Continue per plan of care [] Alter current plan (see comments)  [x] Plan of care initiated [] Hold pending MD visit [] Discharge    Electronically signed by: Jackie Granados, PT , DPT, OCS #[id number]      Note: If patient does not return for scheduled/recommended follow up visits, this note will serve as a discharge from care along with the most recent update on progress.

## 2021-11-08 NOTE — FLOWSHEET NOTE
[facility], [facility]  [address] [facility], [facility]  Phone: [phone]   Fax:     [phone]    Physical Therapy Treatment Note/ Progress Report:     Date:  2021    Patient Name:  Hosea Danielson    :  1935  MRN: [id number]    Pertinent Medical History: Additional Pertinent Hx: 6 months progressive decline; gait, balance, L TKR 2016, R knee OA, L hip ORIF     Medical/Treatment Diagnosis Information:  Diagnosis: M25.561, M25.562, G89.29 (ICD-10-CM) - Chronic pain of both [id number] (ICD-10-CM) - Primary osteoarthritis of both knees  Treatment Diagnosis: Bilat knee pain, progressive LE weakness? Insurance/Certification information:  PT Insurance Information: [facility] Medicare  Physician Information:  Referring Practitioner: Holley Pallas, APRN - CNP  Plan of care signed (Y/N):     Date of Patient follow up with Physician:      Progress Report: []  Yes  [x]  No     Date Range for reporting period:  Beginnin2021  Ending:    Progress report due (10 Rx/or 30 days whichever is less):      Recertification due (POC duration/ or 90 days whichever is less):     Visit # POC/ Insurance Allowable Auth Needed   2 [facility] medicare []Yes    []No     Functional Outcomes Measure:   Date Assessed: at eval  Test: womac  Score:    Pain level:  See eval /10     History of Injury: Pt here for evaluation of chronic knee pain and worsening gait, which seems to be worsening significantly over the past 6 months. He has history of left knee replacement about 4 yrs ago, and R knee has OA, but he doesn't want it replaced. He also has history of left hip fx with ORIF in 2019. He reports onset of back pain starting about 2 months ago, and he seems to assimilate the back pain with bilateral knee pain. He rides a bike at home for about 20 minutes at home nearly every day, and this does not cause him pain.  Prior to 6 months ago, he was able to walk 30 minutes without a cane, but now his walking tolerance is only a few minutes, with very slow gait with wide base of support and he uses a quad cane. He is here with wife, and they state they want to try aquatic therapy because they knew someone who did it and it helped. He did attend therapy at the [address] [place] [address] for 5 visits, but denies any significant change in knee pain. He denies any specific hip pain, but states he has some back pain that does worsen with standing and walking. He has not had any lumbar work-up at this point. He hopes to decrease bilat leg pain with walking and improve walking and functional mobility back to previous levels. SUBJECTIVE:   11/8: pt reports no new c/o. Still antalgic gait w/ quad cane. bilat knee pain worse with standing, loosens some with short walks. Pt reports 5lb wt loss in the past few months, 20lbs over the past few years, but also has not been as active the past few years. OBJECTIVE:    Observation:    Test measurements:        11/8:   Cervical Myelopathy Cluster Tracycass Lopez et al 2010)    Age Greater >45 yes   Gait Deviation yes   Hoffmans no   Inverted Supinator no   Babinski no           >3 Positive tests increases likelihood to 94% for CM    PALPATION   No tenderness w/ lumbar PA    ROM/ MOBILITY   Hip mobility WFL, no reproduction of pain w/ overpressure    STRENGTH   **    SPECIAL TESTS   Neg SLR, neg hip blaine, pos lumbar quadrant w/ facet and radiating pain.     OTHER       RESTRICTIONS/PRECAUTIONS:     Exercises/Interventions:     Therapeutic Ex (35757)   Min: 4' Resistance/Reps Notes/Cues   Sit to stand >NV    Standing hip ex's ABD @ table? >    Heel/ toe raises     bridge hookly x15    EOB hip ext >NV    Hookly LTR >NV    Lateral walk @ table >add per wayne              Therapeutic Activity (70390) Min: 15'     Pt edu/ reassess Pathology hip vs lumbar vs cerv myelopathy, role of PT                    NMR re-education (90367)   Min:     Mf Activation- re-ed     TrA Re-ed activation     Glute Max re-ed activation                    Manual Intervention (20502) Min: 20'     Lumbar Lumbar PA, mid grade all levels No reproduction of pain. Hip manual  PROM w/ overpressure all planes No reproduction of pain. Modalities  Min:             Other Therapeutic Activities:  Pt was educated on PT POC, Diagnosis, Prognosis, pathomechanics as well as frequency and duration of scheduling future physical therapy appointments. Time was also taken on this day to answer all patient questions and participation in PT. Reviewed appointment policy in detail with patient and patient verbalized understanding. Home Exercise Program: Patient instructed in the following for HEP:      11/8: jennifer brumfield       . Patient verbalized/demonstrated understanding and was issued written handout. Therapeutic Exercise and NMR EXR  [x] (48152) Provided verbal/tactile cueing for activities related to strengthening, flexibility, endurance, ROM  for improvements in proximal hip and core control with self care, mobility, lifting and ambulation. [x] (79325) Provided verbal/tactile cueing for activities related to improving balance, coordination, kinesthetic sense, posture, motor skill, proprioception  to assist with core control in self care, mobility, lifting, and ambulation.      Therapeutic Activities:    [x] (49096 or 01073) Provided verbal/tactile cueing for activities related to improving balance, coordination, kinesthetic sense, posture, motor skill, proprioception and motor activation to allow for proper function  with self care and ADLs  [x] (27885) Provided training and instruction to the patient for proper core and proximal hip recruitment and positioning with ambulation re-education     Home Exercise Program:    [x] (97358) Reviewed/Progressed HEP activities related to strengthening, flexibility, endurance, ROM of core, proximal hip and LE for functional self-care, mobility, lifting and ambulation   [x] (15815) Reviewed/Progressed HEP activities related to improving balance, coordination, kinesthetic sense, posture, motor skill, proprioception of core, proximal hip and LE for self care, mobility, lifting, and ambulation      Manual Treatments:  PROM / STM / Oscillations-Mobs:  G-I, II, III, IV (PA's, Inf., Post.)  [x] (47494) Provided manual therapy to mobilize proximal hip and LS spine soft tissue/joints for the purpose of modulating pain, promoting relaxation,  increasing ROM, reducing/eliminating soft tissue swelling/inflammation/restriction, improving soft tissue extensibility and allowing for proper ROM for normal function with self care, mobility, lifting and ambulation. Approval Dates:  CPT Code Units Approved Units Used  Date Updated:                     Charges:  Timed Code Treatment Minutes: 39   Total Treatment Minutes: 39     [] EVAL (LOW) 10636 (typically 20 minutes face-to-face)  [x] EVAL (MOD) 52032 (typically 30 minutes face-to-face)  [] EVAL (HIGH) 32438 (typically 45 minutes face-to-face)  [] RE-EVAL     [] IM(66508) x     [] Dry needle 1 or 2 Muscles (50430)  [] NMR (41453) x     [] Dry needle 3+ Muscles (17424)  [] Manual (28709) x     [] Ultrasound (84263) x  [x] TA (45500) x     [] Mech Traction (24506)  [] ES(attended) (20860)     [] ES (un) (65484):   [] Vasopump (17954) [] Ionto (84504)   [] Other:    GOALS:  Patient stated goal: Able to walk community distances w/o AD. [] Progressing: [] Met: [] Not Met: [] Adjusted    Therapist goals for Patient:   Short Term Goals: To be achieved in: 2 weeks  1. Independent in HEP and progression per patient tolerance, in order to prevent re-injury. [] Progressing: [] Met: [] Not Met: [] Adjusted  2. Patient will have a decrease in pain to facilitate improvement in movement, function, and ADLs as indicated by Functional Deficits.   [] Progressing: [] Met: [] Not Met: [] Adjusted    Long Term Goals: To be achieved in: up to 8-10 weeks  1. Disability index score improvement by 10% or better on WOMAC to assist with reaching prior level of function. [] Progressing: [] Met: [] Not Met: [] Adjusted  3. Patient will demonstrate an increase in Strength to good proximal hip strength and control, within 5lb HHD in LE to allow for proper functional mobility as indicated by patients Functional Deficits. [] Progressing: [] Met: [] Not Met: [] Adjusted  4. Patient will be able to perform at least 10 reps on 30 sec sit to stand test to demonstrate improved lower quarter functional strength. [] Progressing: [] Met: [] Not Met: [] Adjusted    ASSESSMENT:  Sameer session well. Mod improvement in knee pain after lumbar PA's, unable to reproduce bilat knee pain with hip or knee exam or even lumbar PA's. Treatment/Activity Tolerance:  [x] Patient tolerated treatment well [] Patient limited by fatique  [] Patient limited by pain  [] Patient limited by other medical complications  [] Other:     Overall Progression Towards Functional goals/ Treatment Progress Update:  [] Patient is progressing as expected towards functional goals listed. [] Progression is slowed due to complexities/Impairments listed. [] Progression has been slowed due to co-morbidities. [x] Plan just implemented, too soon to assess goals progression <30days   [] Goals require adjustment due to lack of progress  [] Patient is not progressing as expected and requires additional follow up with physician  [] Other:    Prognosis for POC: [x] Good [] Fair  [] Poor    Patient requires continued skilled intervention: [x] Yes  [] No        PLAN: See eval  [] Continue per plan of care [] Alter current plan (see comments)  [x] Plan of care initiated [] Hold pending MD visit [] Discharge    Electronically signed by:  Lorelee Hamman, PT , DPT, OCS #[id number]      Note: If patient does not return for scheduled/recommended follow up visits, this note will serve as a discharge from care along with the most recent update on progress.

## 2021-11-15 NOTE — FLOWSHEET NOTE
[address] [facility], [facility]  [address] [facility]  Phone: [phone]   Fax:     [phone]    Physical Therapy Treatment Note/ Progress Report:     Date:  11/15/2021    Patient Name:  Kassandra Conway    :  1935  MRN: [id number]    Pertinent Medical History: Additional Pertinent Hx: 6 months progressive decline; gait, balance, L TKR 2016, R knee OA, L hip ORIF     Medical/Treatment Diagnosis Information:  Diagnosis: M25.561, M25.562, G89.29 (ICD-10-CM) - Chronic pain of both knees, M17.0 (ICD-10-CM) - Primary osteoarthritis of both knees  Treatment Diagnosis: Bilat knee pain, progressive LE weakness? Insurance/Certification information:  PT Insurance Information: [facility] Medicare  Physician Information:  Referring Practitioner: DEONNA Marie CNP  Plan of care signed (Y/N):     Date of Patient follow up with Physician:      Progress Report: []  Yes  [x]  No     Date Range for reporting period:  Beginnin2021  Ending:    Progress report due (10 Rx/or 30 days whichever is less):      Recertification due (POC duration/ or 90 days whichever is less):     Visit # POC/ Insurance Allowable Auth Needed   3 [facility] medicare []Yes    []No     Functional Outcomes Measure:   Date Assessed: at eval  Test: womac  Score:    Pain level:  See eval /10     History of Injury: Pt here for evaluation of chronic knee pain and worsening gait, which seems to be worsening significantly over the past 6 months. He has history of left knee replacement about 4 yrs ago, and R knee has OA, but he doesn't want it replaced. He also has history of left hip fx with ORIF in 2019. He reports onset of back pain starting about 2 months ago, and he seems to assimilate the back pain with bilateral knee pain. He rides a bike at home for about 20 minutes at home nearly every day, and this does not cause him pain.  Prior to 6 months ago, he was able to walk 30 minutes without a cane, but now his walking tolerance is only a few minutes, with very slow gait with wide base of support and he uses a quad cane. He is here with wife, and they state they want to try aquatic therapy because they knew someone who did it and it helped. He did attend therapy at the [address] [place] [address] for 5 visits, but denies any significant change in knee pain. He denies any specific hip pain, but states he has some back pain that does worsen with standing and walking. He has not had any lumbar work-up at this point. He hopes to decrease bilat leg pain with walking and improve walking and functional mobility back to previous levels. SUBJECTIVE:   11/8: pt reports no new c/o. Still antalgic gait w/ quad cane. bilat knee pain worse with standing, loosens some with short walks. Pt reports 5lb wt loss in the past few months, 20lbs over the past few years, but also has not been as active the past few years. 11/15: no new c/o. bilat knee pain, worse with walking and standing, better with sitting. Amb w/ quad cane, short stride gait. OBJECTIVE:    Observation:    Test measurements:        11/8:   Cervical Myelopathy Cluster Tracycass Lopez et al 2010)    Age Greater >45 yes   Gait Deviation yes   Hoffmans no   Inverted Supinator no   Babinski no           >3 Positive tests increases likelihood to 94% for CM    PALPATION   No tenderness w/ lumbar PA or reproduction of radic sx. ROM/ MOBILITY   Hip mobility WFL, no reproduction of pain w/ overpressure    STRENGTH   Knee ext and flexion 4+/5 bilat to 5/5. SPECIAL TESTS   Neg SLR, neg hip blaine, pos lumbar quadrant w/ facet and radiating pain.    TUG: quad cane: 29s, FWW: 24s    OTHER   11/15: patellar reflexes 3+ bilat, achilles 2+ bilat, neg babinski    RESTRICTIONS/PRECAUTIONS:     Exercises/Interventions:     Therapeutic Ex (52224)   Min: 8' Resistance/Reps Notes/Cues   Sit to stand >NV    Standing hip ex's ABD @ table? >         Heel/ toe raises     bridge     EOB hip ext >NV    Hookly LTR >NV    Lateral walk @ table 2 laps in // bars    Step up Fwd 6\" 2 rails x 15 ea         Therapeutic Activity (50355) Min: 10'     Pt edu/ reassess Pathology knee vs lumbar, use of RW to facilitate gait    Functional test:  TUG w/ quad cane, w/FWW Cane: 29.5s, FWW: 24s   Gait 2 clinic laps w/ FWW         NMR re-education (50975)   Min:     Mf Activation- re-ed     TrA Re-ed activation     Glute Max re-ed activation                    Manual Intervention (25774) Min: 15'     Lumbar Lumbar PA, mid grade all levels, R,L unilat No reproduction of pain. Hip manual  planes No reproduction of pain. Modalities  Min:             Other Therapeutic Activities:  Pt was educated on PT POC, Diagnosis, Prognosis, pathomechanics as well as frequency and duration of scheduling future physical therapy appointments. Time was also taken on this day to answer all patient questions and participation in PT. Reviewed appointment policy in detail with patient and patient verbalized understanding. Home Exercise Program: Patient instructed in the following for HEP:      11/8: hookly bridge       . Patient verbalized/demonstrated understanding and was issued written handout. Therapeutic Exercise and NMR EXR  [x] (56190) Provided verbal/tactile cueing for activities related to strengthening, flexibility, endurance, ROM  for improvements in proximal hip and core control with self care, mobility, lifting and ambulation. [x] (13456) Provided verbal/tactile cueing for activities related to improving balance, coordination, kinesthetic sense, posture, motor skill, proprioception  to assist with core control in self care, mobility, lifting, and ambulation.      Therapeutic Activities:    [x] (68514 or 09180) Provided verbal/tactile cueing for activities related to improving balance, coordination, kinesthetic sense, posture, motor skill, proprioception and motor activation to allow for proper function  with self care and ADLs  [x] (76892) Provided training and instruction to the patient for proper core and proximal hip recruitment and positioning with ambulation re-education     Home Exercise Program:    [x] (22178) Reviewed/Progressed HEP activities related to strengthening, flexibility, endurance, ROM of core, proximal hip and LE for functional self-care, mobility, lifting and ambulation   [x] (57742) Reviewed/Progressed HEP activities related to improving balance, coordination, kinesthetic sense, posture, motor skill, proprioception of core, proximal hip and LE for self care, mobility, lifting, and ambulation      Manual Treatments:  PROM / STM / Oscillations-Mobs:  G-I, II, III, IV (PA's, Inf., Post.)  [x] (45716) Provided manual therapy to mobilize proximal hip and LS spine soft tissue/joints for the purpose of modulating pain, promoting relaxation,  increasing ROM, reducing/eliminating soft tissue swelling/inflammation/restriction, improving soft tissue extensibility and allowing for proper ROM for normal function with self care, mobility, lifting and ambulation. Approval Dates:  CPT Code Units Approved Units Used  Date Updated:                     Charges:  Timed Code Treatment Minutes: 33   Total Treatment Minutes: 33     [] EVAL (LOW) 61901 (typically 20 minutes face-to-face)  [x] EVAL (MOD) 22472 (typically 30 minutes face-to-face)  [] EVAL (HIGH) 55007 (typically 45 minutes face-to-face)  [] RE-EVAL     [] IP(00272) x     [] Dry needle 1 or 2 Muscles (27753)  [] NMR (45249) x     [] Dry needle 3+ Muscles (67383)  [x] Manual (56509) x     [] Ultrasound (01629) x  [x] TA (79147) x     [] Mech Traction (10675)  [] ES(attended) (54068)     [] ES (un) (27169):   [] Vasopump (62999) [] Ionto (23528)   [] Other:    GOALS:  Patient stated goal: Able to walk community distances w/o AD.    [] Progressing: [] Met: [] Not Met: [] Adjusted    Therapist goals for Patient:   Short Term Goals: To be achieved in: 2 weeks  1. Independent in HEP and progression per patient tolerance, in order to prevent re-injury. [] Progressing: [] Met: [] Not Met: [] Adjusted  2. Patient will have a decrease in pain to facilitate improvement in movement, function, and ADLs as indicated by Functional Deficits. [] Progressing: [] Met: [] Not Met: [] Adjusted    Long Term Goals: To be achieved in: up to 8-10 weeks  1. Disability index score improvement by 10% or better on WOMAC to assist with reaching prior level of function. [] Progressing: [] Met: [] Not Met: [] Adjusted  3. Patient will demonstrate an increase in Strength to good proximal hip strength and control, within 5lb HHD in LE to allow for proper functional mobility as indicated by patients Functional Deficits. [] Progressing: [] Met: [] Not Met: [] Adjusted  4. Patient will be able to perform at least 10 reps on 30 sec sit to stand test to demonstrate improved lower quarter functional strength. [] Progressing: [] Met: [] Not Met: [] Adjusted    ASSESSMENT:  Sameer session well. Mod improvement in knee pain after lumbar PA's, unable to reproduce bilat knee pain with hip or knee exam or even lumbar PA's. Significant reduction in bilat knee pain with ambulation with FWW. Spent time discussing use of FWW, and recommended he consult w/ physician to see if he can get one. Treatment/Activity Tolerance:  [x] Patient tolerated treatment well [] Patient limited by fatique  [] Patient limited by pain  [] Patient limited by other medical complications  [] Other:     Overall Progression Towards Functional goals/ Treatment Progress Update:  [x] Patient is progressing as expected towards functional goals listed. [] Progression is slowed due to complexities/Impairments listed. [] Progression has been slowed due to co-morbidities.   [] Plan just implemented, too soon to assess goals progression <30days   [] Goals require adjustment due to lack of progress  [] Patient is not progressing as expected and requires additional follow up with physician  [] Other:    Prognosis for POC: [x] Good [] Fair  [] Poor    Patient requires continued skilled intervention: [x] Yes  [] No        PLAN: See eval  [] Continue per plan of care [] Alter current plan (see comments)  [x] Plan of care initiated [] Hold pending MD visit [] Discharge    Electronically signed by: Sindy Miller, PT , DPT, OCS #[id number]      Note: If patient does not return for scheduled/recommended follow up visits, this note will serve as a discharge from care along with the most recent update on progress.

## 2021-11-18 NOTE — PROGRESS NOTES
CHRONIC CONDITION FOLOW-UP    Subjective:      Chief Complaint   Patient presents with    Follow-up     Pt is following on diabetes. Last a1c was 8/20/2021    Other     Pt said the arthritis in both knees is really bothering him. Moira Santana is an 81 y.o. male who presents for follow up of following chronic problems:  1. Malignant neoplasm of descending colon ([facility] [address]- 1/18 left colectomy, T3N0    2. Essential hypertension    3. Coronary artery disease, non-occlusive    4. Type 2 diabetes mellitus with diabetic nephropathy, without long-term current use of insulin ([facility] [address]    5. S/P aortic valve replacement with porcine valve    6. Chronic pain of both knees      Complaints: bilat knee pain worse this wall, S/P L TKR. Just got walker. Helps a lot. Pain just with walking. Tramadol no better than tylenol. R knee injection did not help 2 mo ago. Denies weakness but not able to walk far. Denies leg numbness/tingle/burn or giving out. Failed lidocaine patches. CHRISTINA was not complete due to non-compressible veins. Saw vascular and getting another test 12/6. CHART REVIEW   reports that he has never smoked. He has never used smokeless tobacco.  Health Maintenance Due   Topic Date Due    DTaP/Tdap/Td vaccine (1 - Tdap) Never done    Colon cancer screen colonoscopy  12/17/2020    COVID-19 Vaccine (3 - Booster for Moderna series) 08/14/2021    Annual Wellness Visit (AWV)  10/22/2021    Lipid screen  11/04/2021    Diabetic retinal exam  11/09/2021     Current Outpatient Medications   Medication Instructions    amLODIPine (NORVASC) 10 MG tablet Take 1 tablet by mouth once daily    aspirin EC 81 mg, Oral, 2 TIMES DAILY, Take for DVT blood clot prophylaxis. Please avoid missing doses.     Blood Glucose Monitoring Suppl (ONE TOUCH ULTRA 2) w/Device KIT 1 kit, Does not apply, DAILY    blood glucose test strips (ONETOUCH ULTRA) strip USE DAILY OR AS NEEDED    Calcium Carbonate Antacid (TUMS E-X PO) 2 tablets, (!) 144/70   03/24/21 128/80     Wt Readings from Last 5 Encounters:   11/18/21 164 lb (74.4 kg)   10/06/21 164 lb (74.4 kg)   09/16/21 164 lb 3.2 oz (74.5 kg)   08/20/21 164 lb (74.4 kg)   08/04/21 171 lb (77.6 kg)      GENERAL:   · well-developed, well-nourished, alert, no distress. LUNGS:    · Breathing unlabored  · clear to auscultation bilaterally and good air movement  CARDIOVASC:   · regular rate and rhythm  · LEGS:  Lower extremity edema: none    SKIN: warm and dry     Assessment and Plan:      Diagnosis Orders   1. Malignant neoplasm of descending colon ([facility] [address]- 1/18 left colectomy, T3N0     2. Essential hypertension     3. Coronary artery disease, non-occlusive  Lipid Panel   4. Type 2 diabetes mellitus with diabetic nephropathy, without long-term current use of insulin (HCC)  Hemoglobin A1C   5. S/P aortic valve replacement with porcine valve     6. Chronic pain of both knees     Stable, knees worse     Continue current Tx plan. Any changes marked below. INSTRUCTIONS  NEXT APPOINTMENT: Please schedule annual complete physical (30 minutes) in 4 months. · PLEASE TAKE THIS FORM TO CHECK-OUT WINDOW TO SCHEDULE NEXT VISIT. PLEASE GET FASTING BLOODWORK DRAWN in 1-2 weeks. Lab is on first floor in [address]. Hours Monday to Friday 7 AM to 5 PM.   · Please get COVID booster soon. Separate from other vaccines by at least 2 weeks. Please get annual dilated eye exam to screen for diabetic retinopathy which can lead to vision loss. Ask for report to be faxed to [phone]. Take the tylenol (8-hour) 2 tabs three times per day. Complete the vascular evaluation. Ask orthopedics (Dr. Lisa Adams) about a geniculate nerve block for knees. Keep using walker for now.

## 2021-11-18 NOTE — PATIENT INSTRUCTIONS
INSTRUCTIONS  NEXT APPOINTMENT: Please schedule annual complete physical (30 minutes) in 4 months. · PLEASE TAKE THIS FORM TO CHECK-OUT WINDOW TO SCHEDULE NEXT VISIT. PLEASE GET FASTING BLOODWORK DRAWN in 1-2 weeks. Lab is on first floor in [address]. Hours Monday to Friday 7 AM to 5 PM.   · Please get COVID booster soon. Separate from other vaccines by at least 2 weeks. Please get annual dilated eye exam to screen for diabetic retinopathy which can lead to vision loss. Ask for report to be faxed to [phone]. Take the tylenol (8-hour) 2 tabs three times per day. Complete the vascular evaluation. Ask orthopedics (Dr. Alla Healy) about a geniculate nerve block for knees. Keep using walker for now.

## 2021-11-18 NOTE — FLOWSHEET NOTE
[address], [facility]  [address] [facility], [facility]  Phone: [phone]   Fax:     [phone]    Physical Therapy Treatment Note/ Progress Report:     Date:  2021    Patient Name:  Jin Pederson    :  1935  MRN: [id number]    Pertinent Medical History: Additional Pertinent Hx: 6 months progressive decline; gait, balance, L TKR 2016, R knee OA, L hip ORIF     Medical/Treatment Diagnosis Information:  Diagnosis: M25.561, M25.562, G89.29 (ICD-10-CM) - Chronic pain of both knees, M17.0 (ICD-10-CM) - Primary osteoarthritis of both knees  Treatment Diagnosis: Bilat knee pain, progressive LE weakness? Insurance/Certification information:  PT Insurance Information: [facility] Medicare  Physician Information:  Referring Practitioner: DEONNA Monaco CNP  Plan of care signed (Y/N):     Date of Patient follow up with Physician:      Progress Report: []  Yes  [x]  No     Date Range for reporting period:  Beginnin2021  Ending:    Progress report due (10 Rx/or 30 days whichever is less):      Recertification due (POC duration/ or 90 days whichever is less):     Visit # POC/ Insurance Allowable Auth Needed   4 [facility] medicare []Yes    []No     Functional Outcomes Measure:   Date Assessed: at eval  Test: womac  Score:    Pain level:  N/r /10     History of Injury: Pt here for evaluation of chronic knee pain and worsening gait, which seems to be worsening significantly over the past 6 months. He has history of left knee replacement about 4 yrs ago, and R knee has OA, but he doesn't want it replaced. He also has history of left hip fx with ORIF in 2019. He reports onset of back pain starting about 2 months ago, and he seems to assimilate the back pain with bilateral knee pain. He rides a bike at home for about 20 minutes at home nearly every day, and this does not cause him pain.  Prior to 6 months ago, he was able to walk 30 minutes without a cane, but now his walking tolerance is only a few minutes, with very slow gait with wide base of support and he uses a quad cane. He is here with wife, and they state they want to try aquatic therapy because they knew someone who did it and it helped. He did attend therapy at the [address] [place] [address] for 5 visits, but denies any significant change in knee pain. He denies any specific hip pain, but states he has some back pain that does worsen with standing and walking. He has not had any lumbar work-up at this point. He hopes to decrease bilat leg pain with walking and improve walking and functional mobility back to previous levels. SUBJECTIVE:   11/8: pt reports no new c/o. Still antalgic gait w/ quad cane. bilat knee pain worse with standing, loosens some with short walks. Pt reports 5lb wt loss in the past few months, 20lbs over the past few years, but also has not been as active the past few years. 11/15: no new c/o. bilat knee pain, worse with walking and standing, better with sitting. Amb w/ quad cane, short stride gait. 11/18: no new c/o. Knee pain was better for a few hrs after last visit. He has FWW today, states it helps his knee pain a little. He saw PCP and they wondered why he wasn't doing aquatic PT. I explained that in order to help problem solve his pain and to differentiate between knee/ hip/ low back sources, it was important to do land PT.       OBJECTIVE:    Observation:    Test measurements:        11/8:   Cervical Myelopathy Cluster Kin Maddy et al 2010)    Age Greater >45 yes   Gait Deviation yes   Hoffmans no   Inverted Supinator no   Babinski no           >3 Positive tests increases likelihood to 94% for CM    PALPATION   No tenderness w/ lumbar PA or reproduction of radic sx. ROM/ MOBILITY   Hip mobility WFL, no reproduction of pain w/ overpressure    STRENGTH   Knee ext and flexion 4+/5 bilat to 5/5.      SPECIAL TESTS   Neg SLR, neg hip blaine, pos lumbar quadrant w/ facet and radiating pain.  TUG: quad cane: 29s, FWW: 24s   11/18: 30s STS: 6x (age adjusted norm =8)    OTHER   11/15: patellar reflexes 3+ bilat, achilles 2+ bilat, neg babinski    RESTRICTIONS/PRECAUTIONS:     Exercises/Interventions:     Therapeutic Ex (60322)   Min: 12' Resistance/Reps Notes/Cues   Sit to stand Chair + pillow 2x6 Hands on thighs ,cue form   Standing hip ex's ABD @ table? > NV? Heel/ toe raises     bridge     EOB hip ext 2x10    Hookly LTR >HEP NV   Lateral walk @ table Resume NV   Step up  Resume NV             Gait FWW x 2 clinic laps Cue posture, adequate stride length        Therapeutic Activity (63860) Min: 6'     Pt edu/ reassess Differentials: knee v hip vs lumbar. Functional deficits per 30s STS test    Functional test:  Cane: 29.5s, FWW: 24s            NMR re-education (69678)   Min:     Mf Activation- re-ed     TrA Re-ed activation     Glute Max re-ed activation                    Manual Intervention (70902) Min: 18'     Lumbar R,L hip LAD unilat/ bilat    Lumbar manual SL Rot gap, low/ mid grade R,L     Hip manual  planes No reproduction of pain. Modalities  Min:             Other Therapeutic Activities:  Pt was educated on PT POC, Diagnosis, Prognosis, pathomechanics as well as frequency and duration of scheduling future physical therapy appointments. Time was also taken on this day to answer all patient questions and participation in PT. Reviewed appointment policy in detail with patient and patient verbalized understanding. Home Exercise Program: Patient instructed in the following for HEP:      11/8: hookly bridge  11/18:   Access Code: [email or web address]  Exercises  Supine Lower Trunk Rotation - 2-3 x daily - 4-6 x weekly - 2-3 sets - 10-15 reps  Sit to Stand - 2-3 x daily - 4-6 x weekly - 10-15 reps       . Patient verbalized/demonstrated understanding and was issued written handout.       Therapeutic Exercise and NMR EXR  [x] (12076) Provided verbal/tactile cueing for activities related to strengthening, flexibility, endurance, ROM  for improvements in proximal hip and core control with self care, mobility, lifting and ambulation. [x] (85020) Provided verbal/tactile cueing for activities related to improving balance, coordination, kinesthetic sense, posture, motor skill, proprioception  to assist with core control in self care, mobility, lifting, and ambulation. Therapeutic Activities:    [x] (80582 or 28597) Provided verbal/tactile cueing for activities related to improving balance, coordination, kinesthetic sense, posture, motor skill, proprioception and motor activation to allow for proper function  with self care and ADLs  [x] (75819) Provided training and instruction to the patient for proper core and proximal hip recruitment and positioning with ambulation re-education     Home Exercise Program:    [x] (36201) Reviewed/Progressed HEP activities related to strengthening, flexibility, endurance, ROM of core, proximal hip and LE for functional self-care, mobility, lifting and ambulation   [x] (16683) Reviewed/Progressed HEP activities related to improving balance, coordination, kinesthetic sense, posture, motor skill, proprioception of core, proximal hip and LE for self care, mobility, lifting, and ambulation      Manual Treatments:  PROM / STM / Oscillations-Mobs:  G-I, II, III, IV (PA's, Inf., Post.)  [x] (26341) Provided manual therapy to mobilize proximal hip and LS spine soft tissue/joints for the purpose of modulating pain, promoting relaxation,  increasing ROM, reducing/eliminating soft tissue swelling/inflammation/restriction, improving soft tissue extensibility and allowing for proper ROM for normal function with self care, mobility, lifting and ambulation.      Approval Dates:  CPT Code Units Approved Units Used  Date Updated:                     Charges:  Timed Code Treatment Minutes: 36   Total Treatment Minutes: 36     [] EVAL (LOW) 20821 (typically 20 minutes face-to-face)  [] EVAL (MOD) 08466 (typically 30 minutes face-to-face)  [] EVAL (HIGH) 38189 (typically 45 minutes face-to-face)  [] RE-EVAL     [x] RO(94308) x     [] Dry needle 1 or 2 Muscles (05494)  [] NMR (76687) x     [] Dry needle 3+ Muscles (15747)  [x] Manual (37297) x     [] Ultrasound (91390) x  [] TA (91174) x     [] Mech Traction (14162)  [] ES(attended) (69115)     [] ES (un) (53492):   [] Vasopump (31914) [] Ionto (62959)   [] Other:    GOALS:  Patient stated goal: Able to walk community distances w/o AD. [] Progressing: [] Met: [] Not Met: [] Adjusted    Therapist goals for Patient:   Short Term Goals: To be achieved in: 2 weeks  1. Independent in HEP and progression per patient tolerance, in order to prevent re-injury. [] Progressing: [] Met: [] Not Met: [] Adjusted  2. Patient will have a decrease in pain to facilitate improvement in movement, function, and ADLs as indicated by Functional Deficits. [] Progressing: [] Met: [] Not Met: [] Adjusted    Long Term Goals: To be achieved in: up to 8-10 weeks  1. Disability index score improvement by 10% or better on WOMAC to assist with reaching prior level of function. [] Progressing: [] Met: [] Not Met: [] Adjusted  3. Patient will demonstrate an increase in Strength to good proximal hip strength and control, within 5lb HHD in LE to allow for proper functional mobility as indicated by patients Functional Deficits. [] Progressing: [] Met: [] Not Met: [] Adjusted  4. Patient will be able to perform at least 10 reps on 30 sec sit to stand test to demonstrate improved lower quarter functional strength. [] Progressing: [] Met: [] Not Met: [] Adjusted    ASSESSMENT:  Sameer session well. Significant improvement in pain after LE LAD and SL lumbar mobs. Some L groin pain noted with left side up, but repositioned into less hip add, and that helped.  He struggles with sit to stand, and needs cues for form and encouragment not to use hands as a compensation. He had significant reduction in knee pain at end of session. Pt needs cont's skilled PT to improve lumbar mobility which seems to decrease knee pain and also needs work on progressing functional strength and independence. Treatment/Activity Tolerance:  [x] Patient tolerated treatment well [] Patient limited by fatique  [] Patient limited by pain  [] Patient limited by other medical complications  [] Other:     Overall Progression Towards Functional goals/ Treatment Progress Update:  [x] Patient is progressing as expected towards functional goals listed. [] Progression is slowed due to complexities/Impairments listed. [] Progression has been slowed due to co-morbidities. [] Plan just implemented, too soon to assess goals progression <30days   [] Goals require adjustment due to lack of progress  [] Patient is not progressing as expected and requires additional follow up with physician  [] Other:    Prognosis for POC: [x] Good [] Fair  [] Poor    Patient requires continued skilled intervention: [x] Yes  [] No        PLAN: See eval  [] Continue per plan of care [] Alter current plan (see comments)  [x] Plan of care initiated [] Hold pending MD visit [] Discharge    Electronically signed by: Vaishnavi Bobby, PT , DPT, OCS #[id number]      Note: If patient does not return for scheduled/recommended follow up visits, this note will serve as a discharge from care along with the most recent update on progress.

## 2021-11-22 NOTE — FLOWSHEET NOTE
[address] [facility], [facility]  [address] [facility]  Phone: [phone]   Fax:     [phone]    Physical Therapy Treatment Note/ Progress Report:     Date:  2021    Patient Name:  Homar Gonzalez    :  1935  MRN: [id number]    Pertinent Medical History: Additional Pertinent Hx: 6 months progressive decline; gait, balance, L TKR 2016, R knee OA, L hip ORIF     Medical/Treatment Diagnosis Information:  Diagnosis: M25.561, M25.562, G89.29 (ICD-10-CM) - Chronic pain of both knees, M17.0 (ICD-10-CM) - Primary osteoarthritis of both knees  Treatment Diagnosis: Bilat knee pain, progressive LE weakness? Insurance/Certification information:  PT Insurance Information: [facility] Medicare  Physician Information:  Referring Practitioner: DEONNA Cardoza - CNP  Plan of care signed (Y/N):     Date of Patient follow up with Physician:      Progress Report: []  Yes  [x]  No     Date Range for reporting period:  Beginnin2021  Ending:    Progress report due (10 Rx/or 30 days whichever is less):      Recertification due (POC duration/ or 90 days whichever is less):     Visit # POC/ Insurance Allowable Auth Needed   5 [facility] medicare []Yes    []No     Functional Outcomes Measure:   Date Assessed: at eval  Test: womac  Score:    Pain level:  N/r /10     History of Injury: Pt here for evaluation of chronic knee pain and worsening gait, which seems to be worsening significantly over the past 6 months. He has history of left knee replacement about 4 yrs ago, and R knee has OA, but he doesn't want it replaced. He also has history of left hip fx with ORIF in 2019. He reports onset of back pain starting about 2 months ago, and he seems to assimilate the back pain with bilateral knee pain. He rides a bike at home for about 20 minutes at home nearly every day, and this does not cause him pain.  Prior to 6 months ago, he was able to walk 30 minutes without a cane, but now his walking tolerance is only a few minutes, with very slow gait with wide base of support and he uses a quad cane. He is here with wife, and they state they want to try aquatic therapy because they knew someone who did it and it helped. He did attend therapy at the [address] [place] [address] for 5 visits, but denies any significant change in knee pain. He denies any specific hip pain, but states he has some back pain that does worsen with standing and walking. He has not had any lumbar work-up at this point. He hopes to decrease bilat leg pain with walking and improve walking and functional mobility back to previous levels. SUBJECTIVE:   11/8: pt reports no new c/o. Still antalgic gait w/ quad cane. bilat knee pain worse with standing, loosens some with short walks. Pt reports 5lb wt loss in the past few months, 20lbs over the past few years, but also has not been as active the past few years. 11/15: no new c/o. bilat knee pain, worse with walking and standing, better with sitting. Amb w/ quad cane, short stride gait. 11/18: no new c/o. Knee pain was better for a few hrs after last visit. He has FWW today, states it helps his knee pain a little. He saw PCP and they wondered why he wasn't doing aquatic PT. I explained that in order to help problem solve his pain and to differentiate between knee/ hip/ low back sources, it was important to do land PT.   11/22/21: pt reports overall not much change. Pain rated 6-7/10 today. States after PT, his pain reduces 1-2pts, but then returns a few hours later. He is now able to walk 1 mile using FWW with near normal stride gait.        OBJECTIVE:    Observation:    Test measurements:        11/8:   Cervical Myelopathy Cluster Thomas Del Valle et al 2010)    Age Greater >45 yes   Gait Deviation yes   Hoffmans no   Inverted Supinator no   Babinski no           >3 Positive tests increases likelihood to 94% for CM    PALPATION   No tenderness w/ lumbar PA or reproduction of radic sx. ROM/ MOBILITY   Hip mobility WFL, no reproduction of pain w/ overpressure    STRENGTH   Knee ext and flexion 4+/5 bilat to 5/5. SPECIAL TESTS   Neg SLR, neg hip blaine, pos lumbar quadrant w/ facet and radiating pain.  TUG: quad cane: 29s, FWW: 24s   11/18: 30s STS: 6x (age adjusted norm =8)    OTHER   11/15: patellar reflexes 3+ bilat, achilles 2+ bilat, neg babinski    RESTRICTIONS/PRECAUTIONS:     Exercises/Interventions:     Therapeutic Ex (26932)   Min: 25' Resistance/Reps Notes/Cues   Sit to stand Chair + pillow 2x6 Hands on thighs ,cue form   Standing hip ex's ABD @ table/ counter? > NV? Heel/ toe raises     bridge hookly 2x15    EOB hip ext  Resume NV   Hookly LTR x20    Lateral walk @ table 2 laps in // bars   Step up Fwd 6\" 2 rails x 4 laps reciprocal R,L              Gait 2 clinic laps w/FWW   2 laps in // bars min UE A Cue posture, adequate stride length        Therapeutic Activity (04589) Min: 5'     Pt edu/ reassess Differentials: knee v hip vs lumbar. Functional deficits per 30s STS test    Functional test:  Cane: 29.5s, FWW: 24s            NMR re-education (03761)   Min:     Mf Activation- re-ed     TrA Re-ed activation     Glute Max re-ed activation     Balance ex's > Add prn             Manual Intervention (42547) Min: 0     Lumbar Resume prn   Lumbar manual Resume prn   Hip manual  planes No reproduction of pain. Modalities  Min:             Other Therapeutic Activities:  Pt was educated on PT POC, Diagnosis, Prognosis, pathomechanics as well as frequency and duration of scheduling future physical therapy appointments. Time was also taken on this day to answer all patient questions and participation in PT. Reviewed appointment policy in detail with patient and patient verbalized understanding.      Home Exercise Program: Patient instructed in the following for HEP:      11/8: hookly bridge  11/18: Access Code: [email or web address]  Exercises  Supine Lower Trunk Rotation - 2-3 x daily - 4-6 x weekly - 2-3 sets - 10-15 reps  Sit to Stand - 2-3 x daily - 4-6 x weekly - 10-15 reps  Walking program w/ walker       . Patient verbalized/demonstrated understanding and was issued written handout. Therapeutic Exercise and NMR EXR  [x] (62529) Provided verbal/tactile cueing for activities related to strengthening, flexibility, endurance, ROM  for improvements in proximal hip and core control with self care, mobility, lifting and ambulation. [x] (05184) Provided verbal/tactile cueing for activities related to improving balance, coordination, kinesthetic sense, posture, motor skill, proprioception  to assist with core control in self care, mobility, lifting, and ambulation.      Therapeutic Activities:    [x] (17786 or 61069) Provided verbal/tactile cueing for activities related to improving balance, coordination, kinesthetic sense, posture, motor skill, proprioception and motor activation to allow for proper function  with self care and ADLs  [x] (09421) Provided training and instruction to the patient for proper core and proximal hip recruitment and positioning with ambulation re-education     Home Exercise Program:    [x] (41192) Reviewed/Progressed HEP activities related to strengthening, flexibility, endurance, ROM of core, proximal hip and LE for functional self-care, mobility, lifting and ambulation   [x] (71588) Reviewed/Progressed HEP activities related to improving balance, coordination, kinesthetic sense, posture, motor skill, proprioception of core, proximal hip and LE for self care, mobility, lifting, and ambulation      Manual Treatments:  PROM / STM / Oscillations-Mobs:  G-I, II, III, IV (PA's, Inf., Post.)  [x] (40017) Provided manual therapy to mobilize proximal hip and LS spine soft tissue/joints for the purpose of modulating pain, promoting relaxation,  increasing ROM, reducing/eliminating soft tissue swelling/inflammation/restriction, improving soft tissue extensibility and allowing for proper ROM for normal function with self care, mobility, lifting and ambulation. Approval Dates:  CPT Code Units Approved Units Used  Date Updated:                     Charges:  Timed Code Treatment Minutes: 30   Total Treatment Minutes: 30     [] EVAL (LOW) 66340 (typically 20 minutes face-to-face)  [] EVAL (MOD) 19102 (typically 30 minutes face-to-face)  [] EVAL (HIGH) 06840 (typically 45 minutes face-to-face)  [] RE-EVAL     [x] UR(87294) x 2    [] Dry needle 1 or 2 Muscles (81436)  [] NMR (39309) x     [] Dry needle 3+ Muscles (42579)  [] Manual (12651) x     [] Ultrasound (83800) x  [] TA (88670) x     [] Mech Traction (66643)  [] ES(attended) (88476)     [] ES (un) (59721):   [] Vasopump (02982) [] Ionto (33176)   [] Other:    GOALS:  Patient stated goal: Able to walk community distances w/o AD. [] Progressing: [] Met: [] Not Met: [] Adjusted    Therapist goals for Patient:   Short Term Goals: To be achieved in: 2 weeks  1. Independent in HEP and progression per patient tolerance, in order to prevent re-injury. [] Progressing: [] Met: [] Not Met: [] Adjusted  2. Patient will have a decrease in pain to facilitate improvement in movement, function, and ADLs as indicated by Functional Deficits. [] Progressing: [] Met: [] Not Met: [] Adjusted    Long Term Goals: To be achieved in: up to 8-10 weeks  1. Disability index score improvement by 10% or better on WOMAC to assist with reaching prior level of function. [] Progressing: [] Met: [] Not Met: [] Adjusted  3. Patient will demonstrate an increase in Strength to good proximal hip strength and control, within 5lb HHD in LE to allow for proper functional mobility as indicated by patients Functional Deficits. [] Progressing: [] Met: [] Not Met: [] Adjusted  4.  Patient will be able to perform at least 10 reps on 30 sec sit to stand test to demonstrate improved lower quarter functional strength. [] Progressing: [] Met: [] Not Met: [] Adjusted    ASSESSMENT:  Sameer session well. Significant reduction in knee pain after hookly LTR and hookly bridges. Incr knee pain with walking in // bars w/ min UE assist, but better with walker and slightly flexed trunk posture. Still struggles with sit to stand and functional ex's. Pt needs cont's skilled PT to improve lumbar mobility which seems to decrease knee pain and also needs work on progressing functional strength and independence. Treatment/Activity Tolerance:  [x] Patient tolerated treatment well [] Patient limited by fatique  [] Patient limited by pain  [] Patient limited by other medical complications  [] Other:     Overall Progression Towards Functional goals/ Treatment Progress Update:  [x] Patient is progressing as expected towards functional goals listed. [] Progression is slowed due to complexities/Impairments listed. [] Progression has been slowed due to co-morbidities. [] Plan just implemented, too soon to assess goals progression <30days   [] Goals require adjustment due to lack of progress  [] Patient is not progressing as expected and requires additional follow up with physician  [] Other:    Prognosis for POC: [x] Good [] Fair  [] Poor    Patient requires continued skilled intervention: [x] Yes  [] No        PLAN: See eval  PT recommends 1-2x/wk, if pt cont's to struggle with pain and lack of significant functional progress, may refer back for further work-up possibly including investigating lumbar stenosis as a contributing factor to functional decline. [] Continue per plan of care [] Alter current plan (see comments)  [x] Plan of care initiated [] Hold pending MD visit [] Discharge    Electronically signed by:  Darrion Pedro, PT , DPT, OCS #[id number]      Note: If patient does not return for scheduled/recommended follow up visits, this note will serve as a discharge from care along with the most recent update on progress.

## 2021-12-02 NOTE — FLOWSHEET NOTE
[address] [facility], [facility]  [address] [facility]  Phone: [phone]   Fax:     [phone]    Physical Therapy Treatment Note/ Progress Report:     Date:  2021    Patient Name:  Robert Aponte    :  1935  MRN: [id number]    Pertinent Medical History: Additional Pertinent Hx: 6 months progressive decline; gait, balance, L TKR 2016, R knee OA, L hip ORIF     Medical/Treatment Diagnosis Information:  Diagnosis: M25.561, M25.562, G89.29 (ICD-10-CM) - Chronic pain of both knees, M17.0 (ICD-10-CM) - Primary osteoarthritis of both knees  Treatment Diagnosis: Bilat knee pain, progressive LE weakness? Insurance/Certification information:  PT Insurance Information: [facility] Medicare  Physician Information:  Referring Practitioner: DEONNA Jim CNP  Plan of care signed (Y/N):     Date of Patient follow up with Physician:      Progress Report: []  Yes  [x]  No     Date Range for reporting period:  Beginnin2021  Ending:    Progress report due (10 Rx/or 30 days whichever is less):      Recertification due (POC duration/ or 90 days whichever is less):     Visit # POC/ Insurance Allowable Auth Needed   6 [facility] medicare []Yes    []No     Functional Outcomes Measure:   Date Assessed: at eval  Test: womac  Score:    Pain level:  6 /10     History of Injury: Pt here for evaluation of chronic knee pain and worsening gait, which seems to be worsening significantly over the past 6 months. He has history of left knee replacement about 4 yrs ago, and R knee has OA, but he doesn't want it replaced. He also has history of left hip fx with ORIF in 2019. He reports onset of back pain starting about 2 months ago, and he seems to assimilate the back pain with bilateral knee pain. He rides a bike at home for about 20 minutes at home nearly every day, and this does not cause him pain.  Prior to 6 months ago, he was able to walk 30 minutes without a cane, but now his walking tolerance is only a few minutes, with very slow gait with wide base of support and he uses a quad cane. He is here with wife, and they state they want to try aquatic therapy because they knew someone who did it and it helped. He did attend therapy at the [address] [place] [address] for 5 visits, but denies any significant change in knee pain. He denies any specific hip pain, but states he has some back pain that does worsen with standing and walking. He has not had any lumbar work-up at this point. He hopes to decrease bilat leg pain with walking and improve walking and functional mobility back to previous levels. SUBJECTIVE:   11/8: pt reports no new c/o. Still antalgic gait w/ quad cane. bilat knee pain worse with standing, loosens some with short walks. Pt reports 5lb wt loss in the past few months, 20lbs over the past few years, but also has not been as active the past few years. 11/15: no new c/o. bilat knee pain, worse with walking and standing, better with sitting. Amb w/ quad cane, short stride gait. 11/18: no new c/o. Knee pain was better for a few hrs after last visit. He has FWW today, states it helps his knee pain a little. He saw PCP and they wondered why he wasn't doing aquatic PT. I explained that in order to help problem solve his pain and to differentiate between knee/ hip/ low back sources, it was important to do land PT.   11/22/21: pt reports overall not much change. Pain rated 6-7/10 today. States after PT, his pain reduces 1-2pts, but then returns a few hours later. He is now able to walk 1 mile using FWW with near normal stride gait. 12/2: Patient states that both of his knees are very sore today, 6/10. Walked about 1/4 mile outside his house this morning which he typically does. Back is a little sore as well but not much. Notes getting some slight relief of his B knee pain when he sits.   Doesn't feel like the therapy is helping much. OBJECTIVE:    Observation:    Test measurements:        11/8:   Cervical Myelopathy Cluster Carlottastiven Del Valle et al 2010)    Age Greater >45 yes   Gait Deviation yes   Hoffmans no   Inverted Supinator no   Babinski no           >3 Positive tests increases likelihood to 94% for CM    PALPATION   No tenderness w/ lumbar PA or reproduction of radic sx. ROM/ MOBILITY   Hip mobility WFL, no reproduction of pain w/ overpressure    STRENGTH   Knee ext and flexion 4+/5 bilat to 5/5. SPECIAL TESTS   Neg SLR, neg hip blaine, pos lumbar quadrant w/ facet and radiating pain.    TUG: quad cane: 29s, FWW: 24s   11/18: 30s STS: 6x (age adjusted norm =8)    OTHER   11/15: patellar reflexes 3+ bilat, achilles 2+ bilat, neg babinski    RESTRICTIONS/PRECAUTIONS:     Exercises/Interventions:     Therapeutic Ex (80461)   Min: 25' Resistance/Reps Notes/Cues   Sit to stand Chair + pillow 2x6 Hands on thighs ,cue form   Standing hip abduction 0# 1x10 L/R At table   DKTC  5x 5 sec holds With A from PT   Heel/ toe raises 20x    bridge hookly 2x15    EOB hip ext 1x10    Hookly LTR x20    Lateral walk with PT HHA 5' x2Pt needed to sit and rest due to increased B LE pain with lateral gait   Step up Fwd 6\" 2 rails x 4 laps reciprocal R,L              Gait 2 clinic laps w/FWW   2 laps in // bars min UE A Cue posture, adequate stride length        Therapeutic Activity (55039) Min: 0'     Functional test:  Cane: 29.5s, FWW: 24s            NMR re-education (86450)   Min:     Mf Activation- re-ed     TrA Re-ed activation     Glute Max re-ed activation     Balance ex's > Add prn             Manual Intervention (14380) Min: 15     Lumbar Resume prn   Lumbar manual Resume prn   Hip manual / Hamstring stretching PROM w/ overpressure all planes Pt reports reduction of knee pain following manual hip stretching    Modalities  Min:             Other Therapeutic Activities:  Pt was educated on PT POC, Diagnosis, Prognosis, core, proximal hip and LE for self care, mobility, lifting, and ambulation      Manual Treatments:  PROM / STM / Oscillations-Mobs:  G-I, II, III, IV (PA's, Inf., Post.)  [x] (52876) Provided manual therapy to mobilize proximal hip and LS spine soft tissue/joints for the purpose of modulating pain, promoting relaxation,  increasing ROM, reducing/eliminating soft tissue swelling/inflammation/restriction, improving soft tissue extensibility and allowing for proper ROM for normal function with self care, mobility, lifting and ambulation. Charges:  Timed Code Treatment Minutes: 40   Total Treatment Minutes: 40     [] EVAL (LOW) 25527 (typically 20 minutes face-to-face)  [] EVAL (MOD) 12252 (typically 30 minutes face-to-face)  [] EVAL (HIGH) 66103 (typically 45 minutes face-to-face)  [] RE-EVAL     [x] DH(74033) x 2    [] Dry needle 1 or 2 Muscles (78561)  [] NMR (69129) x     [] Dry needle 3+ Muscles (06027)  [x] Manual (92774) x     [] Ultrasound (34774) x  [] TA (50942) x     [] Mech Traction (76807)  [] ES(attended) (39337)     [] ES (un) (98101):   [] Vasopump (16597) [] Ionto (01384)   [] Other:    GOALS:  Patient stated goal: Able to walk community distances w/o AD. [] Progressing: [] Met: [] Not Met: [] Adjusted    Therapist goals for Patient:   Short Term Goals: To be achieved in: 2 weeks  1. Independent in HEP and progression per patient tolerance, in order to prevent re-injury. [] Progressing: [] Met: [] Not Met: [] Adjusted  2. Patient will have a decrease in pain to facilitate improvement in movement, function, and ADLs as indicated by Functional Deficits. [] Progressing: [] Met: [] Not Met: [] Adjusted    Long Term Goals: To be achieved in: up to 8-10 weeks  1. Disability index score improvement by 10% or better on WOMAC to assist with reaching prior level of function. [] Progressing: [] Met: [] Not Met: [] Adjusted  3.  Patient will demonstrate an increase in Strength to good proximal hip strength and control, within 5lb HHD in LE to allow for proper functional mobility as indicated by patients Functional Deficits. [] Progressing: [] Met: [] Not Met: [] Adjusted  4. Patient will be able to perform at least 10 reps on 30 sec sit to stand test to demonstrate improved lower quarter functional strength. [] Progressing: [] Met: [] Not Met: [] Adjusted    ASSESSMENT:  Fair tolerance to PT session. Significant reduction in knee pain after manual hip stretching and with seated flexion stretch. Pain returned immediately though with walking exercises which required pt to take seated rest break. Discussed with patient and wife the possibility of pt's decline and pain coming from lumbar stenosis. Pt needs cont's skilled PT to improve lumbar mobility which seems to decrease knee pain and also needs work on progressing functional strength and independence. However, pt and wife requested to hold PT through the end of the month due to busy schedule this month going out of town. Treatment/Activity Tolerance:  [x] Patient tolerated treatment well [] Patient limited by fatique  [] Patient limited by pain  [] Patient limited by other medical complications  [] Other:     Overall Progression Towards Functional goals/ Treatment Progress Update:  [x] Patient is progressing as expected towards functional goals listed. [] Progression is slowed due to complexities/Impairments listed. [] Progression has been slowed due to co-morbidities. [] Plan just implemented, too soon to assess goals progression <30days   [] Goals require adjustment due to lack of progress  [] Patient is not progressing as expected and requires additional follow up with physician  [] Other:    Prognosis for POC: [x] Good [] Fair  [] Poor    Patient requires continued skilled intervention: [x] Yes  [] No        PLAN: Pt and wife requested to hold PT through the end of the month due to busy schedule this month going out of town.  Pt to call once back in town concerning need for continued PT. If pt cont's to struggle with pain and lack of significant functional progress, may refer back for further work-up possibly including investigating lumbar stenosis as a contributing factor to functional decline. [] Continue per plan of care [x] Alter current plan (see comments)  [] Plan of care initiated [] Hold pending MD visit [] Discharge    Electronically signed by: Annie Patterson PT , OMT-C,  [id number]    Note: If patient does not return for scheduled/recommended follow up visits, this note will serve as a discharge from care along with the most recent update on progress.

## 2022-01-01 ENCOUNTER — CARE COORDINATION (OUTPATIENT)
Dept: CASE MANAGEMENT | Age: 87
End: 2022-01-01

## 2022-01-01 ENCOUNTER — TELEPHONE (OUTPATIENT)
Dept: ORTHOPEDIC SURGERY | Age: 87
End: 2022-01-01

## 2022-01-01 ENCOUNTER — APPOINTMENT (OUTPATIENT)
Dept: CT IMAGING | Age: 87
DRG: 068 | End: 2022-01-01
Payer: MEDICARE

## 2022-01-01 ENCOUNTER — HOSPITAL ENCOUNTER (INPATIENT)
Age: 87
LOS: 3 days | Discharge: HOME HEALTH | DRG: 068 | End: 2022-04-26
Attending: EMERGENCY MEDICINE | Admitting: INTERNAL MEDICINE
Payer: MEDICARE

## 2022-01-01 ENCOUNTER — TELEPHONE (OUTPATIENT)
Dept: FAMILY MEDICINE CLINIC | Age: 87
End: 2022-01-01

## 2022-01-01 ENCOUNTER — TELEPHONE (OUTPATIENT)
Dept: CARDIOLOGY CLINIC | Age: 87
End: 2022-01-01

## 2022-01-01 ENCOUNTER — HOSPITAL ENCOUNTER (OUTPATIENT)
Dept: PHYSICAL THERAPY | Age: 87
Setting detail: THERAPIES SERIES
Discharge: HOME | End: 2022-02-11
Payer: MEDICARE

## 2022-01-01 ENCOUNTER — HOSPITAL ENCOUNTER (OUTPATIENT)
Dept: PHYSICAL THERAPY | Age: 87
Setting detail: THERAPIES SERIES
Discharge: HOME | End: 2022-02-04
Payer: MEDICARE

## 2022-01-01 ENCOUNTER — HOSPITAL ENCOUNTER (INPATIENT)
Age: 87
LOS: 1 days | DRG: 064 | End: 2022-05-03
Attending: EMERGENCY MEDICINE | Admitting: FAMILY MEDICINE
Payer: MEDICARE

## 2022-01-01 ENCOUNTER — OFFICE VISIT (OUTPATIENT)
Dept: ORTHOPEDIC SURGERY | Age: 87
End: 2022-01-01
Payer: MEDICARE

## 2022-01-01 ENCOUNTER — APPOINTMENT (OUTPATIENT)
Dept: PHYSICAL THERAPY | Age: 87
End: 2022-01-01
Payer: MEDICARE

## 2022-01-01 ENCOUNTER — OFFICE VISIT (OUTPATIENT)
Dept: FAMILY MEDICINE CLINIC | Age: 87
End: 2022-01-01
Payer: MEDICARE

## 2022-01-01 ENCOUNTER — HOSPITAL ENCOUNTER (OUTPATIENT)
Dept: PHYSICAL THERAPY | Age: 87
Setting detail: THERAPIES SERIES
Discharge: HOME | End: 2022-04-13
Payer: MEDICARE

## 2022-01-01 ENCOUNTER — HOSPITAL ENCOUNTER (OUTPATIENT)
Dept: PHYSICAL THERAPY | Age: 87
Setting detail: THERAPIES SERIES
Discharge: HOME | End: 2022-02-08
Payer: MEDICARE

## 2022-01-01 ENCOUNTER — HOSPITAL ENCOUNTER (OUTPATIENT)
Dept: PHYSICAL THERAPY | Age: 87
Setting detail: THERAPIES SERIES
Discharge: HOME | End: 2022-02-15
Payer: MEDICARE

## 2022-01-01 ENCOUNTER — APPOINTMENT (OUTPATIENT)
Dept: GENERAL RADIOLOGY | Age: 87
DRG: 064 | End: 2022-01-01
Payer: MEDICARE

## 2022-01-01 ENCOUNTER — HOSPITAL ENCOUNTER (OUTPATIENT)
Age: 87
Setting detail: OUTPATIENT SURGERY
Discharge: HOME | End: 2022-03-09
Attending: ORTHOPAEDIC SURGERY | Admitting: ORTHOPAEDIC SURGERY
Payer: MEDICARE

## 2022-01-01 ENCOUNTER — APPOINTMENT (OUTPATIENT)
Dept: MRI IMAGING | Age: 87
DRG: 068 | End: 2022-01-01
Payer: MEDICARE

## 2022-01-01 ENCOUNTER — APPOINTMENT (OUTPATIENT)
Dept: GENERAL RADIOLOGY | Age: 87
DRG: 068 | End: 2022-01-01
Payer: MEDICARE

## 2022-01-01 ENCOUNTER — APPOINTMENT (OUTPATIENT)
Dept: CT IMAGING | Age: 87
DRG: 064 | End: 2022-01-01
Payer: MEDICARE

## 2022-01-01 VITALS
DIASTOLIC BLOOD PRESSURE: 107 MMHG | TEMPERATURE: 96.6 F | HEART RATE: 57 BPM | OXYGEN SATURATION: 100 % | WEIGHT: 164 LBS | HEIGHT: 67 IN | BODY MASS INDEX: 25.74 KG/M2 | SYSTOLIC BLOOD PRESSURE: 186 MMHG | RESPIRATION RATE: 16 BRPM

## 2022-01-01 VITALS — WEIGHT: 163 LBS | BODY MASS INDEX: 25.58 KG/M2 | HEIGHT: 67 IN

## 2022-01-01 VITALS
DIASTOLIC BLOOD PRESSURE: 94 MMHG | WEIGHT: 164.02 LBS | HEART RATE: 58 BPM | OXYGEN SATURATION: 96 % | RESPIRATION RATE: 15 BRPM | HEIGHT: 67 IN | SYSTOLIC BLOOD PRESSURE: 164 MMHG | TEMPERATURE: 97.5 F | BODY MASS INDEX: 25.74 KG/M2

## 2022-01-01 VITALS
SYSTOLIC BLOOD PRESSURE: 119 MMHG | DIASTOLIC BLOOD PRESSURE: 85 MMHG | HEIGHT: 69 IN | TEMPERATURE: 100.1 F | OXYGEN SATURATION: 92 % | WEIGHT: 162.48 LBS | BODY MASS INDEX: 24.07 KG/M2

## 2022-01-01 VITALS
HEIGHT: 67 IN | HEART RATE: 77 BPM | OXYGEN SATURATION: 98 % | SYSTOLIC BLOOD PRESSURE: 126 MMHG | RESPIRATION RATE: 16 BRPM | BODY MASS INDEX: 25.9 KG/M2 | WEIGHT: 165 LBS | DIASTOLIC BLOOD PRESSURE: 78 MMHG

## 2022-01-01 VITALS — HEIGHT: 67 IN | WEIGHT: 163 LBS | BODY MASS INDEX: 25.58 KG/M2

## 2022-01-01 VITALS
OXYGEN SATURATION: 97 % | BODY MASS INDEX: 25.27 KG/M2 | HEIGHT: 67 IN | HEART RATE: 83 BPM | WEIGHT: 161 LBS | RESPIRATION RATE: 18 BRPM | SYSTOLIC BLOOD PRESSURE: 160 MMHG | DIASTOLIC BLOOD PRESSURE: 98 MMHG

## 2022-01-01 DIAGNOSIS — Z96.652 STATUS POST TOTAL LEFT KNEE REPLACEMENT: ICD-10-CM

## 2022-01-01 DIAGNOSIS — F03.90 DEMENTIA WITHOUT BEHAVIORAL DISTURBANCE, UNSPECIFIED DEMENTIA TYPE: ICD-10-CM

## 2022-01-01 DIAGNOSIS — N13.8 BPH WITH OBSTRUCTION/LOWER URINARY TRACT SYMPTOMS: ICD-10-CM

## 2022-01-01 DIAGNOSIS — Z00.00 MEDICARE ANNUAL WELLNESS VISIT, SUBSEQUENT: Primary | ICD-10-CM

## 2022-01-01 DIAGNOSIS — I10 ESSENTIAL HYPERTENSION: ICD-10-CM

## 2022-01-01 DIAGNOSIS — M25.562 CHRONIC PAIN OF LEFT KNEE: ICD-10-CM

## 2022-01-01 DIAGNOSIS — I16.1 HYPERTENSIVE EMERGENCY: ICD-10-CM

## 2022-01-01 DIAGNOSIS — C18.6 MALIGNANT NEOPLASM OF DESCENDING COLON (HCC): Chronic | ICD-10-CM

## 2022-01-01 DIAGNOSIS — G89.29 CHRONIC PAIN OF BOTH KNEES: ICD-10-CM

## 2022-01-01 DIAGNOSIS — I10 ESSENTIAL HYPERTENSION: Chronic | ICD-10-CM

## 2022-01-01 DIAGNOSIS — E11.21 TYPE 2 DIABETES MELLITUS WITH DIABETIC NEPHROPATHY, WITHOUT LONG-TERM CURRENT USE OF INSULIN (HCC): ICD-10-CM

## 2022-01-01 DIAGNOSIS — M17.11 PRIMARY OSTEOARTHRITIS OF RIGHT KNEE: Primary | ICD-10-CM

## 2022-01-01 DIAGNOSIS — R73.9 HYPERGLYCEMIA: ICD-10-CM

## 2022-01-01 DIAGNOSIS — C18.6 MALIGNANT NEOPLASM OF DESCENDING COLON (HCC): ICD-10-CM

## 2022-01-01 DIAGNOSIS — I63.9 CEREBROVASCULAR ACCIDENT (CVA), UNSPECIFIED MECHANISM (HCC): Primary | ICD-10-CM

## 2022-01-01 DIAGNOSIS — R53.1 INCREASED WEAKNESS WHEN AMBULATING: ICD-10-CM

## 2022-01-01 DIAGNOSIS — E78.1 HYPERTRIGLYCERIDEMIA: Chronic | ICD-10-CM

## 2022-01-01 DIAGNOSIS — M25.562 CHRONIC PAIN OF BOTH KNEES: ICD-10-CM

## 2022-01-01 DIAGNOSIS — Z00.00 ROUTINE GENERAL MEDICAL EXAMINATION AT A HEALTH CARE FACILITY: Primary | ICD-10-CM

## 2022-01-01 DIAGNOSIS — R40.2322: ICD-10-CM

## 2022-01-01 DIAGNOSIS — R77.8 ELEVATED TROPONIN: ICD-10-CM

## 2022-01-01 DIAGNOSIS — G89.29 CHRONIC PAIN OF RIGHT KNEE: ICD-10-CM

## 2022-01-01 DIAGNOSIS — S41.111A LACERATION OF RIGHT UPPER EXTREMITY, INITIAL ENCOUNTER: ICD-10-CM

## 2022-01-01 DIAGNOSIS — M25.561 CHRONIC PAIN OF BOTH KNEES: ICD-10-CM

## 2022-01-01 DIAGNOSIS — I25.10 CORONARY ARTERY DISEASE, NON-OCCLUSIVE: Chronic | ICD-10-CM

## 2022-01-01 DIAGNOSIS — Z95.3 S/P AORTIC VALVE REPLACEMENT WITH PORCINE VALVE: ICD-10-CM

## 2022-01-01 DIAGNOSIS — I62.9 INTRACRANIAL HEMORRHAGE (HCC): Primary | ICD-10-CM

## 2022-01-01 DIAGNOSIS — M25.561 CHRONIC PAIN OF RIGHT KNEE: ICD-10-CM

## 2022-01-01 DIAGNOSIS — R35.81 NOCTURNAL POLYURIA: ICD-10-CM

## 2022-01-01 DIAGNOSIS — I63.9 ACUTE CEREBROVASCULAR ACCIDENT (CVA) (HCC): Primary | ICD-10-CM

## 2022-01-01 DIAGNOSIS — N40.1 BPH WITH OBSTRUCTION/LOWER URINARY TRACT SYMPTOMS: ICD-10-CM

## 2022-01-01 DIAGNOSIS — G89.29 CHRONIC PAIN OF LEFT KNEE: ICD-10-CM

## 2022-01-01 DIAGNOSIS — E11.21 DIABETIC NEPHROPATHY ASSOCIATED WITH TYPE 2 DIABETES MELLITUS (HCC): ICD-10-CM

## 2022-01-01 LAB
A/G RATIO: 1.3 (ref 1.1–2.2)
A/G RATIO: 1.4 (ref 1.1–2.2)
ALBUMIN SERPL-MCNC: 3.6 G/DL (ref 3.4–5)
ALBUMIN SERPL-MCNC: 4 G/DL (ref 3.4–5)
ALP BLD-CCNC: 80 U/L (ref 40–129)
ALP BLD-CCNC: 92 U/L (ref 40–129)
ALT SERPL-CCNC: 12 U/L (ref 10–40)
ALT SERPL-CCNC: 15 U/L (ref 10–40)
ANION GAP SERPL CALCULATED.3IONS-SCNC: 14 MMOL/L (ref 3–16)
APTT: 34.4 SEC (ref 26.2–38.6)
AST SERPL-CCNC: 18 U/L (ref 15–37)
AST SERPL-CCNC: 19 U/L (ref 15–37)
BACTERIA: NORMAL /HPF
BASOPHILS ABSOLUTE: 0 K/UL (ref 0–0.2)
BASOPHILS ABSOLUTE: 0.1 K/UL (ref 0–0.2)
BASOPHILS RELATIVE PERCENT: 0.4 %
BASOPHILS RELATIVE PERCENT: 0.8 %
BILIRUB SERPL-MCNC: 0.6 MG/DL (ref 0–1)
BILIRUB SERPL-MCNC: 0.8 MG/DL (ref 0–1)
BILIRUBIN URINE: NEGATIVE
BILIRUBIN URINE: NEGATIVE
BILIRUBIN, POC: ABNORMAL
BLOOD URINE, POC: NEGATIVE
BLOOD, URINE: ABNORMAL
BLOOD, URINE: ABNORMAL
BUN BLDV-MCNC: 19 MG/DL (ref 7–20)
BUN BLDV-MCNC: 21 MG/DL (ref 7–20)
BUN BLDV-MCNC: 22 MG/DL (ref 7–20)
CALCIUM SERPL-MCNC: 8.9 MG/DL (ref 8.3–10.6)
CALCIUM SERPL-MCNC: 8.9 MG/DL (ref 8.3–10.6)
CALCIUM SERPL-MCNC: 9.1 MG/DL (ref 8.3–10.6)
CHLORIDE BLD-SCNC: 97 MMOL/L (ref 99–110)
CHLORIDE BLD-SCNC: 98 MMOL/L (ref 99–110)
CHLORIDE BLD-SCNC: 98 MMOL/L (ref 99–110)
CHOLESTEROL, TOTAL: 133 MG/DL (ref 0–199)
CLARITY, POC: CLEAR
CLARITY: CLEAR
CLARITY: CLEAR
CO2: 20 MMOL/L (ref 21–32)
CO2: 23 MMOL/L (ref 21–32)
CO2: 24 MMOL/L (ref 21–32)
COLOR, POC: YELLOW
COLOR: YELLOW
COLOR: YELLOW
CREAT SERPL-MCNC: 0.9 MG/DL (ref 0.8–1.3)
CREAT SERPL-MCNC: 0.9 MG/DL (ref 0.8–1.3)
CREAT SERPL-MCNC: 1 MG/DL (ref 0.8–1.3)
EKG ATRIAL RATE: 44 BPM
EKG ATRIAL RATE: 63 BPM
EKG DIAGNOSIS: NORMAL
EKG DIAGNOSIS: NORMAL
EKG P AXIS: 74 DEGREES
EKG P-R INTERVAL: 192 MS
EKG Q-T INTERVAL: 440 MS
EKG Q-T INTERVAL: 520 MS
EKG QRS DURATION: 72 MS
EKG QRS DURATION: 74 MS
EKG QTC CALCULATION (BAZETT): 450 MS
EKG QTC CALCULATION (BAZETT): 520 MS
EKG R AXIS: -26 DEGREES
EKG R AXIS: -33 DEGREES
EKG T AXIS: -31 DEGREES
EKG T AXIS: 222 DEGREES
EKG VENTRICULAR RATE: 60 BPM
EKG VENTRICULAR RATE: 63 BPM
EOSINOPHILS ABSOLUTE: 0 K/UL (ref 0–0.6)
EOSINOPHILS ABSOLUTE: 0.2 K/UL (ref 0–0.6)
EOSINOPHILS RELATIVE PERCENT: 0.7 %
EOSINOPHILS RELATIVE PERCENT: 1.9 %
EPITHELIAL CELLS, UA: 0 /HPF (ref 0–5)
EPITHELIAL CELLS, UA: 0 /HPF (ref 0–5)
ESTIMATED AVERAGE GLUCOSE: 182.9 MG/DL
ESTIMATED AVERAGE GLUCOSE: 182.9 MG/DL
GFR AFRICAN AMERICAN: >60
GFR NON-AFRICAN AMERICAN: >60
GLUCOSE BLD-MCNC: 140 MG/DL (ref 70–99)
GLUCOSE BLD-MCNC: 140 MG/DL (ref 70–99)
GLUCOSE BLD-MCNC: 144 MG/DL (ref 70–99)
GLUCOSE BLD-MCNC: 149 MG/DL (ref 70–99)
GLUCOSE BLD-MCNC: 175 MG/DL (ref 70–99)
GLUCOSE BLD-MCNC: 180 MG/DL
GLUCOSE BLD-MCNC: 186 MG/DL (ref 70–99)
GLUCOSE BLD-MCNC: 213 MG/DL (ref 70–99)
GLUCOSE BLD-MCNC: 216 MG/DL (ref 70–99)
GLUCOSE BLD-MCNC: 225 MG/DL (ref 70–99)
GLUCOSE BLD-MCNC: 245 MG/DL (ref 70–99)
GLUCOSE BLD-MCNC: 256 MG/DL (ref 70–99)
GLUCOSE BLD-MCNC: 285 MG/DL (ref 70–99)
GLUCOSE BLD-MCNC: 319 MG/DL (ref 70–99)
GLUCOSE BLD-MCNC: 338 MG/DL (ref 70–99)
GLUCOSE BLD-MCNC: 342 MG/DL (ref 70–99)
GLUCOSE BLD-MCNC: 369 MG/DL (ref 70–99)
GLUCOSE URINE, POC: NEGATIVE
GLUCOSE URINE: 250 MG/DL
GLUCOSE URINE: 500 MG/DL
HBA1C MFR BLD: 8 %
HBA1C MFR BLD: 8 %
HBA1C MFR BLD: 8.3 %
HCT VFR BLD CALC: 40.4 % (ref 40.5–52.5)
HCT VFR BLD CALC: 41.1 % (ref 40.5–52.5)
HCT VFR BLD CALC: 42.3 % (ref 40.5–52.5)
HDLC SERPL-MCNC: 46 MG/DL (ref 40–60)
HEMOGLOBIN: 13.9 G/DL (ref 13.5–17.5)
HEMOGLOBIN: 14.1 G/DL (ref 13.5–17.5)
HEMOGLOBIN: 14.5 G/DL (ref 13.5–17.5)
HYALINE CASTS: 0 /LPF (ref 0–8)
HYALINE CASTS: 0 /LPF (ref 0–8)
INR BLD: 1.03 (ref 0.88–1.12)
INR BLD: 1.09 (ref 0.88–1.12)
INR BLD: 1.14 (ref 0.88–1.12)
KETONES, POC: NEGATIVE
KETONES, URINE: NEGATIVE MG/DL
KETONES, URINE: NEGATIVE MG/DL
LDL CHOLESTEROL CALCULATED: 63 MG/DL
LEUKOCYTE EST, POC: NEGATIVE
LEUKOCYTE ESTERASE, URINE: NEGATIVE
LEUKOCYTE ESTERASE, URINE: NEGATIVE
LV EF: 58 %
LVEF MODALITY: NORMAL
LYMPHOCYTES ABSOLUTE: 0.9 K/UL (ref 1–5.1)
LYMPHOCYTES ABSOLUTE: 3.3 K/UL (ref 1–5.1)
LYMPHOCYTES RELATIVE PERCENT: 14.5 %
LYMPHOCYTES RELATIVE PERCENT: 32.3 %
MCH RBC QN AUTO: 30.7 PG (ref 26–34)
MCH RBC QN AUTO: 30.8 PG (ref 26–34)
MCH RBC QN AUTO: 31.5 PG (ref 26–34)
MCHC RBC AUTO-ENTMCNC: 33.8 G/DL (ref 31–36)
MCHC RBC AUTO-ENTMCNC: 34.3 G/DL (ref 31–36)
MCHC RBC AUTO-ENTMCNC: 34.9 G/DL (ref 31–36)
MCV RBC AUTO: 89.6 FL (ref 80–100)
MCV RBC AUTO: 90.3 FL (ref 80–100)
MCV RBC AUTO: 91 FL (ref 80–100)
MICROSCOPIC EXAMINATION: YES
MICROSCOPIC EXAMINATION: YES
MONOCYTES ABSOLUTE: 0.5 K/UL (ref 0–1.3)
MONOCYTES ABSOLUTE: 0.8 K/UL (ref 0–1.3)
MONOCYTES RELATIVE PERCENT: 8.1 %
MONOCYTES RELATIVE PERCENT: 8.4 %
NEUTROPHILS ABSOLUTE: 4.8 K/UL (ref 1.7–7.7)
NEUTROPHILS ABSOLUTE: 5.9 K/UL (ref 1.7–7.7)
NEUTROPHILS RELATIVE PERCENT: 56.9 %
NEUTROPHILS RELATIVE PERCENT: 76 %
NITRITE, POC: NEGATIVE
NITRITE, URINE: NEGATIVE
NITRITE, URINE: NEGATIVE
PDW BLD-RTO: 13.7 % (ref 12.4–15.4)
PERFORMED ON: ABNORMAL
PH UA: 5.5 (ref 5–8)
PH UA: 8 (ref 5–8)
PH, POC: 5
PLATELET # BLD: 229 K/UL (ref 135–450)
PLATELET # BLD: 230 K/UL (ref 135–450)
PLATELET # BLD: 299 K/UL (ref 135–450)
PLATELET SLIDE REVIEW: ADEQUATE
PMV BLD AUTO: 7.9 FL (ref 5–10.5)
PMV BLD AUTO: 8.2 FL (ref 5–10.5)
PMV BLD AUTO: 8.5 FL (ref 5–10.5)
POTASSIUM REFLEX MAGNESIUM: 3.7 MMOL/L (ref 3.5–5.1)
POTASSIUM REFLEX MAGNESIUM: 4.2 MMOL/L (ref 3.5–5.1)
POTASSIUM REFLEX MAGNESIUM: 4.5 MMOL/L (ref 3.5–5.1)
PROTEIN UA: 100 MG/DL
PROTEIN UA: NEGATIVE MG/DL
PROTEIN, POC: ABNORMAL
PROTHROMBIN TIME: 11.7 SEC (ref 9.9–12.7)
PROTHROMBIN TIME: 12.4 SEC (ref 9.9–12.7)
PROTHROMBIN TIME: 12.9 SEC (ref 9.9–12.7)
RBC # BLD: 4.47 M/UL (ref 4.2–5.9)
RBC # BLD: 4.51 M/UL (ref 4.2–5.9)
RBC # BLD: 4.72 M/UL (ref 4.2–5.9)
RBC UA: 1 /HPF (ref 0–4)
RBC UA: NORMAL /HPF (ref 0–4)
SLIDE REVIEW: ABNORMAL
SODIUM BLD-SCNC: 132 MMOL/L (ref 136–145)
SODIUM BLD-SCNC: 134 MMOL/L (ref 136–145)
SODIUM BLD-SCNC: 136 MMOL/L (ref 136–145)
SPECIFIC GRAVITY UA: 1.01 (ref 1–1.03)
SPECIFIC GRAVITY UA: 1.01 (ref 1–1.03)
SPECIFIC GRAVITY, POC: >=1.03
TOTAL PROTEIN: 6.3 G/DL (ref 6.4–8.2)
TOTAL PROTEIN: 6.9 G/DL (ref 6.4–8.2)
TRIGL SERPL-MCNC: 119 MG/DL (ref 0–150)
TROPONIN: 0.01 NG/ML
TROPONIN: 0.02 NG/ML
TROPONIN: 0.02 NG/ML
URINE REFLEX TO CULTURE: ABNORMAL
URINE REFLEX TO CULTURE: ABNORMAL
URINE TYPE: ABNORMAL
URINE TYPE: ABNORMAL
UROBILINOGEN, POC: ABNORMAL
UROBILINOGEN, URINE: 0.2 E.U./DL
UROBILINOGEN, URINE: 0.2 E.U./DL
VLDLC SERPL CALC-MCNC: 24 MG/DL
WBC # BLD: 10.3 K/UL (ref 4–11)
WBC # BLD: 6.3 K/UL (ref 4–11)
WBC # BLD: 7.5 K/UL (ref 4–11)
WBC UA: 1 /HPF (ref 0–5)
WBC UA: 1 /HPF (ref 0–5)

## 2022-01-01 PROCEDURE — 92523 SPEECH SOUND LANG COMPREHEN: CPT

## 2022-01-01 PROCEDURE — 80048 BASIC METABOLIC PNL TOTAL CA: CPT

## 2022-01-01 PROCEDURE — 93005 ELECTROCARDIOGRAM TRACING: CPT | Performed by: EMERGENCY MEDICINE

## 2022-01-01 PROCEDURE — 90471 IMMUNIZATION ADMIN: CPT | Performed by: FAMILY MEDICINE

## 2022-01-01 PROCEDURE — 97535 SELF CARE MNGMENT TRAINING: CPT

## 2022-01-01 PROCEDURE — 93306 TTE W/DOPPLER COMPLETE: CPT

## 2022-01-01 PROCEDURE — 81002 URINALYSIS NONAUTO W/O SCOPE: CPT | Performed by: FAMILY MEDICINE

## 2022-01-01 PROCEDURE — 94760 N-INVAS EAR/PLS OXIMETRY 1: CPT

## 2022-01-01 PROCEDURE — 6360000002 HC RX W HCPCS

## 2022-01-01 PROCEDURE — G8417 CALC BMI ABV UP PARAM F/U: HCPCS | Performed by: ORTHOPAEDIC SURGERY

## 2022-01-01 PROCEDURE — 1200000000 HC SEMI PRIVATE

## 2022-01-01 PROCEDURE — 70450 CT HEAD/BRAIN W/O DYE: CPT

## 2022-01-01 PROCEDURE — 83036 HEMOGLOBIN GLYCOSYLATED A1C: CPT

## 2022-01-01 PROCEDURE — 2700000000 HC OXYGEN THERAPY PER DAY

## 2022-01-01 PROCEDURE — 71045 X-RAY EXAM CHEST 1 VIEW: CPT

## 2022-01-01 PROCEDURE — 97110 THERAPEUTIC EXERCISES: CPT

## 2022-01-01 PROCEDURE — 92610 EVALUATE SWALLOWING FUNCTION: CPT

## 2022-01-01 PROCEDURE — 96365 THER/PROPH/DIAG IV INF INIT: CPT

## 2022-01-01 PROCEDURE — G8484 FLU IMMUNIZE NO ADMIN: HCPCS | Performed by: ORTHOPAEDIC SURGERY

## 2022-01-01 PROCEDURE — 6360000002 HC RX W HCPCS: Performed by: ORTHOPAEDIC SURGERY

## 2022-01-01 PROCEDURE — 70496 CT ANGIOGRAPHY HEAD: CPT

## 2022-01-01 PROCEDURE — 4040F PNEUMOC VAC/ADMIN/RCVD: CPT | Performed by: ORTHOPAEDIC SURGERY

## 2022-01-01 PROCEDURE — 70551 MRI BRAIN STEM W/O DYE: CPT

## 2022-01-01 PROCEDURE — 1123F ACP DISCUSS/DSCN MKR DOCD: CPT | Performed by: ORTHOPAEDIC SURGERY

## 2022-01-01 PROCEDURE — 6370000000 HC RX 637 (ALT 250 FOR IP): Performed by: INTERNAL MEDICINE

## 2022-01-01 PROCEDURE — 4040F PNEUMOC VAC/ADMIN/RCVD: CPT | Performed by: FAMILY MEDICINE

## 2022-01-01 PROCEDURE — 85027 COMPLETE CBC AUTOMATED: CPT

## 2022-01-01 PROCEDURE — 1123F ACP DISCUSS/DSCN MKR DOCD: CPT | Performed by: STUDENT IN AN ORGANIZED HEALTH CARE EDUCATION/TRAINING PROGRAM

## 2022-01-01 PROCEDURE — 97162 PT EVAL MOD COMPLEX 30 MIN: CPT

## 2022-01-01 PROCEDURE — 2500000003 HC RX 250 WO HCPCS: Performed by: EMERGENCY MEDICINE

## 2022-01-01 PROCEDURE — 1123F ACP DISCUSS/DSCN MKR DOCD: CPT | Performed by: FAMILY MEDICINE

## 2022-01-01 PROCEDURE — 6360000002 HC RX W HCPCS: Performed by: INTERNAL MEDICINE

## 2022-01-01 PROCEDURE — 2580000003 HC RX 258: Performed by: FAMILY MEDICINE

## 2022-01-01 PROCEDURE — 2580000003 HC RX 258: Performed by: INTERNAL MEDICINE

## 2022-01-01 PROCEDURE — 94761 N-INVAS EAR/PLS OXIMETRY MLT: CPT

## 2022-01-01 PROCEDURE — 36415 COLL VENOUS BLD VENIPUNCTURE: CPT

## 2022-01-01 PROCEDURE — 84484 ASSAY OF TROPONIN QUANT: CPT

## 2022-01-01 PROCEDURE — G8427 DOCREV CUR MEDS BY ELIG CLIN: HCPCS | Performed by: ORTHOPAEDIC SURGERY

## 2022-01-01 PROCEDURE — 85025 COMPLETE CBC W/AUTO DIFF WBC: CPT

## 2022-01-01 PROCEDURE — 94002 VENT MGMT INPAT INIT DAY: CPT

## 2022-01-01 PROCEDURE — 97140 MANUAL THERAPY 1/> REGIONS: CPT

## 2022-01-01 PROCEDURE — 6360000004 HC RX CONTRAST MEDICATION: Performed by: EMERGENCY MEDICINE

## 2022-01-01 PROCEDURE — 70546 MR ANGIOGRAPH HEAD W/O&W/DYE: CPT

## 2022-01-01 PROCEDURE — 80053 COMPREHEN METABOLIC PANEL: CPT

## 2022-01-01 PROCEDURE — 2580000003 HC RX 258: Performed by: EMERGENCY MEDICINE

## 2022-01-01 PROCEDURE — G8417 CALC BMI ABV UP PARAM F/U: HCPCS | Performed by: FAMILY MEDICINE

## 2022-01-01 PROCEDURE — 64454 NJX AA&/STRD GNCLR NRV BRNCH: CPT | Performed by: ORTHOPAEDIC SURGERY

## 2022-01-01 PROCEDURE — G8428 CUR MEDS NOT DOCUMENT: HCPCS | Performed by: STUDENT IN AN ORGANIZED HEALTH CARE EDUCATION/TRAINING PROGRAM

## 2022-01-01 PROCEDURE — 5A1945Z RESPIRATORY VENTILATION, 24-96 CONSECUTIVE HOURS: ICD-10-PCS | Performed by: INTERNAL MEDICINE

## 2022-01-01 PROCEDURE — 93010 ELECTROCARDIOGRAM REPORT: CPT | Performed by: INTERNAL MEDICINE

## 2022-01-01 PROCEDURE — 1036F TOBACCO NON-USER: CPT | Performed by: FAMILY MEDICINE

## 2022-01-01 PROCEDURE — 99214 OFFICE O/P EST MOD 30 MIN: CPT | Performed by: STUDENT IN AN ORGANIZED HEALTH CARE EDUCATION/TRAINING PROGRAM

## 2022-01-01 PROCEDURE — 6360000004 HC RX CONTRAST MEDICATION: Performed by: PSYCHIATRY & NEUROLOGY

## 2022-01-01 PROCEDURE — 90714 TD VACC NO PRESV 7 YRS+ IM: CPT | Performed by: FAMILY MEDICINE

## 2022-01-01 PROCEDURE — 64624 DSTRJ NULYT AGT GNCLR NRV: CPT | Performed by: ORTHOPAEDIC SURGERY

## 2022-01-01 PROCEDURE — 97530 THERAPEUTIC ACTIVITIES: CPT

## 2022-01-01 PROCEDURE — APPNB30 APP NON BILLABLE TIME 0-30 MINS: Performed by: NURSE PRACTITIONER

## 2022-01-01 PROCEDURE — 4040F PNEUMOC VAC/ADMIN/RCVD: CPT | Performed by: STUDENT IN AN ORGANIZED HEALTH CARE EDUCATION/TRAINING PROGRAM

## 2022-01-01 PROCEDURE — 80061 LIPID PANEL: CPT

## 2022-01-01 PROCEDURE — 1036F TOBACCO NON-USER: CPT | Performed by: STUDENT IN AN ORGANIZED HEALTH CARE EDUCATION/TRAINING PROGRAM

## 2022-01-01 PROCEDURE — 97165 OT EVAL LOW COMPLEX 30 MIN: CPT

## 2022-01-01 PROCEDURE — 81001 URINALYSIS AUTO W/SCOPE: CPT

## 2022-01-01 PROCEDURE — 2060000000 HC ICU INTERMEDIATE R&B

## 2022-01-01 PROCEDURE — 2720000010 HC SURG SUPPLY STERILE: Performed by: ORTHOPAEDIC SURGERY

## 2022-01-01 PROCEDURE — G0439 PPPS, SUBSEQ VISIT: HCPCS | Performed by: FAMILY MEDICINE

## 2022-01-01 PROCEDURE — 85730 THROMBOPLASTIN TIME PARTIAL: CPT

## 2022-01-01 PROCEDURE — 93005 ELECTROCARDIOGRAM TRACING: CPT | Performed by: NURSE PRACTITIONER

## 2022-01-01 PROCEDURE — 3052F HG A1C>EQUAL 8.0%<EQUAL 9.0%: CPT | Performed by: FAMILY MEDICINE

## 2022-01-01 PROCEDURE — G8484 FLU IMMUNIZE NO ADMIN: HCPCS | Performed by: STUDENT IN AN ORGANIZED HEALTH CARE EDUCATION/TRAINING PROGRAM

## 2022-01-01 PROCEDURE — 97161 PT EVAL LOW COMPLEX 20 MIN: CPT

## 2022-01-01 PROCEDURE — 4A03X5D MEASUREMENT OF ARTERIAL FLOW, INTRACRANIAL, EXTERNAL APPROACH: ICD-10-PCS | Performed by: INTERNAL MEDICINE

## 2022-01-01 PROCEDURE — 99285 EMERGENCY DEPT VISIT HI MDM: CPT

## 2022-01-01 PROCEDURE — G8427 DOCREV CUR MEDS BY ELIG CLIN: HCPCS | Performed by: FAMILY MEDICINE

## 2022-01-01 PROCEDURE — A9577 INJ MULTIHANCE: HCPCS | Performed by: PSYCHIATRY & NEUROLOGY

## 2022-01-01 PROCEDURE — 99214 OFFICE O/P EST MOD 30 MIN: CPT | Performed by: FAMILY MEDICINE

## 2022-01-01 PROCEDURE — G8417 CALC BMI ABV UP PARAM F/U: HCPCS | Performed by: STUDENT IN AN ORGANIZED HEALTH CARE EDUCATION/TRAINING PROGRAM

## 2022-01-01 PROCEDURE — 1111F DSCHRG MED/CURRENT MED MERGE: CPT

## 2022-01-01 PROCEDURE — 6370000000 HC RX 637 (ALT 250 FOR IP): Performed by: NURSE PRACTITIONER

## 2022-01-01 PROCEDURE — APPSS30 APP SPLIT SHARED TIME 16-30 MINUTES: Performed by: NURSE PRACTITIONER

## 2022-01-01 PROCEDURE — 1036F TOBACCO NON-USER: CPT | Performed by: ORTHOPAEDIC SURGERY

## 2022-01-01 PROCEDURE — G8484 FLU IMMUNIZE NO ADMIN: HCPCS | Performed by: FAMILY MEDICINE

## 2022-01-01 PROCEDURE — 2709999900 HC NON-CHARGEABLE SUPPLY: Performed by: ORTHOPAEDIC SURGERY

## 2022-01-01 PROCEDURE — 2000000000 HC ICU R&B

## 2022-01-01 PROCEDURE — 51702 INSERT TEMP BLADDER CATH: CPT

## 2022-01-01 PROCEDURE — 85610 PROTHROMBIN TIME: CPT

## 2022-01-01 PROCEDURE — 3610000056 HC PAIN LEVEL 4 BASE (NON-OR): Performed by: ORTHOPAEDIC SURGERY

## 2022-01-01 PROCEDURE — 97164 PT RE-EVAL EST PLAN CARE: CPT

## 2022-01-01 PROCEDURE — 99213 OFFICE O/P EST LOW 20 MIN: CPT | Performed by: ORTHOPAEDIC SURGERY

## 2022-01-01 PROCEDURE — 2500000003 HC RX 250 WO HCPCS: Performed by: ORTHOPAEDIC SURGERY

## 2022-01-01 PROCEDURE — 6360000002 HC RX W HCPCS: Performed by: NURSE PRACTITIONER

## 2022-01-01 PROCEDURE — 83036 HEMOGLOBIN GLYCOSYLATED A1C: CPT | Performed by: FAMILY MEDICINE

## 2022-01-01 PROCEDURE — 92526 ORAL FUNCTION THERAPY: CPT

## 2022-01-01 PROCEDURE — 3610000057 HC PAIN LEVEL 4 ADDL 15 MIN (NON-OR): Performed by: ORTHOPAEDIC SURGERY

## 2022-01-01 PROCEDURE — 94003 VENT MGMT INPAT SUBQ DAY: CPT

## 2022-01-01 PROCEDURE — 0BH17EZ INSERTION OF ENDOTRACHEAL AIRWAY INTO TRACHEA, VIA NATURAL OR ARTIFICIAL OPENING: ICD-10-PCS | Performed by: INTERNAL MEDICINE

## 2022-01-01 RX ORDER — SODIUM CHLORIDE 9 MG/ML
INJECTION, SOLUTION INTRAVENOUS PRN
Status: DISCONTINUED | OUTPATIENT
Start: 2022-01-01 | End: 2022-01-01 | Stop reason: HOSPADM

## 2022-01-01 RX ORDER — INSULIN GLARGINE 100 [IU]/ML
16 INJECTION, SOLUTION SUBCUTANEOUS NIGHTLY
Qty: 5 PEN | Refills: 1 | Status: SHIPPED | OUTPATIENT
Start: 2022-01-01 | End: 2022-01-01 | Stop reason: SDUPTHER

## 2022-01-01 RX ORDER — ACETAMINOPHEN 325 MG/1
650 TABLET ORAL EVERY 6 HOURS PRN
Status: DISCONTINUED | OUTPATIENT
Start: 2022-01-01 | End: 2022-01-01 | Stop reason: HOSPADM

## 2022-01-01 RX ORDER — ASPIRIN 81 MG/1
81 TABLET ORAL DAILY
Status: DISCONTINUED | OUTPATIENT
Start: 2022-01-01 | End: 2022-01-01 | Stop reason: HOSPADM

## 2022-01-01 RX ORDER — DOXAZOSIN MESYLATE 1 MG/1
1 TABLET ORAL DAILY
Qty: 30 TABLET | Refills: 3 | Status: SHIPPED | OUTPATIENT
Start: 2022-01-01 | End: 2022-01-01

## 2022-01-01 RX ORDER — MIDAZOLAM HYDROCHLORIDE 1 MG/ML
INJECTION INTRAMUSCULAR; INTRAVENOUS
Status: COMPLETED
Start: 2022-01-01 | End: 2022-01-01

## 2022-01-01 RX ORDER — LORAZEPAM 2 MG/ML
1 INJECTION INTRAMUSCULAR
Status: DISCONTINUED | OUTPATIENT
Start: 2022-01-01 | End: 2022-01-01

## 2022-01-01 RX ORDER — POLYETHYLENE GLYCOL 3350 17 G/17G
17 POWDER, FOR SOLUTION ORAL DAILY PRN
Status: DISCONTINUED | OUTPATIENT
Start: 2022-01-01 | End: 2022-01-01

## 2022-01-01 RX ORDER — LIDOCAINE HYDROCHLORIDE 20 MG/ML
INJECTION, SOLUTION EPIDURAL; INFILTRATION; INTRACAUDAL; PERINEURAL
Status: COMPLETED | OUTPATIENT
Start: 2022-01-01 | End: 2022-01-01

## 2022-01-01 RX ORDER — SODIUM CHLORIDE 0.9 % (FLUSH) 0.9 %
5-40 SYRINGE (ML) INJECTION EVERY 12 HOURS SCHEDULED
Status: DISCONTINUED | OUTPATIENT
Start: 2022-01-01 | End: 2022-01-01 | Stop reason: HOSPADM

## 2022-01-01 RX ORDER — LORAZEPAM 2 MG/ML
2 INJECTION INTRAMUSCULAR ONCE
Status: COMPLETED | OUTPATIENT
Start: 2022-01-01 | End: 2022-01-01

## 2022-01-01 RX ORDER — DONEPEZIL HYDROCHLORIDE 5 MG/1
5 TABLET, FILM COATED ORAL NIGHTLY
Qty: 30 TABLET | Refills: 0 | Status: ON HOLD | OUTPATIENT
Start: 2022-01-01 | End: 2022-01-01

## 2022-01-01 RX ORDER — HYDRALAZINE HYDROCHLORIDE 20 MG/ML
10 INJECTION INTRAMUSCULAR; INTRAVENOUS EVERY 6 HOURS PRN
Status: DISCONTINUED | OUTPATIENT
Start: 2022-01-01 | End: 2022-01-01 | Stop reason: HOSPADM

## 2022-01-01 RX ORDER — DONEPEZIL HYDROCHLORIDE 5 MG/1
5 TABLET, FILM COATED ORAL NIGHTLY
Status: DISCONTINUED | OUTPATIENT
Start: 2022-01-01 | End: 2022-01-01 | Stop reason: HOSPADM

## 2022-01-01 RX ORDER — ONDANSETRON 4 MG/1
4 TABLET, ORALLY DISINTEGRATING ORAL EVERY 8 HOURS PRN
Status: DISCONTINUED | OUTPATIENT
Start: 2022-01-01 | End: 2022-01-01 | Stop reason: HOSPADM

## 2022-01-01 RX ORDER — SODIUM CHLORIDE 0.9 % (FLUSH) 0.9 %
10 SYRINGE (ML) INJECTION PRN
Status: DISCONTINUED | OUTPATIENT
Start: 2022-01-01 | End: 2022-01-01 | Stop reason: HOSPADM

## 2022-01-01 RX ORDER — BUPIVACAINE HYDROCHLORIDE 5 MG/ML
INJECTION, SOLUTION EPIDURAL; INTRACAUDAL
Status: COMPLETED | OUTPATIENT
Start: 2022-01-01 | End: 2022-01-01

## 2022-01-01 RX ORDER — NICOTINE POLACRILEX 4 MG
15 LOZENGE BUCCAL PRN
Status: DISCONTINUED | OUTPATIENT
Start: 2022-01-01 | End: 2022-01-01 | Stop reason: HOSPADM

## 2022-01-01 RX ORDER — ASPIRIN 300 MG/1
300 SUPPOSITORY RECTAL DAILY
Status: DISCONTINUED | OUTPATIENT
Start: 2022-01-01 | End: 2022-01-01 | Stop reason: HOSPADM

## 2022-01-01 RX ORDER — DEXTROSE MONOHYDRATE 25 G/50ML
12.5 INJECTION, SOLUTION INTRAVENOUS PRN
Status: DISCONTINUED | OUTPATIENT
Start: 2022-01-01 | End: 2022-01-01 | Stop reason: HOSPADM

## 2022-01-01 RX ORDER — SODIUM CHLORIDE 0.9 % (FLUSH) 0.9 %
5-40 SYRINGE (ML) INJECTION PRN
Status: DISCONTINUED | OUTPATIENT
Start: 2022-01-01 | End: 2022-01-01 | Stop reason: HOSPADM

## 2022-01-01 RX ORDER — INSULIN LISPRO 100 [IU]/ML
0-6 INJECTION, SOLUTION INTRAVENOUS; SUBCUTANEOUS NIGHTLY
Status: DISCONTINUED | OUTPATIENT
Start: 2022-01-01 | End: 2022-01-01 | Stop reason: HOSPADM

## 2022-01-01 RX ORDER — ATORVASTATIN CALCIUM 40 MG/1
40 TABLET, FILM COATED ORAL NIGHTLY
Status: DISCONTINUED | OUTPATIENT
Start: 2022-01-01 | End: 2022-01-01 | Stop reason: HOSPADM

## 2022-01-01 RX ORDER — METOPROLOL TARTRATE 50 MG/1
50 TABLET, FILM COATED ORAL 2 TIMES DAILY
Status: DISCONTINUED | OUTPATIENT
Start: 2022-01-01 | End: 2022-01-01 | Stop reason: HOSPADM

## 2022-01-01 RX ORDER — LIDOCAINE HYDROCHLORIDE 20 MG/ML
8 INJECTION, SOLUTION INFILTRATION; PERINEURAL ONCE
Status: COMPLETED | OUTPATIENT
Start: 2022-01-01 | End: 2022-01-01

## 2022-01-01 RX ORDER — DICLOFENAC SODIUM 1 MG/ML
1 SOLUTION/ DROPS OPHTHALMIC 4 TIMES DAILY
COMMUNITY
End: 2022-01-01

## 2022-01-01 RX ORDER — ONDANSETRON 2 MG/ML
4 INJECTION INTRAMUSCULAR; INTRAVENOUS EVERY 6 HOURS PRN
Status: DISCONTINUED | OUTPATIENT
Start: 2022-01-01 | End: 2022-01-01 | Stop reason: HOSPADM

## 2022-01-01 RX ORDER — ATROPINE SULFATE 10 MG/ML
2 SOLUTION/ DROPS OPHTHALMIC EVERY 4 HOURS PRN
Status: DISCONTINUED | OUTPATIENT
Start: 2022-01-01 | End: 2022-01-01 | Stop reason: HOSPADM

## 2022-01-01 RX ORDER — ROCURONIUM BROMIDE 10 MG/ML
INJECTION, SOLUTION INTRAVENOUS DAILY PRN
Status: COMPLETED | OUTPATIENT
Start: 2022-01-01 | End: 2022-01-01

## 2022-01-01 RX ORDER — INSULIN GLARGINE 100 [IU]/ML
20 INJECTION, SOLUTION SUBCUTANEOUS NIGHTLY
Qty: 5 PEN | Refills: 1 | Status: SHIPPED | OUTPATIENT
Start: 2022-01-01

## 2022-01-01 RX ORDER — METHYLPREDNISOLONE ACETATE 40 MG/ML
INJECTION, SUSPENSION INTRA-ARTICULAR; INTRALESIONAL; INTRAMUSCULAR; SOFT TISSUE
Status: COMPLETED | OUTPATIENT
Start: 2022-01-01 | End: 2022-01-01

## 2022-01-01 RX ORDER — INSULIN LISPRO 100 [IU]/ML
0-12 INJECTION, SOLUTION INTRAVENOUS; SUBCUTANEOUS
Status: DISCONTINUED | OUTPATIENT
Start: 2022-01-01 | End: 2022-01-01 | Stop reason: HOSPADM

## 2022-01-01 RX ORDER — ASPIRIN 325 MG
325 TABLET ORAL ONCE
Status: COMPLETED | OUTPATIENT
Start: 2022-01-01 | End: 2022-01-01

## 2022-01-01 RX ORDER — MORPHINE SULFATE 2 MG/ML
2 INJECTION, SOLUTION INTRAMUSCULAR; INTRAVENOUS
Status: DISCONTINUED | OUTPATIENT
Start: 2022-01-01 | End: 2022-01-01

## 2022-01-01 RX ORDER — INSULIN GLARGINE 100 [IU]/ML
20 INJECTION, SOLUTION SUBCUTANEOUS NIGHTLY
Status: DISCONTINUED | OUTPATIENT
Start: 2022-01-01 | End: 2022-01-01

## 2022-01-01 RX ORDER — ENOXAPARIN SODIUM 100 MG/ML
40 INJECTION SUBCUTANEOUS DAILY
Status: DISCONTINUED | OUTPATIENT
Start: 2022-01-01 | End: 2022-01-01 | Stop reason: HOSPADM

## 2022-01-01 RX ORDER — MIDAZOLAM HYDROCHLORIDE 1 MG/ML
2 INJECTION INTRAMUSCULAR; INTRAVENOUS ONCE
Status: COMPLETED | OUTPATIENT
Start: 2022-01-01 | End: 2022-01-01

## 2022-01-01 RX ORDER — MORPHINE SULFATE 4 MG/ML
4 INJECTION, SOLUTION INTRAMUSCULAR; INTRAVENOUS
Status: DISCONTINUED | OUTPATIENT
Start: 2022-01-01 | End: 2022-01-01

## 2022-01-01 RX ORDER — METOPROLOL TARTRATE 50 MG/1
TABLET, FILM COATED ORAL
Qty: 180 TABLET | Refills: 3 | Status: SHIPPED | OUTPATIENT
Start: 2022-01-01

## 2022-01-01 RX ORDER — FUROSEMIDE 20 MG/1
20 TABLET ORAL DAILY
Status: DISCONTINUED | OUTPATIENT
Start: 2022-01-01 | End: 2022-01-01 | Stop reason: HOSPADM

## 2022-01-01 RX ORDER — LORAZEPAM 2 MG/ML
4 INJECTION INTRAMUSCULAR
Status: DISCONTINUED | OUTPATIENT
Start: 2022-01-01 | End: 2022-01-01 | Stop reason: HOSPADM

## 2022-01-01 RX ORDER — ASPIRIN 81 MG/1
81 TABLET ORAL DAILY
COMMUNITY

## 2022-01-01 RX ORDER — PEN NEEDLE, DIABETIC 30 GX5/16"
1 NEEDLE, DISPOSABLE MISCELLANEOUS DAILY
Qty: 100 EACH | Refills: 3 | Status: SHIPPED | OUTPATIENT
Start: 2022-01-01 | End: 2022-01-01

## 2022-01-01 RX ORDER — CETIRIZINE HYDROCHLORIDE 10 MG/1
10 TABLET ORAL DAILY
Status: DISCONTINUED | OUTPATIENT
Start: 2022-01-01 | End: 2022-01-01 | Stop reason: HOSPADM

## 2022-01-01 RX ORDER — DONEPEZIL HYDROCHLORIDE 10 MG/1
10 TABLET, FILM COATED ORAL NIGHTLY
Qty: 90 TABLET | Refills: 0 | Status: SHIPPED | OUTPATIENT
Start: 2022-01-01 | End: 2022-01-01

## 2022-01-01 RX ORDER — DONEPEZIL HYDROCHLORIDE 10 MG/1
10 TABLET, FILM COATED ORAL NIGHTLY
COMMUNITY

## 2022-01-01 RX ORDER — SODIUM CHLORIDE 0.9 % (FLUSH) 0.9 %
10 SYRINGE (ML) INJECTION EVERY 12 HOURS SCHEDULED
Status: DISCONTINUED | OUTPATIENT
Start: 2022-01-01 | End: 2022-01-01 | Stop reason: HOSPADM

## 2022-01-01 RX ORDER — LORAZEPAM 2 MG/ML
2 INJECTION INTRAMUSCULAR
Status: DISCONTINUED | OUTPATIENT
Start: 2022-01-01 | End: 2022-01-01 | Stop reason: HOSPADM

## 2022-01-01 RX ORDER — INSULIN GLARGINE 100 [IU]/ML
10 INJECTION, SOLUTION SUBCUTANEOUS NIGHTLY
Status: DISCONTINUED | OUTPATIENT
Start: 2022-01-01 | End: 2022-01-01 | Stop reason: HOSPADM

## 2022-01-01 RX ORDER — SIMVASTATIN 20 MG
TABLET ORAL
Qty: 90 TABLET | Refills: 3 | Status: SHIPPED | OUTPATIENT
Start: 2022-01-01

## 2022-01-01 RX ORDER — ETOMIDATE 2 MG/ML
INJECTION INTRAVENOUS DAILY PRN
Status: COMPLETED | OUTPATIENT
Start: 2022-01-01 | End: 2022-01-01

## 2022-01-01 RX ORDER — CLOPIDOGREL 300 MG/1
600 TABLET, FILM COATED ORAL ONCE
Status: COMPLETED | OUTPATIENT
Start: 2022-01-01 | End: 2022-01-01

## 2022-01-01 RX ORDER — MORPHINE SULFATE 4 MG/ML
4 INJECTION, SOLUTION INTRAMUSCULAR; INTRAVENOUS ONCE
Status: COMPLETED | OUTPATIENT
Start: 2022-01-01 | End: 2022-01-01

## 2022-01-01 RX ORDER — LORATADINE 10 MG/1
10 TABLET ORAL DAILY
Status: ON HOLD | COMMUNITY
End: 2022-01-01

## 2022-01-01 RX ORDER — PROPAFENONE HYDROCHLORIDE 150 MG/1
150 TABLET, FILM COATED ORAL 2 TIMES DAILY
Status: DISCONTINUED | OUTPATIENT
Start: 2022-01-01 | End: 2022-01-01 | Stop reason: HOSPADM

## 2022-01-01 RX ORDER — MORPHINE SULFATE 4 MG/ML
4 INJECTION, SOLUTION INTRAMUSCULAR; INTRAVENOUS
Status: DISCONTINUED | OUTPATIENT
Start: 2022-01-01 | End: 2022-01-01 | Stop reason: HOSPADM

## 2022-01-01 RX ORDER — ACETAMINOPHEN 650 MG/1
650 SUPPOSITORY RECTAL EVERY 6 HOURS PRN
Status: DISCONTINUED | OUTPATIENT
Start: 2022-01-01 | End: 2022-01-01 | Stop reason: HOSPADM

## 2022-01-01 RX ORDER — LISINOPRIL 40 MG/1
40 TABLET ORAL DAILY
Status: DISCONTINUED | OUTPATIENT
Start: 2022-01-01 | End: 2022-01-01 | Stop reason: HOSPADM

## 2022-01-01 RX ORDER — DEXTROSE MONOHYDRATE 50 MG/ML
100 INJECTION, SOLUTION INTRAVENOUS PRN
Status: DISCONTINUED | OUTPATIENT
Start: 2022-01-01 | End: 2022-01-01 | Stop reason: HOSPADM

## 2022-01-01 RX ORDER — FINASTERIDE 5 MG/1
5 TABLET, FILM COATED ORAL DAILY
Status: DISCONTINUED | OUTPATIENT
Start: 2022-01-01 | End: 2022-01-01 | Stop reason: HOSPADM

## 2022-01-01 RX ORDER — MORPHINE SULFATE 2 MG/ML
2 INJECTION, SOLUTION INTRAMUSCULAR; INTRAVENOUS
Status: DISCONTINUED | OUTPATIENT
Start: 2022-01-01 | End: 2022-01-01 | Stop reason: HOSPADM

## 2022-01-01 RX ORDER — UREA 10 %
2 LOTION (ML) TOPICAL PRN
COMMUNITY
End: 2022-01-01

## 2022-01-01 RX ORDER — FUROSEMIDE 20 MG/1
20 TABLET ORAL DAILY
Status: ON HOLD | COMMUNITY
End: 2022-01-01

## 2022-01-01 RX ADMIN — INSULIN LISPRO 2 UNITS: 100 INJECTION, SOLUTION INTRAVENOUS; SUBCUTANEOUS at 08:30

## 2022-01-01 RX ADMIN — METOPROLOL TARTRATE 50 MG: 50 TABLET ORAL at 20:38

## 2022-01-01 RX ADMIN — PROPAFENONE HYDROCHLORIDE 150 MG: 150 TABLET, FILM COATED ORAL at 20:54

## 2022-01-01 RX ADMIN — MORPHINE SULFATE 2 MG: 2 INJECTION, SOLUTION INTRAMUSCULAR; INTRAVENOUS at 15:36

## 2022-01-01 RX ADMIN — METOPROLOL TARTRATE 50 MG: 50 TABLET ORAL at 11:27

## 2022-01-01 RX ADMIN — INSULIN LISPRO 2 UNITS: 100 INJECTION, SOLUTION INTRAVENOUS; SUBCUTANEOUS at 08:12

## 2022-01-01 RX ADMIN — LISINOPRIL 40 MG: 40 TABLET ORAL at 08:27

## 2022-01-01 RX ADMIN — INSULIN GLARGINE 10 UNITS: 100 INJECTION, SOLUTION SUBCUTANEOUS at 21:24

## 2022-01-01 RX ADMIN — ASPIRIN 325 MG ORAL TABLET 325 MG: 325 PILL ORAL at 16:44

## 2022-01-01 RX ADMIN — METOPROLOL TARTRATE 50 MG: 50 TABLET ORAL at 08:27

## 2022-01-01 RX ADMIN — ROCURONIUM BROMIDE 100 MG: 10 INJECTION INTRAVENOUS at 11:04

## 2022-01-01 RX ADMIN — GADOBENATE DIMEGLUMINE 14 ML: 529 INJECTION, SOLUTION INTRAVENOUS at 10:25

## 2022-01-01 RX ADMIN — MORPHINE SULFATE 4 MG: 4 INJECTION, SOLUTION INTRAMUSCULAR; INTRAVENOUS at 08:24

## 2022-01-01 RX ADMIN — SODIUM CHLORIDE, PRESERVATIVE FREE 10 ML: 5 INJECTION INTRAVENOUS at 20:54

## 2022-01-01 RX ADMIN — INSULIN LISPRO 2 UNITS: 100 INJECTION, SOLUTION INTRAVENOUS; SUBCUTANEOUS at 11:29

## 2022-01-01 RX ADMIN — SODIUM CHLORIDE 2.5 MG/HR: 9 INJECTION, SOLUTION INTRAVENOUS at 11:36

## 2022-01-01 RX ADMIN — DONEPEZIL HYDROCHLORIDE 5 MG: 5 TABLET, FILM COATED ORAL at 20:54

## 2022-01-01 RX ADMIN — PROPAFENONE HYDROCHLORIDE 150 MG: 150 TABLET, FILM COATED ORAL at 10:43

## 2022-01-01 RX ADMIN — LORAZEPAM 2 MG: 2 INJECTION INTRAMUSCULAR; INTRAVENOUS at 08:24

## 2022-01-01 RX ADMIN — FUROSEMIDE 20 MG: 20 TABLET ORAL at 08:27

## 2022-01-01 RX ADMIN — ENOXAPARIN SODIUM 40 MG: 100 INJECTION SUBCUTANEOUS at 08:11

## 2022-01-01 RX ADMIN — FINASTERIDE 5 MG: 5 TABLET, FILM COATED ORAL at 08:27

## 2022-01-01 RX ADMIN — DONEPEZIL HYDROCHLORIDE 5 MG: 5 TABLET, FILM COATED ORAL at 20:39

## 2022-01-01 RX ADMIN — FINASTERIDE 5 MG: 5 TABLET, FILM COATED ORAL at 08:11

## 2022-01-01 RX ADMIN — ASPIRIN 81 MG: 81 TABLET, COATED ORAL at 07:53

## 2022-01-01 RX ADMIN — ATORVASTATIN CALCIUM 40 MG: 40 TABLET, FILM COATED ORAL at 20:38

## 2022-01-01 RX ADMIN — CLOPIDOGREL BISULFATE 600 MG: 300 TABLET, FILM COATED ORAL at 21:21

## 2022-01-01 RX ADMIN — SODIUM CHLORIDE, PRESERVATIVE FREE 10 ML: 5 INJECTION INTRAVENOUS at 11:29

## 2022-01-01 RX ADMIN — FUROSEMIDE 20 MG: 20 TABLET ORAL at 07:53

## 2022-01-01 RX ADMIN — INSULIN LISPRO 6 UNITS: 100 INJECTION, SOLUTION INTRAVENOUS; SUBCUTANEOUS at 12:32

## 2022-01-01 RX ADMIN — SODIUM CHLORIDE, PRESERVATIVE FREE 10 ML: 5 INJECTION INTRAVENOUS at 08:11

## 2022-01-01 RX ADMIN — IOPAMIDOL 75 ML: 755 INJECTION, SOLUTION INTRAVENOUS at 14:35

## 2022-01-01 RX ADMIN — FUROSEMIDE 20 MG: 20 TABLET ORAL at 08:11

## 2022-01-01 RX ADMIN — LISINOPRIL 40 MG: 40 TABLET ORAL at 08:11

## 2022-01-01 RX ADMIN — INSULIN LISPRO 3 UNITS: 100 INJECTION, SOLUTION INTRAVENOUS; SUBCUTANEOUS at 20:55

## 2022-01-01 RX ADMIN — SODIUM CHLORIDE, PRESERVATIVE FREE 10 ML: 5 INJECTION INTRAVENOUS at 21:23

## 2022-01-01 RX ADMIN — ENOXAPARIN SODIUM 40 MG: 100 INJECTION SUBCUTANEOUS at 07:53

## 2022-01-01 RX ADMIN — SODIUM CHLORIDE, PRESERVATIVE FREE 10 ML: 5 INJECTION INTRAVENOUS at 08:26

## 2022-01-01 RX ADMIN — METOPROLOL TARTRATE 50 MG: 50 TABLET ORAL at 21:22

## 2022-01-01 RX ADMIN — LORAZEPAM 4 MG: 2 INJECTION INTRAMUSCULAR; INTRAVENOUS at 12:02

## 2022-01-01 RX ADMIN — LORAZEPAM 4 MG: 2 INJECTION INTRAMUSCULAR; INTRAVENOUS at 16:15

## 2022-01-01 RX ADMIN — MIDAZOLAM 2 MG: 1 INJECTION INTRAMUSCULAR; INTRAVENOUS at 04:50

## 2022-01-01 RX ADMIN — MIDAZOLAM HYDROCHLORIDE 2 MG: 1 INJECTION INTRAMUSCULAR; INTRAVENOUS at 04:50

## 2022-01-01 RX ADMIN — METOPROLOL TARTRATE 50 MG: 50 TABLET ORAL at 20:54

## 2022-01-01 RX ADMIN — ATORVASTATIN CALCIUM 40 MG: 40 TABLET, FILM COATED ORAL at 21:22

## 2022-01-01 RX ADMIN — LISINOPRIL 40 MG: 40 TABLET ORAL at 07:53

## 2022-01-01 RX ADMIN — PROPAFENONE HYDROCHLORIDE 150 MG: 150 TABLET, FILM COATED ORAL at 11:27

## 2022-01-01 RX ADMIN — SODIUM CHLORIDE, PRESERVATIVE FREE 10 ML: 5 INJECTION INTRAVENOUS at 20:17

## 2022-01-01 RX ADMIN — PROPAFENONE HYDROCHLORIDE 150 MG: 150 TABLET, FILM COATED ORAL at 22:16

## 2022-01-01 RX ADMIN — INSULIN LISPRO 8 UNITS: 100 INJECTION, SOLUTION INTRAVENOUS; SUBCUTANEOUS at 12:28

## 2022-01-01 RX ADMIN — SODIUM CHLORIDE, PRESERVATIVE FREE 10 ML: 5 INJECTION INTRAVENOUS at 08:24

## 2022-01-01 RX ADMIN — ASPIRIN 81 MG: 81 TABLET, COATED ORAL at 08:27

## 2022-01-01 RX ADMIN — METOPROLOL TARTRATE 50 MG: 50 TABLET ORAL at 10:18

## 2022-01-01 RX ADMIN — INSULIN GLARGINE 10 UNITS: 100 INJECTION, SOLUTION SUBCUTANEOUS at 20:39

## 2022-01-01 RX ADMIN — PROPAFENONE HYDROCHLORIDE 150 MG: 150 TABLET, FILM COATED ORAL at 22:10

## 2022-01-01 RX ADMIN — INSULIN GLARGINE 10 UNITS: 100 INJECTION, SOLUTION SUBCUTANEOUS at 20:55

## 2022-01-01 RX ADMIN — LORAZEPAM 2 MG: 2 INJECTION INTRAMUSCULAR; INTRAVENOUS at 13:46

## 2022-01-01 RX ADMIN — HYDRALAZINE HYDROCHLORIDE 10 MG: 20 INJECTION INTRAMUSCULAR; INTRAVENOUS at 04:28

## 2022-01-01 RX ADMIN — ATORVASTATIN CALCIUM 40 MG: 40 TABLET, FILM COATED ORAL at 20:54

## 2022-01-01 RX ADMIN — IOPAMIDOL 75 ML: 755 INJECTION, SOLUTION INTRAVENOUS at 11:17

## 2022-01-01 RX ADMIN — INSULIN LISPRO 2 UNITS: 100 INJECTION, SOLUTION INTRAVENOUS; SUBCUTANEOUS at 21:24

## 2022-01-01 RX ADMIN — INSULIN LISPRO 8 UNITS: 100 INJECTION, SOLUTION INTRAVENOUS; SUBCUTANEOUS at 17:25

## 2022-01-01 RX ADMIN — LORAZEPAM 2 MG: 2 INJECTION INTRAMUSCULAR; INTRAVENOUS at 10:27

## 2022-01-01 RX ADMIN — CETIRIZINE HYDROCHLORIDE 10 MG: 10 TABLET, FILM COATED ORAL at 08:11

## 2022-01-01 RX ADMIN — ETOMIDATE 20 MG: 2 INJECTION INTRAVENOUS at 11:03

## 2022-01-01 RX ADMIN — FINASTERIDE 5 MG: 5 TABLET, FILM COATED ORAL at 07:53

## 2022-01-01 RX ADMIN — INSULIN LISPRO 4 UNITS: 100 INJECTION, SOLUTION INTRAVENOUS; SUBCUTANEOUS at 17:02

## 2022-01-01 RX ADMIN — CETIRIZINE HYDROCHLORIDE 10 MG: 10 TABLET, FILM COATED ORAL at 07:53

## 2022-01-01 RX ADMIN — ASPIRIN 81 MG: 81 TABLET, COATED ORAL at 08:11

## 2022-01-01 RX ADMIN — MORPHINE SULFATE 2 MG: 2 INJECTION, SOLUTION INTRAMUSCULAR; INTRAVENOUS at 11:55

## 2022-01-01 RX ADMIN — LIDOCAINE HYDROCHLORIDE 8 ML: 20 INJECTION, SOLUTION INFILTRATION; PERINEURAL at 17:27

## 2022-01-01 RX ADMIN — INSULIN LISPRO 1 UNITS: 100 INJECTION, SOLUTION INTRAVENOUS; SUBCUTANEOUS at 20:39

## 2022-01-01 RX ADMIN — ENOXAPARIN SODIUM 40 MG: 100 INJECTION SUBCUTANEOUS at 08:28

## 2022-01-01 RX ADMIN — CETIRIZINE HYDROCHLORIDE 10 MG: 10 TABLET, FILM COATED ORAL at 08:27

## 2022-01-01 RX ADMIN — SODIUM CHLORIDE 2.5 MG/HR: 9 INJECTION, SOLUTION INTRAVENOUS at 20:56

## 2022-01-01 RX ADMIN — SODIUM CHLORIDE, PRESERVATIVE FREE 10 ML: 5 INJECTION INTRAVENOUS at 20:39

## 2022-01-01 RX ADMIN — PROPAFENONE HYDROCHLORIDE 150 MG: 150 TABLET, FILM COATED ORAL at 08:11

## 2022-01-01 ASSESSMENT — PAIN - FUNCTIONAL ASSESSMENT
PAIN_FUNCTIONAL_ASSESSMENT: PREVENTS OR INTERFERES SOME ACTIVE ACTIVITIES AND ADLS
PAIN_FUNCTIONAL_ASSESSMENT: 0-10

## 2022-01-01 ASSESSMENT — PULMONARY FUNCTION TESTS
PIF_VALUE: 18
PIF_VALUE: 16
PIF_VALUE: 16
PIF_VALUE: 18
PIF_VALUE: 20
PIF_VALUE: 19
PIF_VALUE: 16
PIF_VALUE: 29
PIF_VALUE: 17
PIF_VALUE: 21
PIF_VALUE: 17
PIF_VALUE: 16
PIF_VALUE: 18
PIF_VALUE: 17
PIF_VALUE: 15
PIF_VALUE: 16
PIF_VALUE: 16
PIF_VALUE: 17
PIF_VALUE: 45
PIF_VALUE: 17
PIF_VALUE: 17
PIF_VALUE: 16
PIF_VALUE: 15
PIF_VALUE: 16
PIF_VALUE: 16
PIF_VALUE: 17
PIF_VALUE: 16
PIF_VALUE: 19
PIF_VALUE: 16
PIF_VALUE: 16
PIF_VALUE: 17
PIF_VALUE: 17
PIF_VALUE: 18
PIF_VALUE: 22
PIF_VALUE: 17
PIF_VALUE: 21
PIF_VALUE: 19
PIF_VALUE: 27
PIF_VALUE: 21
PIF_VALUE: 17
PIF_VALUE: 16

## 2022-01-01 ASSESSMENT — PAIN DESCRIPTION - DESCRIPTORS
DESCRIPTORS: DISCOMFORT
DESCRIPTORS: DISCOMFORT

## 2022-01-01 ASSESSMENT — PAIN SCALES - GENERAL
PAINLEVEL_OUTOF10: 0
PAINLEVEL_OUTOF10: 4
PAINLEVEL_OUTOF10: 0

## 2022-01-01 ASSESSMENT — PATIENT HEALTH QUESTIONNAIRE - PHQ9
SUM OF ALL RESPONSES TO PHQ QUESTIONS 1-9: 0
2. FEELING DOWN, DEPRESSED OR HOPELESS: 0
SUM OF ALL RESPONSES TO PHQ QUESTIONS 1-9: 1
SUM OF ALL RESPONSES TO PHQ9 QUESTIONS 1 & 2: 1
2. FEELING DOWN, DEPRESSED OR HOPELESS: 0
SUM OF ALL RESPONSES TO PHQ QUESTIONS 1-9: 0
SUM OF ALL RESPONSES TO PHQ QUESTIONS 1-9: 1
SUM OF ALL RESPONSES TO PHQ QUESTIONS 1-9: 1
1. LITTLE INTEREST OR PLEASURE IN DOING THINGS: 0
SUM OF ALL RESPONSES TO PHQ QUESTIONS 1-9: 0
SUM OF ALL RESPONSES TO PHQ QUESTIONS 1-9: 0
1. LITTLE INTEREST OR PLEASURE IN DOING THINGS: 1
SUM OF ALL RESPONSES TO PHQ QUESTIONS 1-9: 1
SUM OF ALL RESPONSES TO PHQ9 QUESTIONS 1 & 2: 0

## 2022-01-01 ASSESSMENT — LIFESTYLE VARIABLES
HOW MANY STANDARD DRINKS CONTAINING ALCOHOL DO YOU HAVE ON A TYPICAL DAY: 1 OR 2
HOW OFTEN DO YOU HAVE A DRINK CONTAINING ALCOHOL: MONTHLY OR LESS
HOW OFTEN DO YOU HAVE A DRINK CONTAINING ALCOHOL: 0

## 2022-01-01 ASSESSMENT — PAIN DESCRIPTION - PROGRESSION: CLINICAL_PROGRESSION: GRADUALLY IMPROVING

## 2022-01-01 ASSESSMENT — ENCOUNTER SYMPTOMS
NAUSEA: 0
CHEST TIGHTNESS: 0
VOMITING: 0
ABDOMINAL PAIN: 0
SHORTNESS OF BREATH: 0

## 2022-01-01 ASSESSMENT — PAIN DESCRIPTION - PAIN TYPE: TYPE: CHRONIC PAIN

## 2022-01-01 ASSESSMENT — PAIN DESCRIPTION - ORIENTATION: ORIENTATION: RIGHT

## 2022-01-01 ASSESSMENT — PAIN DESCRIPTION - FREQUENCY: FREQUENCY: CONTINUOUS

## 2022-01-01 ASSESSMENT — PAIN DESCRIPTION - LOCATION: LOCATION: KNEE

## 2022-01-11 NOTE — PROGRESS NOTES
Medicare Annual Wellness Visit  Name: Josselyn Coffey  YOB: 1935  Age: 81 y.o. Sex: male  MRN: [id number]     Date of Service:  1/11/2022    Chief Complaint:   Josselyn Coffey is a 81 y.o. male who presents for Medicare Annual Wellness Visit and check-up for:  1. Routine general medical examination at a health care facility    2. Type 2 diabetes mellitus with diabetic nephropathy, without long-term current use of insulin ([facility]    3. S/P aortic valve replacement with porcine valve    4. Malignant neoplasm of descending colon ([facility]    5. Essential hypertension    6. Hypertriglyceridemia    7. Nocturnal polyuria    8. Chronic pain of both knees      HPI    Chief Complaint   Patient presents with    Medicare AWV   Complaints: pt is having extreme weakness in his legs his knees hurt very bad he had shots in his knees and it only helped a few days. Tried PT and it didn't work much. His knees have been getting worse. Worst first thing in morning. Feels better after move around 15 minutes. No falls. If stand 5 min leg pain and feel like giving out. . Denies burn/tingle. Does not want surgery. · Nocturia 3x, denies hesitancy. Worse past month. Some incomplete void sensation, denies incontinence or dysuria    Review of Systems   Respiratory ROS: cough? No   Wheezing? No  Cardiovascular ROS: chest pain? No   Shortness of breath? No  Gastrointestinal ROS: abdominal pain? No   Change in stools? No    Health Maintenance Due   Topic Date Due    DTaP/Tdap/Td vaccine (1 - Tdap) Never done    Colon cancer screen colonoscopy  12/17/2020    COVID-19 Vaccine (3 - Booster for [place] series) 08/14/2021    Annual Wellness Visit (AWV)  10/22/2021    Diabetic foot exam  01/18/2022    Depression Screen  01/18/2022     *Chief complaint, HPI, History and ROS provided by the medical assistant has been reviewed and verified by provider- Jaclyn Martinez MD    CHART REVIEW   reports that he has never smoked.  He has never used smokeless tobacco.  Current Outpatient Medications   Medication Instructions    aspirin EC 81 mg, Oral, 2 TIMES DAILY, Take for DVT blood clot prophylaxis. Please avoid missing doses.  Blood Glucose Monitoring Suppl (ONE TOUCH ULTRA 2) w/Device KIT 1 kit, Does not apply, DAILY    blood glucose test strips (ONETOUCH ULTRA) strip USE DAILY OR AS NEEDED    Calcium Carbonate Antacid (TUMS E-X PO) 2 tablets, Oral, PRN    doxazosin (CARDURA) 1 mg, Oral, DAILY    finasteride (PROSCAR) 5 MG tablet Take 1 tablet by mouth once daily    Insulin Pen Needle (PEN NEEDLES 3/16\") 31G X 5 MM MISC 1 each, Does not apply, DAILY    Lancet Devices MISC 1 x daily for the Lifescan lancets    Lantus SoloStar 16 Units, SubCUTAneous, NIGHTLY    LIFESCAN UNISTIK II LANCETS MISC 1 each, Does not apply, DAILY    lisinopril (PRINIVIL;ZESTRIL) 40 mg, Oral, DAILY    metFORMIN (GLUCOPHAGE-XR) 500 MG extended release tablet TAKE 2 TABLETS BY MOUTH IN THE MORNING 1 IN THE EVENING    metoprolol tartrate (LOPRESSOR) 50 MG tablet Take 1 tablet by mouth twice daily    propafenone (RYTHMOL) 150 mg, Oral, 2 TIMES DAILY    simvastatin (ZOCOR) 20 MG tablet Take 1 tablet by mouth nightly     The ASCVD Risk score (Evelia Christiansen., et al., 2013) failed to calculate for the following reasons: The 2013 ASCVD risk score is only valid for ages 36 to 78  Family History   Problem Relation Age of Onset    Hypertension Mother     Stroke Mother      Social History     Tobacco Use    Smoking status: Never Smoker    Smokeless tobacco: Never Used    Tobacco comment: congrats, advised not to start   Vaping Use    Vaping Use: Never used   Substance Use Topics    Alcohol use:  Yes     Alcohol/week: 2.0 standard drinks     Types: 2 Standard drinks or equivalent per week     Comment: 1-2 beers/week    Drug use: No      Immunization History   Administered Date(s) Administered    COVID-19, Moderna, Primary or Immunocompromised, PF, 100mcg/0.5mL 01/19/2021, 02/14/2021    Influenza, High Dose (Fluzone 65 yrs and older) 01/31/2018, 09/12/2018    Influenza, Quadv, adjuvanted, 65 yrs +, IM, PF (Fluad) 10/21/2020, 09/16/2021    Influenza, Triv, inactivated, subunit, adjuvanted, IM (Fluad 65 yrs and older) 10/08/2019    Pneumococcal Conjugate 13-valent (Dawxzsn63) 02/16/2016    Pneumococcal Polysaccharide (Yaqvmifdo58) 10/24/2008    Tetanus 08/01/2003     LAST LABS  Cholesterol, Total   Date Value Ref Range Status   11/30/2021 146 0 - 199 mg/dL Final     LDL Calculated   Date Value Ref Range Status   11/30/2021 58 <100 mg/dL Final     HDL   Date Value Ref Range Status   11/30/2021 48 40 - 60 mg/dL Final     Triglycerides   Date Value Ref Range Status   11/30/2021 199 (H) 0 - 150 mg/dL Final     Lab Results   Component Value Date    GLUCOSE 200 (H) 10/06/2021     Lab Results   Component Value Date     10/06/2021    K 4.6 10/06/2021    CREATININE 1.1 10/06/2021     Lab Results   Component Value Date    WBC 8.0 10/06/2021    HGB 15.4 10/06/2021    HCT 46.0 10/06/2021    MCV 90.5 10/06/2021     10/06/2021     Lab Results   Component Value Date    ALT 13 10/06/2021    AST 13 (L) 10/06/2021    ALKPHOS 85 10/06/2021    BILITOT 0.6 10/06/2021     TSH (uIU/mL)   Date Value   11/04/2020 1.24     Lab Results   Component Value Date    LABA1C 7.4 11/30/2021      CareTeam (Including outside providers/suppliers regularly involved in providing care):   Patient Care Team:  Aleksandr Desir MD as PCP - General (Family Medicine)  Aleksandr Desir MD as PCP [facility]  Britney Crowell MD as Consulting Physician (Cardiology)    The following problems were reviewed today and where indicated follow up appointments were made and/or referrals ordered.     Positive Risk Factor Screenings with Interventions:        General Health and ACP:  General  In general, how would you say your health is?: Fair  In the past 7 days, have you experienced any of the following? New or Increased Pain, New or Increased Fatigue, Loneliness, Social Isolation, Stress or Anger?: (!) New or Increased Pain  Do you get the social and emotional support that you need?: Yes  Do you have a Living Will?: Yes  Advance Directives     Power of  Living Will ACP-Advance Directive ACP-Power of     Not on File Filed on 01/16/18 Filed Not on File      General Health Risk Interventions:  · Pain issues: see HPI, A/P    ADL:  ADLs  In the past 7 days, did you need help from others to perform any of the following everyday activities? Eating, dressing, grooming, bathing, toileting, or walking/balance?: (!) Walking/Balance  In the past 7 days, did you need help from others to take care of any of the following? Laundry, housekeeping, banking/finances, shopping, telephone use, food preparation, transportation, or taking medications?: (!) Laundry,Housekeeping,Shopping  ADL Interventions:  · Patient declines any further evaluation/treatment for this issue    Current Health Maintenance Status  Recommendations for Preventive Services Due: see orders. Recommended screening schedule for the next 5-10 years is provided to the patient in written form: see Patient Instructions/AVS.    PHYSICAL EXAM:  VITALS:  /78 (Site: Left Upper Arm, Position: Sitting, Cuff Size: Large Adult)   Pulse 77   Resp 16   Ht 5' 7\" (1.702 m)   Wt 165 lb (74.8 kg)   SpO2 98%   BMI 25.84 kg/m²   BP Readings from Last 5 Encounters:   01/11/22 126/78   11/18/21 136/84   10/06/21 (!) 144/84   09/16/21 (!) 145/82   08/20/21 (!) 144/70     Wt Readings from Last 5 Encounters:   01/11/22 165 lb (74.8 kg)   11/18/21 164 lb (74.4 kg)   10/06/21 164 lb (74.4 kg)   09/16/21 164 lb 3.2 oz (74.5 kg)   08/20/21 164 lb (74.4 kg)   Body mass index is 25.84 kg/m².   GENERAL: well-developed, well-nourished, alert, no distress, calm   EYES: negative findings: lids and lashes normal and conjunctivae and sclerae normal  ENT: normal TM's and external ear canals both ears  · External nose and ears appear normal  · Pharynx: normal. Exudates: None  · Lips, mucosa, and tongue normal  · Hearing grossly normal.    NECK: No adenopathy, supple, symmetrical, trachea midline  · Thyroid not enlarged, symmetric, no tenderness/mass/nodules  · no cervical nodes, no supraclavicular nodes  LUNGS:  Breathing unlabored  · clear to auscultation bilaterally and good air movement  CARDIOVASC: regular rate and rhythm, S1, S2 normal   LEGS:  Lower extremity edema: none     No carotid bruits  SKIN: warm and dry  · No rashes or suspicious lesions  PSYCH:  Alert and oriented  · Normal reasoning, insight good  · Facial expressions full, mood appropriate  · No memory disturbance noted  MUSCULOSKEL:  No significant finger or nail findings  · Spine symmetric, no deformities, no kyphosis   GAIT: Assistive device: rolling walker      Assessment and Plan:      Diagnosis Orders   1. Routine general medical examination at a health care facility     2. Type 2 diabetes mellitus with diabetic nephropathy, without long-term current use of insulin (HCC)  insulin glargine (LANTUS SOLOSTAR) 100 UNIT/ML injection pen    Insulin Pen Needle (PEN NEEDLES 3/16\") 31G X 5 MM MISC   3. S/P aortic valve replacement with porcine valve     4. Malignant neoplasm of descending colon ([address]     5. Essential hypertension     6. Hypertriglyceridemia     7. Nocturnal polyuria  POCT Urinalysis no Micro    doxazosin (CARDURA) 1 MG tablet   8. Chronic pain of both knees     Stable except new nocturia probably due to BPH. Knee pain worse and possible spinal stenosis,     Continue current Tx plan. Any changes marked below. INSTRUCTIONS  NEXT APPOINTMENT: Please schedule check-up in 2 months. · PLEASE TAKE THIS FORM TO CHECK-OUT WINDOW TO SCHEDULE NEXT VISIT. · Please get COVID booster soon. Separate from other vaccines by at least 2 weeks.   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 2 options:  Medicare only covers for injury. Call to be seen for tetanus booster if you have any cuts, punctures or other open skin injury. OR get tetanus booster at pharmacy (like [facility] or [facility]) for approximately $50.    · Has arthritis in knees and possibly spinal stenosis causing leg weak feeling standing. · Continue tylenol for pain. · Try heat on knees when really acting up. · Can schedule knee injection with ortho or Dr. Smith Mins soon. · Read handout about spinal stenosis. Use walker to prevent fall and reduce leg weakness. · ADD doxazosin in evening to reduce number of times getting up at night to urinate.

## 2022-01-11 NOTE — PATIENT INSTRUCTIONS
INSTRUCTIONS  NEXT APPOINTMENT: Please schedule check-up in 2 months. · PLEASE TAKE THIS FORM TO CHECK-OUT WINDOW TO SCHEDULE NEXT VISIT. · Please get COVID booster soon. Separate from other vaccines by at least 2 weeks. 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 2 options:  Medicare only covers for injury. Call to be seen for tetanus booster if you have any cuts, punctures or other open skin injury. OR get tetanus booster at pharmacy (like [email or web address] or [facility]) for approximately $50.    · Has arthritis in knees and possibly spinal stenosis causing leg weak feeling standing. · Continue tylenol for pain. · Try heat on knees when really acting up. · Can schedule knee injection with ortho or Dr. Moshe Castillo soon. · Read handout about spinal stenosis. Use walker to prevent fall and reduce leg weakness. · ADD doxazosin in evening to reduce number of times getting up at night to urinate. Patient Education       SPINAL STENOSIS    Spinal stenosis (or narrowing) is a common condition that occurs when the small spinal canal, which contains the nerve roots and spinal cord, becomes compressed. This causes a \"pinching\" of the spinal cord and/or nerve roots, which leads to pain, cramping, weakness or numbness. Depending on where the narrowing takes place, you may feel these symptoms in the lower back and legs, neck, shoulder or arms. Usually, the narrowing is caused by osteoarthritis, or \"wear and tear\" arthritis, of the spinal column and discs between the vertebrae (the bones of the back). It may also be caused by a thickening of the ligaments in the back, as well as by a bulging of the discs that separate the vertebrae. Symptoms of spinal stenosis often start slowly and get worse over time. Pain in the legs may become so severe that walking even short distances is unbearable. Frequently, sufferers must sit or lean forward over a grocery cart, countertop or walker to temporarily ease pain.     Fast facts  Spinal stenosis is usually the result of osteoarthritis, which can cause a pinching of the spinal cord or nerve roots. There is no cure for this disease but there are steps you can take to reduce pain and improve your flexibility (your ability to bend and move about). Exercise is very important in the treatment of this disease. What is spinal stenosis? Spinal stenosis is a narrowing of one or more areas of the spine. This narrowing, which occurs most often in the lower back or neck, can put pressure on the spinal cord or nerves that branch out from the squeezed areas. Typically, a person with this condition complains of severe pain in the legs, calves or lower back when standing or walking. Pain may come on more quickly when walking up a hill, a ramp or steps. Usually, it is relieved by sitting down or leaning over. However, not all patients with spinal narrowing develop symptomsand we still don't understand why. Because of this, the term \"spinal stenosis\" actually refers to the symptoms of pain and not to the narrowing itself. What causes spinal stenosis? Some people are born with a small spinal canal. This is called \"congenital stenosis\". However, spinal narrowing is most often due to age-related changes that take place over time. This is called \"acquired spinal stenosis. \"     Who gets spinal stenosis? The risk of developing spinal stenosis increases if:   You were born with a narrow spinal canal   You are female   You are 48years old or older   You've had a previous injury or surgery of the spine   Some medical conditions can cause spinal stenosis. These include:  Osteoarthritis and bony spurs that form as we age   Inflammatory spondyloarthritis (e.g., ankylosing spondylitis)   Spinal tumors   Paget's Disease     How is spinal stenosis diagnosed? A rheumatologist will ask about your symptoms and medical history.  If he suspects spinal stenosis, he will do a physical exam. Some symptoms he In addition, a rheumatologist may prescribe other medications to help with pain and/or muscle spasm. Cortisone injections. Injections directly into the area around the spinal cord may provide a great deal of temporary, sometimes permanent, relief. These injections are usually given on an outpatient basis in a hospital or clinic setting. Surgery. Some patients with severe or worsening symptoms (but who are otherwise healthy) may be candidates for a \"decompression laminectomy. \" This surgery removes the bony spurs and buildup of bone in the spinal canal, freeing space for the nerves and spinal cord. Afterwards, doctors often perform a spinal fusion to connect two or more vertebrae and better support for the spine. Several recent studies have found that surgery produces better results than non-surgical treatment in the short term. However, results vary and, like all surgeries, this one also carries risks. These risks include blood clots in the brain and/or the legs; tears in the tissue around the spinal cord; infection; and injury to the nerve root. While surgery may bring some relief, it will not cure spinal stenosis or osteoarthritis and symptoms may recur. Broader health impacts  Spinal stenosis can lead to the slow but steady loss of strength in the legs. The severe pain caused by this condition can be quite disabling, even if you have no muscle weakness, since it greatly affects your ability to work and enjoy life. Living with spinal stenosis  There is no cure for this condition but there are steps you can take to feel better. For example:  Get moving. Regular exercise is very important, so do it often  at least three times a week for about 30 minutes. Start slowly with flexion-based (forward-bending) exercises. As you begin to feel stronger, add walking or swimming to your plan. Modify activity.  Don't do anything that can trigger or worsen pain and disability such as lifting heavy objects or walking long distances. Talk to your physician about pain medications, as well as alternative therapies such as acupuncture or massage that can ease pain. Explore non-surgical options first except in rare cases when pain, weakness and numbness comes on quickly. Points to remember  Medical history is benedict in making a diagnosis. Anyone over the age of 48 is at risk. The impact of this disease varies widely from patient to patient. Exercising regularly to keep muscles strong and improve flexibility boosts strength, reduces pain and improves general well-being. Your choice of treatment depends on how severely spinal stenosis affects your quality of life. OSTEOARTHRITIS    Overview   What is arthritis? Arthritis is inflammation (swelling) of the joints. It causes pain and usually also limits movement of the joints that are affected. There are many kinds of arthritis. What is osteoarthritis? Osteoarthritis (say: xbe-dka-rr-arth-rye-tis) is the most common kind of arthritis. Osteoarthritis (also called degenerative joint disease) can affect any joint in your body and causes the cushion layer between your bones (called the cartilage) to wear away. Causes & Risk Factors   What causes osteoarthritis? The exact cause isn't known. Osteoarthritis may be hereditary, which means it runs in families. Osteoarthritis seems to be related to the wear and tear put on joints over the years in most people. But wear and tear alone don't cause osteoarthritis. What happens when a joint is affected? Normally, a smooth layer of cartilage acts as a pad between the bones of a joint. Cartilage helps the joint move easily and comfortably. In some people, the cartilage thins as the joints are used. This is the start of osteoarthritis. Over time, the cartilage wears away and the bones may rub against one another.   Bones may even start to grow too thick on the ends where they meet to make a joint, and bits of cartilage may loosen and get in the way of movement. This can cause pain, joint swelling and stiffness. Who gets osteoarthritis? Osteoarthritis is more common in older people because they have been using their joints longer. Using the joints to do the same task over and over or simply using them over time can make osteoarthritis worse. Younger people can also get osteoarthritis. Athletes are at risk because they use their joints so much. People who have jobs that require the same movement over and over are also at risk. Injuries to a joint can increase the risk of arthritis in the joint later on. Excess weight also can accelerate arthritis in the knees, hips and spine. Diagnosis & Tests   How can my doctor tell if I have osteoarthritis? Your doctor will ask you questions about your pain. He or she will probably ask you if your joint pain gets worse with activity and better with rest. Your doctor will examine you to see if you have trouble moving your joint. Your doctor may also order an X-ray of the joint that is causing you problems to see what is causing the pain. Blood tests can help rule out other forms of arthritis. Treatment   Is there a treatment? No cure for osteoarthritis has been found. But the right plan can help you stay active, protect your joints from damage, limit injury and control pain. Your doctor will help you create the right plan for you. Will my arthritis get worse? Osteoarthritis does tend to get worse over time. But you can do many things to help yourself. It's important to stay as active as possible. When joints hurt, people tend not to use them and the muscles get weak. This can cause the joint to work less effectively and it can make it harder to get around. This causes more pain and the cycle begins again. Talk to your doctor about ways to control your pain so that you can stay active and avoid this problem. Tips on staying active  Lose weight if you're overweight. Exercise regularly for short periods. Go to a physical therapist if you can. Use canes and other special devices to protect your joints. Avoid lifting heavy things. Avoid overusing your joints. Don't pull on objects to move them -- push them instead. Take your medicine the way your doctor suggests. Use heat and/or cold to reduce pain or stiffness. Will medicine help? Over-the-counter medicines are medicines you can buy without a doctors prescription. Some of these, such as nonsteroidal anti-inflammatory drugs (NSAIDs), which reduce inflammation, or pain relievers can help you feel better. NSAIDs include aspirin, ibuprofen (one brand name: Motrin) and naproxen (one brand name: Aleve). Acetaminophen (one brand name: Tylenol) is a common pain reliever. Your doctor can also prescribe medicine for you, such as prescription pain relievers or prescription NSAIDs used to treat certain types of arthritis. NSAIDs can help by reducing inflammation, swelling and pain in the joints, but not everyone can take them. Medicine should be used wisely. You only need the amount that makes you feel good enough to keep moving. Using too much medicine may increase the risk of side effects. Can special devices really help? Yes. Special devices (see box below) and different ways of doing things can help people who have arthritis stay independent for as long as possible. These devices help protect your joints and keep you moving. For example, if you learn to use a cane the right way, you can help reduce the amount of pressure your weight puts on your hip joint when you walk by up to 60%. Talk to your doctor if you think a special device may help your arthritis.     Special devices for people who have arthritis  Canes, walkers and splints   Shoe inserts, wedges or pads   Special fasteners (such as Velcro) on clothing   Large  for tools and utensils (wrap foam or fabric around items with narrow handles, like pens)   Wall-mounted jar openers   Electric appliances, such as can openers and knives   Mobile shower heads   Bath seats and grab bars for the bathtub     Will special exercises really help? Yes. Exercise keeps your muscles strong and helps you stay flexible. Exercises that don't strain your joints are best. To avoid pain and injury, choose exercises that can be done in small amounts with rest time in between. Dancing, weight lifting and bike riding are good exercises for people who have arthritis. Try tightening your muscles and then relaxing them a number of times. You can do this with all of your major muscle groups. You could also try an \"aquacise\" program available through your local swimming pool or community center. These programs involve special movements in the pool, with much of your body's weight held up by water. Talk to your doctor before starting a new exercise program.    Should I use heat to ease pain? Using heat may reduce your pain and stiffness. Heat can be applied through warm baths, hot towels, hot water bottles or heating pads. Try alternating heat with ice packs. Personalized Preventive Plan for Katie Starr - 1/11/2022  Medicare offers a range of preventive health benefits. Some of the tests and screenings are paid in full while other may be subject to a deductible, co-insurance, and/or copay. Some of these benefits include a comprehensive review of your medical history including lifestyle, illnesses that may run in your family, and various assessments and screenings as appropriate. After reviewing your medical record and screening and assessments performed today your provider may have ordered immunizations, labs, imaging, and/or referrals for you. A list of these orders (if applicable) as well as your Preventive Care list are included within your After Visit Summary for your review.     Other Preventive Recommendations:    · A preventive eye exam performed by an eye specialist is recommended every 1-2 years to screen for glaucoma; cataracts, macular degeneration, and other eye disorders. · A preventive dental visit is recommended every 6 months. · Try to get at least 150 minutes of exercise per week or 10,000 steps per day on a pedometer . · Order or download the FREE \"Exercise & Physical Activity: Your Everyday Guide\" from [employer]. Call 7-308.121.5359 or search [employer] online. · You need 1621-6139 mg of calcium and 4767-5094 IU of vitamin D per day. It is possible to meet your calcium requirement with diet alone, but a vitamin D supplement is usually necessary to meet this goal.  · When exposed to the sun, use a sunscreen that protects against both UVA and UVB radiation with an SPF of 30 or greater. Reapply every 2 to 3 hours or after sweating, drying off with a towel, or swimming. · Always wear a seat belt when traveling in a car. Always wear a helmet when riding a bicycle or motorcycle.

## 2022-01-19 NOTE — TELEPHONE ENCOUNTER
Spoke with patients wife in regards to the [address] [facility] joint pain program. Wife stated that he's in a lot of pain in both knees but that they are scheduled with Dr. Jose Francisco Crane next week to discuss next steps. Patient can give us a call with any questions between now and appointment.

## 2022-01-25 NOTE — PROGRESS NOTES
INITIAL EVALUATION OF KNEE                                                                    1/25/2022  Aleksandra Welch     1935       History of Present Illness:    Bryan Farris is seen for Knee Pain (Bilateral knee  History of Left total knee replacement done in 11/2015)      Bryan Farris is an 50-year-old gentleman accompanied by his wife to the office who was sent by Dr. Sen Lou for evaluation of bilateral knee pain. He has a long history of bilateral knee osteoarthritis and underwent a left total knee replacement at [place] [address] in November 2015. He did well for approximately 3 years after that surgery but the pain eventually returned and worsened. He now complains of pain up to 7 in his left knee with ambulation. His right knee pain also worsened and has been treated twice since last March by Dr. Alyssa Nicholson with steroid injections with good results. He has no pain at rest but pain up to 5 with ambulation. He now requires use of a walker to get around. Significant past orthopedic history includes a left hip comminuted intertrochanteric fracture treated with TFN nail on 9/6/2019. Apparently he was traveling to [place] through [facility] and fell sustaining a left hip fracture. The fracture was apparently fixed in [facility]. The fracture healed well by the time he saw Dr. Tripp Valencia in March of this year he had 0 pain in the left hip and utilize a cane for ambulation only for distances. Unfortunately since that last visit with Dr. Alyssa Nicholson 5 months ago his ambulatory ability has deteriorated and he now requires a walker and has difficulty transferring from sit to standing position. He has had some physical therapy to improve his ambulation but apparently he has not been exercising sufficiently at home.      I have today reviewed with Aleksandra Welch the clinically relevant past medical history, medications, allergies, family history, and Review of Systems from the patients most recent history form, and I have documented any details relevant to today's presenting complaints in my history above. The patient's self recorded documents concerning the above have been scanned  into the chart under the \"Media\" tab. Ht 5' 7\" (1.702 m)   Wt 163 lb (73.9 kg)   BMI 25.53 kg/m²     Physical examination:    General Appearance: no acute distress, alert, oriented x 3  Limps when walking: yes - mild to moderate limp using his walker   1 + Pitting edema right ankle                                                                    Right                                       Left  Swelling mild minimal   Effusion  slight neg   Ecchymosis neg neg   Errythemia neg neg   Warmth neg neg   Deformity Varus neg   Crepitus Mild  neg   Patellar Subluxation neg neg   Tenderness  neg neg   Log Roll neg neg   Patellar Apprehension neg neg   Patellar Grind neg neg   Extension Lag Moderate  Moderate      Neurovascular Status:   Range of Motion Extension Flexion Pulses (0-4) Dorsalis  Pedis Posterior  Tibialis   Right 10    133      Degrees Right With doppler   With doppler   Left   4    132      Degrees Left 2+                          Test Including Ligamentous Stability/ Positive or Negative                                       Test          Right         Left   Valgus     neg              neg                Varus 5 neg   Lachman neg neg   Anterior Drawer 1-2+ neg   Posterior Drawer neg neg   Scott     Pivot Shift     Quadraceps Active     Atrophy                    cm                   cm       X-Ray Findings taken in Office: 4 views of the right knee were read by myself and shows complete loss of his medial joint space with associated varus deformity and associated tricompartmental osteoarthritic changes.     X-Ray Findings taken in the Office: 3 views of the left total knee were read by myself and shows a left total knee replacement with components in good position and no signs of loosening. No evidence of osteolysis. Impression:   1. 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 2.  Well-functioning left total knee replacement without evidence of loosening or infection. 3.  Decreased ambulatory ability related to loss of strength bilateral knees. 4.  Status post left hip intertrochanteric fracture fixed with TFN nail almost 3 years ago. The patient did regain ambulatory ability following that fracture. He has no pain related to the hip and need for a cane only for prolonged ambulation. 5.  The source of his bilateral knee pain is not entirely clear however he complained of only minimal pain in the office today. Plan/Treatment:   1. He is started back in physical therapy for aggressive strengthening of both knees. 2.  He was turned down for a geniculate nerve block in the office today as he had minimal pain so we could not obtain a legitimate result on his geniculate nerve block. 3.  He was told to work aggressively on strengthening both knees. If the right knee flares up related to his strengthening exercises he will come in for geniculate nerve block of the right knee. Sol Weinberg MD  1/25/2022    This dictation was done with Dragon dictation and may contain mechanical errors related to translation.

## 2022-01-25 NOTE — PATIENT INSTRUCTIONS
Impression:   1. 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 2.  Well-functioning left total knee replacement without evidence of loosening or infection. 3.  Decreased ambulatory ability related to loss of strength bilateral knees. 4.  Status post left hip intertrochanteric fracture fixed with TFN nail almost 3 years ago. The patient did regain ambulatory ability following that fracture. He has no pain related to the hip and need for a cane only for prolonged ambulation. 5.  The source of his bilateral knee pain is not entirely clear however he complained of only minimal pain in the office today. Plan/Treatment:   1. He is started back in physical therapy for aggressive strengthening of both knees. 2.  He was turned down for a geniculate nerve block in the office today as he had minimal pain so we could not obtain a legitimate result on his geniculate nerve block. 3.  He was told to work aggressively on strengthening both knees. If the right knee flares up related to his strengthening exercises he will come in for geniculate nerve block of the right knee.         Emile Dean MD  1/25/2022

## 2022-02-04 NOTE — PATIENT INSTRUCTIONS
INSTRUCTIONS  NEXT APPOINTMENT: Please schedule annual complete physical (30 minutes) in 3 months. · PLEASE TAKE THIS FORM TO CHECK-OUT WINDOW TO SCHEDULE NEXT VISIT. · PLEASE GET BLOODWORK DRAWN in 2 weeks ON FIRST FLOOR in 170. Please get flu vaccine when available in fall. Can get either at this office or at stores such as [employer]. · Please get annual dilated eye exam to screen for diabetic retinopathy which can lead to vision loss. Ask for report to be faxed to [phone]. · Continue lisinopril at 40 mg AND amlodipine  · Take OTC tylenol arthritis, 2 tabs every 8 hours for pain.
68

## 2022-02-08 NOTE — PROGRESS NOTES
[address] [facility], [facility]  [address] [facility] [facility]  Phone: [phone]   Fax:     [phone]    Physical Therapy Treatment Note/ Progress Report:     Date:  2022    Patient Name:  Kimberlee Sarmiento    :  1935  MRN: [id number]    Pertinent Medical History: Additional Pertinent Hx: 6 months progressive decline; gait, balance, L TKR , R knee OA, L hip ORIF     Medical/Treatment Diagnosis Information:  Diagnosis: Z96.652 (ICD-10-CM) - Status post total left knee [id number] (ICD-10-CM) - Primary osteoarthritis of right knee  Treatment Diagnosis: Bilat knee pain, progressive LE weakness? Insurance/Certification information:  PT Insurance Information: [facility]  Physician Information:  Referring Practitioner: Dr. Josesito Ji of care signed (Y/N):     Date of Patient follow up with Physician:      Progress Report: []  Yes  [x]  No     Date Range for reporting period:  Beginning: , 22  Ending:    Progress report due (10 Rx/or 30 days whichever is less):      Recertification due (POC duration/ or 90 days whichever is less):     Visit # 222 [address] Needed     1 [facility] medicare []Yes    []No     Functional Outcomes Measure:   Date Assessed: at eval  Test: womac  Score:    Pain level:  6-7 /10     History of Injury:  (from 21) Pt here for evaluation of chronic knee pain and worsening gait, which seems to be worsening significantly over the past 6 months. He has history of left knee replacement about 4 yrs ago, and R knee has OA, but he doesn't want it replaced. He also has history of left hip fx with ORIF in 2019. He reports onset of back pain starting about 2 months ago, and he seems to assimilate the back pain with bilateral knee pain. He rides a bike at home for about 20 minutes at home nearly every day, and this does not cause him pain.  Prior to 6 months ago, he was able to walk 30 minutes without a cane, but now his walking tolerance is only a few minutes, with very slow gait with wide base of support and he uses a quad cane. He is here with wife, and they state they want to try aquatic therapy because they knew someone who did it and it helped. He did attend therapy at the [address] [place] [address] for 5 visits, but denies any significant change in knee pain. He denies any specific hip pain, but states he has some back pain that does worsen with standing and walking. He has not had any lumbar work-up at this point. He hopes to decrease bilat leg pain with walking and improve walking and functional mobility back to previous levels. SUBJECTIVE:   11/8: pt reports no new c/o. Still antalgic gait w/ quad cane. bilat knee pain worse with standing, loosens some with short walks. Pt reports 5lb wt loss in the past few months, 20lbs over the past few years, but also has not been as active the past few years. 11/15: no new c/o. bilat knee pain, worse with walking and standing, better with sitting. Amb w/ quad cane, short stride gait. 11/18: no new c/o. Knee pain was better for a few hrs after last visit. He has FWW today, states it helps his knee pain a little. He saw PCP and they wondered why he wasn't doing aquatic PT. I explained that in order to help problem solve his pain and to differentiate between knee/ hip/ low back sources, it was important to do land PT.   11/22/21: pt reports overall not much change. Pain rated 6-7/10 today. States after PT, his pain reduces 1-2pts, but then returns a few hours later. He is now able to walk 1 mile using FWW with near normal stride gait. 12/2: Patient states that both of his knees are very sore today, 6/10. Walked about 1/4 mile outside his house this morning which he typically does. Back is a little sore as well but not much. Notes getting some slight relief of his B knee pain when he sits.   Doesn't feel like the therapy is helping much. RE-EVAL 2/8/22: pt here for re-evaluation of bilateral knee pain and functional decline. He is here using rollator and is accompanied with wife. They state that his condition continues to decline overall. Pain is still 6-7/10 and is fairly constant. He states pain worsens when he stands. Denies any radiating pain or significant numbness or tingling in lower extremities. Denies loss of bowel/ bladder. When asked about his previous home exercise program, he demonstrates that he has been performing the supine heel slide exercise (this was not one of the exercises I gave him previously). When asked specifically about the sit to stand exercise that we worked on previously, he states that he does get up out of a chair often during the day, but has not been doing it as an exercise. OBJECTIVE:    Observation:    Test measurements:        11/8:   Cervical Myelopathy Cluster Adryan Wiley et al 2010)    Age Greater >45 yes   Gait Deviation yes   Hoffmans no   Inverted Supinator no   Babinski no           >3 Positive tests increases likelihood to 94% for CM    PALPATION   No sig tenderness w/ lumbar PA or reproduction of radic sx. ROM/ MOBILITY   Hip mobility WFL, no reproduction of pain w/ overpressure    STRENGTH   Knee ext: R=35lb, L=40lb   Knee flex: R=20lb, L=25lb   Ankle DF: 4/5 bilat    SPECIAL TESTS   Neg SLR, neg hip blaine, weakly pos lumbar quadrant w/ ipsilateral local LBP      30s STS:  o 11/18/21: 6x (age adjusted norm =8)  o 2/8/22: 0x w/ hands on thighs. With firm motivational cues, and minimal assist, able to do 3x.      OTHER    patellar reflexes 3+ bilat, achilles 1+ bilat, neg babinski    RESTRICTIONS/PRECAUTIONS:     Exercises/Interventions:     Therapeutic Ex (28234)   Min: 5' Resistance/Reps Notes/Cues   Sit to stand Chair + pillow x5 Lots of cues and min A prn   Standing hip abduction At table   DKTC  With A from PT   Heel/ toe raises    bridge    EOB hip ext Hookly LTR    Lateral walk with PT HHA Pt needed to sit and rest due to increased B LE pain with lateral gait   Step up    LAQ >    HS curl >    Gait Cue posture, adequate stride length        Therapeutic Activity (16363) Min: 20'     Pt edu Again review pathology, possible lumbar stenosis contributions, need for regular exercise with HEP, pain science            NMR re-education (98267)   Min:     Mf Activation- re-ed     TrA Re-ed activation     Glute Max re-ed activation     Balance ex's > Add prn             Manual Intervention (57812) Min: 0     traction >may consider trial in future visits    Lumbar Resume prn   Lumbar manual Resume prn   Hip manual / Hamstring stretching     Modalities  Min:             Other Therapeutic Activities:  Pt was educated on PT POC, Diagnosis, Prognosis, pathomechanics as well as frequency and duration of scheduling future physical therapy appointments. Time was also taken on this day to answer all patient questions and participation in PT. Reviewed appointment policy in detail with patient and patient verbalized understanding. Home Exercise Program: Patient instructed in the following for HEP:   11/8: jennifer bridge  11/18:  Access Code: [email or web address]  Exercises  Supine Lower Trunk Rotation - 2-3 x daily - 4-6 x weekly - 2-3 sets - 10-15 reps  Sit to Stand - 2-3 x daily - 4-6 x weekly - 10-15 reps  Walking program w/ walker    2/8/22: sit to stand (chair w/ pillow 5x, 3x/day)      Patient verbalized/demonstrated understanding and was issued written handout. Therapeutic Exercise and NMR EXR  [x] (40315) Provided verbal/tactile cueing for activities related to strengthening, flexibility, endurance, ROM  for improvements in proximal hip and core control with self care, mobility, lifting and ambulation.   [x] (89026) Provided verbal/tactile cueing for activities related to improving balance, coordination, kinesthetic sense, posture, motor skill, proprioception  to assist with core control in self care, mobility, lifting, and ambulation. Therapeutic Activities:    [x] (19540 or 66513) Provided verbal/tactile cueing for activities related to improving balance, coordination, kinesthetic sense, posture, motor skill, proprioception and motor activation to allow for proper function  with self care and ADLs  [x] (59129) Provided training and instruction to the patient for proper core and proximal hip recruitment and positioning with ambulation re-education     Home Exercise Program:    [x] (02941) Reviewed/Progressed HEP activities related to strengthening, flexibility, endurance, ROM of core, proximal hip and LE for functional self-care, mobility, lifting and ambulation   [x] (36228) Reviewed/Progressed HEP activities related to improving balance, coordination, kinesthetic sense, posture, motor skill, proprioception of core, proximal hip and LE for self care, mobility, lifting, and ambulation      Manual Treatments:  PROM / STM / Oscillations-Mobs:  G-I, II, III, IV (PA's, Inf., Post.)  [x] (15895) Provided manual therapy to mobilize proximal hip and LS spine soft tissue/joints for the purpose of modulating pain, promoting relaxation,  increasing ROM, reducing/eliminating soft tissue swelling/inflammation/restriction, improving soft tissue extensibility and allowing for proper ROM for normal function with self care, mobility, lifting and ambulation.      Charges:  Timed Code Treatment Minutes: 25   Total Treatment Minutes: 40     [] EVAL (LOW) 16740 (typically 20 minutes face-to-face)  [] EVAL (MOD) 79468 (typically 30 minutes face-to-face)  [] EVAL (HIGH) 09947 (typically 45 minutes face-to-face)  [x] RE-EVAL     [] PZ(57849) x     [] Dry needle 1 or 2 Muscles (05166)  [] NMR (88696) x     [] Dry needle 3+ Muscles (24023)  [] Manual (93169) x     [] Ultrasound (11173) x  [x] TA (72603) x 2    [] Mech Traction (21488)  [] ES(attended) (61518)     [] ES (un) (99431):   [] Vasopump (02693) [] Eduardo (76717)   [] Other:    GOALS:  Patient stated goal: Able to walk for at least 10min w/o needing a rest break. [] Progressing: [] Met: [] Not Met: [] Adjusted    Therapist goals for Patient:   Short Term Goals: To be achieved in: 2 weeks  1. Independent in HEP and progression per patient tolerance, in order to prevent re-injury. [] Progressing: [] Met: [] Not Met: [] Adjusted  2. Patient will have a decrease in pain to facilitate improvement in movement, function, and ADLs as indicated by Functional Deficits. [] Progressing: [] Met: [] Not Met: [] Adjusted    Long Term Goals: To be achieved in: up to 8-10 weeks  1. Disability index score improvement from 72% to 50% or less on WOMAC to assist with reaching prior level of function. [] Progressing: [] Met: [] Not Met: [] Adjusted  3. Patient will demonstrate an increase in Strength to good proximal hip strength and control, within 5lb HHD in LE to allow for proper functional mobility as indicated by patients Functional Deficits. [] Progressing: [] Met: [] Not Met: [] Adjusted  4. Patient will be able to perform at least 10 reps on 30 sec sit to stand test to demonstrate improved lower quarter functional strength. [] Progressing: [] Met: [] Not Met: [] Adjusted  5. Able to ambulate his 2 flights of stairs in his home (bi-level) w/o needing a rest break. [] Progressing: [] Met: [] Not Met: [] Adjusted        ASSESSMENT:  Pt cont's to have s/s consistent with functional decline, with possible contributions due to chronic knee pain or lumbar stenosis type condition. After a long discussion, we decided to give PT another try for a few weeks really working hard on his functional strength to see if this helps. We also had a long discussion regarding specificity of exercise and how supine knee AROM was not going to translate into helping his ability to walk and get around.      Treatment/Activity Tolerance:  [] Patient tolerated treatment well [] Patient limited by fatique  [] Patient limited by pain  [] Patient limited by other medical complications  [x] Other: limited by effort and motivation    Overall Progression Towards Functional goals/ Treatment Progress Update:  [x] Patient is progressing as expected towards functional goals listed. [] Progression is slowed due to complexities/Impairments listed. [] Progression has been slowed due to co-morbidities. [] Plan just implemented, too soon to assess goals progression <30days   [] Goals require adjustment due to lack of progress  [] Patient is not progressing as expected and requires additional follow up with physician  [] Other:    Prognosis for POC: [x] Good [] Fair  [] Poor    Patient requires continued skilled intervention: [x] Yes  [] No        PLAN: 12/2/21: Pt and wife requested to hold PT through the end of the month due to busy schedule this month going out of town. Pt to call once back in town concerning need for continued PT. If pt cont's to struggle with pain and lack of significant functional progress, may refer back for further work-up possibly including investigating lumbar stenosis as a contributing factor to functional decline. 2/8/22: restart PT at 2x/wk for at least 3 weeks with targeting deficits described above. Will plan to see him up to 10 visits if he is making improvement. [] Continue per plan of care [] Alter current plan (see comments)  [] Plan of care initiated [] Hold pending MD visit [] Discharge    Electronically signed by: Leigha Quesada PT , DPT, OCS #[id number]      Note: If patient does not return for scheduled/recommended follow up visits, this note will serve as a discharge from care along with the most recent update on progress.

## 2022-02-11 NOTE — FLOWSHEET NOTE
[address] [facility], [facility]  [address] [facility]  Phone: [phone]   Fax:     [phone]    Physical Therapy Treatment Note/ Progress Report:     Date:  2022    Patient Name:  Feli Landrum    :  1935  MRN: [id number]    Pertinent Medical History: Additional Pertinent Hx: 6 months progressive decline; gait, balance, L TKR 2016, R knee OA, L hip ORIF     Medical/Treatment Diagnosis Information:  Diagnosis: Z96.652 (ICD-10-CM) - Status post total left knee [id number] (ICD-10-CM) - Primary osteoarthritis of right knee  Treatment Diagnosis: Bilat knee pain, progressive LE weakness? Insurance/Certification information:  PT Insurance Information: [facility] Medicare  Physician Information:  Referring Practitioner: Dr. Cayetano Yip of care signed (Y/N):     Date of Patient follow up with Physician:      Progress Report: []  Yes  [x]  No     Date Range for reporting period:  Beginning: , 22  Ending:    Progress report due (10 Rx/or 30 days whichever is less):  77    Recertification due (POC duration/ or 90 days whichever is less):     Visit # 222 [address] Briceño Needed     2 [address] [facility] []Yes    []No     Functional Outcomes Measure:   Date Assessed: at eval  Test: womac  Score:    Pain level:  6-7 /10     History of Injury:  (from 21) Pt here for evaluation of chronic knee pain and worsening gait, which seems to be worsening significantly over the past 6 months. He has history of left knee replacement about 4 yrs ago, and R knee has OA, but he doesn't want it replaced. He also has history of left hip fx with ORIF in 2019. He reports onset of back pain starting about 2 months ago, and he seems to assimilate the back pain with bilateral knee pain. He rides a bike at home for about 20 minutes at home nearly every day, and this does not cause him pain.  Prior to 6 months ago, he was able to walk 30 minutes without a cane, but now his walking tolerance is only a few minutes, with very slow gait with wide base of support and he uses a quad cane. He is here with wife, and they state they want to try aquatic therapy because they knew someone who did it and it helped. He did attend therapy at the [address] [place] [address] for 5 visits, but denies any significant change in knee pain. He denies any specific hip pain, but states he has some back pain that does worsen with standing and walking. He has not had any lumbar work-up at this point. He hopes to decrease bilat leg pain with walking and improve walking and functional mobility back to previous levels. SUBJECTIVE:   11/8: pt reports no new c/o. Still antalgic gait w/ quad cane. bilat knee pain worse with standing, loosens some with short walks. Pt reports 5lb wt loss in the past few months, 20lbs over the past few years, but also has not been as active the past few years. 11/15: no new c/o. bilat knee pain, worse with walking and standing, better with sitting. Amb w/ quad cane, short stride gait. 11/18: no new c/o. Knee pain was better for a few hrs after last visit. He has FWW today, states it helps his knee pain a little. He saw PCP and they wondered why he wasn't doing aquatic PT. I explained that in order to help problem solve his pain and to differentiate between knee/ hip/ low back sources, it was important to do land PT.   11/22/21: pt reports overall not much change. Pain rated 6-7/10 today. States after PT, his pain reduces 1-2pts, but then returns a few hours later. He is now able to walk 1 mile using FWW with near normal stride gait. 12/2: Patient states that both of his knees are very sore today, 6/10. Walked about 1/4 mile outside his house this morning which he typically does. Back is a little sore as well but not much. Notes getting some slight relief of his B knee pain when he sits.   Doesn't feel like the therapy is helping much. RE-EVAL 2/8/22: pt here for re-evaluation of bilateral knee pain and functional decline. He is here using rollator and is accompanied with wife. They state that his condition continues to decline overall. Pain is still 6-7/10 and is fairly constant. He states pain worsens when he stands. Denies any radiating pain or significant numbness or tingling in lower extremities. Denies loss of bowel/ bladder. When asked about his previous home exercise program, he demonstrates that he has been performing the supine heel slide exercise (this was not one of the exercises I gave him previously). When asked specifically about the sit to stand exercise that we worked on previously, he states that he does get up out of a chair often during the day, but has not been doing it as an exercise. 2/11/22: pt states \"I feel worse\". Pain is still 6/10 and both knees hurt. Ambulation looks about the same, slow labored gait. States he did do home exercises and that did not particularly cause incr soreness. Pt states he fell last night and hit his side on the couch. He does have a mild skin abrasion that is lightly red, approx 2\" wide and about 6-8\" long. No major bruising or difficulty breathing per his report. (**NEXT VISIT INQUIRE ABOUT HX OF MALIGNANT NEOPLASM OF COLON - SURGERY IN 2018)    OBJECTIVE:    Observation:    Test measurements:        30s STS:      RESTRICTIONS/PRECAUTIONS:     Exercises/Interventions:     Therapeutic Ex (96849)   Min: 20' Resistance/Reps Notes/Cues   Sit to stand Chair x7    Standing hip abduction At table   DKTC  With A from PT   Heel/ toe raises    bridge hookly 2x15    EOB hip ext Add prn    Hookly LTR x15    Lateral walk  @ counter 2 lapsCues for large steps, min use of UE.    Step up Add prn    LAQ >    HS curl >    Gait 1 clinic lap w/ FWS Cue posture, incr stride length        Therapeutic Activity (31078) Min: 5'     Pt edu Reviewed importance to give full effort during program, especially given the fact his pain was reduced after ex's, importance of exercise to prevent functional decline. NMR re-education (95360)   Min:                    Balance ex's > Add prn             Manual Intervention (26455) Min: 20'     traction Supine 90-90 x 8'    Lumbar R,L hip LAD unilat/ bilat    Lumbar manual SL Rot gap, low/ mid grade R,L     Hip manual / Hamstring stretching     Modalities  Min:             Other Therapeutic Activities:  Pt was educated on PT POC, Diagnosis, Prognosis, pathomechanics as well as frequency and duration of scheduling future physical therapy appointments. Time was also taken on this day to answer all patient questions and participation in PT. Reviewed appointment policy in detail with patient and patient verbalized understanding. Home Exercise Program: Patient instructed in the following for HEP:   11/8: jennifer brumfield  11/18:  Access Code: [email or web address]  Exercises  Supine Lower Trunk Rotation - 2-3 x daily - 4-6 x weekly - 2-3 sets - 10-15 reps  Sit to Stand - 2-3 x daily - 4-6 x weekly - 10-15 reps  Walking program w/ walker    2/8/22: sit to stand (chair w/ pillow 5x, 3x/day)  2/11/22:   Access Code: [email or web address]  URL: [email or web address]  Date: 02/11/2022  Prepared by: Caleb Howard    Exercises: (PERFORM all ex's 3X/DAY)  Sit to Stand 5 reps  Supine Lower Trunk Rotation - 15 reps  Supine Bridge - 15 reps  Side Stepping with Counter Support -2 laps  Ambulation w/ walker: 2-3 laps in house      Patient verbalized/demonstrated understanding and was issued written handout. Therapeutic Exercise and NMR EXR  [x] (13814) Provided verbal/tactile cueing for activities related to strengthening, flexibility, endurance, ROM  for improvements in proximal hip and core control with self care, mobility, lifting and ambulation.   [x] (75121) Provided verbal/tactile cueing for activities related to improving balance, coordination, kinesthetic sense, posture, motor skill, proprioception  to assist with core control in self care, mobility, lifting, and ambulation. Therapeutic Activities:    [x] (43812 or 13943) Provided verbal/tactile cueing for activities related to improving balance, coordination, kinesthetic sense, posture, motor skill, proprioception and motor activation to allow for proper function  with self care and ADLs  [x] (27278) Provided training and instruction to the patient for proper core and proximal hip recruitment and positioning with ambulation re-education     Home Exercise Program:    [x] (24230) Reviewed/Progressed HEP activities related to strengthening, flexibility, endurance, ROM of core, proximal hip and LE for functional self-care, mobility, lifting and ambulation   [x] (72135) Reviewed/Progressed HEP activities related to improving balance, coordination, kinesthetic sense, posture, motor skill, proprioception of core, proximal hip and LE for self care, mobility, lifting, and ambulation      Manual Treatments:  PROM / STM / Oscillations-Mobs:  G-I, II, III, IV (PA's, Inf., Post.)  [x] (82462) Provided manual therapy to mobilize proximal hip and LS spine soft tissue/joints for the purpose of modulating pain, promoting relaxation,  increasing ROM, reducing/eliminating soft tissue swelling/inflammation/restriction, improving soft tissue extensibility and allowing for proper ROM for normal function with self care, mobility, lifting and ambulation.      Charges:  Timed Code Treatment Minutes: 45   Total Treatment Minutes: 45     [] EVAL (LOW) 72580 (typically 20 minutes face-to-face)  [] EVAL (MOD) 21958 (typically 30 minutes face-to-face)  [] EVAL (HIGH) 13266 (typically 45 minutes face-to-face)  [] RE-EVAL     [x] JF(33332) x     [] Dry needle 1 or 2 Muscles (47463)  [] NMR (99929) x     [] Dry needle 3+ Muscles (73013)  [x] Manual (24069) x 2    [] Ultrasound (52726) x  [] TA (43497) x 2    [] Mech Traction (50699)  [] ES(attended) (12664)     [] ES (un) (19832):   [] Vasopump (30499) [] Ionto (95941)   [] Other:    GOALS:  Patient stated goal: Able to walk for at least 10min w/o needing a rest break. [] Progressing: [] Met: [] Not Met: [] Adjusted    Therapist goals for Patient:   Short Term Goals: To be achieved in: 2 weeks  1. Independent in HEP and progression per patient tolerance, in order to prevent re-injury. [] Progressing: [] Met: [] Not Met: [] Adjusted  2. Patient will have a decrease in pain to facilitate improvement in movement, function, and ADLs as indicated by Functional Deficits. [] Progressing: [] Met: [] Not Met: [] Adjusted    Long Term Goals: To be achieved in: up to 8-10 weeks  1. Disability index score improvement from 72% to 50% or less on WOMAC to assist with reaching prior level of function. [] Progressing: [] Met: [] Not Met: [] Adjusted  3. Patient will demonstrate an increase in Strength to good proximal hip strength and control, within 5lb HHD in LE to allow for proper functional mobility as indicated by patients Functional Deficits. [] Progressing: [] Met: [] Not Met: [] Adjusted  4. Patient will be able to perform at least 10 reps on 30 sec sit to stand test to demonstrate improved lower quarter functional strength. [] Progressing: [] Met: [] Not Met: [] Adjusted  5. Able to ambulate his 2 flights of stairs in his home (bi-level) w/o needing a rest break. [] Progressing: [] Met: [] Not Met: [] Adjusted      ASSESSMENT:  Pt reported slight improvement in knee pain after lumbar manual traction, and further improvement after exercises. Seemed to have greater response with improvement after ex's. Educated pt and spouse on benefits of exercise in managing chronic pain. Updated his home program with more global ex's targeting functional strength and mobility. Pt does seem to be moving slower and bed mobility is worse than it was when I first saw him in November.  Also possibly a little slower with responses and answers to questions. Will continue to monitor and mention to referring physician when he see's him again. Treatment/Activity Tolerance:  [] Patient tolerated treatment well [] Patient limited by fatique  [] Patient limited by pain  [] Patient limited by other medical complications  [x] Other: limited by effort and motivation    Overall Progression Towards Functional goals/ Treatment Progress Update:  [x] Patient is progressing as expected towards functional goals listed. [] Progression is slowed due to complexities/Impairments listed. [] Progression has been slowed due to co-morbidities. [] Plan just implemented, too soon to assess goals progression <30days   [] Goals require adjustment due to lack of progress  [] Patient is not progressing as expected and requires additional follow up with physician  [] Other:    Prognosis for POC: [x] Good [] Fair  [] Poor    Patient requires continued skilled intervention: [x] Yes  [] No        PLAN: 12/2/21: Pt and wife requested to hold PT through the end of the month due to busy schedule this month going out of town. Pt to call once back in town concerning need for continued PT. If pt cont's to struggle with pain and lack of significant functional progress, may refer back for further work-up possibly including investigating lumbar stenosis as a contributing factor to functional decline. 2/8/22: restart PT at 2x/wk for at least 3 weeks with targeting deficits described above. Will plan to see him up to 10 visits if he is making improvement. [x] Continue per plan of care [] Alter current plan (see comments)  [] Plan of care initiated [] Hold pending MD visit [] Discharge    Electronically signed by: Loretta Meadows PT , DPT, OCS #[id number]      Note: If patient does not return for scheduled/recommended follow up visits, this note will serve as a discharge from care along with the most recent update on progress.

## 2022-02-15 NOTE — FLOWSHEET NOTE
[address] [facility], [facility]  [address] [facility]  Phone: [phone]   Fax:     [phone]    Physical Therapy Treatment Note/ Progress Report:     Date:  2/15/2022    Patient Name:  Brenden Soriano    :  1935  MRN: [id number]    Pertinent Medical History: Additional Pertinent Hx: 6 months progressive decline; gait, balance, L TKR 2016, R knee OA, L hip ORIF     Medical/Treatment Diagnosis Information:  Diagnosis: Z96.652 (ICD-10-CM) - Status post total left knee [id number] (ICD-10-CM) - Primary osteoarthritis of right knee  Treatment Diagnosis: Bilat knee pain, progressive LE weakness? Insurance/Certification information:  PT Insurance Information: [facility] Medicare  Physician Information:  Referring Practitioner: Dr. Caron Rome of care signed (Y/N):     Date of Patient follow up with Physician:      Progress Report: []  Yes  [x]  No     Date Range for reporting period:  Beginning: , 22  Ending:    Progress report due (10 Rx/or 30 days whichever is less):      Recertification due (POC duration/ or 90 days whichever is less):     Visit # 222 GRADY Briceño Needed     3 [facility] []Yes    []No     Functional Outcomes Measure:   Date Assessed: at eval  Test: womac  Score:    Pain level:  6-7 /10     History of Injury:  (from 21) Pt here for evaluation of chronic knee pain and worsening gait, which seems to be worsening significantly over the past 6 months. He has history of left knee replacement about 4 yrs ago, and R knee has OA, but he doesn't want it replaced. He also has history of left hip fx with ORIF in 2019. He reports onset of back pain starting about 2 months ago, and he seems to assimilate the back pain with bilateral knee pain. He rides a bike at home for about 20 minutes at home nearly every day, and this does not cause him pain.  Prior to 6 months ago, he was able to walk 30 minutes without a cane, but now his walking tolerance is only a few minutes, with very slow gait with wide base of support and he uses a quad cane. He is here with wife, and they state they want to try aquatic therapy because they knew someone who did it and it helped. He did attend therapy at the [address] [place] [address] for 5 visits, but denies any significant change in knee pain. He denies any specific hip pain, but states he has some back pain that does worsen with standing and walking. He has not had any lumbar work-up at this point. He hopes to decrease bilat leg pain with walking and improve walking and functional mobility back to previous levels. SUBJECTIVE:   11/8: pt reports no new c/o. Still antalgic gait w/ quad cane. bilat knee pain worse with standing, loosens some with short walks. Pt reports 5lb wt loss in the past few months, 20lbs over the past few years, but also has not been as active the past few years. 11/15: no new c/o. bilat knee pain, worse with walking and standing, better with sitting. Amb w/ quad cane, short stride gait. 11/18: no new c/o. Knee pain was better for a few hrs after last visit. He has FWW today, states it helps his knee pain a little. He saw PCP and they wondered why he wasn't doing aquatic PT. I explained that in order to help problem solve his pain and to differentiate between knee/ hip/ low back sources, it was important to do land PT.   11/22/21: pt reports overall not much change. Pain rated 6-7/10 today. States after PT, his pain reduces 1-2pts, but then returns a few hours later. He is now able to walk 1 mile using FWW with near normal stride gait. 12/2: Patient states that both of his knees are very sore today, 6/10. Walked about 1/4 mile outside his house this morning which he typically does. Back is a little sore as well but not much. Notes getting some slight relief of his B knee pain when he sits.   Doesn't feel like the therapy is helping much. RE-EVAL 2/8/22: pt here for re-evaluation of bilateral knee pain and functional decline. He is here using rollator and is accompanied with wife. They state that his condition continues to decline overall. Pain is still 6-7/10 and is fairly constant. He states pain worsens when he stands. Denies any radiating pain or significant numbness or tingling in lower extremities. Denies loss of bowel/ bladder. When asked about his previous home exercise program, he demonstrates that he has been performing the supine heel slide exercise (this was not one of the exercises I gave him previously). When asked specifically about the sit to stand exercise that we worked on previously, he states that he does get up out of a chair often during the day, but has not been doing it as an exercise. 2/11/22: pt states \"I feel worse\". Pain is still 6/10 and both knees hurt. Ambulation looks about the same, slow labored gait. States he did do home exercises and that did not particularly cause incr soreness. Pt states he fell last night and hit his side on the couch. He does have a mild skin abrasion that is lightly red, approx 2\" wide and about 6-8\" long. No major bruising or difficulty breathing per his report. 2/15/22: Today is his 3rd visit since re-eval on 2/8/22 and 11th overall PT session since Sept 2021 for same condition. pt reports no new c/o. Feels about the same overall. He has been working on home program as well as continuing to ride his stationary bike for 20min at a time and also ambulating stairs regularly to work on lower body strength. He has not noticed a significant improvement despite increased focus on lower body strength or with lumbar targeted treatment.      OBJECTIVE:    Observation:    Test measurements:        Cervical Myelopathy Cluster Julian Medina et al 2010)    Age Greater >45 yes   Gait Deviation yes   Hoffmans no   Inverted Supinator no   Babinski no           >3 Positive tests increases likelihood to 94% for CM    PALPATION   No sig tenderness w/ lumbar PA or reproduction of radic sx. ROM/ MOBILITY   Hip mobility WFL, no reproduction of pain w/ overpressure    STRENGTH   Knee ext: R=35lb, L=40lb   Knee flex: R=20lb, L=25lb   Ankle DF: 4/5 bilat    SPECIAL TESTS   Neg SLR, neg hip blaine   TUG: rollator: 24sec (similar as previous measures from months ago)   30s STS:  o 11/18/21: 6x (age adjusted norm =8)  o 2/8/22: 0x w/ hands on thighs. With firm motivational cues, and minimal assist, able to do 3x.   o 2/15: 5x w/ slightly elevated table    OTHER    patellar reflexes 3+ bilat, achilles 1+ bilat, neg babinski    RESTRICTIONS/PRECAUTIONS:     Exercises/Interventions:     Therapeutic Ex (48754)   Min: 10' Resistance/Reps Notes/Cues   Sit to stand 30s (5 reps) Slightly elevated table, multiple attempts and mod cues   Standing hip abduction At table   DKTC  With A from PT   Heel/ toe raises    bridge hookly x15    EOB hip ext Add prn    Hookly LTR    Lateral walk  Cues for large steps, min use of UE. Step up Add prn    LAQ >    HS curl >    Gait  Cue posture, incr stride length        Therapeutic Activity (27597) Min: 15'     Pt edu Long discussion re: HEP to continue, unclear explanation for progressive decline over the past year. May consider neurology or other specialist f/u if deemed appropriate by his medical doctors. NMR re-education (12492)   Min:                    Balance ex's > Add prn             Manual Intervention (67436) Min: 10'     traction    Lumbar R,L hip LAD unilat/ bilat    Lumbar manual Prone PA's unilat/ bilat All nonpainful.  Some hypomobility at mid/upper lumbar, but did not change his symptoms after manual treatment   Hip manual / Hamstring stretching     Modalities  Min:             Other Therapeutic Activities:  Pt was educated on PT POC, Diagnosis, Prognosis, pathomechanics as well as frequency and duration of scheduling future physical therapy appointments. Time was also taken on this day to answer all patient questions and participation in PT. Reviewed appointment policy in detail with patient and patient verbalized understanding. Home Exercise Program: Patient instructed in the following for HEP:   11/8: jennifer brumfield  11/18:  Access Code: [email or web address]  Exercises  Supine Lower Trunk Rotation - 2-3 x daily - 4-6 x weekly - 2-3 sets - 10-15 reps  Sit to Stand - 2-3 x daily - 4-6 x weekly - 10-15 reps  Walking program w/ walker    2/8/22: sit to stand (chair w/ pillow 5x, 3x/day)  2/11/22:   Access Code: [email or web address]  URL: [email or web address]  Date: 02/11/2022  Prepared by: Shiva Bonilla    Exercises: (PERFORM all ex's 3X/DAY)  Sit to Stand 5 reps  Supine Lower Trunk Rotation - 15 reps  Supine Bridge - 15 reps  Side Stepping with Counter Support -2 laps  Ambulation w/ walker: 2-3 laps in house      Patient verbalized/demonstrated understanding and was issued written handout. Therapeutic Exercise and NMR EXR  [x] (27379) Provided verbal/tactile cueing for activities related to strengthening, flexibility, endurance, ROM  for improvements in proximal hip and core control with self care, mobility, lifting and ambulation. [x] (06107) Provided verbal/tactile cueing for activities related to improving balance, coordination, kinesthetic sense, posture, motor skill, proprioception  to assist with core control in self care, mobility, lifting, and ambulation.      Therapeutic Activities:    [x] (77032 or 13042) Provided verbal/tactile cueing for activities related to improving balance, coordination, kinesthetic sense, posture, motor skill, proprioception and motor activation to allow for proper function  with self care and ADLs  [x] (63041) Provided training and instruction to the patient for proper core and proximal hip recruitment and positioning with ambulation re-education     Home Exercise Program:    [x] (66807) Reviewed/Progressed HEP activities related to strengthening, flexibility, endurance, ROM of core, proximal hip and LE for functional self-care, mobility, lifting and ambulation   [x] (31088) Reviewed/Progressed HEP activities related to improving balance, coordination, kinesthetic sense, posture, motor skill, proprioception of core, proximal hip and LE for self care, mobility, lifting, and ambulation      Manual Treatments:  PROM / STM / Oscillations-Mobs:  G-I, II, III, IV (PA's, Inf., Post.)  [x] (03721) Provided manual therapy to mobilize proximal hip and LS spine soft tissue/joints for the purpose of modulating pain, promoting relaxation,  increasing ROM, reducing/eliminating soft tissue swelling/inflammation/restriction, improving soft tissue extensibility and allowing for proper ROM for normal function with self care, mobility, lifting and ambulation. Charges:  Timed Code Treatment Minutes: 35   Total Treatment Minutes: 35     [] EVAL (LOW) 13075 (typically 20 minutes face-to-face)  [] EVAL (MOD) 37094 (typically 30 minutes face-to-face)  [] EVAL (HIGH) 02896 (typically 45 minutes face-to-face)  [] RE-EVAL     [] SL(76577) x     [] Dry needle 1 or 2 Muscles (22733)  [] NMR (43703) x     [] Dry needle 3+ Muscles (51286)  [x] Manual (79726) x     [] Ultrasound (14083) x  [x] TA (60290) x 2    [] Mech Traction (42486)  [] ES(attended) (53777)     [] ES (un) (96916):   [] Vasopump (02304) [] Ionto (75302)   [] Other:    GOALS:  Patient stated goal: Able to walk for at least 10min w/o needing a rest break. [x] Progressing: [] Met: [] Not Met: [] Adjusted    Therapist goals for Patient:   Short Term Goals: To be achieved in: 2 weeks  1. Independent in HEP and progression per patient tolerance, in order to prevent re-injury. [x] Progressing: [] Met: [] Not Met: [] Adjusted  2. Patient will have a decrease in pain to facilitate improvement in movement, function, and ADLs as indicated by Functional Deficits.   [x] Progressing: [] Met: [] Not Met: [] Adjusted    Long Term Goals: To be achieved in: up to 8-10 weeks  1. Disability index score improvement from 72% to 50% or less on WOMAC to assist with reaching prior level of function. [] Progressing: [] Met: [] Not Met: [] not assessed  3. Patient will demonstrate an increase in Strength to good proximal hip strength and control, within 5lb HHD in LE to allow for proper functional mobility as indicated by patients Functional Deficits. [x] Progressing: [] Met: [] Not Met: [] Adjusted  4. Patient will be able to perform at least 10 reps on 30 sec sit to stand test to demonstrate improved lower quarter functional strength. [] Progressing: [] Met: [x] Not Met: [] Adjusted  5. Able to ambulate his 2 flights of stairs in his home (bi-level) w/o needing a rest break. [x] Progressing: [x] Met: [] Not Met: [] Adjusted      ASSESSMENT:  Pt has shown slight functional improvement since re-starting PT and it appears he seems more committed to performing the more aggressive home program on a regular basis. He admits he relies on his wife to encourage him to perform the ex's regularly. *Note, his overall functional bed mobility seems to have moderately declined since I saw him a few months ago. He now requires mod assist with transitioning to/from supine and prone and also needs more attempts when rising from a chair. Had a brief discussion w/ patient and his wife that they could mention this to his medical doctors to see if he would need further work-up to see if there is some other explanation for his functional decline over the past 9 months from community ambulation to now limited to a rollator and having difficulty w/ bed mobility.         Treatment/Activity Tolerance:  [] Patient tolerated treatment well [] Patient limited by fatique  [] Patient limited by pain  [] Patient limited by other medical complications  [x] Other: limited by effort and motivation    Overall Progression Towards Functional goals/ Treatment Progress Update:  [x] Patient is progressing as expected towards functional goals listed. [] Progression is slowed due to complexities/Impairments listed. [] Progression has been slowed due to co-morbidities. [] Plan just implemented, too soon to assess goals progression <30days   [] Goals require adjustment due to lack of progress  [x] Patient is not progressing as expected and requires additional follow up with physician  [] Other:    Prognosis for POC: [] Good [x] Fair  [] Poor    Patient requires continued skilled intervention: [] Yes  [x] No        PLAN: 12/2/21: Pt and wife requested to hold PT through the end of the month due to busy schedule this month going out of town. Pt to call once back in town concerning need for continued PT. If pt cont's to struggle with pain and lack of significant functional progress, may refer back for further work-up possibly including investigating lumbar stenosis as a contributing factor to functional decline. 2/8/22: restart PT at 2x/wk for at least 3 weeks with targeting deficits described above. Will plan to see him up to 10 visits if he is making improvement. 2/15/22: pt has been faithful with updated home program. He and his wife would like to discuss the plan w/ ortho next week. At this point, given lack of significant improvement with attending PT 3 different times since last fall, it would be difficult to recommend further outpatient orthopedic physical therapy at this time. Possibly consult w/ medical doctors about other potential causes for functional decline over the past 9 months.     [] Continue per plan of care [] Alter current plan (see comments)  [] Plan of care initiated [x] Hold pending MD visit [] Discharge    Electronically signed by:  Jonathan Torres, PT , DPT, OCS #[id number]      Note: If patient does not return for scheduled/recommended follow up visits, this note will serve as a discharge from care along with the most recent update on progress.

## 2022-02-22 NOTE — PROGRESS NOTES
ULTRASOUND GUIDED GENICULATE NERVE BLOCK  Surgeon: MD Duarte Dumas    February 22, 2022      Chief Complaint   Patient presents with    Follow-up     Rt knee       Ht 5' 7\" (1.702 m)   Wt 163 lb (73.9 kg)   BMI 25.53 kg/m²     Massie Harada is here for a diagnostic geniculate nerve block of his right knee. He is scheduled for this procedure to determine if he is a candidate for a Cooled Radiofrequency Ablation Procedure for his chronic knee pain. He was last seen in the office 4 weeks ago on 1/25/2022. During our office visit he was noted on x-ray to have severe osteoarthritis of his right knee with complete loss of his medial joint space. He also had a history of bilateral lower extremity weakness resulting in need for walker. However he had no knee pain on that date so was turned down for geniculate nerve ablation testing. He returns today stating that he is more symptomatic in his right knee despite his physical therapy. There is no pain at rest with pain up to 6 with ambulation. He desired to proceed with genicular nerve block to see if he is a candidate for Coolief procedure. He has failed other conservative treatment in the past including repeat steroid injections performed by Dr. Inocencio Morales. I have today reviewed with Duarte Hoang the clinically relevant past medical history, medications, allergies, family history, and Review of Systems from the patients most recent history form, and I have documented any details relevant to today's presenting complaints in my history above. The patient's self recorded documents concerning the above have been scanned  into the chart under the \"Media\" tab. Physical Exam:   Examination of the right knee shows mild intra-articular effusion but no erythema. No Lesion of the skin is noted over the injection sites    Impression:  right knee osteoarthritis.       Plan:  Geniculate nerve block was recommended to the patient who understands that this is a trial to determine the appropriateness for Cooled Radiofrequency ablation of the knee. Procedures:  I. Inferomedial geniculate nerve block. 2. Superomedial geniculate nerve block. 3. Superolateral geniculate nerve block. 4. Suprapateller geniculate nerve block from the Vastus Lateralis branch of the Femoral nerve . Anesthesia: local    Procedure narrartive:  1. The geniculate nerve block procedure for the  right knee using ultrasound visualization was explained to the patient  . He understands the risks and benefits of the injection procedure,  and describes no potential allergies. Time out was performed to verify correct person, correct procedure, and correct site. 2. A bolster was placed under the  right knee and the knee was draped and cleansed with ChloroPrep. 3. Ultrasound visualization was first performed utilizing the [employer] Ultrasound unit using  18/4 MHz Linear Probe with sterile drape in long axes to the tibia, finding the neurovascular bundle of the inferomedial geniculate nerve at the junction of the diaphysis and metaphysis below the medial tibial plateau. The ultrasound doppler function was utilized to aid in location of the bundle. The location of the needle insertion was determined anterior to the geniculate nerve and the skin overlying the  site was anesthetized with 1 mL of 2% Lidocaine using a 25 gauge needle. After sufficient time was allowed for anesthesia, a second 25-gauge  needle was then introduced under ultrasound control to Inferomedial geniculate neurovascular bundle using out of plane technique. Once the needle was in position, the nerve was injected with 1 mL  of 2% Lidocaine. 4. Attention was turned to the medial aspect of the distal femur.   The transducer was utilized in long axis to identify the superomedial geniculate neurovascular bundle just proximal to  the adductor denis insertion into the medial femoral epicondyle at the distal femoral metaphyseal diaphyseal junction. Once the injection site was identified, and the skin overlying the site was anesthetized with 1ml of 2% Lidocaine. After allowing sufficient time for analgesia,the superomedial geniculate nerve was injected with 1 ml of 2% Lidocaine using out of plane technique using a 25 gauge needle. 5. Next the attention was turned to the lateral aspect of the distal femur where the superolateral geniculate neurovascular bundle was identified using ultrasound at the distal femoral metaphyseal diaphyseal junction. The overlying skin was anesthetized using a 25 gauge needle with 1ml of 2% Lidocaine, and after allowing sufficient time for analgesia,  the nerve was injected with 1 ml of 2%  Lidocaine using a 25 gauge needle in identical manner. 6. Attention was turned to the Suprapatellar branch of the femoral nerve/ Nerve to the Vastus Lateralis which was located with ultrasound  just superficial to the vastus intermedius muscle and proximal to the supra patellar pouch. The skin over the injection site was first anesthetized with 1ml of 2% Lidocaine using a 25 gauge needle, and after allowing sufficient time for analgesia,  the suprapatellar geniculate nerve anesthetized with 1 ml of 2% lidocaine and 25 gauge needle using in plane technique. 7. Bandaids were applied to the injection sites, and the patient was assisted in transfer to a chair. 8.  Sidney Ervin tolerated the procedure well and  did report significant relief of  the right knee pain within 5 minutes of the injection. 9.  Based on the injection results, Sidney Ervin is  a candidate for the Cooled Radio Frequency Geniculate Nerve Ablation Procedure. 10. The patient and his wife are aware of risks and benefits of the procedure and he has elected to proceed.     Jeremie Saldivar MD  2/22/2022

## 2022-02-22 NOTE — PATIENT INSTRUCTIONS
Impression:  right knee osteoarthritis. Plan:  Geniculate nerve block was recommended to the patient who understands that this is a trial to determine the appropriateness for Cooled Radiofrequency ablation of the knee. Procedures:  I. Inferomedial geniculate nerve block. 2. Superomedial geniculate nerve block. 3. Superolateral geniculate nerve block. 4. Suprapateller geniculate nerve block from the Vastus Lateralis branch of the Femoral nerve . Anesthesia: local    Procedure narrartive:  1. The geniculate nerve block procedure for the  right knee using ultrasound visualization was explained to the patient  . He understands the risks and benefits of the injection procedure,  and describes no potential allergies. Time out was performed to verify correct person, correct procedure, and correct site. 2. A bolster was placed under the  right knee and the knee was draped and cleansed with ChloroPrep. 3. Ultrasound visualization was first performed utilizing the [employer] Ultrasound unit using  18/4 MHz Linear Probe with sterile drape in long axes to the tibia, finding the neurovascular bundle of the inferomedial geniculate nerve at the junction of the diaphysis and metaphysis below the medial tibial plateau. The ultrasound doppler function was utilized to aid in location of the bundle. The location of the needle insertion was determined anterior to the geniculate nerve and the skin overlying the  site was anesthetized with 1 mL of 2% Lidocaine using a 25 gauge needle. After sufficient time was allowed for anesthesia, a second 25-gauge  needle was then introduced under ultrasound control to Inferomedial geniculate neurovascular bundle using out of plane technique. Once the needle was in position, the nerve was injected with 1 mL  of 2% Lidocaine. 4. Attention was turned to the medial aspect of the distal femur.   The transducer was utilized in long axis to identify the superomedial geniculate

## 2022-03-07 NOTE — PROGRESS NOTES
Spoke with patient on the phone. Pt unsure of all medications and past medical history but noted that Dr Madisyn Fisher had recently reviewed 2/22. Pt able to answer all other questions appropriately.   Pt's wife will be accompanying pt

## 2022-03-07 NOTE — PROGRESS NOTES
[facility] ENDOSCOPY AND OUTPATIENT  PRE-PROCEDURE INSTRUCTIONS    Procedure date____3/9/22_____Arrival time_______1100_____        Procedure time_______1200_____       It is not necessary to stop eating or drinking prior to this procedure. We would like you to take your medications for blood pressure as usual.  You may be asked to stop blood thinners such as Coumadin, Plavix, Fragmin, Lovenox, etc., or any anti-inflammatories such as:  Aspirin, Ibuprofen, Advil, Naproxen prior to your procedure. We also ask that you stop any OTC medications that cause additional bleeding    You must make arrangements for a responsible adult to arrive with you and stay in our waiting area during your procedure. They will also need to take you home after your procedure. For your safety you will not be allowed to leave alone or drive yourself home. Also for your safety, it is strongly suggested that someone stay with you the first 24 hours after your procedure. For your comfort, please wear simple loose fitting clothing to the center. Please do not bring valuables. If you have a living will and a durable power of  for healthcare, please bring in a copy.     You will need to bring a photo ID and insurance card    Our goal is to provide you with excellent care so if you have any questions, please contact us at the [address] at [phone]         Please note these are generalized instructions for all EGD cases, you may be provided with more specific instructions if necessary

## 2022-03-09 NOTE — H&P
Patient:  Francie Rees  YOB: 1935  Medical Record #:  [id number]   [address] [place] [address]  Date:  3/9/2022   Physician:  Francisca Polk MD    History Obtained From: electronic medical record    HISTORY OF PRESENT ILLNESS      He is an 81year old male with a long history of right knee pain treated with visco and steroid injections . He has grade 4 osteoarthritis. He had a successful geniculate block on 2-22-22. Past Medical History:        Diagnosis Date    Allergic rhinitis, seasonal     Aortic regurgitation     2/12 ECHO EF 60%, mod AI    CAD (coronary artery disease)     Cancer (HCC)     Closed left hip fracture (HCC)     Colon polyps- last colon neg 11/06     Diabetic nephropathy- pos microalb 10/11, on ARB 10/5/2011    DM (diabetes mellitus), type 2 (HCC)     Enlarged prostate     GERD (gastroesophageal reflux disease)     Holosystolic murmur 4/4/3068    HTN (hypertension)     Hypertriglyceridemia     Osteoarthritis     SVT (supraventricular tachycardia) ([facility] [address]      Past Surgical History:        Procedure Laterality Date    ABLATION OF DYSRHYTHMIC FOCUS  2013    Dr. 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      Current Outpatient Medications on File Prior to Encounter   Medication Sig Dispense Refill    metoprolol tartrate (LOPRESSOR) 50 MG tablet Take 1 tablet by mouth twice daily 180 tablet 3    simvastatin (ZOCOR) 20 MG tablet Take 1 tablet by mouth nightly 90 tablet 3    doxazosin (CARDURA) 1 MG tablet Take 1 tablet by mouth daily 30 tablet 3    finasteride (PROSCAR) 5 MG tablet Take 1 tablet by mouth once daily 90 tablet 3    metFORMIN (GLUCOPHAGE-XR) 500 MG extended release tablet TAKE 2 TABLETS BY MOUTH IN THE MORNING 1 IN THE EVENING 270 tablet 1    lisinopril (PRINIVIL;ZESTRIL) 40 MG tablet Take 1 tablet by mouth daily 90 tablet 1    aspirin EC 81 MG EC tablet Take 1 tablet by mouth 2 times daily for 14 days Take for DVT blood clot prophylaxis. Please avoid missing doses. 28 tablet 0    Calcium Carbonate Antacid (TUMS E-X PO) Take 2 tablets by mouth as needed      insulin glargine (LANTUS SOLOSTAR) 100 UNIT/ML injection pen Inject 16 Units into the skin nightly 5 pen 1    Insulin Pen Needle (PEN NEEDLES 3/16\") 31G X 5 MM MISC 1 each by Does not apply route daily 100 each 3    blood glucose test strips (ONETOUCH ULTRA) strip USE DAILY OR AS NEEDED 100 each 1    [DISCONTINUED] metoprolol tartrate (LOPRESSOR) 50 MG tablet Take 1 tablet by mouth twice daily 180 tablet 1    propafenone (RYTHMOL) 150 MG tablet Take 1 tablet by mouth 2 times daily 60 tablet 3    LIFESCAN UNISTIK II LANCETS MISC 1 each by Does not apply route daily 100 each 5    Blood Glucose Monitoring Suppl (ONE TOUCH ULTRA 2) w/Device KIT 1 kit by Does not apply route daily 1 kit 0    Lancet Devices MISC 1 x daily for the Lifescan lancets 1 each 0    [DISCONTINUED] oxybutynin (DITROPAN) 5 MG tablet Take 1 tablet by mouth 2 times daily as needed for Other (bladder).  180 tablet 3     Allergies:  No known allergies  Social History     Socioeconomic History    Marital status:      Spouse name: Not on file    Number of children: Not on file    Years of education: Not on file    Highest education level: Not on file   Occupational History    Not on file   Tobacco Use    Smoking status: Never Smoker    Smokeless tobacco: Never Used    Tobacco comment: congrats, advised not to start   Vaping Use    Vaping Use: Never used   Substance and Sexual Activity    Alcohol use: Yes     Alcohol/week: 2.0 standard drinks     Types: 2 Standard drinks or equivalent per week     Comment: 1-2 beers/week    Drug use: No    Sexual activity: Not on file   Other Topics Concern    Not on file   Social History Narrative    Exercise: walking/swim/bike 3x/wk. Lives with spouse. Seatbelt use: Always. Living will:  yes,   copy on file. Social Determinants of Health     Financial Resource Strain:     Difficulty of Paying Living Expenses: Not on file   Food Insecurity:     Worried About Running Out of Food in the Last Year: Not on file    Iveth of Food in the Last Year: Not on file   Transportation Needs:     Lack of Transportation (Medical): Not on file    Lack of Transportation (Non-Medical):  Not on file   Physical Activity:     Days of Exercise per Week: Not on file    Minutes of Exercise per Session: Not on file   Stress:     Feeling of Stress : Not on file   Social Connections:     Frequency of Communication with Friends and Family: Not on file    Frequency of Social Gatherings with Friends and Family: Not on file    Attends Oriental orthodox Services: Not on file    Active Member of [address] [place] [address] or Organizations: Not on file    Attends Club or Organization Meetings: Not on file    Marital Status: Not on file   Intimate Partner Violence:     Fear of Current or Ex-Partner: Not on file    Emotionally Abused: Not on file    Physically Abused: Not on file    Sexually Abused: Not on file   Housing Stability:     Unable to Pay for Housing in the Last Year: Not on file    Number of [place] in the Last Year: Not on file    Unstable Housing in the Last Year: Not on file     Family History   Problem Relation Age of Onset    Hypertension Mother  Stroke Mother      Review of System:    I have today reviewed with Brenden Soriano the clinically relevant past medical history, medications, allergies, family history, and Review of Systems from the patients most recent history form, and I have documented any details relevant to today's presenting complaints in my history above. The patient's self recorded documents concerning the above have been scanned  into the chart under the \"Media\" tab. PHYSICAL EXAM:      BP (!) 178/88   Pulse 63   Temp 96.6 °F (35.9 °C) (Temporal)   Resp 16   Ht 5' 7\" (1.702 m)   Wt 164 lb (74.4 kg)   SpO2 98%   BMI 25.69 kg/m²  I    General Appearance: no acute distress, alert, oriented x 3, appropriate mood and affect  Examination of the right knee reveals no sign of synovitis. Vascular exam of right foot intact. Cardiac: Regular rate and rhythm without murmur  Pulmonary: Lungs clear to ausculation bilaterally    ASSESSMENT        1. Osteorarthritis of  right knee       PLAN:  Procedure(s):  COOLIEF RADIOFREQUENCY ABLATION - RIGHT KNEE    1. Coolief Cooled Geniculate Nerve Ablation right      No name on file.

## 2022-03-09 NOTE — PROGRESS NOTES
Patient and spouse poor historians as far as medications that patient takes. MD aware of current hypertension. Pt states \"I'm a little nervous\".

## 2022-03-09 NOTE — OP NOTE
Operative Note      Patient: Melody Jj  YOB: 1935  MRN: [id number]    Date of Procedure: 3/9/2022    Pre-Op Diagnosis: Osteoarthritis of right knee    Post-Op Diagnosis: Same       Procedure(s):  COOLIEF RADIOFREQUENCY ABLATION - RIGHT KNEE    Surgeon(s):  MD Amber Morganstcarson Surgeon: Coralyn Shone MD    Anesthesia: Local    Estimated Blood Loss (mL): 0 ml    Complications: None     Specimens: None    Implants: None      Drains: N/A    Procedure narrartive:  1. The geniculate nerve ablation procedure for the right knee using ultrasound visualization was explained to the patient  . He understands the risks and benefits of the injection procedure,  and describes no potential allergies. Time out was performed to verify correct person, correct procedure, and correct site. 2. A bolster was placed under the  right knee and the knee was prepped with ChloroPrep and draped in a sterile manner   3. Ultrasound visualization was first performed utilizing the [facility] Ultrasound unit using  18/4 MHz Linear Probe with sterile drape in long axes to the tibia, finding the neurovascular bundle of the inferomedial geniculate nerve at the junction of the diaphysis and metaphysis below the medial tibial plateau. The ultrasound doppler function was utilized to aid in location of the bundle. The location of the needle insertion was determined anterior to the geniculate nerve and the site was anesthetized with 4 ml of a 50/50 mixture of 2% Lidocaine, and 0.5 % Marcaine using a 25 gauge needle. After sufficient time was allowed for anesthesia, the Coolief procedure needle was then introduced under ultrasound control to Inferomedial geniculate neurovascular bundle using out of plane technique. Once the needle was in position, the radiofrequency probe was placed through the needle. Using the [facility] Unit, ablation was performed at 80 degrees for 2.5 minutes.   20 mg of Depomedrol was introduced into the site prior to removing the needle. 4. Attention was turned to the medial aspect of the distal femur. The transducer was utilized in long axis to identify the superomedial geniculate neurovascular bundle just proximal to  the adductor denis insertion into the medial femoral epicondyle at the distal femoral metaphyseal diaphyseal junction. The ultrasound doppler function was utilized to aid in location of the bundle. The location of the needle insertion was determined anterior to the geniculate nerve and the site was anesthetized with 4 ml of a 50/50 mixture of 2% Lidocaine, and 0.5 % Marcaine using a 25 gauge needle. After sufficient time was allowed for anesthesia, the Coolief procedure needle was then introduced under ultrasound control to superomedial geniculate neurovascular bundle using out of plane technique. Once the needle was in position, the radiofrequency probe was placed through the needle. Using the [facility] Unit, ablation was performed at 80 degrees for 2.5 minutes. 20 mg of Depomedrol was introduced into the site prior to removing the needle. 5. Next the attention was turned to the lateral aspect of the distal femur where the superolateral geniculate neurovascular bundle was identified using ultrasound at the distal femoral metaphyseal diaphyseal junction. The ultrasound doppler function was utilized to aid in location of the bundle. The location of the needle insertion was determined anterior to the geniculate nerve and the site was anesthetized with 4 ml of a 50/50 mixture of 2% Lidocaine, and 0.5 % Marcaine using a 25 gauge needle. After sufficient time was allowed for anesthesia, the Coolief procedure needle was then introduced under ultrasound control to the superolaeral geniculate neurovascular bundle using out of plane technique. Once the needle was in position, the radiofrequency probe was placed through the needle.   Using the [facility] Unit, ablation was performed at [de-identified] degrees for 2.5 minutes. 20 mg of Depomedrol was introduced into the site prior to removing the needle. 6. Attention was turned to the Suprapatellar branch of the femoral nerve/ Nerve to the vastus lateralis which was located with ultrasound  just superficial to the vastus intermedius muscle and proximal to the supra patellar pouch. The location of the needle insertion was determined lateral to the geniculate nerve and the site was anesthetized with 4 ml of a 50/50 mixture of 2% Lidocaine  and 0.5 % Marcaine using a 25 gauge needle. After sufficient time was allowed for anesthesia, the Coolief procedure needle was then introduced under ultrasound control to the suprapatellar geniculate neurovascular bundle utilizing in-plane technique. Using the [facility] Unit, ablation was performed at 80 degrees for 2.5 minutes. 20 mg of Depomedrol was introduced into the site prior to removing the needle. 7. Bandaids were applied to the injection sites, and the patient was taken to the recovery room in satisfactory condition.          Electronically signed by John Berg MD on 3/9/2022 at 1:05 PM

## 2022-03-30 NOTE — PROGRESS NOTES
once daily 90 tablet 3    metFORMIN (GLUCOPHAGE-XR) 500 MG extended release tablet TAKE 2 TABLETS BY MOUTH IN THE MORNING 1 IN THE EVENING 270 tablet 1    blood glucose test strips (ONETOUCH ULTRA) strip USE DAILY OR AS NEEDED 100 each 1    lisinopril (PRINIVIL;ZESTRIL) 40 MG tablet Take 1 tablet by mouth daily 90 tablet 1    aspirin EC 81 MG EC tablet Take 1 tablet by mouth 2 times daily for 14 days Take for DVT blood clot prophylaxis. Please avoid missing doses. 28 tablet 0    propafenone (RYTHMOL) 150 MG tablet Take 1 tablet by mouth 2 times daily 60 tablet 3    LIFESCAN UNISTIK II LANCETS MISC 1 each by Does not apply route daily 100 each 5    Blood Glucose Monitoring Suppl (ONE TOUCH ULTRA 2) w/Device KIT 1 kit by Does not apply route daily 1 kit 0    Lancet Devices MISC 1 x daily for the Lifescan lancets 1 each 0    Calcium Carbonate Antacid (TUMS E-X PO) Take 2 tablets by mouth as needed       No current facility-administered medications for this visit. Allergies  Allergies   Allergen Reactions    No Known Allergies        Review of Systems:  Pertinent items are noted in HPI. Physical Examination: This is a pleasant male, alert, and in no acute distress. There were no vitals filed for this visit. Right knee exam: No tenderness to palpation. Range of motion is intact. There is some crepitus with range of motion. Strength is equal bilaterally except with knee extension which is mildly weaker on the right than on the left. Sensation is intact slump test is negative. He ambulates with a walker and is slightly hunched over when doing so. Slow/difficult to get up from a chair, uses arms.     Vascular exam: Extremities warm and well perfused, no significant edema    Respiratory exam: Breathing easy and unlabored    Neuro exam: No focal neuro deficits    Lymphatic: No obvious lymphadenopathy    Skin: Warm, dry    Radiology:     4 view X-rays of the right knee dated 1/25/22 were reviewed and interpreted independently today and discussed with the patient. The films revealed: Severe medial compartment osteoarthritis of the right knee and moderate of the anterior compartment. Assessment and Plan:     1. Primary osteoarthritis of right knee  His pain is improved after doing the Coolief procedure, but he continues to have difficulty and weakness with ambulation. He is doing some exercises at home, but it does not seem like this is intense enough to provide significant improvement in strength. We talked about doing physical therapy again it seems his difficulty is due to weakness. I does not seem like this is due to back pain or nerve impingement in the spine given his lack of symptoms consistent with this. - Physical therapy referral    Follow-up: 1 month. Sooner with any problems, questions, concerns, or worsening symptoms. Michelle Washington MD  Primary Care Sports Medicine    Please note that this chart was at least partially generated using dragon dictation software. Although every effort was made to ensure the accuracy of this automated transcription, some errors in transcription may have occurred. On this date, 3/30/2022 I have spent greater than 30 minutes reviewing previous notes, test results, and coordinating care as well as documenting and discussing the plan of care with the patient.

## 2022-04-11 NOTE — PATIENT INSTRUCTIONS
FYI: While Medicare provides you with a FREE ANNUAL PREVENTIVE PHYSICAL, this visit does NOT include management of chronic medical problems or physical examination. Dr. Netta Churchill usually does a combination visit if you have other medical problems so you don't have to come back for another visit. However, this means that there will be a co-pay. INSTRUCTIONS  NEXT APPOINTMENT: Please schedule check-up in 4 months with Gerard Fields (Nurse Practitioner). · Dr. Netta Churchill and Gerard Fields (Nurse Practitioner) are using a team approach for managing diabetics in our practice. Office visits will alternate between these providers while we continue to maintain high quality of care. · PLEASE TAKE THIS FORM TO CHECK-OUT WINDOW TO SCHEDULE NEXT VISIT. · See urology about other ideas for bladder. · INCREASE Lantus to 20 units. · Bring in [place] card next time with booster date. · Gently wash with mild soap and water over the strips on arm. Can remove if still attached in 10-14 days. Patient Education     DEMENTIA    Overview   How does the brain store information? Information is stored in different parts of your memory. Information stored in recent memory may include what you ate for breakfast this morning. Information stored in the short-term memory may include the name of a person you met moments ago. Information stored in the remote or long-term memory includes things that you stored in your memory years ago, such as memories of childhood. How does aging change the brain? When you're in your 20s, you begin to lose brain cells a few at a time. Your body also starts to make less of the chemicals your brain cells need to work. The older you are, the more these changes can affect your memory. Aging may affect memory by changing the way the brain stores information and by making it harder to recall stored information. Your short-term and remote memories aren't usually affected by aging.  But your recent memory may be affected. For example, you may forget names of people you've met today or where you set your keys. These are normal changes. What is dementia? Dementia is a brain disorder that makes it hard for people to remember, learn and communicate. These changes eventually make it hard for people who have dementia to care for themselves. Dementia may also cause changes in mood and personality. Early on, lapses in memory and clear thinking may bother the person who has dementia. Later, disruptive behavior and other problems can create a burden for caregivers and other family members. Dementia is caused by the damage of brain cells. A head injury, stroke, brain tumor or disease (such as Alzheimer's disease) can damage brain cells and lead to dementia. Symptoms   What are the symptoms of dementia? The symptoms of dementia include:  Recent memory loss. All of us forget things for a while and then remember them later. People who have dementia often forget things, but they never remember them. They might ask you the same question over and over, each time forgetting that you've already given them the answer. They won't even remember that they already asked the question. Difficulty performing familiar tasks. People who have dementia might cook a meal but forget to serve it. They might even forget that they cooked it. Problems with language. People who have dementia may forget simple words or use the wrong words. This makes it hard to understand what they want. Time and place disorientation. People who have dementia may get lost on their own street. They may forget how they got to a certain place and how to get back home. Poor judgment. Even a person who doesn't have dementia might get distracted. But people who have dementia can forget simple things, like forgetting to put on a coat before going out in cold weather. Problems with abstract thinking.  Anybody might have trouble balancing a checkbook, but people who have dementia may forget what the numbers are and what has to be done with them. Misplacing things. People who have dementia may put things in the wrong places. They might put an iron in the freezer or a wristwatch in the sugar bowl. Then they can't find these things later. Changes in mood. Everyone is reilly at times, but people who have dementia may have fast mood swings, going from calm to tears to anger in a few minutes. Personality changes. People who have dementia may have drastic changes in personality. They might become irritable, suspicious or fearful. Loss of initiative. People who have dementia may become passive. They might not want to go places or see other people     What about when I know a word but can't recall it? This is usually just a glitch in your memory. Luna Castro almost always remember the word with time. This may become more common as you age. It can be very frustrating, but it's not usually serious. How can I tell if my memory problems are serious? A memory problem is serious when it affects your daily living. If you sometimes forget names, you're probably okay. But you may have a more serious problem if you have trouble remembering how to do things you've done many times before, get to a place you've been to often, or do things that require steps (such as following a recipe). Another difference between normal memory problems and dementia is that normal memory loss doesn't get much worse over time. Dementia gets much worse over several months to several years. It may be hard to figure out on your own if you have a serious problem. Talk to your family doctor about any concerns you have. If your memory problems are caused by a certain medicine you're taking, your doctor can prescribe another medicine that doesn't have this side effect. If another condition is causing your memory loss (such as depression), your doctor can help you treat the condition.     Memory problems that aren't part of normal aging  Forgetting things much more often than you used to   Forgetting how to do things you've done many times before   Trouble learning new things   Repeating phrases or stories in the same conversation   Trouble making choices or handling money   Not being able to keep track of what happens each day     Diagnosis & Tests   What should I do if I think I may have dementia? Talk with your doctor. Your doctor can do tests to find out if your experiences are caused by dementia. The sooner you know, the sooner you can talk to your doctor about treatment options. What should I do if a family member is showing signs of dementia? If your family member is showing some of the signs of dementia, try to get him or her to go see a doctor. You may want to go along and talk with the doctor before your relative sees him or her. Then you can tell the doctor about the way your relative is acting without embarrassing your relative. Treatment   How is dementia treated? Some causes of dementia can be treated. However, once brain cells have been destroyed, they cannot be replaced. Treatment may slow or stop the loss of more brain cells. When the cause of dementia can't be treated, the focus of care is on helping the person with his or her daily activities and reducing upsetting symptoms. Some medicines can help people who have dementia. Your family doctor will talk with you about treatment options. Complications   Why do people who have dementia become agitated? The agitation can have many causes. A sudden change in surroundings or frustrating situations can cause people who have dementia to become agitated. For example, getting dressed or giving the wrong answer to a question may cause frustration. Being challenged about the confusion or inability to do things caused by the dementia may also make the person agitated.  As a result, the person may cry, become irritable, or try to hurt others in some way. How can I deal with agitation? One of the most important things you can do is avoid situations in which your loved one might become frustrated. Try to make your loved one's tasks less difficult. For example, instead of expecting him or her to get dressed alone, you can just have your loved one put on one piece of the outfit (such as a jacket) on his or her own. You can also try to limit the number of difficult situations your loved one must face. For example, if taking a bath or shower causes problems, have him or her take one every other day instead of every day. Also, you can schedule difficult activities for a time of day when your loved one tends to be less agitated. It's helpful to give frequent reassurance and avoid contradicting him or her. What should I do if hallucinations are a problem? If hallucinations are not making your loved one scared or anxious, you don't need to do anything. It's better not to confront people who have dementia about their hallucinations. Arguing may just upset your loved one. However, if hallucinations are scary or upsetting to your loved one, you can try to distract the person by involving him or her in a pleasant activity. What if my loved one will not go to sleep at night? Try one or more of the following if your loved one is having trouble sleeping:  Try to make the person more aware of what time of day it is. Place clocks where he or she can see them. Keep curtains or blinds open so that he or she can tell when it is daytime and when it is nighttime. Limit the amount of caffeine he or she consumes. Try to help your loved one get some exercise every day. Don't let him or her take too many naps during the day. Make your loved one's bedroom peaceful. It is easier to sleep in a quiet room. At night, provide a night light or leave a dim light on. Total darkness can add to confusion.    If your loved one has arthritis or another painful condition that interrupts his or her sleep, ask your doctor if it is okay to give your loved one medicine for pain right before bed. What if wandering becomes a problem? Sometimes very simple things can help with this problem. It is all right for your loved one to wander in a safe place, such as in a fenced yard. By providing a safe place, you may avoid confrontation. If this doesn't work, remind your loved one not to go out a certain door by placing a stop sign on it or putting a piece of furniture in front of it. A ribbon tied across a door can serve as a similar reminder. Hiding the doorknob by placing a strip of cloth over it may also be helpful. An alarm system will alert you that your loved one is trying to leave a certain area. Your alarm system may just be a few empty cans tied to a string on the doorknob. You might have to place special locks on the doors, but be aware that such locks might be dangerous if a house fire occurs. Don't use this method if your loved one will be left home alone. Make sure your loved one wears a medical bracelet with his or her name, address and medical conditions, in case he or she does wander away from home. CAREGIVER'S GUIDE TO UNDERSTANDING DEMENTIA BEHAVIORS      Introduction  Caring for a loved one with dementia poses many challenges for families and caregivers. People with dementia from conditions such as Alzheimers and related diseases have a progressive brain disorder that makes it more and more difficult for them to remember things, think clearly, communicate with others, or take care of themselves. In addition, dementia can cause mood swings and even change a persons personality and behavior. This Fact Sheet provides some practical strategies for dealing with the troubling behavior problems and communication difficulties often encountered when caring for a person with dementia.     Ten Tips for Communicating with a Person with Dementia  We arent born knowing how to communicate with a person with dementiabut we can learn. Improving your communication skills will help make caregiv-ing less stressful and will likely improve the quality of your relationship with your loved one. Good communication skills will also enhance your ability to handle the difficult behavior you may encounter as you care for a person with a dementing illness. 1. Set a positive mood for interaction. Your attitude and body language communicate your feelings and thoughts stronger than your words. Set a positive mood by speaking to your loved one in a pleasant and respectful manner. Use facial expressions, tone of voice and physical touch to help convey your message and show your feelings of affection. 2. Get the persons attention. Limit distractions and noiseturn off the radio or TV, close the curtains or shut the door, or move to quieter servando-roundings. Before speaking, make sure you have her attention; address her by name, identify yourself by name and relation, and use nonver-bal cues and touch to help keep her focused. If she is seated, get down to her level and maintain eye contact. 3. State your message clearly. Use simple words and sentences. Speak slowly, distinctly and in a reassuring tone. Refrain from raising your voice higher or louder; instead, pitch your voice lower. If she doesnt understand the first time, use the same wording to repeat your message or ques-tion. If she still doesnt understand, wait a few minutes and rephrase the question. Use the names of people and places instead of pronouns or abbreviations. 4. Ask simple, answerable questions. Ask one question at a time; those with yes or no answers work best. Refrain from asking open-ended ques-tions or giving too many choices. For example, ask, Would you like to wear your white shirt or your blue shirt?  Better still, show her the choicesvisual prompts and cues also help clar-seb your question and can guide her response. 5. Listen with your ears, eyes and heart. Be patient in waiting for your loved ones reply. If she is struggling for an answer, its okay to suggest words. Watch for nonverbal cues and body language, and respond appropriately. Always strive to listen for the meaning and feelings that underlie the words. 6. Break down activities into a series of steps. This makes many tasks much more manageable. You can encourage your loved one to do what he can, gently remind him of steps he tends to forget, and assist with steps hes no longer able to accomplish on his own. Using visual cues, such as showing him with your hand where to place the dinner plate, can be very helpful. 7. When the going gets tough, distract and redirect. When your loved one becomes upset, try changing the subject or the environment. For example, ask him for help or suggest going for a walk. It is important to connect with the person on a feeling level, before you redirect. You might say, I see youre feeling sadIm sorry youre upset. Lets go get something to eat.     8. Respond with affection and reassurance. People with dementia often feel confused, anxious and unsure of themselves. Further, they often get reality confused and may recall things that never really occurred. Avoid trying to convince them they are wrong. Stay focused on the feelings they are demonstrating (which are real) and respond with verbal and physical expressions of comfort, support and reassurance. Sometimes holding hands, touching, hugging and praise will get the person to respond when all else fails. 9. Remember the good old days. Remembering the past is often a soothing and affirming activity. Many people with dementia may not remember what happened 45 minutes ago, but they can clearly recall their lives 45 years earlier. Therefore, avoid asking questions that rely on short-term memory, such as asking the person what they had for lunch. Instead, try asking general questions about the persons distant pastthis information is more likely to be retained. 10. Maintain your sense of humor. Use humor whenever possible, though not at the person's expense. People with dementia tend to retain their social skills and are usually delighted to laugh along with you. Handling Troubling Behavior  Some of the greatest challenges of caring for a loved one with dementia are the personality and behavior changes that often occur. You can best meet these challenges by using creativity, flexibility, patience and compassion. It also helps to not take things personally and maintain your sense of humor. To start, consider these ground rules: We cannot change the person. The person you are caring for has a brain disorder that shapes who he has become. When you try to control or change his behavior, youll most likely be unsuccessful or be met with resistance. Its important to:  Try to accommodate the behavior, not control the behavior. For example, if the person insists on sleeping on the floor, place a mattress on the floor to make him more comfortable. Remember that we can change our behavior or the physical environment. Changing our own behavior will often result in a change in our loved ones behavior. Check with the doctor first. Behavioral problems may have an underlying medical reason: perhaps the person is in pain or experiencing an adverse side effect from medications. In some cases, like incontinence or hallucinations, there may be some medication or treatment that can assist in managing the problem. Behavior has a purpose. People with dementia typically cannot tell us what they want or need. They might do something, like take all the clothes out of the closet on a daily basis, and we wonder why. It is very likely that the person is fulfilling a need to be busy and productive.  Always consider what need the person might be trying to meet with their behaviorand, when possible, try to accommodate them. Behavior is triggered. It is important to understand that all behavior is triggeredit doesnt occur out of the blue. It might be something a person did or said that triggered a behavior or it could be a change in the physical environment. The root to changing be-havior is disrupting the patterns that we create. Try a different approach, or try a different consequence. What works today, may not tomorrow. The multiple factors that influence troubling behaviors and the natural progression of the disease process means that solutions that are effective today may need to be modified tomorrowor may no longer work at all. The key to managing difficult behaviors is being creative and flexible in your strategies to address a given issue. Get support from others. You are not alonethere are many others caring for someone with dementia. Call your local Area Agency on Aging, the local chapter of the Alzheimers Association, a [address] or one of the groups listed below in Resources to find support groups, organizations and services that can help you. Expect that, like the loved one you are caring for, you will have good days and bad days. Develop strategies for coping with the bad days (see the A Fact Sheet, Dementia, Caregiving and Controlling Frustration). The following is an overview of the most common dementia-associated behaviors with suggestions that may be useful in handling them. Ladan Rosario find additional resources listed at the end of this Fact Sheet. Wandering  People with dementia walk, seemingly aimlessly, for a variety of reasons, such as boredom, medication side effects or to look for something or someone. They also may be trying to fulfill a physical needthirst, hunger, a need to use the toilet or exercise.  Discovering the triggers for wandering are not always easy, but they can provide insights to dealing with the behavior. Make time for regular exercise to minimize restlessness. Consider installing new locks that require a key. Position locks high or low on the door; many people with dementia will not think to look beyond eye level. Keep in mind fire and safety concerns for all family members; the lock(s) must be accessible to others and not take more than a few seconds to open. Try a barrier like a curtain or colored streamer to mask the door. A stop sign or do not enter sign also may help. Place a black mat or paint a black space on your front porch; this may appear to be an impassable hole to the person with dementia. Add child-safe plastic covers to doorknobs. Consider installing a home security system or monitoring system designed to keep watch over someone with dementia. Also available are new digital devices that can be worn like a watch or clipped on a belt that use global positioning systems (GPS) or other technology to track a persons whereabouts or locate him if he wanders off. .   Put away essential items such as the confused persons coat, purse or glasses. Some individuals will not go out without certain articles. Have your relative wear an ID bracelet and sew ID labels in their clothes. Always have a current photo available should you need to report your loved one missing. Consider leaving a copy on file at the police department or registering the person with the Alzheimers [employer] Safe Return program (see Resources). Tell neighbors about your relatives wandering behavior and make sure they have your phone number. Incontinence  The loss of bladder or bowel control often occurs as dementia progresses. Sometimes accidents result from environmental factors; for example, someone cant remember where the bathroom is located or cant get to it in time. If an accident occurs, your understanding and reassurance will help the person maintain dignity and minimize embarrassment.   Establish a routine for using the toilet. Try reminding the person or assisting her to the bathroom every two hours. Schedule fluid intake to ensure the confused person does not become dehydrated. However, avoid drinks with a diuretic effect like coffee, tea, cola, or beer. Limit fluid intake in the evening before bedtime. Use signs (with illustrations) to indicate which door leads to the bathroom. A commode, obtained at any medical supply store, can be left in the bedroom at night for easy access. Incontinence pads and products can be purchased at the pharmacy or supermarket. A urologist may be able to prescribe a special product or treatment. Use easy-to-remove clothing with elastic waistbands or VelcroÒ closures, and provide clothes that are easily washable. Agitation  Agitation refers to a range of behaviors associated with dementia, including irritability, sleeplessness, and verbal or physical aggression. Often these types of behavior problems progress with the stages of dementia, from mild to more severe. Agitation may be triggered by a variety of things, including environmental factors, fear and fatigue. Most often, agitation is triggered when the person experiences control being taken from him. Reduce caffeine intake, sugar and junk food. Reduce noise, clutter or the number of persons in the room. Maintain structure by keeping the same routines. Keep household objects and furniture in the same places. Familiar objects and photographs offer a sense of security and can suggest pleasant memories. Try gentle touch, soothing music, reading or walks to quell agitation. Speak in a reassuring voice. Do not try to restrain the person during a period of agitation. Keep dangerous objects out of reach. Allow the person to do as much for himself as possiblesupport his independence and ability to care for himself. Acknowledge the confused persons anger over the loss of control in his life.  Tell him you understand his frustration. Distract the person with a snack or an activity. Allow him to forget the troubling incident. Confronting a confused person may increase anxiety. Repetitive speech or actions (perseveration)  People with dementia will often repeat a word, state-ment, question or activity over and over. While this type of behavior is usually harmless for the person with dementia, it can be annoying and stressful to caregivers. Sometimes the behavior is triggered by anxiety, boredom, fear or environmental factors. Provide plenty of reassurance and comfort, both in words and in touch. Try distracting with a snack or activity. Avoid reminding them that they just asked the same question. Try ignoring the behavior or question and distract the person into an activity. Dont discuss plans with a confused person until immediately prior to an event. You may want to try placing a sign on the kitchen table, such as, Maximiliano Rahman is at 6:30 or Thalia comes home at 5:00 to remove anxiety and uncertainty about anticipated events. Learn to recognize certain behaviors. An agitated state or pulling at clothing, for example, could indicate a need to use the bathroom. Paranoia  Seeing a loved one suddenly become suspicious, jealous or accusatory is unsettling. Remember, what the person is experiencing is very real to them. It is best not to argue or disagree. This, too, is part of the dementiatry not to take it personally. If the confused person suspects money is missing, allow her to keep small amounts of money in a pocket or handbag for easy inspection. Help them look for the object and then distract them into another activity. Try to learn where the confused persons favorite hiding places are for storing objects, which are frequently assumed to be lost. Avoid arguing. Take time to explain to other family members and home-helpers that suspicious accusations are a part of the dementing illness. Try nonverbal reassurances like a gentle touch or hug. Respond to the feeling behind the accusation and then reassure the person. You might try saying, I see this frightens you; stay with me, I wont let anything happen to you.     Sleeplessness/Sundowning  Restlessness, agitation, disorientation and other troubling behavior in people with dementia often get worse at the end of the day and sometimes continue throughout the night. Experts believe this behavior, commonly called sundowning, is caused by a combination of factors, such as exhaustion from the days events and changes in the persons biological clock that confuse day and night. Increase daytime activities, particularly physical exercise. Discourage inactivity and napping during the day. Watch out for dietary culprits, such as sugar, caffeine and some types of junk food. Eliminate or restrict these types of foods and beverages to early in the day. Plan smaller meals throughout the day, including a light meal, such as half a sandwich, before bedtime. Plan for the afternoon and evening hours to be quiet and calm; however, structured, quiet activity is important. Perhaps take a stroll outdoors, play a simple card game or listen to soothing music together. Turning on lights well before sunset and closing the curtains at dusk will minimize shadows and may help diminish confusion. At minimum, keep a nightlight in the persons room, hallway and bathroom. Make sure the house is safe: block off stairs with gomez, lock the kitchen door and/or put away dangerous items. As a last resort, consider talking to the doctor about medication to help the agitated person relax and sleep. Be aware that sleeping pills and tranquilizers may solve one problem and create another, such as sleeping at night but being more confused the next day. Its essential that you, the caregiver, get enough sleep.  If your loved ones nighttime activity keeps you awake, consider asking a friend or relative, or hiring someone, to take a turn so that you can get a good nights sleep. Catnaps during the day also might help. Eating/Nutrition  Ensuring that your loved one is eating enough nutritious foods and drinking enough fluids is a challenge. People with dementia literally begin to forget that they need to eat and drink. Complicating the issue may be dental problems or medications that decrease appetite or make food taste funny.  The consequences of poor nutrition are many, including weight loss, irritability, sleeplessness, bladder or bowel problems and disorientation. Make meal and snack times part of the daily routine and schedule them around the same time every day. Instead of three big meals, try five or six smaller ones. Make mealtimes a special time. Try flowers or soft music. Turn off loud radio programs and the TV. Eating independently should take precedence over eating neatly or with proper table manners. Finger foods support independence. Pre-cut and season the food. Try using a straw or a childs sippy cup if holding a glass has become difficult. Provide assistance only when necessary and allow plenty of time for meals. Sit down and eat with your loved one. Often they will mimic your actions and it makes the meal more pleasant to share it with someone. Prepare foods with your loved one in mind. If they have dentures or trouble chewing or swallowing, use soft foods or cut food into bite-size pieces. If chewing and swallowing are an issue, try gently moving the persons chin in a chewing motion or lightly stroking their throat to encourage them to swallow. If loss of weight is a problem, offer nutritious high-calorie snacks between meals. Breakfast foods high in carbohydrates are often preferred. On the other hand, if the problem is weight gain, keep high-calorie foods out of sight.  Instead, keep handy fresh fruits, veggie trays and other healthy low-calorie snacks. Bathing  People with dementia often have difficulty remembering good hygiene, such as brushing teeth, toileting, bathing and regularly changing their clothes. From childhood we are taught these are highly private and personal activities; to be undressed and cleaned by another can feel frightening, humiliating and embarrassing. As a result, bathing often causes distress for both caregivers and their loved ones. Think historically of your loved ones hygiene routine  did she prefer baths or showers? Mornings or nights? Did she have her hair washed at the salon or do it herself? Was there a favorite scent, lotion or talcum powder she always used? Adoptingas much as possibleher past bathing routine may provide some comfort. Remember that it may not be necessary to bathe every daysometimes twice a week is sufficient. If your loved one has always been modest, enhance that feeling by making sure doors and curtains are closed. Whether in the shower or the bath, keep a towel over her front, lifting to wash as needed. Have towels and a robe or her clothes ready when she gets out. Be mindful of the environment, such as the temperature of the room and water (older adults are more sensitive to heat and cold) and the adequacy of lighting. Its a good idea to use safety features such as non-slip floor bath mats, grab-bars, and bath or shower seats. A hand-held shower might also be a good feature to install. Rememberpeople are often afraid of falling. Help them feel secure in the shower or tub. Never leave a person with dementia unattended in the bath or shower. Have all the bath things you need laid out beforehand. If giving a bath, draw the bath water first. Reassure the person that the water is warmperhaps pour a cup of water over her hands before she steps in. If hair washing is a struggle, make it a separate activity. Or, use a dry shampoo.    If bathing in the tub or shower is consistently traumatic, a towel bath provides a soothing alter-native. A bed bath has traditionally been done with only the most frail and bed-ridden patients, soaping up a bit at a time in their beds, rinsing off with a basin of water and drying with towels. A growing number of nurses in and out of facilities, however, are beginning to recognize its value and a variationthe towel bathfor others as well, including people with dementia who find bathing in the tub or shower uncomfortable or unpleasant. The towel bath uses a large bath towel and washcloths dampened in a plastic bag of warm water and no-rinse soap. Large bath-blankets are used to keep the patient covered, dry and warm while the dampened towel and washcloths are massaged over the body. For more information, see the book Bathing Without a Pedraza, (details in the Recommended Reading section below), or visit [email or web address] Additional Problem Areas  Dressing is difficult for most dementia patients. Choose loose-fitting, comfortable clothes with easy zippers or snaps and minimal buttons. Reduce the persons choices by removing seldom-worn clothes from the closet. To facilitate dressing and support independence, lay out one article of clothing at a time, in the order it is to be worn. Remove soiled clothes from the room. Dont argue if the person insists on wearing the same thing again. Hallucinations (seeing or hearing things that others dont) and delusions (false beliefs, such as someone is trying to hurt or kill another) may occur as the dementia progresses. State simply and calmly your perception of the situation, but avoid arguing or trying to convince the person their perceptions are wrong. Keep rooms well-lit to decrease shadows, and offer reassurance and a simple explanation if the curtains move from circulating air or a loud noise such as a plane or siren is heard. Distractions may help.  Depending on the severity of symptoms, you might consider medication. Sexually inappropriate behavior, such as masturbating or undressing in public, lewd remarks, unreasonable sexual demands, even sexually aggressive or violent behavior, may occur during the course of the illness. Remember, this behavior is caused by the disease. Talk to the doctor about possible treatment plans. Develop an action plan to follow before the behavior occurs, i.e., what you will say and do if the behavior happens at home, around other adults or children. If you can, identify what triggers the behavior. Verbal outbursts such as cursing, arguing and threatening often are expressions of anger or stress. React by staying calm and reassuring. Validate your loved ones feelings and then try to distract or redirect his attention to something else. Rabia Curet is when a person with dementia imitates and follows the caregiver, or constantly talks, asks questions and interrupts. Like sundowning, this behavior often occurs late in the day and can be irritating for caregivers. Comfort the person with verbal and physical reassurance. Distraction or redirection might also help. Giving your loved one a job such as folding laundry might help to make her feel needed and useful. People with dementia may become uncooperative and resistant to daily activities such as bathing, dressing and eating. Often this is a response to feeling out of control, rushed, afraid or confused by what you are asking of them. Break each task into steps and, in a reassuring voice, explain each step before you do it. Allow plenty of time. Find ways to have them assist to their ability in the process, or follow with an activity that they can perform. Personalized Preventive Plan for Leelee Greco - 4/11/2022  Medicare offers a range of preventive health benefits. Some of the tests and screenings are paid in full while other may be subject to a deductible, co-insurance, and/or copay.     Some of these benefits include a comprehensive review of your medical history including lifestyle, illnesses that may run in your family, and various assessments and screenings as appropriate. After reviewing your medical record and screening and assessments performed today your provider may have ordered immunizations, labs, imaging, and/or referrals for you. A list of these orders (if applicable) as well as your Preventive Care list are included within your After Visit Summary for your review. Other Preventive Recommendations:    · A preventive eye exam performed by an eye specialist is recommended every 1-2 years to screen for glaucoma; cataracts, macular degeneration, and other eye disorders. · A preventive dental visit is recommended every 6 months. · Try to get at least 150 minutes of exercise per week or 10,000 steps per day on a pedometer . · Order or download the FREE \"Exercise & Physical Activity: Your Everyday Guide\" from [employer]. Call 1-420.962.8940 or search [employer] online. · You need 7455-8280 mg of calcium and 4721-6641 IU of vitamin D per day. It is possible to meet your calcium requirement with diet alone, but a vitamin D supplement is usually necessary to meet this goal.  · When exposed to the sun, use a sunscreen that protects against both UVA and UVB radiation with an SPF of 30 or greater. Reapply every 2 to 3 hours or after sweating, drying off with a towel, or swimming. · Always wear a seat belt when traveling in a car. Always wear a helmet when riding a bicycle or motorcycle. Personalized Preventive Plan for [facility] - 4/11/2022  Medicare offers a range of preventive health benefits. Some of the tests and screenings are paid in full while other may be subject to a deductible, co-insurance, and/or copay.     Some of these benefits include a comprehensive review of your medical history including lifestyle, illnesses that may run in your family, and various assessments and screenings as appropriate. After reviewing your medical record and screening and assessments performed today your provider may have ordered immunizations, labs, imaging, and/or referrals for you. A list of these orders (if applicable) as well as your Preventive Care list are included within your After Visit Summary for your review. Other Preventive Recommendations:    A preventive eye exam performed by an eye specialist is recommended every 1-2 years to screen for glaucoma; cataracts, macular degeneration, and other eye disorders. A preventive dental visit is recommended every 6 months. Try to get at least 150 minutes of exercise per week or 10,000 steps per day on a pedometer . Order or download the FREE \"Exercise & Physical Activity: Your Everyday Guide\" from [employer]. Call 4-685.314.9775 or search [employer] online. You need 7476-0293 mg of calcium and 3863-6297 IU of vitamin D per day. It is possible to meet your calcium requirement with diet alone, but a vitamin D supplement is usually necessary to meet this goal.  When exposed to the sun, use a sunscreen that protects against both UVA and UVB radiation with an SPF of 30 or greater. Reapply every 2 to 3 hours or after sweating, drying off with a towel, or swimming. Always wear a seat belt when traveling in a car. Always wear a helmet when riding a bicycle or motorcycle.

## 2022-04-13 NOTE — PLAN OF CARE
[facility]. [address]  Phone: [phone]   Fax:     [phone]                                                       Physical Therapy Certification    Dear Referring Practitioner: Honey Carroll MD,    We had the pleasure of evaluating the following patient for physical therapy services at [facility]. A summary of our findings can be found in the initial assessment below. This includes our plan of care. If you have any questions or concerns regarding these findings, please do not hesitate to contact me at the office phone number checked above. Thank you for the referral.       Physician Signature:_______________________________Date:__________________  By signing above (or electronic signature), therapists plan is approved by physician          Patient: Lizzie Mora   : 1935   MRN: [id number]  Referring Physician: Referring Practitioner: Honey Carroll MD      Evaluation Date: 2022      Medical Diagnosis Information:  Diagnosis: M17.11 (ICD-10-CM) - Primary osteoarthritis of right kneeR53.1 (ICD-10-CM) - Increased weakness when ambulating   Treatment Diagnosis: Bilat knee pain, mobility, gait, functional deficits                                         Insurance information:       Precautions/ Contra-indications:   Latex Allergy:  [x]NO      []YES  Preferred Language for Healthcare:   [x]English       []other:    C-SSRS Triggered by Intake questionnaire (Past 2 wk assessment ):   [x] No, Questionnaire did not trigger screening.   [] Yes, Patient intake triggered C-SSRS Screening      [] C-SSRS Screening completed  [] PCP notified via Epic     SUBJECTIVE: Patient stated complaint: Pt is familiar to me and is here for evaluation with c/o generalized weakness in his legs and functional decline.     Brief Hx: He attended 5 PT sessions at [address] in Sept 2021 for bilat knee pain and saw minimal change at that time, so PT was stopped. I saw him in Nov 2021 and he reported 6 month history of worsening gait and lower quarter strength. He stated about 6 months prior he was able to walk approx 30 min w/ a cane and when I saw him in November, his gait was very slow and unsteady and when we trialed a walker, his gait improved quite a bit. At that time, we saw him for about 6 visits over about a month period. We worked on improving his lumbar spine mobility which seemed to temporarily help his subjective leg symptoms. Also, we found that just doing exercises during the session would improve his subjective report of leg pain and weakness. At that time, I did not notice any significant deficits with bed mobility. At the conclusion of PT at that point, we had thoroughly reviewed a home program for ongoing functional lower quarter and lumbar strength as well as gait stability activities. He was compliant w/ regular walking using his walker and was able to ambulate stairs at that time with support of a rail. In Jan 2022, he saw Dr. Estela Calabrese who wanted to re-start PT for LE strength and was working him up for knee ablation procedure. He was re-evaluated by myself on 2/8/22 and at that time reported his knee pain at 6-7/10 and again stated that he felt he was getting weaker. He was now using a rollator and preferred this over a rolling walker due to improved mobility. He was riding bike 20min a day regularly and also taking several walks a day within his home. Again during these visits we did spend some time incorporating lumbar focused treatment for possible stenosis, but improvements were reported subjectively, but short term. He attended 3 visits and then decided to hold and he did have the ablation procedure from Dr. Estela Calabrese.  During this episode of PT, it did appear that his low level functional mobility had declined significantly since I saw him last fall. He was slower with transitions from sit to/from standing, and also bed mobility was much more difficult, even requiring min assist at times for turning. I expressed my concerns with his wife with my concerns of his declining mental and physical status and encouraged them to discuss this with PCP. Today, he returns for evaluation with similar complaints. He is here with his wife and initially, she was going to wait in the waiting room, but I encouraged her to join him during the formal evaluation. Upon review of recent PCP visit from 4/11: it appears there was new worsening memory issues forgetting appointments and names of people. PCP also reported a couple recent falls where he slid out of a chair. It was also documented that he now requires assistance with bathing, whereas this was not documented on 1/11/22. Also PCP gave patient and his wife information regarding Dementia. Patient states that currently his home activity consists of walking for 10min, 3-4x/day and also riding stationary bike x 15-20min every other day. He also does heel slides x10 every day, and ambulates stairs in their bi-level home a couple times a day. He hopes to be able to walk outside with the weather getting warmer. He and his wife state that their goal is to work him aggressively and get him \"stronger. \" He denies any specific daily activities that he wants to return to. When asked about his interest in a exercise class, his wife is not interested in driving him to the [facility] and he does not drive. A few years ago, he used to swim at the [facility], but has not recently because they felt it wasn't safe for him to do so. Also note that he and his wife play cards with friends; up to several hours at a time. He did not voluntarily report any subjective back pain or specific radiating pain today.      Relevant Medical History:Additional Pertinent Hx: functional decline; gait, balance, L TKR 2016, R knee OA, L hip ORIF 2019  Functional Outcome Measure: womac =     Pain Scale: avg: R knee= 2/10, L knee=3/10 at rest, After 10min of walking \"my knees feel better\"  Easing factors: light exercise helps his pain  Provocative factors: long periods of sitting (plays card for several hours with friends); then he is \"stiff\" when he gets up. Type: [x]Constant   []Intermittent  []Radiating []Localized []other:     Numbness/Tingling: not reported    Occupation/School:retired    Living Status/Prior Level of Function: Independent with ADLs and IADLs, see above    OBJECTIVE:     Posture: walks with mild trunk flexion posture, mildly decr stride length, no LOB or significant sway w/ amb w/ rollator    Functional Mobility/Transfers: unable to rise from standard chair w/o using arms.      Palpation: no specific tender areas reported    Bandages/Dressings/Incisions:     Gait: (include devices/WB status) amb w/ rollator    ROM Nov 2021 Feb 2022 4/13/22    HIP Flex       HIP Abd       HIP Ext       HIP IR       HIP ER       Knee ext       Knee Flex       Ankle PF       Ankle DF       Ankle In       Ankle Ev       Strength        HIP Flexors       HIP Abductors       HIP Ext       Hip ER       Knee EXT (quad) 4+/5 to 5/5 bilat R=35#  L=40# R=36#  L=36#    Knee Flex (HS) 4+/5 to 5/5 bilat R=20#  L=25# R=21#  L=35#    Ankle DF  4/5 bilat 4 to 4+/5 bilat    Ankle PF       Ankle Inv       Ankle EV              Tests:             Timed up and go (>12sec = fall risk) Quad cane=29sec  Rolling walker=24sec Rollator=24sec Assess at future visits    30sec sit to stand (norm=8x) Chair + pillow: 6x 3x-5x (requiring max cues) Chair+pillow= unable  Chair +2 pillows= at least 1 rep (mod/max cues)           Reflexes       patellar 3+ bilat 3+ bilat 3+ bilat    achilles 2+ bilat 1+ bilat n/a                  Functional Outcome measure:        WOMAC         Reflexes/Sensation:    []Dermatomes/Myotomes intact    []Reflexes equal and normal bilaterally   []Other:    Joint mobility:    []Normal    []Hypo   []Hyper    Orthopedic Special Tests: At previous visit in Feb 2022, lumbar AROM was mostly unremarkable for familiar knee pain or weakness and he did have mild soreness with facet closing and SLR was negative. [x] Patient history, allergies, meds reviewed. Medical chart reviewed. See intake form. Review Of Systems (ROS):  [x]Performed Review of systems (Integumentary, CardioPulmonary, Neurological) by intake and observation. Intake form has been scanned into medical record. Patient has been instructed to contact their primary care physician regarding ROS issues if not already being addressed at this time. Co-morbidities/Complexities (which will affect course of rehabilitation):  has a past medical history of Allergic rhinitis, seasonal, Aortic regurgitation, CAD (coronary artery disease), Cancer ([address] Closed left hip fracture ([address] Colon polyps- last colon neg 11/06, Diabetic nephropathy- pos microalb 10/11, on ARB, DM (diabetes mellitus), type 2 ([address] Enlarged prostate, GERD (gastroesophageal reflux disease), Holosystolic murmur, HTN (hypertension), Hypertriglyceridemia, Osteoarthritis, and SVT (supraventricular tachycardia) ([address]     []None           Arthritic conditions   []Rheumatoid arthritis (M05.9)  []Osteoarthritis (M19.91)   Cardiovascular conditions   []Hypertension (I10)  []Hyperlipidemia (E78.5)  []Angina pectoris (I20)  []Atherosclerosis (I70)  []CVA Musculoskeletal conditions   []Disc pathology   []Congenital spine pathologies   []Prior surgical intervention  []Osteoporosis (M81.8)  []Osteopenia (M85.8)   Endocrine conditions   []Hypothyroid (E03.9)  []Hyperthyroid Gastrointestinal conditions   []Constipation (O78.56)   Metabolic conditions   []Morbid obesity (E66.01)  []Diabetes type 1(E10.65) or 2 (E11.65)   []Neuropathy (G60.9)     Pulmonary conditions   []Asthma (J45)  []Coughing   []COPD (J44.9)   Psychological Disorders  []Anxiety (F41.9)  []Depression (F32.9)   []Other:   [x]Other:     See above     Barriers to/and or personal factors that will affect rehab potential:              [x]Age  []Sex    []Smoker              [x]Motivation/Lack of Motivation                        [x]Co-Morbidities              [x]Cognitive Function, education/learning barriers              [x]Environmental, home barriers              []profession/work barriers  [x]past PT/medical experience  []other:  Justification:     Falls Risk Assessment (30 days):   [] Falls Risk assessed and no intervention required. [x] Falls Risk assessed and Patient requires intervention due to being higher risk   TUG score (>12s at risk):     [x] Falls education provided      ASSESSMENT: Pt seems to be struggling with physical and cognitive decline. We did have a long discussion about the role of PT in helping to minimize deficits and restore strength and function, but also the importance of figuring out a long-term plan to manage/ minimize decline. Despite discussing the benefits of a community based exercise program for physical and mental benefits, his spouse does not seem interested in driving him to a fitness center or joining in a fitness program. I supported him for continuing to walk and ride his bike at home and again reinforced the need for carryover with home program with specific functional lower quarter strength exercises. There is likely some influence from lumbar stenosis as in the past he had much more problems walking upright with a cane, and had immediate improvement in his gait and report of generalized pains with walking in slight forward trunk flexion with a walker.      Functional Impairments:     []Noted lumbar/proximal hip/LE hypomobility   []Decreased LE functional ROM   [x]Decreased core/proximal hip strength and neuromuscular control   [x]Decreased LE functional strength   [x]Reduced balance/proprioceptive control   []other:      Functional Activity Limitations (from functional questionnaire and intake)   [x]Reduced ability to tolerate prolonged functional positions   []Reduced ability or difficulty with changes of positions or transfers between positions   []Reduced ability to maintain good posture and demonstrate good body mechanics with sitting, bending, and lifting   []Reduced ability to sleep   [] Reduced ability or tolerance with driving and/or computer work   []Reduced ability to perform lifting, carrying tasks   [x]Reduced ability to squat   []Reduced ability to forward bend   [x]Reduced ability to ambulate prolonged functional periods/distances/surfaces   [x]Reduced ability to ascend/descend stairs   []Reduced ability to run, hop or jump   []other:     Participation Restrictions   [x]Reduced participation in self care activities   [x]Reduced participation in home management activities   []Reduced participation in work activities   [x]Reduced participation in social activities. []Reduced participation in sport activities. Classification :    []Signs/symptoms consistent with post-surgical status including decreased ROM, strength and function. []Signs/symptoms consistent with joint sprain/strain   []Signs/symptoms consistent with patella-femoral syndrome   []Signs/symptoms consistent with knee OA/hip OA   []Signs/symptoms consistent with internal derangement of knee/Hip   []Signs/symptoms consistent with functional hip weakness/NMR control      []Signs/symptoms consistent with tendinitis/tendinosis    []signs/symptoms consistent with pathology which may benefit from Dry needling      [x]other:   S/s consistent w/ functional and cognitive decline.      Prognosis/Rehab Potential:      []Excellent   []Good    [x]Fair   []Poor    Tolerance of evaluation/treatment:    []Excellent   []Good    [x]Fair   []Poor    Physical Therapy Evaluation Complexity Justification  [x] A history of present problem with:  [] no personal factors and/or comorbidities that impact the plan of care;  []1-2 personal factors and/or comorbidities that impact the plan of care  [x]3 personal factors and/or comorbidities that impact the plan of care  [x] An examination of body systems using standardized tests and measures addressing any of the following: body structures and functions (impairments), activity limitations, and/or participation restrictions;:  [x] a total of 1-2 or more elements   [] a total of 3 or more elements   [] a total of 4 or more elements   [x] A clinical presentation with:  [x] stable and/or uncomplicated characteristics   [] evolving clinical presentation with changing characteristics  [] unstable and unpredictable characteristics;   [x] Clinical decision making of [] low, [] moderate, [] high complexity using standardized patient assessment instrument and/or measurable assessment of functional outcome. [x] EVAL (LOW) 50570 (typically 20 minutes face-to-face)  [] EVAL (MOD) 97822 (typically 30 minutes face-to-face)  [] EVAL (HIGH) 13559 (typically 45 minutes face-to-face)  [] RE-EVAL     PLAN:  Frequency/Duration:  Up to 1 days per week for up to 6-8 Weeks:  Interventions:  [x]  Therapeutic exercise including: strength training, ROM, for Lower extremity and core   [x]  NMR activation and proprioception for LE, Glutes and Core   []  Manual therapy as indicated for LE, Hip and spine to include: Dry Needling/IASTM, STM, PROM, Gr I-IV mobilizations, manipulation. [x] Modalities as needed that may include: thermal agents, E-stim, Biofeedback, US, iontophoresis as indicated  [x] Patient education on joint protection, postural re-education, activity modification, progression of HEP. HEP instruction:   4/13: cont walking in home, cont w/ bike, sit to stand 10x; 2-3x/day, lateral walk @ counter    GOALS:  Patient stated goal: be able to walk community distances w/ less difficulty.    [] Progressing: [] Met: [] Not Met: [] Adjusted    Therapist goals for Patient:   Short Term Goals: To be achieved in: 2 weeks  1. Independent in HEP and progression per patient tolerance, in order to prevent re-injury. [] Progressing: [] Met: [] Not Met: [] Adjusted  2. Patient will have a decrease in pain to facilitate improvement in movement, function, and ADLs as indicated by Functional Deficits. [] Progressing: [] Met: [] Not Met: [] Adjusted    Long Term Goals: To be achieved in: up to 8 weeks  1. Disability index score from % or less for the [facility] to assist with reaching prior level of function. [] Progressing: [] Met: [] Not Met: [] Adjusted  2. Patient will demonstrate improvement with 30sec sit to stand test to at least 8 reps to match age adjusted norms. [] Progressing: [] Met: [] Not Met: [] Adjusted  3. Patient will demonstrate the ability to independently perform his home exercise program with appropriate intensity. [] Progressing: [] Met: [] Not Met: [] Adjusted  4. Patient will improve TUG test score by at least 3 seconds to demonstrate improvement in gait stability and confidence. [] Progressing: [] Met: [] Not Met: [] Adjusted       Electronically signed by: Alison Arzate PT , DPT, [facility] #[id number]        Note: If patient does not return for scheduled/recommended follow up visits, this note will serve as a discharge from care along with the most recent update on progress.

## 2022-04-13 NOTE — FLOWSHEET NOTE
[facility] - Outpatient Rehabilitation & Therapy  [address]. [place]  Phone: [phone]   Fax:     [phone]      Physical Therapy Treatment Note/ Progress Report:     Date:  2022    Patient Name:  Tarun Farias    :  1935  MRN: [id number]    Pertinent Medical History:Additional Pertinent Hx: functional decline; gait, balance, L TKR 2016, R knee OA, L hip ORIF     Medical/Treatment Diagnosis Information:  · Diagnosis: M17.11 (ICD-10-CM) - Primary osteoarthritis of right kneeR53.1 (ICD-10-CM) - Increased weakness when ambulating  · Treatment Diagnosis: Bilat knee pain, mobility, gait, functional deficits    Insurance/Certification information:     Physician Information:  Referring Practitioner: Brian Barger MD  Plan of care signed (Y/N):     Date of Patient follow up with Physician:      Progress Report: []  Yes  [x]  No     Date Range for reporting period:  Beginnin2022  Ending:      Progress report due (10 Rx/or 30 days whichever is less):      Recertification due (POC duration/ or 90 days whichever is less):      Visit # POC/Insurance Allowable Auth Needed   1 BMN (3 used in ) []Yes   []No     Latex Allergy:  [x]NO      []YES  Preferred Language for Healthcare:   [x]English       []Other:    Functional Scale:      Date assessed: at eval  Test: womac   Score:    Pain level:  See below /10     History of Injury: See eval for full information about previous PT episodes. Today, he returns for evaluation with similar complaints. He is here with his wife and initially, she was going to wait in the waiting room, but I encouraged her to join him during the formal evaluation. Upon review of recent PCP visit from : it appears there was new worsening memory issues forgetting appointments and names of people. PCP also reported a couple recent falls where he slid out of a chair.  It was also documented that he now requires assistance with bathing, whereas this was not documented on 1/11/22. Also PCP gave patient and his wife information regarding Dementia. Patient states that currently his home activity consists of walking for 10min, 3-4x/day and also riding stationary bike x 15-20min every other day. He also does heel slides x10 every day, and ambulates stairs in their bi-level home a couple times a day. He hopes to be able to walk outside with the weather getting warmer. He and his wife state that their goal is to work him aggressively and get him \"stronger. \" He denies any specific daily activities that he wants to return to. When asked about his interest in a exercise class, his wife is not interested in driving him to the [facility] and he does not drive. A few years ago, he used to swim at the [facility], but has not recently because they felt it wasn't safe for him to do so. Also note that he and his wife play cards with friends; up to several hours at a time. He did not voluntarily report any subjective back pain or specific radiating pain today. SUBJECTIVE:  See eval   NEXT VISIT: TUG TEST, PSFS? OBJECTIVE:   Observation:    Test measurements:      RESTRICTIONS/PRECAUTIONS: fall risk, new Dx dementia? Exercises/Interventions:     Therapeutic Ex (15287)   Min: Reps/Resistance Notes/CUES        Sit to stand > 20\" table x 1 rep   Lateral walk >w/band? Step up >              Pt edu HEP review/ update    Manual Intervention (40438)  Min:     LE manual >prn    Lumbar manual >prn                        NMR re-education (29229)  Min:  CUES NEEDED   Balance ex's >                             Therapeutic Activity (09579)  Min: 25'     Pt edu Very long discussion again about functional decline, new Dx of dementia, role of PT and physical activity and also mentally stimulating activities for optimal long-term health/ wellness.      Functional testing: See eval    Modalities  Min: Other Therapeutic Activities: Pt was educated on PT POC, Diagnosis, Prognosis, pathomechanics as well as frequency and duration of scheduling future physical therapy appointments. Time was also taken on this day to answer all patient questions and participation in PT. Reviewed appointment policy in detail with patient and patient verbalized understanding. Home Exercise Program: Patient was instructed in the following for HEP:    4/13: cont walking @ home x 10min; 3-4x/day, cont biking 15-20min every other day, add sit to stand and lateral walk @ counter 2-3x/day. . Patient verbalized/demonstrated understanding and was issued written handout. Therapeutic Exercise and NMR EXR  [x] (54573) Provided verbal/tactile cueing for activities related to strengthening, flexibility, endurance, ROM for improvements in LE, proximal hip, and core control with self care, mobility, lifting, ambulation. [x] (95673) Provided verbal/tactile cueing for activities related to improving balance, coordination, kinesthetic sense, posture, motor skill, proprioception  to assist with LE, proximal hip, and core control in self care, mobility, lifting, ambulation and eccentric single leg control. NMR and Therapeutic Activities:    [x] (68825 or 00243) Provided verbal/tactile cueing for activities related to improving balance, coordination, kinesthetic sense, posture, motor skill, proprioception and motor activation to allow for proper function of core, proximal hip and LE with self care and ADLs and functional mobility.    [x] (28188) Gait Re-education- Provided training and instruction to the patient for proper LE, core and proximal hip recruitment and positioning and eccentric body weight control with ambulation re-education including up and down stairs     Home Exercise Program:    [x] (55191) Reviewed/Progressed HEP activities related to strengthening, flexibility, endurance, ROM of core, proximal hip and LE for functional self-care, mobility, lifting and ambulation/stair navigation   [x] (77610)Reviewed/Progressed HEP activities related to improving balance, coordination, kinesthetic sense, posture, motor skill, proprioception of core, proximal hip and LE for self care, mobility, lifting, and ambulation/stair navigation      Manual Treatments:  PROM / STM / Oscillations-Mobs:  G-I, II, III, IV (PA's, Inf., Post.)  [x] (30761) Provided manual therapy to mobilize LE, proximal hip and/or LS spine soft tissue/joints for the purpose of modulating pain, promoting relaxation,  increasing ROM, reducing/eliminating soft tissue swelling/inflammation/restriction, improving soft tissue extensibility and allowing for proper ROM for normal function with self care, mobility, lifting and ambulation. Approval Dates:  CPT Code Units Approved Units Used  Date Updated:                     Charges:  Timed Code Treatment Minutes: 25   Total Treatment Minutes: 50      [x] EVAL (LOW) 88829 (typically 20 minutes face-to-face)  [] EVAL (MOD) 54284 (typically 30 minutes face-to-face)  [] EVAL (HIGH) 55187 (typically 45 minutes face-to-face)  [] RE-EVAL     [] IY(61058) x     [] Dry needle 1 or 2 Muscles (00776)  [] NMR (24925) x     [] Dry needle 3+ Muscles (21858)  [] Manual (34155) x     [] Ultrasound (63966) x  [x] TA (21919) x  2  [] Mech Traction (27168)  [] ES(attended) (73096)     [] ES (un) (75728):   [] Vasopump (43692) [] Ionto (37570)   [] Other:    GOALS:  Patient stated goal: be able to walk community distances w/ less difficulty. [] Progressing: [] Met: [] Not Met: [] Adjusted    Therapist goals for Patient:   Short Term Goals: To be achieved in: 2 weeks  1. Independent in HEP and progression per patient tolerance, in order to prevent re-injury. [] Progressing: [] Met: [] Not Met: [] Adjusted  2.  Patient will have a decrease in pain to facilitate improvement in movement, function, and ADLs as indicated by Functional Deficits. [] Progressing: [] Met: [] Not Met: [] Adjusted    Long Term Goals: To be achieved in: up to 8 weeks  1. Disability index score from % or less for the [facility] to assist with reaching prior level of function. [] Progressing: [] Met: [] Not Met: [] Adjusted  2. Patient will demonstrate improvement with 30sec sit to stand test to at least 8 reps to match age adjusted norms. [] Progressing: [] Met: [] Not Met: [] Adjusted  3. Patient will demonstrate the ability to independently perform his home exercise program with appropriate intensity. [] Progressing: [] Met: [] Not Met: [] Adjusted  4. Patient will improve TUG test score by at least 3 seconds to demonstrate improvement in gait stability and confidence. [] Progressing: [] Met: [] Not Met: [] Adjusted    ASSESSMENT:  See eval    Treatment/Activity Tolerance:  [x] Patient tolerated treatment well [] Patient limited by fatique  [] Patient limited by pain  [] Patient limited by other medical complications  [] Other:     Overall Progression Towards Functional goals/ Treatment Progress Update:  [] Patient is progressing as expected towards functional goals listed. [] Progression is slowed due to complexities/Impairments listed. [] Progression has been slowed due to co-morbidities. [x] Plan just implemented, too soon to assess goals progression <30days   [] Goals require adjustment due to lack of progress  [] Patient is not progressing as expected and requires additional follow up with physician  [] Other    Prognosis for POC: [x] Good [] Fair  [] Poor    Patient requires continued skilled intervention: [x] Yes  [] No        PLAN:   [] Continue per plan of care [] Alter current plan (see comments)  [x] Plan of care initiated [] Hold pending MD visit [] Discharge    Electronically signed by:  Allie Franco, PT , DPT, OCS #[id number]      Note: If patient does not return for scheduled/recommended follow up visits, this note will serve as a discharge from care along with the most recent update on progress.

## 2022-04-23 PROBLEM — I63.9 ACUTE CEREBROVASCULAR ACCIDENT (CVA) (HCC): Status: ACTIVE | Noted: 2022-01-01

## 2022-04-23 NOTE — ED NOTES

## 2022-04-23 NOTE — ED PROVIDER NOTES
Attending Supervisory Note/Shared Visit   I have personally performed a face to face diagnostic evaluation on this patient. I have reviewed the mid-levels findings and agree. History and Exam by me shows an alert elderly male no acute distress. Went to bed at 11 PM last night. His wife was outside gardening around 11 AM and he came outside and was having difficulty speaking and complaining of dizziness. No history of stroke. Last known well was 11 PM last night. He normally ambulates with a walker and went for a walk in the park yesterday. HEENT: Atraumatic. Pupils equal round reactive. Extraocular movements are intact. He has a slight drooping of the left upper eyelid and the left face at rest, when he grands he has no facial droop. Heart: Regular rate and rhythm. No murmurs or gallops noted. Lungs: Breath sounds equal bilaterally and clear. Neuro: Awake, alert, oriented. Symmetrical reactive pupils. Intact extraocular movements. No drift. No limb ataxia. When he was stood up beside the bed he had truncal ataxia and was not able to ambulate. His speech is clear and understandable. His wife states that this speech difficulty has resolved. Code stroke activated. Stroke team physician consulted. Patient is out of the window for tPA. Proceed with CT head and CT angiogram head and neck. EKG: Normal sinus rhythm, rate of 63. Rhythm strip shows a sinus rhythm with a rate of 63, LA interval of 190 ms, QRS 74 ms with no other ectopy as interpreted by me. No significant change compared to 10/6/2021. Lab reviewed. H&H of 13.9 and 41.1. White blood cell count 6300 with 76 neutrophils and 15 lymphs. Sodium is 132 with potassium 4.5. BUN of 22 with a creatinine 1.0.  Glucose is 369. Troponin of 0.02. PT and INR of 12.9 and 1.14 respectively. PTT of 34.4. Chest x-ray: No acute abnormality.     CTA head and neck with contrast: No apparent arterial high-grade stenosis, occlusion or aneurysm within the anterior intracranial circulation. High-grade stenosis involving the origin of the right V4 segment with multiple luminal irregularities throughout the remaining right V4 segment. 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 Ectatic appearance of the descending thoracic aorta measuring up to 3.6 cm. Extensive left anterior neck and paraspinal venous collateralization. CT head without contrast: No hemorrhage or acute infarction. Moderate cerebral cortical atrophy. Extensive small vessel age-related white matter changes. The stroke team physician called back after evaluating the patient's imaging. He recommend loading with aspirin and Plavix and admitting for further evaluation by neurology and the hospitalist service. Patient was given aspirin 325 mg and Plavix 600 mg. Hospitalist will be consulted for admission.     (Please note that portions of this note were completed with a voice recognition program.  Efforts were made to edit the dictations but occasionally words are mis-transcribed.)    Anna Stephens MD  Attending Emergency Physician        Dyana Buckley MD  04/23/22 4900

## 2022-04-23 NOTE — PROGRESS NOTES
Medication Reconciliation     List of medications patient is currently taking is complete. Source of information:   1. Conversation with patient at bedside  2. EPIC records        Notes regarding home medications:  1. Patient received all of his morning home medications today PTA. 2. Per recent office visits in last month metoprolol was d/c. Pt and wife still have this medication on list and were unaware it has been d/c. Patient has been taking  3. Pt just started donepezil one week ago.  After first month the dose increases to 10mg  Denies other otc/herbal use  Natalia Guzman, Pharmacy Intern 4/23/2022 4:20 PM

## 2022-04-23 NOTE — ED PROVIDER NOTES
disease)     Cancer ([facility]     Closed left hip fracture ([facility]     Colon polyps- last colon neg 11/06     Diabetic nephropathy- pos microalb 10/11, on ARB 10/5/2011    DM (diabetes mellitus), type 2 (HCC)     Enlarged prostate     GERD (gastroesophageal reflux disease)     Holosystolic murmur 6/6/3573    HTN (hypertension)     Hypertriglyceridemia     Osteoarthritis     SVT (supraventricular tachycardia) ([facility]      Past Surgical History:   Procedure Laterality Date    ABLATION OF DYSRHYTHMIC FOCUS  2013    Dr. 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    APPENDECTOMY  age 30    CATARACT REMOVAL Bilateral 11/2017    COLONOSCOPY  12/19/2017    COLONOSCOPY N/A 12/13/2018    COLONOSCOPY POLYPECTOMY SNARE/COLD BIOPSY performed by Alexa Arellano MD at [address] [facility] SURGERY Left 2019    JOINT REPLACEMENT      PAIN MANAGEMENT PROCEDURE Right 3/9/2022    COOLIEF RADIOFREQUENCY ABLATION - RIGHT KNEE performed by Rick Simmons MD at [address] [facility]  age 9   [address] Left 11/2015    TURP  01/03/2017    cysto, green light lazer TURP       CURRENT MEDICATIONS        ALLERGIES    Allergies   Allergen Reactions    No Known Allergies        FAMILY OR SOCIAL HISTORY    Family History   Problem Relation Age of Onset    Hypertension Mother     Stroke Mother      Social History     Socioeconomic History    Marital status:      Spouse name: Not on file    Number of children: Not on file    Years of education: Not on file    Highest education level: Not on file   Occupational History    Not on file   Tobacco Use    Smoking status: Never Smoker    Smokeless tobacco: Never Used    Tobacco comment: congrats, advised not to start   Vaping Use    Vaping Use: Never used   Substance and Sexual cervical vertebral arteries. 4. 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   5. There is also ectatic appearance of the descending thoracic aorta   measuring up to 3.6 cm.   6. Extensive left anterior neck and paraspinal venous collateralization. Correlate with concerns for central venous obstruction/occlusion. CT HEAD WO CONTRAST   Final Result   Addendum 1 of 1   ADDENDUM:   Verbal results were given to ARCELIA Adkins by core on 04/23/2022, 1454   hours. Final   No hemorrhage or acute infarction. Moderate vermian and moderate cerebral   cortical atrophy. Extensive small vessel-age related white matter changes as   described above.             MRI brain without contrast    (Results Pending)     Labs Reviewed   CBC WITH AUTO DIFFERENTIAL - Abnormal; Notable for the following components:       Result Value    Lymphocytes Absolute 0.9 (*)     All other components within normal limits   COMPREHENSIVE METABOLIC PANEL W/ REFLEX TO MG FOR LOW K - Abnormal; Notable for the following components:    Sodium 132 (*)     Chloride 98 (*)     CO2 20 (*)     Glucose 369 (*)     BUN 22 (*)     Total Protein 6.3 (*)     All other components within normal limits   TROPONIN - Abnormal; Notable for the following components:    Troponin 0.02 (*)     All other components within normal limits   PROTIME-INR - Abnormal; Notable for the following components:    Protime 12.9 (*)     INR 1.14 (*)     All other components within normal limits   POCT GLUCOSE - Abnormal; Notable for the following components:    POC Glucose 342 (*)     All other components within normal limits   POCT GLUCOSE - Abnormal; Notable for the following components:    POC Glucose 175 (*)     All other components within normal limits   POCT GLUCOSE - Abnormal; Notable for the following components:    POC Glucose 216 (*)     All other components within normal limits APTT   URINALYSIS WITH REFLEX TO CULTURE   BASIC METABOLIC PANEL W/ REFLEX TO MG FOR LOW K   CBC   PROTIME-INR   HEMOGLOBIN A1C   LIPID PANEL   HEMOGLOBIN A1C   POCT GLUCOSE   POCT GLUCOSE   POCT GLUCOSE   POCT GLUCOSE       ED COURSE & MEDICAL DECISION MAKING    Pertinent Labs & Imaging studies reviewed and interpreted. (See chart for details)    See chart for details of medications given during the ED stay.     Vitals:    04/23/22 2015 04/23/22 2032 04/23/22 2120 04/24/22 0002   BP:  (!) 189/100  (!) 158/100   Pulse: 67 59 59 57   Resp: 11 20 18 17   Temp:  97.3 °F (36.3 °C)  97.5 °F (36.4 °C)   TempSrc:  Oral  Oral   SpO2:  94%  98%   Weight:       Height:         Medications   perflutren lipid microspheres (DEFINITY) injection 1.65 mg (has no administration in time range)   sodium chloride flush 0.9 % injection 10 mL (10 mLs IntraVENous Given 4/23/22 2123)   sodium chloride flush 0.9 % injection 10 mL (has no administration in time range)   0.9 % sodium chloride infusion (has no administration in time range)   ondansetron (ZOFRAN-ODT) disintegrating tablet 4 mg (has no administration in time range)     Or   ondansetron (ZOFRAN) injection 4 mg (has no administration in time range)   enoxaparin (LOVENOX) injection 40 mg (has no administration in time range)   aspirin EC tablet 81 mg (has no administration in time range)     Or   aspirin suppository 300 mg (has no administration in time range)   atorvastatin (LIPITOR) tablet 40 mg (40 mg Oral Given 4/23/22 2122)   hydrALAZINE (APRESOLINE) injection 10 mg (has no administration in time range)   donepezil (ARICEPT) tablet 5 mg (has no administration in time range)   finasteride (PROSCAR) tablet 5 mg (has no administration in time range)   furosemide (LASIX) tablet 20 mg (has no administration in time range)   lisinopril (PRINIVIL;ZESTRIL) tablet 40 mg (has no administration in time range)   cetirizine (ZYRTEC) tablet 10 mg (has no administration in time range) metoprolol tartrate (LOPRESSOR) tablet 50 mg (50 mg Oral Given 4/23/22 2122)   propafenone (RYTHMOL) tablet 150 mg (150 mg Oral Given 4/23/22 2210)   glucose (GLUTOSE) 40 % oral gel 15 g (has no administration in time range)   dextrose 50 % IV solution (has no administration in time range)   glucagon (rDNA) injection 1 mg (has no administration in time range)   dextrose 5 % solution (has no administration in time range)   insulin lispro (HUMALOG) injection vial 0-12 Units (0 Units SubCUTAneous Not Given 4/23/22 2030)   insulin lispro (HUMALOG) injection vial 0-6 Units (2 Units SubCUTAneous Given 4/23/22 2124)   insulin glargine (LANTUS) injection vial 10 Units (10 Units SubCUTAneous Given 4/23/22 2124)   iopamidol (ISOVUE-370) 76 % injection 75 mL (75 mLs IntraVENous Given 4/23/22 1435)   aspirin tablet 325 mg (325 mg Oral Given 4/23/22 1644)   clopidogrel (PLAVIX) tablet 600 mg (600 mg Oral Given 4/23/22 2121)     This patient was seen evaluated by myself my attending physician Dr. Tennille Sims    Differential diagnosis: thrombotic stroke, embolic stroke, hemorrhagic stroke, TIA,  hypoglycemia, mass lesions, metabolic cause, head injury, encephalopathy, multiple sclerosis, seizure, other    He is nontoxic in appearance and afebrile. Last well known was at 11 PM.  Patient states he woke up at 10 AM and was having difficulty talking and speaking. The wife reports she was outside gardening when the patient came out to her at 6 and he had an unsteady gait and she cannot understand what he was saying. He also reports waking up with dizziness. On arrival to the emergency department he denies lightheadedness or dizziness. He denies blurred vision or headache. I can understand what he is saying. He does have a positive Romberg's and he is unable to ambulate on the neuro exam.  I immediately called a code stroke at the time of my initial evaluation for this patient.   Initial NIH stroke scale score is 1 for minor facial droop.    1440 I spoke with Dr. Johnson Partida from  stroke team.  We will continue with code stroke plan. He is out of the window for tPA. 46 I spoke with Dr. Johnson Partida from [facility] stroke team.  Looks like he may have had a posterior circulation stroke. Again he is out of the window for tPA. We will load him with 325 of aspirin and 600 mg of Plavix. CBC is unremarkable. Sodium is 132, potassium 4.5, chloride 98, CO2 20, BUN 22 with creatinine of 1. Glucose 369. Troponin 0.02. No acute changes on EKG. CTA of the head and neck with contrast  1. No apparent arterial high grade stenosis, occlusion or aneurysm within the   anterior intracranial circulation. 2. High-grade stenosis involving origin of the right V4 segment with multi   luminal irregularity throughout the remaining right V4 segment. 3. No significant stenosis within the cervical ICAs per NASCET criteria. Patent cervical vertebral arteries. 4. 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   5. There is also ectatic appearance of the descending thoracic aorta   measuring up to 3.6 cm.   6. Extensive left anterior neck and paraspinal venous collateralization. Correlate with concerns for central venous obstruction/occlusion. Patient will be admitted to the hospitalist service. The patient and his wife were updated on plan of care and they agree. CRITICAL CARE NOTE:  There was a high probability of clinically significant life-threatening deterioration of the patient's condition requiring my urgent intervention. Total critical care time is 35 minutes. This includes multiple reevaluations, vital sign monitoring, pulse oximetry monitoring, telemetry monitoring, clinical response to the IV medications, reviewing the nursing notes, consultation time, dictation/documentation time, and interpretation of the labwork.  (This time excludes time spent performing procedures). FINAL IMPRESSION    1. Cerebrovascular accident (CVA), unspecified mechanism ([facility] [address] [facility]    2. Elevated troponin    3.  Hyperglycemia        PLAN  Admission to the hospital      (Please note that this note was completed with a voice recognition program.  Every attempt was made to edit the dictations, but inevitably there remain words that are mis-transcribed.)       DEONNA Barron - LENCHO  04/24/22 0028

## 2022-04-23 NOTE — PROGRESS NOTES
Pt to 5253 via ED stretcher with all personal belongings. Oriented to room and role as RN. NIH completed with ED RN and this RN with score of 1, regarding L facial droop. PCU bedside monitor applied and verified by Abdi Hancock. Pt transfered to bed, alert and oriented x4. Assessment of pt in progress. POC and education initiated per protocol. Call light within reach. Bed in lowest position and wheels locked. Room is free of clutter. Personal belongings within reach. No current complaints at this time. Pt denies pain, SOB, vomiting, nausea, diarrhea. Will continue to monitor.    Electronically signed by Nader Bull RN on 4/23/2022 at 6:43 PM

## 2022-04-23 NOTE — ED NOTES
Per wife, patients face does not appear to be symmetrical when prompted about patients face during NIH. NIH score changed.       Marylen Mince, RN  04/23/22 6235

## 2022-04-23 NOTE — PLAN OF CARE
Problem: Discharge Planning  Goal: Discharge to home or other facility with appropriate resources  Outcome: Progressing     Problem: Safety - Adult  Goal: Free from fall injury  Outcome: Progressing     Problem: ABCDS Injury Assessment  Goal: Absence of physical injury  Outcome: Progressing     Problem: Skin/Tissue Integrity  Goal: Absence of new skin breakdown  Description: 1. Monitor for areas of redness and/or skin breakdown  2. Assess vascular access sites hourly  3. Every 4-6 hours minimum:  Change oxygen saturation probe site  4. Every 4-6 hours:  If on nasal continuous positive airway pressure, respiratory therapy assess nares and determine need for appliance change or resting period.   Outcome: Progressing

## 2022-04-23 NOTE — H&P
Hospital Medicine History & Physical      PCP: Lisbeth Lesch, MD    Date of Admission: 4/23/2022    Chief Complaint: Left-sided weakness    History Of Present Illness:  Patient is a 41-year-old male with past medical history of diabetes mellitus, CAD, hypertension, hyperlipidemia who presents to the hospital for left-sided weakness, dizziness. According to the patient he had weakness in his left leg, left arm, according to the wife at the bedside patient also had difficulty speaking so they decided to come to the hospital this happened today morning.   Patient otherwise denies fevers chills nausea vomiting diarrhea constipation dysuria      Past Medical History:          Diagnosis Date    Allergic rhinitis, seasonal     Aortic regurgitation     2/12 ECHO EF 60%, mod AI    CAD (coronary artery disease)     Cancer (HCC)     Closed left hip fracture (HCC)     Colon polyps- last colon neg 11/06     Diabetic nephropathy- pos microalb 10/11, on ARB 10/5/2011    DM (diabetes mellitus), type 2 (HCC)     Enlarged prostate     GERD (gastroesophageal reflux disease)     Holosystolic murmur 0/6/4160    HTN (hypertension)     Hypertriglyceridemia     Osteoarthritis     SVT (supraventricular tachycardia) ([address]        Past Surgical History:          Procedure Laterality Date    ABLATION OF DYSRHYTHMIC FOCUS  2013    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    APPENDECTOMY  age 30    CATARACT REMOVAL Bilateral 11/2017    COLONOSCOPY  12/19/2017    COLONOSCOPY N/A 12/13/2018    COLONOSCOPY POLYPECTOMY SNARE/COLD BIOPSY performed by Edilson Colon MD at [address] [place] [address] Left 2019    JOINT REPLACEMENT      PAIN MANAGEMENT PROCEDURE Right 3/9/2022    COOLIEF RADIOFREQUENCY ABLATION - RIGHT KNEE performed by Diamond Phoenix, MD at [place] [address]  TONSILLECTOMY AND ADENOIDECTOMY  age 10    TOTAL KNEE ARTHROPLASTY Left 11/2015    TURP  01/03/2017    cysto, green light lazer TURP       Medications Prior to Admission:      Prior to Admission medications    Medication Sig Start Date End Date Taking? Authorizing Provider   furosemide (LASIX) 20 MG tablet Take 20 mg by mouth daily   Yes Historical Provider, MD   diclofenac (VOLTAREN) 0.1 % ophthalmic solution Place 1 drop into both eyes 4 times daily   Yes Historical Provider, MD   aspirin 81 MG EC tablet Take 81 mg by mouth daily   Yes Historical Provider, MD   loratadine (CLARITIN) 10 MG tablet Take 10 mg by mouth daily   Yes Historical Provider, MD   donepezil (ARICEPT) 5 MG tablet Take 1 tablet by mouth nightly FIRST MONTH 4/11/22 5/11/22  Rito Koyanagi, MD   insulin glargine (LANTUS SOLOSTAR) 100 UNIT/ML injection pen Inject 20 Units into the skin nightly 4/11/22   Rito Koyanagi, MD   metoprolol tartrate (LOPRESSOR) 50 MG tablet Take 1 tablet by mouth twice daily 2/23/22   Rito Koyanagi, MD   simvastatin (ZOCOR) 20 MG tablet Take 1 tablet by mouth nightly 1/28/22   Rito Koyanagi, MD   finasteride (PROSCAR) 5 MG tablet Take 1 tablet by mouth once daily 12/9/21   Rito Koyanagi, MD   metFORMIN (GLUCOPHAGE-XR) 500 MG extended release tablet TAKE 2 TABLETS BY MOUTH IN THE MORNING 1 IN THE EVENING 12/1/21   Rito Koyanagi, MD   metoprolol tartrate (LOPRESSOR) 50 MG tablet Take 1 tablet by mouth twice daily 1/28/21   Herb Boo MD   lisinopril (PRINIVIL;ZESTRIL) 40 MG tablet Take 1 tablet by mouth daily 10/8/19   Rito Koyanagi, MD   aspirin EC 81 MG EC tablet Take 1 tablet by mouth 2 times daily for 14 days Take for DVT blood clot prophylaxis. Please avoid missing doses.  9/28/19 4/11/22  Laneta Schlatter Heis, MD   propafenone (RYTHMOL) 150 MG tablet Take 1 tablet by mouth 2 times daily 9/27/19   Laneta Schlatter Heis, MD   Calcium Carbonate Antacid (TUMS E-X PO) Take 2 tablets by mouth as needed    Historical Provider, MD oxybutynin (DITROPAN) 5 MG tablet Take 1 tablet by mouth 2 times daily as needed for Other (bladder). 10/21/13 12/30/16  Prince Arteaga MD       Allergies:  No known allergies    Social History:      TOBACCO:   reports that he has never smoked. He has never used smokeless tobacco.  ETOH:   reports current alcohol use of about 2.0 standard drinks of alcohol per week. Family History:       Reviewed in detail and non contributory          Problem Relation Age of Onset    Hypertension Mother     Stroke Mother        REVIEW OF SYSTEMS:   Pertinent positives as noted in the HPI. All other systems reviewed and negative. PHYSICAL EXAM PERFORMED:    BP (!) 208/119   Pulse 64   Temp 98.3 °F (36.8 °C) (Oral)   Resp 22   Ht 5' 7\" (1.702 m)   Wt 163 lb 9.6 oz (74.2 kg)   SpO2 98%   BMI 25.62 kg/m²     General appearance:  No apparent distress, cooperative. HEENT:  Normal cephalic, atraumatic without obvious deformity. Conjunctivae/corneas clear. Neck: Supple, with full range of motion. No cervical lymphadenopathy  Respiratory:  Normal respiratory effort. Clear to auscultation, bilaterally without Rales/Wheezes/Rhonchi. Cardiovascular:  Regular rate and rhythm with normal S1/S2 without murmurs, rubs or gallops. Abdomen: Soft, non-tender, non-distended, normal bowel sounds. Musculoskeletal:  No edema noted bilaterally. No tenderness on palpation   Skin: no rash visible  Neurologic:  Neurologically intact without any focal sensory/motor deficits.   grossly non-focal.  Psychiatric:  Alert and oriented, normal mood  Peripheral Pulses: +2 palpable, equal bilaterally       Labs:     Recent Labs     04/23/22  1425   WBC 6.3   HGB 13.9   HCT 41.1        Recent Labs     04/23/22  1425   *   K 4.5   CL 98*   CO2 20*   BUN 22*   CREATININE 1.0   CALCIUM 9.1     Recent Labs     04/23/22  1425   AST 18   ALT 12   BILITOT 0.6   ALKPHOS 80     Recent Labs     04/23/22  1425   INR 1.14*     Recent Labs 04/23/22  1425   TROPONINI 0.02*       Urinalysis:      Lab Results   Component Value Date    BLOODU NEGATIVE 01/11/2022    SPECGRAV >=1.030 01/11/2022    GLUCOSEU NEGATIVE 01/11/2022       Radiology:       XR CHEST PORTABLE   Final Result   No acute process. CTA HEAD NECK W CONTRAST   Final Result   1. No apparent arterial high grade stenosis, occlusion or aneurysm within the   anterior intracranial circulation. 2. High-grade stenosis involving origin of the right V4 segment with multi   luminal irregularity throughout the remaining right V4 segment. 3. No significant stenosis within the cervical ICAs per NASCET criteria. Patent cervical vertebral arteries. 4. 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   5. There is also ectatic appearance of the descending thoracic aorta   measuring up to 3.6 cm.   6. Extensive left anterior neck and paraspinal venous collateralization. Correlate with concerns for central venous obstruction/occlusion. CT HEAD WO CONTRAST   Final Result   Addendum 1 of 1   ADDENDUM:   Verbal results were given to ARCELIA Tiwari by core on 04/23/2022, 1454   hours. Final   No hemorrhage or acute infarction. Moderate vermian and moderate cerebral   cortical atrophy. Extensive small vessel-age related white matter changes as   described above. MRI brain without contrast    (Results Pending)           Active Hospital Problems    Diagnosis Date Noted    Acute cerebrovascular accident (CVA) [facility]) [I63.9] 04/23/2022     Priority: Medium       Patient is a 81-year-old male with past medical history of diabetes mellitus, CAD, hypertension, hyperlipidemia who presents to the hospital for left-sided weakness, dizziness.   According to the patient he had weakness in his left leg, left arm, according to the wife at the bedside patient also had difficulty speaking so they decided to come to the hospital this happened today morning. Patient otherwise denies fevers chills nausea vomiting diarrhea constipation dysuria    Assessment  Dysarthria, left-sided weakness, rule out CVA  Hyponatremia  Hyperglycemia due to diabetes mellitus  Elevated troponin likely demand ischemia, patient is chest pain-free  CAD  Hypertension  Hyperlipidemia  Uncontrolled hypertension    Plan  CT head performed in the emergency department-negative for acute intracranial hemorrhage  CTA head and neck shows high-grade stenosis of right V4 segment, Aneurysmal dilation of right subclavian artery-we will consult vascular surgery  Monitor on cardiac telemetry, monitor troponin  Consult neurology, stroke protocol  DVT prophylaxis-Lovenox  Insulin sliding scale  Diet: ADULT DIET; Regular; 4 carb choices (60 gm/meal)  Code Status: Full Code    PT/OT Eval Status: ordered    Dispo - pending clinical improvement       Madelin Daniels MD    The note was completed using EMR and Dragon dictation system. Every effort was made to ensure accuracy; however, inadvertent computerized transcription errors may be present. Thank you Sebastien Burns MD for the opportunity to be involved in this patient's care. If you have any questions or concerns please feel free to contact me at [phone].     Madelin Daniels MD

## 2022-04-23 NOTE — PLAN OF CARE
STROKE TEAM PLAN OF CARE    Name:  Isidro Siu (89 y.o., male)  : 1935  MRN:  [id number]  Today's Date: 2022    Stroke team contacted at: 14:30  History obtained from: emergency medicine EMILIANO    Isidro Siu is a 81 y.o. male who presented with imbalance. Briefly, pt was LKW at 11pm last night. He awoke feeling dizzy at 10am, and feels like he was increasingly off balance this AM.  His wife found him at 11am when we went outside and was having difficulty walking because he was drifting to the side. She also felt that he had some difficulty speaking. In the ED, left facial droop, symmetric strength in BUE and BLE, and truncal ataxia were found. XR CHEST PORTABLE   Final Result   No acute process. CTA HEAD NECK W CONTRAST   Final Result   1. No apparent arterial high grade stenosis, occlusion or aneurysm within the   anterior intracranial circulation. 2. High-grade stenosis involving origin of the right V4 segment with multi   luminal irregularity throughout the remaining right V4 segment. 3. No significant stenosis within the cervical ICAs per NASCET criteria. Patent cervical vertebral arteries. 4. 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   5. There is also ectatic appearance of the descending thoracic aorta   measuring up to 3.6 cm.   6. Extensive left anterior neck and paraspinal venous collateralization. Correlate with concerns for central venous obstruction/occlusion. CT HEAD WO CONTRAST   Final Result   Addendum 1 of 1   ADDENDUM:   Verbal results were given to ARCELIA Goodman by core on 2022, 1454   hours. Final   No hemorrhage or acute infarction. Moderate vermian and moderate cerebral   cortical atrophy. Extensive small vessel-age related white matter changes as   described above.                 Acute Ischemic Stroke Clinical Decision Making:    (1) Intravenous tPA:    The patient is not a candidate for iv TPA based on:  Time greater than 4.5h from symptom onset    (2) Angiography / Thrombectomy:  The patient does not have evidence of a proximal large vessel occlusion on CTA, and therefore is not an EVT candidate. However, given the liklihood for a noncardioembolic etiology of symptoms, low NIHSS, and risk factors, would recommend DAPT load with  and clopidogrel 600mg. Recommendations communicated to primary team.    For any additional questions regarding acute stroke care, please feel free to contact the  Stroke Team at [phone].      MD Balwinder   Stroke Team   Late Entry at: 4/23/2022 4:39 PM

## 2022-04-24 NOTE — PLAN OF CARE
Problem: Discharge Planning  Goal: Discharge to home or other facility with appropriate resources  Outcome: Progressing     Problem: Safety - Adult  Goal: Free from fall injury  4/24/2022 0903 by Maycol De La O RN  Outcome: Progressing     Problem: ABCDS Injury Assessment  Goal: Absence of physical injury  4/24/2022 0903 by Maycol De La O RN  Outcome: Progressing     Problem: Skin/Tissue Integrity  Goal: Absence of new skin breakdown  Description: 1. Monitor for areas of redness and/or skin breakdown  2. Assess vascular access sites hourly  3. Every 4-6 hours minimum:  Change oxygen saturation probe site  4. Every 4-6 hours:  If on nasal continuous positive airway pressure, respiratory therapy assess nares and determine need for appliance change or resting period.   4/24/2022 0903 by Maycol De La O RN  Outcome: Progressing

## 2022-04-24 NOTE — PROGRESS NOTES
Physical Therapy  Facility/Department: [facility] PROGRESSIVE CARE  Physical Therapy Initial Assessment    Name: Nuria Burnett  : 1935  MRN: [id number]  Date of Service: 2022    Discharge Recommendations:  Continue to assess pending progress,Home with Home health PT   PT Equipment Recommendations  Other: Will monitor for potential equipt needs. Nuria Burnett scored a 19/ on the AM-PAC short mobility form. Current research shows that an AM-PAC score of 18 or greater is typically associated with a discharge to the patient's home setting. Based on the patient's AM-PAC score and their current functional mobility deficits, it is recommended that the patient have 2-3 sessions per week of Physical Therapy at d/c to increase the patient's independence. At this time, this patient demonstrates the endurance and safety to discharge home with Home PT Evaluation and a follow up treatment frequency of 2-3x/wk. Please see assessment section for further patient specific details. If patient discharges prior to next session this note will serve as a discharge summary. Please see below for the latest assessment towards goals. Assessment   Body Structures, Functions, Activity Limitations Requiring Skilled Therapeutic Intervention: Decreased functional mobility ; Decreased safe awareness;Decreased cognition  Assessment: 80 y/o male admit 2022 with CVA, Dizziness, L Side Weak, Difficulty Speaking; Elevated Troponin. CT Head negative. 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 PMH as noted including SVT, AVR (2016), L TKR, L Hip Fx/Surg, DM, Neuropathy. PTA pt living with wife in bilevel home with bed/bath on main level with steps to access; independent daily care and functional mobility (with Chantale Perry). At this time, anticipate adequate assist/support for d/c home. Would recommend Home PT.   Will cont to monitor pt's progress  Therapy Prognosis: Good  Decision Making: Medium Complexity  History: 80 y/o male admit 4/23/2022 with CVA, Dizziness, L Side Weak, Difficulty Speaking; Elevated Troponin. CT Head negative. 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 PMH as noted including SVT, AVR (2016), L TKR, L Hip Fx/Surg, DM, Neuropathy. Exam: See above. Clinical Presentation: See above. Barriers to Learning: Samish. Requires PT Follow-Up: Yes  Activity Tolerance  Activity Tolerance: Patient tolerated treatment well     Plan   Plan  Plan: 3-5 times per week  Current Treatment Recommendations: Strengthening,Balance training,Functional mobility training,Transfer training,Gait training,Stair training,Safety education & training,Patient/Caregiver education & training  Safety Devices  Type of Devices: Bed alarm in place,Call light within reach,Left in bed,Nurse notified     Restrictions  Restrictions/Precautions  Restrictions/Precautions: Fall Risk     Subjective   General  Chart Reviewed: Yes  Patient assessed for rehabilitation services?: Yes  Additional Pertinent Hx: 80 y/o male admit 4/23/2022 with CVA, Dizziness, L Side Weak, Difficulty Speaking; Elevated Troponin. CT Head negative. 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 PMH as noted including SVT, AVR (2016), L TKR, L Hip Fx/Surg, DM, Neuropathy. Family / Caregiver Present: No  Referring Practitioner: Dr. Teresa Tse: Within Functional Limits  Other (Comment): Pt follows simple commands. Subjective  Subjective: Pt agreeable to PT Eval/Rx. Social/Functional History  Social/Functional History  Lives With: Spouse (Wife Manuel Liner). )  Type of Home: House  Home Layout: Two level (Bilevel : Lower level with 6+6 steps to upper level.)  Home Access: Stairs to enter with rails (Garage : level entry to lower level of home.)  Bathroom Shower/Tub: Tub/Shower unit (Tub/Shower : upper and lower levels of home.)  Bathroom Toilet: Handicap height  Bathroom Equipment: Grab bars in shower,Grab bars around toilet  Bathroom Accessibility: Accessible  Home Equipment: K[place]  Has the patient had two or more falls in the past year or any fall with injury in the past year?:  (1 fall ~ 1 month ago.)  ADL Assistance: [address]:  (Wife takes care of homemaking needs.)  Ambulation Assistance: Independent (With Rollator. Uses Cane when negotiating stairs.)  Transfer Assistance: Independent  Active : No (Wife drives.)  Occupation: Retired  Type of Occupation: Retired : . Additional Comments: 2 sons : live oot. Vision/Hearing  Hearing: Exceptions to [facility]  Hearing Exceptions: Hard of hearing/hearing concerns    Cognition   Orientation  Overall Orientation Status: Impaired  Orientation Level: Oriented to person;Oriented to place (Some somewhat confused time (year : 1820) and recent events/situation.)  Cognition  Overall Cognitive Status: Exceptions  Arousal/Alertness: Appropriate responses to stimuli  Following Commands: Follows one step commands consistently  Safety Judgement: Decreased awareness of need for safety  Insights: Decreased awareness of deficits  Initiation: Requires cues for some  Sequencing: Requires cues for some     Objective         Gross Assessment  AROM: Within functional limits        Strength RLE  Strength RLE: WFL  Strength LLE  Strength LLE: WFL  Strength RUE  Strength RUE: WFL  Strength LUE  Strength LUE: WFL  Strength Other  Other: Mild weak (L>R) although adequate for functional activities. Bed mobility  Supine to Sit: Supervision  Sit to Supine: Supervision  Transfers  Sit to Stand: Contact guard assistance (With Michelle Domínguez. Cues for safe positioning and hand placement.)  Stand to sit: Contact guard assistance (With Michelle Domínguez. Cues for safe positioning and hand placement.)  Comment: Toilet Transfer CGA with Walker.   Pt able to complete pericare/stand at sink to wash hands SBA.  Ambulation  Surface: level tile  Device: Rolling Walker  Distance: Pt amb to/from bathroom (~15' x 2) with Slight CGA. No LE buckling/giving way; cues for safe transitional mvts. AM-PAC Score  AM-PAC Inpatient Mobility Raw Score : 19 (04/24/22 0952)  AM-PAC Inpatient T-Scale Score : 45.44 (04/24/22 0952)  Mobility Inpatient CMS 0-100% Score: 41.77 (04/24/22 9741)  Mobility Inpatient CMS G-Code Modifier : CK (04/24/22 9118)          Goals  Short Term Goals  Time Frame for Short term goals: Upon d/c acute care setting. Short term goal 1: Bed Mob Independent. Short term goal 2: Transfers with assist device Supervision. Short term goal 3: Amb with assist device 100' SBA/Supervision. Patient Goals   Patient goals : Return home.        Therapy Time   Individual Concurrent Group Co-treatment   Time In [address]         Time Out 0945         Minutes [address] [place] [address]

## 2022-04-24 NOTE — CONSULTS
Neurology Consult Note  Reason for Consult: CVA    Chief complaint: \"I thought I had a stroke\"    Dr Orlin Aviles MD asked me to see Carleen Davis in consultation for evaluation of CVA    History of Present Illness:  Carleen Davis is a 81 y.o. male who presents with slurred speech. I obtained my information via interview w/ the patient and his wife at the bedside, supplemented by chart review. They are challenging historians, unable to elaborate much on presenting symptoms. At some point yesterday morning the patient endorsed feeling dizzy, perhaps feeling like things were moving. He apparently had been working outside. Around 1100 his wife says that it looked like his L eye was drooping and he was very difficult to understand. He was unable to speak clearly. He tried taking a drink of water and it dribbled down his face. He denies feeling any lateralizing or other focal neurologic deficits to me though per H&P by hospitalist he may have suggested LUE/LLE weakness. He arrived to the ED around 1400 in order to be evaluated. Initial BP was 209/109. No fever. CT head was w/out any acute findings. CTA head/neck as below.  stroke team was involved and the patient was loaded w/ asa and Plavix. No other acute intervention was pursued. NIH in ED was 1 for L facial weakness. Currently the patient feels OK. His symptoms seem to be improving. Denies any previous hx of stroke.       Medical History:  Past Medical History:   Diagnosis Date    Allergic rhinitis, seasonal     Aortic regurgitation     2/12 ECHO EF 60%, mod AI    CAD (coronary artery disease)     Cancer (HCC)     Closed left hip fracture (HCC)     Colon polyps- last colon neg 11/06     Diabetic nephropathy- pos microalb 10/11, on ARB 10/5/2011    DM (diabetes mellitus), type 2 (HCC)     Enlarged prostate     GERD (gastroesophageal reflux disease)     Holosystolic murmur 6/2/3315    HTN (hypertension)     Hypertriglyceridemia     Osteoarthritis     SVT (supraventricular tachycardia) (HCC)      Past Surgical History:   Procedure Laterality Date    ABLATION OF DYSRHYTHMIC FOCUS  2013    Dr. Clifton Salvador  2019    SVT    AORTIC VALVE REPLACEMENT  2016    mosaic ultra porcine valve/medtronic    APPENDECTOMY  age 30    CATARACT REMOVAL Bilateral 11/2017    COLONOSCOPY  12/19/2017    COLONOSCOPY N/A 12/13/2018    COLONOSCOPY POLYPECTOMY SNARE/COLD BIOPSY performed by Miracle Almanza MD [address] Left 2019    JOINT REPLACEMENT      PAIN MANAGEMENT PROCEDURE Right 3/9/2022    COOLIEF RADIOFREQUENCY ABLATION - RIGHT KNEE performed by Yamilka Sosa MD at [facility]  age 9   [address] Left 11/2015    TURP  01/03/2017    cysto, green light lazer TURP     Scheduled Meds:   sodium chloride flush  10 mL IntraVENous 2 times per day    enoxaparin  40 mg SubCUTAneous Daily    aspirin  81 mg Oral Daily    Or    aspirin  300 mg Rectal Daily    atorvastatin  40 mg Oral Nightly    donepezil  5 mg Oral Nightly    finasteride  5 mg Oral Daily    furosemide  20 mg Oral Daily    lisinopril  40 mg Oral Daily    cetirizine  10 mg Oral Daily    metoprolol tartrate  50 mg Oral BID    propafenone  150 mg Oral BID    insulin lispro  0-12 Units SubCUTAneous TID WC    insulin lispro  0-6 Units SubCUTAneous Nightly    insulin glargine  10 Units SubCUTAneous Nightly     Medications Prior to Admission:   furosemide (LASIX) 20 MG tablet, Take 20 mg by mouth daily  diclofenac (VOLTAREN) 0.1 % ophthalmic solution, Place 1 drop into both eyes 4 times daily  aspirin 81 MG EC tablet, Take 81 mg by mouth daily  loratadine (CLARITIN) 10 MG tablet, Take 10 mg by mouth daily  donepezil (ARICEPT) 5 MG tablet, Take 1 tablet by mouth nightly FIRST MONTH  insulin glargine (LANTUS SOLOSTAR) 100 UNIT/ML injection pen, Inject 20 Units into the skin nightly  metoprolol tartrate (LOPRESSOR) 50 MG tablet, Take 1 tablet by mouth twice daily  simvastatin (ZOCOR) 20 MG tablet, Take 1 tablet by mouth nightly  finasteride (PROSCAR) 5 MG tablet, Take 1 tablet by mouth once daily  metFORMIN (GLUCOPHAGE-XR) 500 MG extended release tablet, TAKE 2 TABLETS BY MOUTH IN THE MORNING 1 IN THE EVENING  lisinopril (PRINIVIL;ZESTRIL) 40 MG tablet, Take 1 tablet by mouth daily  aspirin EC 81 MG EC tablet, Take 1 tablet by mouth 2 times daily for 14 days Take for DVT blood clot prophylaxis. Please avoid missing doses. propafenone (RYTHMOL) 150 MG tablet, Take 1 tablet by mouth 2 times daily  Calcium Carbonate Antacid (TUMS E-X PO), Take 2 tablets by mouth as needed    Allergies   Allergen Reactions    No Known Allergies      Family History   Problem Relation Age of Onset    Hypertension Mother     Stroke Mother      Social History     Tobacco Use   Smoking Status Never Smoker   Smokeless Tobacco Never Used   Tobacco Comment    congrats, advised not to start     Social History     Substance and Sexual Activity   Drug Use No     Social History     Substance and Sexual Activity   Alcohol Use Yes    Alcohol/week: 2.0 standard drinks    Types: 2 Standard drinks or equivalent per week    Comment: 1-2 beers/week     ROS  Constitutional- No weight loss or fevers  Eyes- No diplopia. No photophobia. Ears/nose/throat- No dysphagia. + Dysarthria  Cardiovascular- No palpitations. No chest pain  Respiratory- No dyspnea. No Cough  Gastrointestinal- No Abdominal pain. No Vomiting. Genitourinary- No incontinence. No urinary retention  Musculoskeletal- No myalgia. No arthralgia  Skin- No rash. No easy bruising. Psychiatric- No depression. No anxiety  Endocrine- No diabetes. No thyroid issues. Hematologic- No bleeding difficulty. No fatigue  Neurologic- + weakness. No Headache.     Exam  Blood pressure (!) 203/112, pulse 70, temperature 97.4 °F (36.3 °C), temperature source Oral, resp. rate 18, height 5' 7\" (1.702 m), weight 163 lb 12.8 oz (74.3 kg), SpO2 96 %. Constitutional    Vital signs: BP, HR, and RR reviewed   General alert, no distress  Eyes: unable to visualize the fundi  Cardiovascular: no peripheral edema. Psychiatric: cooperative with examination, no psychotic behavior noted. Neurologic  Mental status:   orientation to person, place. General fund of knowledge grossly intact   Memory grossly intact   Attention intact as able to attend well to the exam     Language fluent in conversation   Comprehension intact; follows simple commands  Cranial nerves:   CN2: visual fields full  CN 3,4,6: extraocular muscles intact  CN5: facial sensation symmetric   CN7: no obvious facial weakness. Wife suggests he is no dysarthric. CN8: hearing grossly intact  CN9: palate elevated symmetrically  CN11: trap full strength on shoulder shrug  CN12: tongue midline with protrusion  Strength: 4/5 BUE/BLE. I didn't really appreciate any asymmetric weakness. Deep tendon reflexes: normal in all 4 extremities  Sensory: light touch intact in all 4 extremities. Cerebellar/coordination: finger nose finger normal without ataxia  Tone: normal in all 4 extremities  Gait: ambulated w/ wheeled walker to bathroom. Shuffling gait. Labs  Glucose 369  Na 132  K 4.5  BUN 22  Cr 1.0    ALT 12  AST 18    WBC 6.3K  Hg 13.9  Platelets 601    YNH6L 8.0  LDL 63    Studies  CT head w/o 4/23/22, independently reviewed  No hemorrhage or acute infarction.  Moderate vermian and moderate cerebral   cortical atrophy.  Extensive small vessel-age related white matter changes as   described above. CTA head/neck 4/23/22, independently reviewed  1. No apparent arterial high grade stenosis, occlusion or aneurysm within the   anterior intracranial circulation.    2. High-grade stenosis involving origin of the right V4 segment with multi   luminal irregularity throughout the remaining right V4 segment. 3. No significant stenosis within the cervical ICAs per NASCET criteria. Patent cervical vertebral arteries. 4. 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   5. There is also ectatic appearance of the descending thoracic aorta   measuring up to 3.6 cm.   6. Extensive left anterior neck and paraspinal venous collateralization. Correlate with concerns for central venous obstruction/occlusion. Impression  1. Reported dysarthria and L sided weakness. This appears to have improved though gait may still be abnormal(?). Possible he may have had a stroke though hypertensive emergency/TIA also on the differential if an infarct isn't identified. 2.  Abnormal CTA head/neck. 3.  Dementia. 4.  DM.   5.  HTN. 6.  Hx paroxysmal SVT. Recommendations  1. MRI brain w/o.   2.  TTE. 3.  Continue antiplatelet/statin. Will consider adjusting. 4.  Would recommend vascular surgery consult for multiple abnormal findings on CTA imaging. 5.  Telemetry. 6.  UA.      Ketan Steiner NP  [address] [place] [address] [address] Neurology    A copy of this note was provided for Dr Rodriguez Buckner MD

## 2022-04-24 NOTE — PROGRESS NOTES
HCT 41.1        Recent Labs     04/23/22  1425   *   K 4.5   CL 98*   CO2 20*   BUN 22*   CREATININE 1.0   CALCIUM 9.1     Recent Labs     04/23/22  1425   AST 18   ALT 12   BILITOT 0.6   ALKPHOS 80     Recent Labs     04/23/22  1425   INR 1.14*     Recent Labs     04/23/22  1425   TROPONINI 0.02*       Urinalysis:      Lab Results   Component Value Date    BLOODU NEGATIVE 01/11/2022    SPECGRAV >=1.030 01/11/2022    GLUCOSEU NEGATIVE 01/11/2022       Radiology:  XR CHEST PORTABLE   Final Result   No acute process. CTA HEAD NECK W CONTRAST   Final Result   1. No apparent arterial high grade stenosis, occlusion or aneurysm within the   anterior intracranial circulation. 2. High-grade stenosis involving origin of the right V4 segment with multi   luminal irregularity throughout the remaining right V4 segment. 3. No significant stenosis within the cervical ICAs per NASCET criteria. Patent cervical vertebral arteries. 4. 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   5. There is also ectatic appearance of the descending thoracic aorta   measuring up to 3.6 cm.   6. Extensive left anterior neck and paraspinal venous collateralization. Correlate with concerns for central venous obstruction/occlusion. CT HEAD WO CONTRAST   Final Result   Addendum 1 of 1   ADDENDUM:   Verbal results were given to ARCELIA Khan by core on 04/23/2022, 1454   hours. Final   No hemorrhage or acute infarction. Moderate vermian and moderate cerebral   cortical atrophy. Extensive small vessel-age related white matter changes as   described above. MRI brain without contrast    (Results Pending)           Assessment/Plan:    Active Hospital Problems    Diagnosis     Acute cerebrovascular accident (CVA) ([facility] [address] [I63.9]      Priority: Medium     1.   Left-sided weakness with dysarthria possibly CVA related, admitted to the hospital, telemetry monitoring, echo, PT, OT, neurology consult  2. High-grade stenosis of right V4 segment, aneurysmal dilation of right subclavian artery, consulted vascular surgery on admission  3. Essential hypertension, restarted on p.o. medications  4. CAD no chest pain  5. Diabetes mellitus with hyperglycemia sliding scale along with Lantus  6. Minimally elevated troponin, appears demand ischemia patient denies chest pain on admission    Diet: ADULT DIET;  Regular; 4 carb choices (60 gm/meal)  Code Status: Full Code        Morro hTompson MD

## 2022-04-24 NOTE — PLAN OF CARE
Problem: Safety - Adult  Goal: Free from fall injury  4/24/2022 0048 by Katt Duvall RN  Outcome: Progressing     Problem: ABCDS Injury Assessment  Goal: Absence of physical injury  4/24/2022 0048 by Katt Duavll RN  Outcome: Progressing     Problem: Skin/Tissue Integrity  Goal: Absence of new skin breakdown  Description: 1. Monitor for areas of redness and/or skin breakdown  2. Assess vascular access sites hourly  3. Every 4-6 hours minimum:  Change oxygen saturation probe site  4. Every 4-6 hours:  If on nasal continuous positive airway pressure, respiratory therapy assess nares and determine need for appliance change or resting period.   4/24/2022 0048 by Katt Duvall RN  Outcome: Progressing

## 2022-04-24 NOTE — PROGRESS NOTES
NAME:  Eric Hernandez  YOB: 1935  MEDICAL RECORD NUMBER:  [id number]  TODAYS DATE:  4/24/2022    Discussed personal risk factors for Stroke /TIA with patient/family, and ways to reduce the risk for a recurrent stroke. Patient's personal risk factors which were identified are:     [] Alcohol Abuse: check with your physician before any alcohol consumption. [] Atrial fibrillation: may cause blood clots. [] Drug Abuse: Seek help, talk with your doctor  [] Clotting Disorder  [x] Diabetes  [] Family history of stroke or heart disease  [x] High Blood Pressure/Hypertension: work with your physician. [x] High cholesterol: monitor cholesterol levels with your physician.   [] Overweight/Obesity: work with your physician for your ideal body weight. [x] Physical Inactivity: get regular exercise as directed by your physician. [x] Personal history of previous TIA or stroke  [] Poor Diet; decrease salt (sodium) in your diet, follow diet directed by physician. [] Smoking: Cigarette/Cigar: stop smoking. Advised pt. that you can reduce your risk for stroke/TIA by modifying/controlling the risk factors that you have. Pt.advised to take the medications as prescribed, which will be detailed in the discharge instructions, and to not stop taking them without consulting their physician. In addition, pt. advised to maintain a healthy diet, exercise regularly and to not smoke. [facility]'s Stroke treatment and prevention, Managing your recovery  notebook  provided and/or reviewed  with patient/family. The notebook includes, but not limited to, sections addressing warning signs & symptoms of a stroke, which are: sudden numbness or weakness especially on one side of the body, sudden confusion, difficulty speaking or understanding, sudden changes in vision, sudden dizziness or loss of balance/ coordination, sudden severe headache, syncope and seizure.   The need to call EMS (911) immediately if signs & symptoms occur is emphasized . The notebook also provides education on Stroke community resources and stroke advocacy. The need for follow-up after discharge was highlighted with patient/family with them being able to repeat understanding of the importance of this.       Electronically signed by Nena Cortez RN on 4/24/2022 at 1:29 AM

## 2022-04-25 NOTE — DISCHARGE INSTR - COC
Continuity of Care Form    Patient Name: Jimmie Yoder   :  1935  MRN:  [id number]    Admit date:  2022  Discharge date:  ***    Code Status Order: Full Code   Advance Directives:      Admitting Physician:  Maxx Hyde MD  PCP: Roseann Kim MD    Discharging Nurse: [facility] Unit/Room#: Y8E-3772/6215-98  Discharging Unit Phone Number: [phone]    Emergency Contact:   Extended Emergency Contact Information  Primary Emergency Contact: Kiran Lovelace  Address: [address] [facility] [place] [address] Phone: [phone]  Mobile Phone: [phone]  Relation: Spouse  Secondary Emergency Contact: Sacha Cole [address] Phone: [phone]  Relation: Other    Past Surgical History:  Past Surgical History:   Procedure Laterality Date    ABLATION OF DYSRHYTHMIC FOCUS      Dr. Grzegorz Phan      SVT    AORTIC VALVE REPLACEMENT  2016    mosaic ultra porcine valve/medtronic    APPENDECTOMY  age 30    CATARACT REMOVAL Bilateral 2017    COLONOSCOPY  2017    COLONOSCOPY N/A 2018    COLONOSCOPY POLYPECTOMY SNARE/COLD BIOPSY performed by Barbara Benitez MD at [facility] Left 2019    JOINT REPLACEMENT      PAIN MANAGEMENT PROCEDURE Right 3/9/2022    [address] - RIGHT KNEE performed by Araceli Knight MD at [facility]  age 9    TOTAL KNEE ARTHROPLASTY Left 2015    TURP  2017    cysto, green light lazer TURP       Immunization History:   Immunization History   Administered Date(s) Administered    COVID-19, Joseph Jin, Primary or Immunocompromised, PF, 100mcg/0.5mL 2021, 2021    Influenza, High Dose (Fluzone 65 yrs and older) 2018, 2018    Influenza, Quadv, adjuvanted, 65 yrs +, IM, PF (Fluad) 10/21/2020, 2021 Influenza, Triv, inactivated, subunit, adjuvanted, IM (Fluad 65 yrs and older) 10/08/2019    Pneumococcal Conjugate 13-valent ([id number]) 02/16/2016    Pneumococcal Polysaccharide ([id number]) 10/24/2008    Td (Adult), 2 Lf Tetanus Toxoid, Pf (Td, Absorbed) 04/11/2022    Tetanus 08/01/2003       Active Problems:  Patient Active Problem List   Diagnosis Code    Essential hypertension I10    Osteoarthritis M19.90    GERD (gastroesophageal reflux disease) K21.9    Allergic rhinitis, seasonal J30.2    Hypertriglyceridemia E78.1    Needs flu shot Z23    Diabetic nephropathy associated with type 2 diabetes mellitus ([facility] [address] E11.21    Urge incontinence N39.41    Coronary artery disease, non-occlusive I25.10    History of paroxysmal supraventricular tachycardia- S/P ablation 2013, 2019 Z86.79    S/P aortic valve replacement with porcine valve Z95.3    Type 2 diabetes mellitus with diabetic nephropathy, without long-term current use of insulin ([facility]) E11.21    BPH with obstruction/lower urinary tract symptoms N40.1, N13.8    Malignant neoplasm of descending colon ([facility] [address]- 1/18 left colectomy, T3N0 C18.6    Left renal mass 2.3 cm enhancing by CT, stable, per hemonc N28.89    History of knee replacement, total, left Z96.652    Closed left hip fracture, sequela S72.002S    Chronic pain of both knees M25.561, M25.562, G89.29    Acute cerebrovascular accident (CVA) ([facility] [address] I63.9       Isolation/Infection:   Isolation            No Isolation          Patient Infection Status       None to display            Nurse Assessment:  Last Vital Signs: BP (!) 199/119 Comment: RN notified  Pulse 70   Temp 97.3 °F (36.3 °C) (Oral)   Resp 22   Ht 5' 7\" (1.702 m)   Wt 162 lb 4.1 oz (73.6 kg)   SpO2 98%   BMI 25.41 kg/m²     Last documented pain score (0-10 scale):    Last Weight:   Wt Readings from Last 1 Encounters:   04/25/22 162 lb 4.1 oz (73.6 kg)     Mental Status:  oriented and alert    IV Access:  - None    Nursing Mobility/ADLs:  Walking   Assisted  Transfer  Assisted  Bathing  Assisted  Dressing  Assisted  Toileting  Assisted  Feeding  Independent  Med Admin  Assisted  Med Delivery   whole    Wound Care Documentation and Therapy:  Incision 09/18/19 Hip Left; Outer (Active)   Number of days: 949        Elimination:  Continence: Bowel: Yes  Bladder: Yes  Urinary Catheter: None   Colostomy/Ileostomy/Ileal Conduit: No       Date of Last BM: ***    Intake/Output Summary (Last 24 hours) at 4/25/2022 1332  Last data filed at 4/25/2022 1318  Gross per 24 hour   Intake 920 ml   Output 0 ml   Net 920 ml     I/O last 3 completed shifts: In: 12 [P.O.:560]  Out: 300 [Urine:300]    Safety Concerns: At Risk for Falls    Impairments/Disabilities:      None    Nutrition Therapy:  Current Nutrition Therapy:   - Oral Diet:  Carb Control 4 carbs/meal (1800kcals/day)    Routes of Feeding: Oral  Liquids: Thin Liquids  Daily Fluid Restriction: no  Last Modified Barium Swallow with Video (Video Swallowing Test): not done    Treatments at the Time of Hospital Discharge:   Respiratory Treatments: ***  Oxygen Therapy:  is not on home oxygen therapy.   Ventilator:    - No ventilator support    Rehab Therapies: Physical Therapy and Occupational Therapy  Weight Bearing Status/Restrictions: No weight bearing restrictions  Other Medical Equipment (for information only, NOT a DME order):  walker  Other Treatments: ***    Patient's personal belongings (please select all that are sent with patient):  None    RN SIGNATURE:  Electronically signed by Josie Glez RN on 4/26/22 at 12:12 PM EDT    CASE MANAGEMENT/SOCIAL WORK SECTION    Inpatient Status Date: 4/23/2022    Readmission Risk Assessment Score:  Readmission Risk              Risk of Unplanned Readmission:  13           Discharging to Facility/ Agency   Name: [facility]  Address:  [address] [facility] [address]  Phone:  [phone]  Fax:  [phone]    Dialysis Facility (if applicable)   Name:  Address:  Dialysis Schedule:  Phone:  Fax:    / signature: Electronically signed by Marky Cooley RN Case Management on 4/25/2022 at 1:32 PM      PHYSICIAN SECTION    Prognosis: Good    Condition at Discharge: Stable    Rehab Potential (if transferring to Rehab): Good    Recommended Labs or Other Treatments After Discharge: ot/pt    Physician Certification: I certify the above information and transfer of Analia Clark  is necessary for the continuing treatment of the diagnosis listed and that he requires [address] for less 30 days.      Update Admission H&P: No change in H&P    PHYSICIAN SIGNATURE:  Electronically signed by Mariaa Park MD on 4/26/22 at 11:17 AM EDT

## 2022-04-25 NOTE — CARE COORDINATION
[facility]    Referral received from  to follow for home care services. [address] unable to staff timely, will require alternate agency, no preference, CM aware. Referral accepted by [place] with [facility], will pull from [facility].      Nickolas Scott RN, BSN CTN  [facility] [phone]

## 2022-04-25 NOTE — PLAN OF CARE
Problem: Discharge Planning  Goal: Discharge to home or other facility with appropriate resources  4/25/2022 1436 by Grey Badillo RN  Outcome: Progressing     Problem: Safety - Adult  Goal: Free from fall injury  4/25/2022 1436 by Grey Badillo RN  Outcome: Progressing     Problem: ABCDS Injury Assessment  Goal: Absence of physical injury  4/25/2022 1436 by Grey Badillo RN  Outcome: Progressing     Problem: Skin/Tissue Integrity  Goal: Absence of new skin breakdown  Description: 1. Monitor for areas of redness and/or skin breakdown  2. Assess vascular access sites hourly  3. Every 4-6 hours minimum:  Change oxygen saturation probe site  4. Every 4-6 hours:  If on nasal continuous positive airway pressure, respiratory therapy assess nares and determine need for appliance change or resting period.   4/25/2022 1436 by Grey Badillo RN  Outcome: Progressing     Problem: Chronic Conditions and Co-morbidities  Goal: Patient's chronic conditions and co-morbidity symptoms are monitored and maintained or improved  4/25/2022 1436 by Grey Badillo RN  Outcome: Progressing     Problem: Neurosensory - Adult  Goal: Achieves stable or improved neurological status  4/25/2022 1436 by Grey Badillo RN  Outcome: Progressing     Problem: Neurosensory - Adult  Goal: Achieves maximal functionality and self care  4/25/2022 1436 by Grey Badillo RN  Outcome: Progressing

## 2022-04-25 NOTE — PROGRESS NOTES
Speech Language/Pathology   SPEECH LANGUAGE AND CLINICAL BEDSIDE SWALLOWING EVALUATION   Speech Therapy Department       Shyann Pan  AGE: 81 y.o.    GENDER: male  : 1935  [id number]  EPISODE DATE:  2022    MEDICAL DIAGNOSIS:   Chief Complaint   Patient presents with    Extremity Weakness     left sided weakness, dizziness since 0800 sxs resolved at 0900     PAST MEDICAL HISTORY        Diagnosis Date    Allergic rhinitis, seasonal     Aortic regurgitation      ECHO EF 60%, mod AI    CAD (coronary artery disease)     Cancer (HCC)     Closed left hip fracture (HCC)     Colon polyps- last colon neg      Diabetic nephropathy- pos microalb 10/11, on ARB 10/5/2011    DM (diabetes mellitus), type 2 (HCC)     Enlarged prostate     GERD (gastroesophageal reflux disease)     Holosystolic murmur 583    HTN (hypertension)     Hypertriglyceridemia     Osteoarthritis     SVT (supraventricular tachycardia) ([facility] [place] [address]        PAST SURGICAL HISTORY    Past Surgical History:   Procedure Laterality Date    ABLATION OF DYSRHYTHMIC FOCUS      Dr. 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    APPENDECTOMY  age 30    CATARACT REMOVAL Bilateral 2017    COLONOSCOPY  2017    COLONOSCOPY N/A 2018    COLONOSCOPY POLYPECTOMY SNARE/COLD BIOPSY performed by Shahab Boland MD [address] Left     JOINT REPLACEMENT      PAIN MANAGEMENT PROCEDURE Right 3/9/2022    COOLIEF RADIOFREQUENCY ABLATION - RIGHT KNEE performed by Marshall Davenport MD at [address] [facility]  age 9   [address] Left 2015    TURP  2017    cysto, green light lazer TURP       ALLERGIES    Allergies   Allergen Reactions    No Known Allergies            RECENT RESULTS wife reports slurred speech persists but is slight. Wife notes no other cognitive changes or swallowing issues. Nsg and pt report no difficulty chewing or swallowing. Reason for referral: Isidro Siu was referred for a Speech-Language and Bedside Swallow Evaluation to assess the efficiency of communicative effectiveness; the efficiency of swallow function, rule out aspiration and make recommendations regarding safe dietary consistencies, effective compensatory strategies, and safe eating environment. Dysphagia Treatment Diagnosis: Oropharyngeal Dysphagia   Speech Language Treatment Diagnosis: dysarthria    IMPRESSIONS  Dysphagia Diagnosis  Dysphagia Diagnosis: Swallow function appears WFL  Cognitive Diagnosis: Pt with impaired memory, complex problem solving for safety issues, orientation WFL with independent use of environmental aids. Aphasia Diagnosis: No indications of aphasia  Speech Diagnosis: Wife reports slight slur persists; this is undectable on exan. Communication Diagnosis: Pt is able to communicate wants and needs. Diagnosis: Pt with moderate cognitive impairment primarily noted with higher level tasks. Deficits are noted for memory and higher level problem solving and safety awareness. Slight L labial weakness is noted. Exercises have been given and safety recommendations reviewed for phone numbers to be placed by the phone, including 911, and spouse to continue to monitor medications. SLP will follow up 4/26/2022 if pt is not discharged. Pt's spouse is advised to notify MD if any new concerns are noted after discharge. Recommended Diet:       Solids: IDDSI 7 Regular Solids;    Liquids: IDDSI 0 Thin Liquids;   Meds: Meds whole with thin liquids       Strategies:   Compensatory Swallowing Strategies : Upright as possible for all oral intake      Plan:  3-5x/week   Recommendations  D/C Recommendations: No follow up therapy recommended post discharge  Requires SLP Intervention: Yes  Therapeutic Interventions: Diet tolerance monitoring, cognitive-communication remediation  Duration of Treatment: 7-10 days      Dysphagia Goals  Dysphagia Goals: The patient will tolerate recommended diet without observed clinical signs of aspiration     Speech and Language Goals  Goals:   Short-term Goals  Timeframe for Short-term Goals: 7-10 days  Goal 1: Pt will demonstrate reading and writing skills WFL for his needs (pt likes to read magazines)  Goal 2: Pt will complete oral motor exercises with assist  Goal 3: Pt will recall safety precautions for walking and emergency number  Goal 4: additional goals as indicated    Barriers to Progress: premorbid cognitive decline per spouse    Prognosis: fair      Education  Consulted and agree with results and recommendations: Patient,Family member  Patient Education: Review of findings with pt and spouse  Patient Education Response: Verbalizes understanding,Needs reinforcement    Discharge Recommendations:  Pt will benefit from continued skilled Speech Therapy for Speech and Dysphagia services, prior to returning home.       Objective:  ASSESSMENT: Included Clinical Evaluation of Swallowing; Oral Motor Speech Evaluation and Cognitive-Linguistic Assessment  Vision  Vision: Within Functional Limits  Hearing  Hearing: Exceptions to [facility]  Hearing Exceptions: Hard of hearing/hearing concerns     Auditory Comprehension  Comprehension: Within Functional Limits (in conversation and responding to questions)     Reading Comprehension: TBA     Expression  Primary Mode of Expression: Verbal  Verbal Expression  Verbal Expression: Within functional limits   No word finding deficits in conversation  Responsive namin/5  Written Expression  Dominant Hand: Right  Written Expression: Unable to assess     Pragmatics/Social Functioning  Pragmatics: Within functional limits     Orientation  Overall Orientation Status: Within Functional Limits (uses environmental cues for date and exact TOD)  Attention  Attention: Unable to assess  Memory  Memory: Exceptions to [facility]  People Encountered: Mild  Short-term Memory: Moderate  Recalls 2/3 parts of message after 15 min  Generally recalls daily activities at hospital  Problem Solving  Problem Solving: Within Functional Limits (for basic daily situations); does not recall 911 independently. Numeric Reasoning  Numeric Reasoning: Unable to assess (pt's wife manages finances)  Abstract Reasoning  Abstract Reasoning: Unable to assess  Safety/Judgment  Safety/Judgment: Exceptions to [facility]  Complex Functional Tasks: Moderate  Awareness of deficits may be decreased  Pt reports his wife is almost always with him. Oral Hygiene: Moist, Clean  Labial ROM: WNL at rest  Labial Symmetry:slight decrease L at rest  Baseline Vocal Quality: Normal  Volitional Cough: Strong  Volitional Swallow:WFL    Oral Phase:  [x]WFL []Mild   [] Moderate  []Severe  []To be assessed  Oral Phase  Oral Phase: WFL  Oral Phase  Oral Phase - Comment: WFL for thin liquids by serial swallow, puree and soft solid       Pharyngeal Phase:   [x]WFL []Mild   [] Moderate  []Severe  []To be assessed    Pharyngeal Phase   Pharyngeal Phase: WNL. No cough, choke vocal change or other clinical indicators of dysphagia. Please accept this as Speech Therapy Discharge status, if pt is discharged prior to next therapy session.     Timed Code Treatment:  Time In: 2343  Time Out: 1500  Minutes: 39         SIGNED:   Gus Cardoso MS CCC/SLP [id number]  Speech Language Pathologist  04/25/22   3:16 PM

## 2022-04-25 NOTE — PROGRESS NOTES
NAME:  Nilo Avelar  YOB: 1935  MEDICAL RECORD NUMBER:  [id number]  TODAYS DATE:  4/25/2022    Discussed personal risk factors for Stroke /TIA with patient/family, and ways to reduce the risk for a recurrent stroke. Patient's personal risk factors which were identified are:     [] Alcohol Abuse: check with your physician before any alcohol consumption. [] Atrial fibrillation: may cause blood clots. [] Drug Abuse: Seek help, talk with your doctor  [] Clotting Disorder  [x] Diabetes  [] Family history of stroke or heart disease  [x] High Blood Pressure/Hypertension: work with your physician. [x] High cholesterol: monitor cholesterol levels with your physician.   [] Overweight/Obesity: work with your physician for your ideal body weight. [x] Physical Inactivity: get regular exercise as directed by your physician. [] Personal history of previous TIA or stroke  [x] Poor Diet; decrease salt (sodium) in your diet, follow diet directed by physician. [] Smoking: Cigarette/Cigar: stop smoking. Advised pt. that you can reduce your risk for stroke/TIA by modifying/controlling the risk factors that you have. Pt.advised to take the medications as prescribed, which will be detailed in the discharge instructions, and to not stop taking them without consulting their physician. In addition, pt. advised to maintain a healthy diet, exercise regularly and to not smoke. [facility]'s Stroke treatment and prevention, Managing your recovery  notebook  provided and/or reviewed  with patient/family. The notebook includes, but not limited to, sections addressing warning signs & symptoms of a stroke, which are: sudden numbness or weakness especially on one side of the body, sudden confusion, difficulty speaking or understanding, sudden changes in vision, sudden dizziness or loss of balance/ coordination, sudden severe headache, syncope and seizure.   The need to call EMS (911) immediately if signs & symptoms occur is emphasized . The notebook also provides education on Stroke community resources and stroke advocacy. The need for follow-up after discharge was highlighted with patient/family with them being able to repeat understanding of the importance of this.       Electronically signed by Wilber Gamez RN on 4/25/2022 at 2:44 AM

## 2022-04-25 NOTE — CONSULTS
on ARB 10/5/2011    DM (diabetes mellitus), type 2 ([address]     Enlarged prostate     GERD (gastroesophageal reflux disease)     Holosystolic murmur 4/2/0941    HTN (hypertension)     Hypertriglyceridemia     Osteoarthritis     SVT (supraventricular tachycardia) (HCC)        Past Surgical History:   Procedure Laterality Date    ABLATION OF DYSRHYTHMIC FOCUS  2013    Dr. 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    APPENDECTOMY  age 30    CATARACT REMOVAL Bilateral 11/2017    COLONOSCOPY  12/19/2017    COLONOSCOPY N/A 12/13/2018    COLONOSCOPY POLYPECTOMY SNARE/COLD BIOPSY performed by Charline Slater MD at [address] Left 2019    JOINT REPLACEMENT      PAIN MANAGEMENT PROCEDURE Right 3/9/2022    COOLIEF RADIOFREQUENCY ABLATION - RIGHT KNEE [facility]  [facility] 9   [address] Left 11/2015    TURP  01/03/2017    cysto, green light lazer TURP       Allergies   Allergen Reactions    No Known Allergies        Social History     Socioeconomic History    Marital status:      Spouse name: Not on file    Number of children: Not on file    Years of education: Not on file    Highest education level: Not on file   Occupational History    Not on file   Tobacco Use    Smoking status: Never Smoker    Smokeless tobacco: Never Used    Tobacco comment: congrats, advised not to start   Vaping Use    Vaping Use: Never used   Substance and Sexual Activity    Alcohol use: Yes     Alcohol/week: 2.0 standard drinks     Types: 2 Standard drinks or equivalent per week     Comment: 1-2 beers/week    Drug use: No    Sexual activity: Not on file   Other Topics Concern    Not on file   Social History Narrative    Exercise: walking/swim/bike 3x/wk.  Lives with spouse. Seatbelt use: Always. Living will:  yes,   copy on file. Social Determinants of Health     Financial Resource Strain:     Difficulty of Paying Living Expenses: Not on file   Food Insecurity:     Worried About Running Out of Food in the Last Year: Not on file    Iveth of Food in the Last Year: Not on file   Transportation Needs:     Lack of Transportation (Medical): Not on file    Lack of Transportation (Non-Medical):  Not on file   Physical Activity: Sufficiently Active    Days of Exercise per Week: 7 days    Minutes of Exercise per Session: 60 min   Stress:     Feeling of Stress : Not on file   Social Connections:     Frequency of Communication with Friends and Family: Not on file    Frequency of Social Gatherings with Friends and Family: Not on file    Attends Jew Services: Not on file    Active Member of [address] [place] [address] [employer] or Organizations: Not on file    Attends Club or Organization Meetings: Not on file    Marital Status: Not on file   Intimate Partner Violence:     Fear of Current or Ex-Partner: Not on file    Emotionally Abused: Not on file    Physically Abused: Not on file    Sexually Abused: Not on file   Housing Stability:     Unable to Pay for Housing in the Last Year: Not on file    Number of [place] in the Last Year: Not on file    Unstable Housing in the Last Year: Not on file       Family History   Problem Relation Age of Onset    Hypertension Mother     Stroke Mother        Vital Signs  Vitals:    04/25/22 0614 04/25/22 0644 04/25/22 0739 04/25/22 1115   BP:   (!) 183/90 (!) 199/119   Pulse: 51  51 70   Resp: 15  19 22   Temp:   97.3 °F (36.3 °C) 97.3 °F (36.3 °C)   TempSrc:   Oral Oral   SpO2:   97% 98%   Weight:  162 lb 4.1 oz (73.6 kg)     Height:           I/O:    Intake/Output Summary (Last 24 hours) at 4/25/2022 1511  Last data filed at 4/25/2022 1318  Gross per 24 hour   Intake 920 ml   Output    Net 920 ml       Physical Exam:  General: no apparent distress, alert and oriented, afebrile  Psychiatric: mood and affect are within normal limits  Head/Eyes/Ears/Nose/Throat: normocephalic and atraumatic, face symmetric, normal hearing, nose - negative, no facial droop noted at this time  Neck: normal appearance and no deformity, supple, trachea midline  Chest/Lungs: non labored breathing no tachypnea, retractions or cyanosis  Cardiac: Heart regular rate and rhythm  Abdomen: soft, nondistended and nontender  Extremities: normal strength, tone, and muscle mass, no deformities, no significant edema to BLE, adequate dorsiflexion and plantar flexion to bilateral lower extremities, adequate  strength of bilateral hands, adequate flexion and extension of bilateral upper extremities  Vascular: extremities warm and well perfused, no signs of cyanosis or ischemia, bilateral upper and lower extremity motorsensory intact  Pulses:  -R Radial: +2/4 palpable   -L Radial: +2/4 palpable  -R DP: +2/4 palpable  -L DP: +2/4 palpable    Labs  Lab Results   Component Value Date    WBC 7.5 04/24/2022    HGB 14.1 04/24/2022    HCT 40.4 04/24/2022    MCV 90.3 04/24/2022     04/24/2022     Lab Results   Component Value Date     04/24/2022    K 4.2 04/24/2022    CL 98 04/24/2022    CO2 24 04/24/2022    BUN 19 04/24/2022    CREATININE 0.9 04/24/2022      No results found for: GLU    Scheduled Meds:    sodium chloride flush  10 mL IntraVENous 2 times per day    enoxaparin  40 mg SubCUTAneous Daily    aspirin  81 mg Oral Daily    Or    aspirin  300 mg Rectal Daily    atorvastatin  40 mg Oral Nightly    donepezil  5 mg Oral Nightly    finasteride  5 mg Oral Daily    furosemide  20 mg Oral Daily    lisinopril  40 mg Oral Daily    cetirizine  10 mg Oral Daily    metoprolol tartrate  50 mg Oral BID    propafenone  150 mg Oral BID    insulin lispro  0-12 Units SubCUTAneous TID     insulin lispro  0-6 Units SubCUTAneous Nightly    insulin glargine  10 Units SubCUTAneous Nightly     Continuous Infusions:    sodium chloride      dextrose         Imaging:   CTA head neck - 4/23/2022  Impression  1. No apparent arterial high grade stenosis, occlusion or aneurysm within the  anterior intracranial circulation. 2. High-grade stenosis involving origin of the right V4 segment with multi  luminal irregularity throughout the remaining right V4 segment. 3. No significant stenosis within the cervical ICAs per NASCET criteria. Patent cervical vertebral arteries. 4. Fusiform aneurysmal dilatation of the left proximal subclavian artery  arising from origin of the arch measuring up to 2.7 cm.  5. There is also ectatic appearance of the descending thoracic aorta  measuring up to 3.6 cm.  6. Extensive left anterior neck and paraspinal venous collateralization. Correlate with concerns for central venous obstruction/occlusion.     Assessment:  -Admitted with slurred speech -improving  -Difficulty walking on admission - improving  -Bilateral carotid arteries without significant stenosis  -Aneurysm of left proximal subclavian artery - per CTA  -No acute infarct noted on MRI    Plan:   -We will discuss plan with Dr. Elida Pagan further recommendations to follow    All pertinent information and plan of care discussed with Dr. Sonny Ambrose. All questions and concerns were addressed with the patient. I have discussed the above stated plan with the patient and the nurse. The patient verbalized understanding and agreed with the plan.     Thank you for allowing to us to participate in the care of Vicente Dove      Electronically signed by DEONNA Arellano CNP on 4/25/2022 at 3:11 PM

## 2022-04-25 NOTE — CARE COORDINATION
INITIAL CASE MANAGEMENT ASSESSMENT    Reviewed chart, met with patient to assess possible discharge needs. Explained Case Management role/services. Living Situation: Confirmed address, lives with wife in a bilevel house, level entry through garage, 6 steps down or up from main entrance; patient states able to manage steps okay    ADLs: Mostly independent while using DME, wife assists with dressing/bathing PRN; wife completes homemaking duties     DME: Rollator, cane, grab bars    PT/OT Recs: Discharge Recommendations:  Continue to assess pending progress,Home with Home health PT   PT Equipment Recommendations  Other: Will monitor for potential equipt needs. Renan Vanegas scored a 19/24 on the AM-PAC short mobility form    Discharge Recommendations: Renan Vanegas scored a 19/24 on the AM-PAC ADL Inpatient form. Current research shows that an AM-PAC score of 18 or greater is typically associated with a discharge to the patient's home setting. Based on the patient's AM-PAC score, and their current ADL deficits, it is recommended that the patient have 2-3 sessions per week of Occupational Therapy at d/c to increase the patient's independence.  At this time, this patient demonstrates the endurance and safety to discharge home with [facility] and a follow up treatment frequency of 2-3x/wk. Please see assessment section for further patient specific details.     If patient discharges prior to next session this note will serve as a discharge summary. Please see below for the latest assessment towards goals.      2-3 sessions per week,Continue to assess pending progress,Home with assist PRN,Patient would benefit from continued therapy after discharge  OT Equipment Recommendations  Equipment Needed: Yes  Mobility Devices: ADL Assistive Devices  ADL Assistive Devices:  Shower Chair with back  Other: pt would benefit due to decreased balance at this time     Active Services: None     Transportation: Wife transports PRN Medications: Uses [employer] -- no issues obtaining or affording medications    PCP: Iain Wood MD      HD/PD: n/a    PLAN/COMMENTS: Patient will return home with wife. Patient agreeable to home care. Home care list given. Patient would like a referral to [facility] (has no preference otherwise). Referral made. Patient declines shower chair at this point. The Plan for Transition of Care is related to the following treatment goals: home care, strengthening    The patient was provided with a choice of provider and agrees   with the discharge plan. [x] Yes [] No    Freedom of choice list was provided with basic dialogue that supports the patient's individualized plan of care/goals, treatment preferences and shares the quality data associated with the providers. [x] Yes [] No    SW/CM provided contact information for patient or family to call with any questions. SW/CM will follow and assist as needed.   Electronically signed by Dorcas Adorno RN Case Management [phone] on 4/25/2022 at 11:53 AM

## 2022-04-25 NOTE — PROGRESS NOTES
Hospitalist Progress Note  4/25/2022 10:44 AM    PCP: Toñito Rosado MD    [id number]     Date of Admission: 4/23/2022                                                                                                                     HOSPITAL COURSE    Patient demographics:  The patient  Kip Suárez is a 81 y.o. male     Significant past medical history:   Patient Active Problem List   Diagnosis    Essential hypertension    Osteoarthritis    GERD (gastroesophageal reflux disease)    Allergic rhinitis, seasonal    Hypertriglyceridemia    Needs flu shot    Diabetic nephropathy associated with type 2 diabetes mellitus ([address]    Urge incontinence    Coronary artery disease, non-occlusive    History of paroxysmal supraventricular tachycardia- S/P ablation 2013, 2019    S/P aortic valve replacement with porcine valve    Type 2 diabetes mellitus with diabetic nephropathy, without long-term current use of insulin ([address]    BPH with obstruction/lower urinary tract symptoms    Malignant neoplasm of descending colon ([address]- 1/18 left colectomy, T3N0    Left renal mass 2.3 cm enhancing by CT, stable, per hemonc    History of knee replacement, total, left    Closed left hip fracture, sequela    Chronic pain of both knees    Acute cerebrovascular accident (CVA) ([address]         Presenting symptoms:  Left-sided weakness    Diagnostic workup:      CONSULTS DURING ADMISSION :   IP CONSULT TO STROKE TEAM  IP CONSULT TO PHARMACY  PHARMACY TO CHANGE BASE FLUIDS  IP CONSULT TO HOSPITALIST  IP CONSULT TO NEUROLOGY  IP CONSULT TO CASE MANAGEMENT  IP CONSULT TO VASCULAR SURGERY      Patient was diagnosed with:        Treatment while inpatient:  Pt  presented to the hospital for left-sided weakness, dizziness.                                                                                        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 ([facility]    Urge incontinence    Coronary artery disease, non-occlusive    History of paroxysmal supraventricular tachycardia- S/P ablation 2013, 2019    S/P aortic valve replacement with porcine valve    Type 2 diabetes mellitus with diabetic nephropathy, without long-term current use of insulin ([facility])    BPH with obstruction/lower urinary tract symptoms    Malignant neoplasm of descending colon ([facility]- 1/18 left colectomy, T3N0    Left renal mass 2.3 cm enhancing by CT, stable, per hemonc    History of knee replacement, total, left    Closed left hip fracture, sequela    Chronic pain of both knees    [facility]) [facility]       Allergies  No known allergies    Medications    Scheduled Meds:   sodium chloride flush  10 mL IntraVENous 2 times per day    enoxaparin  40 mg SubCUTAneous Daily    aspirin  81 mg Oral Daily    Or    aspirin  300 mg Rectal Daily    atorvastatin  40 mg Oral Nightly    donepezil  5 mg Oral Nightly    finasteride  5 mg Oral Daily    furosemide  20 mg Oral Daily    lisinopril  40 mg Oral Daily    cetirizine  10 mg Oral Daily    metoprolol tartrate  50 mg Oral BID    propafenone  150 mg Oral BID    insulin lispro  0-12 Units SubCUTAneous TID WC    insulin lispro  0-6 Units SubCUTAneous Nightly    insulin glargine  10 Units SubCUTAneous Nightly     Continuous Infusions:   sodium chloride      dextrose       PRN Meds:  perflutren lipid microspheres, sodium chloride flush, sodium chloride, ondansetron **OR** ondansetron, hydrALAZINE, glucose, dextrose, glucagon (rDNA), dextrose    Vitals   Vitals /wt Patient Vitals for the past 8 hrs:   BP Temp Temp src Pulse Resp SpO2 Weight   04/25/22 0739 (!) 183/90 97.3 °F (36.3 °C) Oral 51 19 97 %    04/25/22 0644       162 lb 4.1 oz (73.6 kg)   04/25/22 0614    51 15     04/25/22 0443 (!) 182/90 97.4 °F (36.3 °C) Oral 55 15          72HR INTAKE/OUTPUT:      Intake/Output Summary (Last 24 hours) at 4/25/2022 1044  Last data filed at 4/24/2022 1802  Gross per 24 hour   Intake 320 ml   Output 0 ml   Net 320 ml       Exam:    Gen:   Alert and oriented ×3   Eyes: PERRL. No sclera icterus. No conjunctival injection. ENT: No discharge. Pharynx clear. External appearance of ears and nose normal.  Neck: Trachea midline. No obvious mass. Resp: No accessory muscle use. No crackles. No wheezes. No rhonchi. CV: Regular rate. Regular rhythm. No murmur or rub. No edema. GI: Non-tender. Non-distended. No hernia. Skin: Warm, dry, normal texture and turgor. Lymph: No cervical LAD. No supraclavicular LAD. M/S: / Ext. No cyanosis. No clubbing. No joint deformity. Neuro: CN 2-12 are intact,  no neurologic deficits noted. PT/INR:   Recent Labs     04/23/22  1425 04/24/22  0921   PROTIME 12.9* 12.4   INR 1.14* 1.09     APTT:   Recent Labs     04/23/22  1425   APTT 34.4       CBC:   Recent Labs     04/23/22  1425 04/24/22  0921   WBC 6.3 7.5   HGB 13.9 14.1   HCT 41.1 40.4*   MCV 91.0 90.3    229       BMP:   Recent Labs     04/23/22  1425 04/24/22  0921   * 136   K 4.5 4.2   CL 98* 98*   CO2 20* 24   BUN 22* 19   CREATININE 1.0 0.9       LIVER PROFILE:   Recent Labs     04/23/22  1425   ALKPHOS 80   AST 18   ALT 12   BILITOT 0.6     No results for input(s): AMYLASE in the last 72 hours. No results for input(s): LIPASE in the last 72 hours. UA:No results for input(s): NITRITE, LABCAST, WBCUA, RBCUA, MUCUS in the last 72 hours.     TROPONIN:   Recent Labs     04/23/22  1425 04/24/22  0921   TROPONINI 0.02* 0.01       No results found for: URRFLXCULT    No results for input(s): TSHREFLEX in the last 72 hours. No components found for: ZTL1533  POC GLUCOSE:    Recent Labs     04/24/22  0748 04/24/22  1210 04/24/22  1657 04/24/22 2021 04/25/22  0733   POCGLU 149* 319* 213* 144* 140*     Recent Labs     04/23/22  1425 04/24/22  0921   LABA1C 8.0 8.0      Lab Results   Component Value Date    LABA1C 8.0 04/24/2022         ASSESSMENT AND PLAN      Left-sided weakness with dysarthria possibly CVA related,   telemetry monitoring,   Echocardiogram shows grade 2 diastolic dysfunction  No arrhythmias or shunting   PT, OT    CT angiogram of the head and neck   High-grade stenosis of right V4 segment,   aneurysmal dilation of right subclavian artery, consulted vascular surgery on admission     Essential hypertension,   restarted on p.o. medications     CAD no chest pain     Diabetes mellitus with hyperglycemia    sliding scale along with Lantus      Minimally elevated troponin,    appears demand ischemia patient denies chest pain on admission        Code Status: Full Code        Dispo -cc        The patient and / or the family were informed of the results of any tests, a time was given to answer questions, a plan was proposed and they agreed with plan. Katerine Gray MD    This note was transcribed using [id number] [employer]. Please disregard any translational errors.

## 2022-04-25 NOTE — PLAN OF CARE
Problem: Discharge Planning  Goal: Discharge to home or other facility with appropriate resources  Outcome: Progressing     Problem: Safety - Adult  Goal: Free from fall injury  Outcome: Progressing     Problem: ABCDS Injury Assessment  Goal: Absence of physical injury  Outcome: Progressing     Problem: Skin/Tissue Integrity  Goal: Absence of new skin breakdown  Description: 1. Monitor for areas of redness and/or skin breakdown  2. Assess vascular access sites hourly  3. Every 4-6 hours minimum:  Change oxygen saturation probe site  4. Every 4-6 hours:  If on nasal continuous positive airway pressure, respiratory therapy assess nares and determine need for appliance change or resting period.   Outcome: Progressing     Problem: Chronic Conditions and Co-morbidities  Goal: Patient's chronic conditions and co-morbidity symptoms are monitored and maintained or improved  Outcome: Progressing     Problem: Neurosensory - Adult  Goal: Achieves stable or improved neurological status  Outcome: Progressing  Goal: Achieves maximal functionality and self care  Outcome: Progressing

## 2022-04-25 NOTE — PROGRESS NOTES
Brief Neurology Update:     Per RN patient family was requesting MRI results, primary team requesting they be given by neurology. I called and spoke with the patients wife and told her that his MRI Brain did not show a stroke for which she understood the results. Reports patient is significantly improved, per RN likely at baseline with plans of possible discharge. Vascular surgery evaluated patient today for CTA abnormalities. Please refer back to last Neurology encounter as patient was not seen today for most recent impression and recommendations. Call with any questions or concerns. Brenna Rossi CNP  Neurology.

## 2022-04-25 NOTE — ACP (ADVANCE CARE PLANNING)
Advance Care Planning     Advance Care Planning Activator (Inpatient)  Conversation Note      Date of ACP Conversation: 4/25/2022     Conversation Conducted with: Patient with Decision Making Capacity    ACP Activator: Tayler El RN    Health Care Decision Maker:     Current Designated Health Care Decision Maker:     Primary Decision Maker: Fidelina Lukeard - [phone]    Secondary Decision Maker: Samuel [address] [facility] [address] Preferences    Ventilation: \"If you were in your present state of health and suddenly became very ill and were unable to breathe on your own, what would your preference be about the use of a ventilator (breathing machine) if it were available to you? \"      Would the patient desire the use of ventilator (breathing machine)?: yes    \"If your health worsens and it becomes clear that your chance of recovery is unlikely, what would your preference be about the use of a ventilator (breathing machine) if it were available to you? \"     Would the patient desire the use of ventilator (breathing machine)?: Yes      Resuscitation  \"CPR works best to restart the heart when there is a sudden event, like a heart attack, in someone who is otherwise healthy. Unfortunately, CPR does not typically restart the heart for people who have serious health conditions or who are very sick. \"    \"In the event your heart stopped as a result of an underlying serious health condition, would you want attempts to be made to restart your heart (answer \"yes\" for attempt to resuscitate) or would you prefer a natural death (answer \"no\" for do not attempt to resuscitate)? \" yes      [] Yes   [x] No   Educated Patient / Lia Martins regarding differences between Advance Directives and portable DNR orders.     Length of ACP Conversation in minutes:  5 minutes    Conversation Outcomes:  [x] ACP discussion completed  [] Existing advance directive reviewed with patient; no changes to patient's previously recorded wishes  [] New Advance Directive completed  [] Portable Do Not Rescitate prepared for Provider review and signature  [] POLST/POST/MOLST/MOST prepared for Provider review and signature      Follow-up plan:    [] Schedule follow-up conversation to continue planning  [] Referred individual to Provider for additional questions/concerns   [] Advised patient/agent/surrogate to review completed ACP document and update if needed with changes in condition, patient preferences or care setting    [x] This note routed to one or more involved healthcare providers  Electronically signed by Trung Rodriguez RN Case Management [phone] on 4/25/2022 at 11:55 AM

## 2022-04-25 NOTE — PROGRESS NOTES
Occupational Therapy  Facility/Department: [address] PROGRESSIVE CARE  Occupational Therapy Initial Assessment and Tentative D/C      Name: Lady Chau  : 1935  MRN: [id number]  Date of Service: 2022    Discharge Recommendations: Lady Chau scored a 19/24 on the AM-PAC ADL Inpatient form. Current research shows that an AM-PAC score of 18 or greater is typically associated with a discharge to the patient's home setting. Based on the patient's AM-PAC score, and their current ADL deficits, it is recommended that the patient have 2-3 sessions per week of Occupational Therapy at d/c to increase the patient's independence. At this time, this patient demonstrates the endurance and safety to discharge home with [facility] and a follow up treatment frequency of 2-3x/wk. Please see assessment section for further patient specific details. If patient discharges prior to next session this note will serve as a discharge summary. Please see below for the latest assessment towards goals. 2-3 sessions per week,Continue to assess pending progress,Home with assist PRN,Patient would benefit from continued therapy after discharge  OT Equipment Recommendations  Equipment Needed: Yes  Mobility Devices: ADL Assistive Devices  ADL Assistive Devices: Shower Chair with back  Other: pt would benefit due to decreased balance at this time       Patient Diagnosis(es): The primary encounter diagnosis was Cerebrovascular accident (CVA), unspecified mechanism ([address] Diagnoses of Elevated troponin and Hyperglycemia were also pertinent to this visit.   Past Medical History:  has a past medical history of Allergic rhinitis, seasonal, Aortic regurgitation, CAD (coronary artery disease), Cancer ([address] Closed left hip fracture ([address] Colon polyps- last colon neg , Diabetic nephropathy- pos microalb 10/11, on ARB, DM (diabetes mellitus), type 2 ([address] Enlarged prostate, GERD (gastroesophageal reflux disease), Holosystolic murmur, HTN (hypertension), Hypertriglyceridemia, Osteoarthritis, and SVT (supraventricular tachycardia) ([facility] Past Surgical History:  has a past surgical history that includes Appendectomy (age 30); Tonsillectomy and adenoidectomy (age 9); ablation of dysrhythmic focus (2013); Aortic valve replacement (2016); Total knee arthroplasty (Left, 11/2015); TURP (01/03/2017); joint replacement; Colonoscopy (12/19/2017); eye surgery; Cataract removal (Bilateral, 11/2017); Colonoscopy (N/A, 12/13/2018); Intertrochanteric hip fract. Surgery (Left, 2019); ablation of dysrhythmic focus (2019); and Pain management procedure (Right, 3/9/2022). Assessment   Performance deficits / Impairments: Decreased functional mobility ; Decreased balance;Decreased ADL status; Decreased strength;Decreased high-level IADLs  Assessment: Eric Hernandez is a 81 y.o. male who presents to the emergency department with his wife with complaints of feeling like he had a stroke this morning. He was last seen normal at 11 PM by the wife. She states she was outside gardening at 11 AM this morning when the patient came out at that time barely able to walk which is unusual for him. She states he was trying to talk but she could not understand him. She states they went for a walk yesterday and he did just fine with his walker. He has never had a stroke before. He takes a baby aspirin on a daily basis. The patient reports that he felt dizzy when he woke up at 10 AM.  He no longer feels dizzy. He does have a left-sided facial droop which the wife reports is new. He is able to verbalize on arrival.  He has a positive Romberg's and he is unable to ambulate. He denies vision changes, lightheadedness, dizziness, shortness of breath or chest pain currently. There is a discrepancy in the timeline from what the patient has told me versus what the nurses note states.  PTA pt from home with wife where pt was Ind with mobility and ADLs with use of 7LN. Pt currently requires SBA/CGA and use of RW for mobility and transfers. Pt with no LOB and no reports of light headedness or dizziness. Anticipate pt needing up to SBA/Min A for ADLs. Pt presents with slightly decreased strength in L UE. No noted coordination or sensation deficits. Pt will benefit from skilled OT services at this time. Anticipate pt safe to return home with PRN assist and HHOT. Prognosis: Good  Decision Making: Low Complexity  Exam: see above  Assistance / Modification: RW  REQUIRES OT FOLLOW-UP: Yes  Activity Tolerance  Activity Tolerance: Patient Tolerated treatment well        Plan   Plan  Times per Week: 3-5x  Current Treatment Recommendations: Strengthening,Balance training,Functional mobility training,Endurance training,Safety education & training,Patient/Caregiver education & training,Equipment evaluation, education, & procurement,Self-Care / ADL     Restrictions  Restrictions/Precautions  Restrictions/Precautions: Fall Risk    Subjective   General  Chart Reviewed: Yes  Patient assessed for rehabilitation services?: Yes  Additional Pertinent Hx: per ED note, Lady Chau is a 81 y.o. male who presents to the emergency department with his wife with complaints of feeling like he had a stroke this morning. He was last seen normal at 11 PM by the wife. She states she was outside gardening at 11 AM this morning when the patient came out at that time barely able to walk which is unusual for him. She states he was trying to talk but she could not understand him. She states they went for a walk yesterday and he did just fine with his walker. He has never had a stroke before. He takes a baby aspirin on a daily basis. The patient reports that he felt dizzy when he woke up at 10 AM.  He no longer feels dizzy. He does have a left-sided facial droop which the wife reports is new. He is able to verbalize on arrival.  He has a positive Romberg's and he is unable to ambulate.   He denies vision changes, lightheadedness, dizziness, shortness of breath or chest pain currently. There is a discrepancy in the timeline from what the patient has told me versus what the nurses note states. Family / Caregiver Present: No  Referring Practitioner: Vikki Jalloh MD  Subjective  Subjective: Pt agreeable to OT evaluation. Pt reports no pain. Vital Signs  Pulse: 51  Resp: 15  Height and Weight  Weight: 162 lb 4.1 oz (73.6 kg)  Weight Method: Actual;Standing scale  BMI (Calculated): 25.5  Social/Functional History  Social/Functional History  Lives With: Spouse (Wife Michelle Bloom). )  Type of Home: House  Home Layout: Two level (Bilevel : Lower level with 6+6 steps to upper level.)  Home Access: Stairs to enter with rails (Garage : level entry to lower level of home.)  Bathroom Shower/Tub: Tub/Shower unit (Tub/Shower : upper and lower levels of home.)  Bathroom Toilet: Handicap height  Bathroom Equipment: Grab bars in shower,Grab bars around toilet  Bathroom Accessibility: Accessible  Home Equipment: Nolene Stable  Has the patient had two or more falls in the past year or any fall with injury in the past year?:  (1 fall ~ 1 month ago.)  ADL Assistance: [facility] [address]:  (Wife takes care of homemaking needs.)  Ambulation Assistance: Independent (With Rollator. Uses Cane when negotiating stairs.)  Transfer Assistance: Independent  Active : No (Wife drives.)  Occupation: Retired  Type of Occupation: Retired : . Additional Comments: 2 sons : live oot. Objective   Hearing: Exceptions to [facility]  Hearing Exceptions: Hard of hearing/hearing concerns          Safety Devices  Type of Devices: Call light within reach;Nurse notified;Gait belt;Left in chair  Restraints  Restraints Initially in Place: No  Balance  Sitting Balance: Independent  Standing Balance: Stand by assistance (RW)  Functional Mobility  Functional - Mobility Device: Rolling Walker  Activity: To/from bathroom; Other (~15ft, 25ft)  Assist Level: Contact guard assistance (SBA/CGA)  Functional Mobility Comments: no LOB; no reports of light headedness/dizziness  Toilet Transfers  Toilet - Technique: Ambulating (RW)  Equipment Used: Grab bars  Toilet Transfer: Stand by assistance  Wheelchair Bed Transfers  Wheelchair/Bed - Technique: Ambulating (RW)  Equipment Used: Other;Bed (bed to chair)  Level of Asssistance: Stand by assistance  AROM: Within functional limits  PROM: Within functional limits  Strength: Generally decreased, functional (roughly 4/5 in L UE)  Coordination: Within functional limits  Tone: Normal  Sensation: Impaired  ADL  Grooming: Stand by assistance (in stance at sink to wash hands, brush teeth, and wash face)  Toileting: Stand by assistance (SBA for balance; pt able to complete pericare and manage pants/brief)  Additional Comments: Anticipate pt needing up to SBA/Min A for ADLs based on ROM, strength, and balance        Bed mobility  Supine to Sit: Supervision  Sit to Supine: Unable to assess  Scooting: Supervision  Transfers  Sit to stand: Stand by assistance  Stand to sit: Stand by assistance  Transfer Comments: to/from RW     Cognition  Overall Cognitive Status: WFL  Arousal/Alertness: Appropriate responses to stimuli  Following Commands:  Follows one step commands consistently                  Education Given To: Patient  Education Provided: Role of Therapy;Plan of Care;ADL Adaptive Strategies;Transfer Training  Education Method: Demonstration;Verbal  Education Outcome: Verbalized understanding;Demonstrated understanding  LUE AROM (degrees)  LUE AROM : WFL  RUE AROM (degrees)  RUE AROM : [facility]           AM-PAC Score        AM-[facility] Inpatient Daily Activity Raw Score: 19 (04/25/22 0740)  AM-PAC Inpatient ADL T-Scale Score : 40.22 (04/25/22 0740)  ADL Inpatient CMS 0-100% Score: 42.8 (04/25/22 0740)  ADL Inpatient CMS G-Code Modifier : CK (04/25/22 0740)    Goals  Short Term Goals  Time Frame for Short term goals: prior to D/C  Short Term Goal 1: complete functional mobility and transfers with supervision  Short Term Goal 2: complete bathing and dressing with supervision  Short Term Goal 3: complete toileting with supervision  Short Term Goal 4: complete grooming in stance at sink with supervision  Long Term Goals  Time Frame for Long term goals : STG=LTG  Patient Goals   Patient goals : return home       Therapy Time   Individual Concurrent Group Co-treatment   Time In 2514         Time Out 0735         Minutes 30         Timed Code Treatment Minutes: 15 Minutes (15 minute eval)       Cayetano Zuleta OTR/L

## 2022-04-26 NOTE — PROGRESS NOTES
Physician Progress Note      PATIENT:               Norris Jaeger  CSN #:                  [id number]  :                       1935  ADMIT DATE:       2022 2:09 PM  [address] [place] DATE:  RESPONDING  PROVIDER #:        Pool Huthcinson MD          QUERY TEXT:    Pt admitted with left sided weakness, dizziness, and dysarthria. Per H&P and   progress notes, possible CVA. MRI shows no acute CVA. Noted to have high   grade right vertebral artery stenosis. If possible, please document in   progress notes and discharge summary if you are evaluating and /or treating   any of the following: The medical record reflects the following:  Risk Factors: 82 yo male, HTN  Clinical Indicators: MRI showed no acute CVA; no arrhythmias; no shunting per   ECHO; CTA showed high grade stenosis of right vertebral artery  Treatment: Neuro consult, PT, OT, CT and CTA head and neck, MRI    Thank you,  Allison Lee, RN, BSN, CCDS  [email or web address]  Options provided:  -- Left sided weakness, dizziness, and dysarthria due to high grade right   vertebral artery stenosis. CVA ruled out after study  -- Other - I will add my own diagnosis  -- Disagree - Not applicable / Not valid  -- Disagree - Clinically unable to determine / Unknown  -- Refer to Clinical Documentation Reviewer    PROVIDER RESPONSE TEXT:    Left sided weakness, dizziness, and dysarthria due to high grade right   vertebral artery stenosis.  CVA ruled out after study    Query created by: Serg Mohan on 2022 9:09 AM      Electronically signed by:  Pool Hutchinson MD 2022 11:43 AM

## 2022-04-26 NOTE — PROGRESS NOTES
Pt discharged to home with wife at 1330. Discharge instructions including med dosing and times reviewed with pt and his wife. Pt and wife informed of f/u appts that need to be scheduled. Pt and wife informed that pt will receive a Holter monitor in the mail that pt will need to wear. Pt and wife provided with number to [facility] as well as education regarding a Holter monitor. Education of Stroke also provided. Pt and wife voiced understanding of instructions. IV and telemetry removed prior to discharge.    Electronically signed by Jean Carlos Brown RN on 4/26/22 at 1:31 PM EDT

## 2022-04-26 NOTE — PLAN OF CARE
Problem: Discharge Planning  Goal: Discharge to home or other facility with appropriate resources  Outcome: Progressing     Problem: Safety - Adult  Goal: Free from fall injury  Outcome: Progressing     Problem: ABCDS Injury Assessment  Goal: Absence of physical injury  4/26/2022 0935 by Raul Weldon RN  Outcome: Progressing  4/26/2022 0302 by Lizette Gil RN  Outcome: Progressing     Problem: Skin/Tissue Integrity  Goal: Absence of new skin breakdown  Description: 1. Monitor for areas of redness and/or skin breakdown  2. Assess vascular access sites hourly  3. Every 4-6 hours minimum:  Change oxygen saturation probe site  4. Every 4-6 hours:  If on nasal continuous positive airway pressure, respiratory therapy assess nares and determine need for appliance change or resting period.   4/26/2022 0935 by Raul Weldon RN  Outcome: Progressing  4/26/2022 0302 by Lizette Gil RN  Outcome: Progressing     Problem: Chronic Conditions and Co-morbidities  Goal: Patient's chronic conditions and co-morbidity symptoms are monitored and maintained or improved  4/26/2022 0935 by Raul Weldon RN  Outcome: Progressing  4/26/2022 0302 by Lizette Gil RN  Outcome: Progressing     Problem: Neurosensory - Adult  Goal: Achieves stable or improved neurological status  4/26/2022 0935 by Raul Weldon RN  Outcome: Progressing  4/26/2022 0302 by Lizette Gil RN  Outcome: Progressing  Goal: Achieves maximal functionality and self care  Outcome: Progressing     Problem: Pain  Goal: Verbalizes/displays adequate comfort level or baseline comfort level  Outcome: Progressing

## 2022-04-26 NOTE — PROGRESS NOTES
Occupational Therapy  Facility/Department: [address] PROGRESSIVE CARE  Daily Treatment Note and Tentative D/C    NAME: Analia Clark  : 1935  MRN: [id number]    Date of Service: 2022    Discharge Recommendations: Analia Clark scored a 19/24 on the AM-PAC ADL Inpatient form. Current research shows that an AM-PAC score of 18 or greater is typically associated with a discharge to the patient's home setting. If patient discharges prior to next session this note will serve as a discharge summary. Please see below for the latest assessment towards goals. Continue to assess pending progress,Home with assist PRN,Patient would benefit from continued therapy after discharge  OT Equipment Recommendations  Equipment Needed: Yes  ADL Assistive Devices: Shower Chair with back  Other: pt would benefit due to decreased balance at this time      Patient Diagnosis(es): The primary encounter diagnosis was Cerebrovascular accident (CVA), unspecified mechanism ([facility] [address] Diagnoses of Elevated troponin and Hyperglycemia were also pertinent to this visit. Assessment    Assessment: Pt tolerated session well. Pt completes functional mobility and transfers with SBA and use of RW. Pt able to ambulate ~200ft with no LOB. Pt completes toileting with grooiming with SBA. Continue with POC. Activity Tolerance: Patient tolerated treatment well  Discharge Recommendations: Continue to assess pending progress;Home with assist PRN;Patient would benefit from continued therapy after discharge  Equipment Needed: Yes  Other: pt would benefit due to decreased balance at this time      Plan   Plan  Times per Week: 3-5x  Current Treatment Recommendations: Strengthening;Balance training;Functional mobility training; Endurance training; Safety education & training;Patient/Caregiver education & training;Equipment evaluation, education, & procurement;Self-Care / ADL     Subjective   Subjective  Subjective: Pt agreeable to OT treatment.  Pt reports going home today. Pain: Pt reports no pain. Cognition  Overall Cognitive Status: WFL  Arousal/Alertness: Appropriate responses to stimuli  Following Commands:  Follows one step commands consistently        Objective    Vitals     Bed Mobility Training  Bed Mobility Training: Yes  Overall Level of Assistance: Independent  Supine to Sit: Independent  Scooting: Independent  Balance  Sitting: Intact  Sitting - Static: Good (unsupported)  Sitting - Dynamic: Good (unsupported)  Standing: Impaired  Standing - Static: Good (RW)  Standing - Dynamic: Good (RW)  Transfer Training  Transfer Training: Yes  Overall Level of Assistance: Stand-by assistance  Sit to Stand: Stand-by assistance (to/from RW)  Stand to Sit: Stand-by assistance  Bed to Chair: Stand-by assistance (RW)  Toilet Transfer: Stand-by assistance (RW)  Gait  Overall Level of Assistance: Stand-by assistance (RW)  Distance (ft): 200 Feet (15ft)  Assistive Device: Walker, rolling     ADL  Grooming: Stand by assistance (RW)  Toileting: Stand by assistance (SBA for balance)           Patient Education  Education Given To: Patient  Education Provided: Role of Therapy;Plan of Care;ADL Adaptive Strategies;Transfer Training  Education Method: Demonstration;Verbal  Education Outcome: Verbalized understanding;Demonstrated understanding    Goals  Short Term Goals  Time Frame for Short term goals: prior to D/C  Short Term Goal 1: complete functional mobility and transfers with supervision  Short Term Goal 2: complete bathing and dressing with supervision  Short Term Goal 3: complete toileting with supervision  Short Term Goal 4: complete grooming in stance at sink with supervision  Long Term Goals  Time Frame for Long term goals : STG=LTG  Patient Goals   Patient goals : return home       Therapy Time   Individual Concurrent Group Co-treatment   Time In 1047         Time Out 1127         Minutes 40         Timed Code Treatment Minutes: 40 Minutes       Sagar Hilario OTR/L

## 2022-04-26 NOTE — PROGRESS NOTES
[facility] [place]  Personalized Stroke Treatment Plan  My Stroke Type:   [] Ischemic Stroke (Blockage of blood flow to the brain)     [] TIA  Transient Ischemic Attack (mini-stroke)    Personal risk factors you can control include:    [] Alcohol Abuse: check with your physician before any alcohol consumption. [] Atrial fibrillation: may cause blood clots. [] Drug Abuse: Seek help, talk with your doctor  [] Clotting Disorder  [x] Diabetes  [] Family history of stroke or heart disease  [x] High Blood Pressure/Hypertension: work with your physician.  [] High cholesterol: monitor cholesterol levels with your physician.   [] Overweight/Obesity: work with your physician for your ideal body weight. [x] Physical Inactivity: get regular exercise as directed by your physician. [] Personal history of previous TIA or stroke  [] Poor Diet; decrease salt (sodium) in your diet, follow diet directed by physician. [] Smoking: Cigarette/Cigar: stop smoking. Follow up with your physician is important after discharge. TAKE all medications as prescribed. Do not stop taking any medications   without talking to your physician. BE FAST is a simple way to remember the main symptoms of stroke. Recognizing these symptoms helps you know when to call for medical help. BE FAST stands for:                                                 B Balance problems                                                 E Eyes, vision lost        F  Face Drooping      A  Arm Weakness        S  Speech Difficulty      T  Time to Call 9-1-1  DO NOT DELAY THIS! Educated patient and/or family on personal risk factors for stroke/TIA. Included ways to reduce the risk for recurrent stroke. [facility]'s Stroke treatment and prevention, Managing your recovery  notebook  provided to patient. The notebook includes, but not limited to, sections addressing warning signs & symptoms of a stroke.  The need to call EMS ([phone]) immediately if signs & symptoms occur is emphasized . Medication information will be reviewed at discharge. The notebook also provides education on Stroke community resources and stroke advocacy.     Electronically signed by Electronically signed by Trixie Valdez RN on 4/26/2022 at 9:44 AM

## 2022-04-26 NOTE — PROGRESS NOTES
Speech Language/Pathology   SPEECH LANGUAGE AND CLINICAL BEDSIDE SWALLOWING TREATMENT  Speech Therapy Department   Wang Richter  AGE: 81 y.o.    GENDER: male  : 1935  [id number]  EPISODE DATE:  2022    MEDICAL DIAGNOSIS:   Chief Complaint   Patient presents with    Extremity Weakness     left sided weakness, dizziness since 0800 sxs resolved at 0900       ONSET: 2022       PAST MEDICAL HISTORY        Diagnosis Date    Allergic rhinitis, seasonal     Aortic regurgitation      ECHO EF 60%, mod AI    CAD (coronary artery disease)     Cancer (HCC)     Closed left hip fracture (HCC)     Colon polyps- last colon neg      Diabetic nephropathy- pos microalb 10/11, on ARB 10/5/2011    DM (diabetes mellitus), type 2 (HCC)     Enlarged prostate     GERD (gastroesophageal reflux disease)     Holosystolic murmur 1532    HTN (hypertension)     Hypertriglyceridemia     Osteoarthritis     SVT (supraventricular tachycardia) ([facility] [address]        PAST SURGICAL HISTORY    Past Surgical History:   Procedure Laterality Date    ABLATION OF DYSRHYTHMIC FOCUS      Dr. Whitney Cote DYSRHYTHMIC FOCUS  2019    SVT    AORTIC VALVE REPLACEMENT  2016    mosaic ultra porcine valve/medtronic    APPENDECTOMY  age 30    CATARACT REMOVAL Bilateral 2017    COLONOSCOPY  2017    COLONOSCOPY N/A 2018    COLONOSCOPY POLYPECTOMY SNARE/COLD BIOPSY performed by Attila Carrasquillo MD at [address] Left     JOINT REPLACEMENT      PAIN MANAGEMENT PROCEDURE Right 3/9/2022    COOLIEF RADIOFREQUENCY ABLATION - RIGHT KNEE performed by Shay Fofana MD at [facility]  age 9   [address] Left 2015    TURP  2017    cysto, green light lazer TURP       ALLERGIES    Allergies   Allergen Reactions    No Known Allergies        CHART REVIEW: Patient admitted 4/23/22 with C/O left sided weakness/dizziness. H&P Patient is a 71-year-old male with past medical history of diabetes mellitus, CAD, hypertension, hyperlipidemia who presents to the hospital for left-sided weakness, dizziness. According to the patient he had weakness in his left leg, left arm, according to the wife at the bedside patient also had difficulty speaking so they decided to come to the hospital this happened today morning. Patient otherwise denies fevers chills nausea vomiting diarrhea constipation dysuria    CHEST X-RAY 4/23/22  Impression   No acute process. CT HEAD 4/23/22  Impression   No hemorrhage or acute infarction.  Moderate vermian and moderate cerebral   cortical atrophy.  Extensive small vessel-age related white matter changes as   described above.         MRI brain 4/25/22  Impression   1. Loss of the normal signal void within the right vertebral artery, which   can be seen with slow flow/occlusion.  This is of uncertain chronicity. 2. Otherwise, no acute intracranial abnormality.  No acute infarct. 3. Mild-to-moderate global parenchymal volume loss with chronic microvascular   ischemic changes. 4. Chronic lacunar infarcts involving the left thalami. 5. Small foci of susceptibility involving the bilateral thalami, left celestina   and right cerebellum, which may represent sequelae of prior micro hemorrhages. 6. No evidence of a major dural venous sinus thrombus.      Prior Status: (Regular/thin; reports STM deficits prior to hospitalization)  Subjective:     Current diet regular/thin    Pt/staff statements regarding diet tolerance: RN denies concerns with swallowing    Cognitive-communication treatment progress: pt's wife reports STM deficits prior to admission     Objective: Current Therapy Impression of progress/goal status    IMPRESSIONS  Speech Therapy Diagnosis  Cognitive Diagnosis: Pt with impaired memory, complex problem solving for safety issues, orientation [facility] with independent use of environmental aids. Aphasia Diagnosis: No indications of aphasia  Speech Diagnosis: Wife reports slight slur persists; this is undectable on exan. Communication Diagnosis: Pt is able to communicate wants and needs. Dysphagia Diagnosis: Swallow function appears WFL    Recommended Diet:  Solids: IDDSI 7 Regular Solids; Liquids: IDDSI 0 Thin Liquids;  Meds: Meds whole with thin liquids    Compensatory Strategies:   Compensatory Swallowing Strategies : Upright as possible for all oral intake      Status of Dysphagia Goals:   Dysphagia Goals: The patient will tolerate recommended diet without observed clinical signs of aspiration    Goal Status: Speech and Language Goals  Goals:   Pt will demonstrate reading and writing skills WFL for his needs (pt likes to read magazines) ONGOING  Goal 2: Pt will complete oral motor exercises with assist ONGOING  Goal 3: Pt will recall safety precautions for walking and emergency number ONGOING  Goal 4: additional goals as indicated    Prognosis  Good   Education  Consulted and agree with results and recommendations: Patient,Family member  Patient Education: Review of findings with pt and spouse  Patient Education Response: Verbalizes understanding,Needs reinforcement    Discharge Recommendations:  Pt will benefit from continued skilled Speech Therapy for Speech and Dysphagia services, prior to returning home. Please accept this as Speech Therapy Discharge status, if pt is discharged prior to next therapy session.     Objective: Assessment/Therapy activities    Treatment this session  Objective:  COMPREHENSION  Auditory Comprehension: [x]WFL []Mild   [] Moderate  []Severe  []To be assessed    EXPRESSION  Primary Mode of Expression: verbal  Verbal Expression: [x]WFL []Mild   [] Moderate  []Severe  []To be assessed     Pragmatics/Social Functioning: [x]WFL []Mild   [] Moderate  []Severe  []To be assessed      MOTOR SPEECH  Motor Speech: [x]WFL []Mild   [] Moderate  []Severe  []To be assessed   []Apraxia   []Dysarthria   []Decreased Intelligibility  No dysarthria noted during exam    VOICE  Voice: [x]WFL []Mild   [] Moderate  []Severe  []To be assessed    COGNITION  []Unable to be assessed secondary to Aphasia     Overall Orientation : [x]WFL []Mild   [] Moderate  []Severe  []To be assessed   []Unable to be assessed secondary to Aphasia       Attention: []WFL []Mild   [] Moderate  []Severe  [x]To be assessed      Memory: []WFL []Mild   [x] Moderate  []Severe  []To be assessed  Difficulty recalling recent medical events, recalled therapist's name at end of session, unable to recall safety precautions    Problem Solving: []WFL []Mild   [] Moderate  []Severe  [x]To be assessed      Safety/Judgement: []WFL [x]Mild   [x] Moderate  []Severe  []To be assessed    DYSPHAGIA TREATMENT   Positioning   upright  PO Trials:  · Thin Liquids: timely swallow initiation, no overt s/s of aspiration or penetration  · Nectar thick liquids: DNT  · Honey Thick liquids: DNT  · Puree : DNT  · Soft food: DNT  · Regular food: timely bolus formation, adequate mastication/clearance of bolus, no overt s/s of aspiration or penetration     Dysphagia Tx:   Direct Dysphagia tx: PO tolerance as indicated above. No overt s/s of aspiration or penetration with all trials presented  Dysphagia ex: N/A, main focus on PO tolerance  Training in compensatory strategies: educated on sitting upright  Pt response to ex/training: verbalized understanding   Impressions  · Accepted and tolerated tx at bedside  · Pt alert, pleasant and cooperative. Pt upright in chair. Oriented x4, able to follow simple dx and verbally responsive  · Swallow function appears [facility] for all trials presented. No overt s/s of aspiration or penetration with all trials presented. No further dysphagia management warranted at this time.    · ST to cont to follow for cognition    Plan   Plan: ST services  Recommendations  Plan/Recommendations: 3-5x/week during acute admission     Barriers toward progress toward pt goals:  Cognitive deficit    Timed Code Treatment:  Time In: 1137  SLP Individual Minutes  Time In: 1137  Time Out: 1157  Minutes: 20  Total Treatment Time: 10 cognition, 10 dysphagia management    Rodney López, [address] [facility] [place], [address] [place] [address]  Speech-Language Pathologist  On 04/26/22 at 11:37 AM

## 2022-04-26 NOTE — TELEPHONE ENCOUNTER
Patient being discharged from [facility] today. Nurse called for 30 day event monitor. Instructed we will register monitor and have one mailed to him. Nurse provided patient with I number [phone]    Order has been placed, please register monitor to be mailed.

## 2022-04-26 NOTE — DISCHARGE SUMMARY
Hospital Medicine Discharge Summary      Patient ID: Renita Perez , [id number]     Patient's PCP: Black Thurman MD    Admit Date: 4/23/2022     Discharge Date: 4/26/2022      Admitting Physician: Michele Merino MD    Discharge Physician: Kirit Alexander MD     Discharge Diagnoses: Active Hospital Problems    Diagnosis Date Noted    Acute cerebrovascular accident (CVA) [facility]) [I63.9] 04/23/2022     Priority: Medium         The patient was seen and examined on the day of discharge and this discharge summary is in conjunction with any daily progress note from day of discharge.     HOSPITAL COURSE    Patient demographics:  The patient  Renita Perez is a 81 y.o. male      Significant past medical history:       Patient Active Problem List   Diagnosis    Essential hypertension    Osteoarthritis    GERD (gastroesophageal reflux disease)    Allergic rhinitis, seasonal    Hypertriglyceridemia    Needs flu shot    Diabetic nephropathy associated with type 2 diabetes mellitus ([address]    Urge incontinence    Coronary artery disease, non-occlusive    History of paroxysmal supraventricular tachycardia- S/P ablation 2013, 2019    S/P aortic valve replacement with porcine valve    Type 2 diabetes mellitus with diabetic nephropathy, without long-term current use of insulin ([address]    BPH with obstruction/lower urinary tract symptoms    Malignant neoplasm of descending colon ([address]- 1/18 left colectomy, T3N0    Left renal mass 2.3 cm enhancing by CT, stable, per hemonc    History of knee replacement, total, left    Closed left hip fracture, sequela    Chronic pain of both knees    Acute cerebrovascular accident (CVA) ([address]            Presenting symptoms:  Left-sided weakness     Diagnostic workup:   CT HEAD WO CONTRAST  CTA HEAD NECK W CONTRAST  MRI brain without contrast  MRV HEAD W WO CONTRAST         CONSULTS DURING ADMISSION :   IP CONSULT TO STROKE TEAM  IP CONSULT TO PHARMACY  PHARMACY TO CHANGE BASE FLUIDS  IP CONSULT TO HOSPITALIST  IP CONSULT TO NEUROLOGY  IP CONSULT TO CASE MANAGEMENT  IP CONSULT TO VASCULAR SURGERY        Patient was diagnosed with:     3 cm right subclavian artery aneurysm-asymptomatic     Treatment while inpatient:  81years old male with medical history significant for coronary artery disease hypertension diabetes mellitus history of aortic valve replacement  who presented to the emergency room with slurred speech and left-sided weakness                          Patient's slurred speech and difficulty walking has been improving  Bilateral carotid arteries are without significant stenosis  Aneurysm of the left proximal subclavian artery does not need any intervention at this time  No acute infarct was noted on MRI             Discharge Condition:  stable      Discharged to:  Home      Activity:   as tolerated:     Follow Up: Follow-up with PCP in 1-2 weeks      Labs: For convenience and continuity at follow-up the following most recent labs are provided:      CBC:   Lab Results   Component Value Date    WBC 7.5 04/24/2022    HGB 14.1 04/24/2022    HCT 40.4 04/24/2022     04/24/2022       RENAL:   Lab Results   Component Value Date     04/24/2022    K 4.2 04/24/2022    CL 98 04/24/2022    CO2 24 04/24/2022    BUN 19 04/24/2022    CREATININE 0.9 04/24/2022           Discharge Medications:      Medication List      CONTINUE taking these medications    * aspirin 81 MG EC tablet     * aspirin EC 81 MG EC tablet  Take 1 tablet by mouth 2 times daily for 14 days Take for DVT blood clot prophylaxis. Please avoid missing doses.      Claritin 10 MG tablet  Generic drug: loratadine     diclofenac 0.1 % ophthalmic solution  Commonly known as: VOLTAREN     donepezil 5 MG tablet  Commonly known as: ARICEPT  Take 1 tablet by mouth nightly FIRST MONTH     finasteride 5 MG tablet  Commonly known as: PROSCAR  Take 1 tablet by mouth once daily     furosemide 20 MG tablet  Commonly known as: LASIX     Lantus SoloStar 100 UNIT/ML injection pen  Generic drug: insulin glargine  Inject 20 Units into the skin nightly     lisinopril 40 MG tablet  Commonly known as: PRINIVIL;ZESTRIL     metFORMIN 500 MG extended release tablet  Commonly known as: GLUCOPHAGE-XR  TAKE 2 TABLETS BY MOUTH IN THE MORNING 1 IN THE EVENING     metoprolol tartrate 50 MG tablet  Commonly known as: LOPRESSOR  Take 1 tablet by mouth twice daily     propafenone 150 MG tablet  Commonly known as: RYTHMOL  Take 1 tablet by mouth 2 times daily     simvastatin 20 MG tablet  Commonly known as: ZOCOR  Take 1 tablet by mouth nightly     TUMS E-X PO         * This list has 2 medication(s) that are the same as other medications prescribed for you. Read the directions carefully, and ask your doctor or other care provider to review them with you. Time Spent on discharge is more than 30 min in the examination, evaluation, counseling and review of medications and discharge plan. Signed:  lEeni Ivory MD   4/26/2022      Thank you Gwendolyn Grijalva MD for the opportunity to be involved in this patient's care. If you have any questions or concerns please feel free to contact me at [phone]. This note was transcribed using [id number] [employer]. Please disregard any translational errors.

## 2022-04-26 NOTE — CARE COORDINATION
CASE MANAGEMENT DISCHARGE SUMMARY:  Met with patient & wife. Agreeable to discharge today and getting home care. [facility] accepted, spoke to Lita green aware of discharge today and able to pull orders via Epic. RN aware of discharge plan.     DISCHARGE DATE: 4/26/2022    DISCHARGED TO: Home with wife & home care     HOME CARE AGENCY:   · Name: [facility] [address]  · Address:  [address] , [facility] [address]   · Phone:  [phone]  · Fax:  [phone]    DME: Patient declines shower chair    TRANSPORTATION: Wife             TIME: afternoon    Electronically signed by Yola Jefferson RN Case Management [phone] on 4/26/2022 at 1:31 PM

## 2022-04-26 NOTE — PLAN OF CARE
Problem: Discharge Planning  Goal: Discharge to home or other facility with appropriate resources  Outcome: Progressing     Problem: Safety - Adult  Goal: Free from fall injury  Outcome: Progressing     Problem: ABCDS Injury Assessment  Goal: Absence of physical injury  Outcome: Progressing     Problem: Skin/Tissue Integrity  Goal: Absence of new skin breakdown  Description: 1. Monitor for areas of redness and/or skin breakdown  2. Assess vascular access sites hourly  3. Every 4-6 hours minimum:  Change oxygen saturation probe site  4. Every 4-6 hours:  If on nasal continuous positive airway pressure, respiratory therapy assess nares and determine need for appliance change or resting period.   Outcome: Progressing     Problem: Chronic Conditions and Co-morbidities  Goal: Patient's chronic conditions and co-morbidity symptoms are monitored and maintained or improved  Outcome: Progressing

## 2022-04-27 PROBLEM — I72.8 ANEURYSM OF LEFT SUBCLAVIAN ARTERY (HCC): Status: ACTIVE | Noted: 2022-01-01

## 2022-04-27 NOTE — CARE COORDINATION
Brett [facility] Transitions Initial Follow Up Call    Call within 2 business days of discharge: Yes    Patient: Kip Suárez Patient : 1935   MRN: [id number]  Reason for Admission: CVA  Discharge Date: 22 RARS: Readmission Risk Score: 13.9 ( )      Last Discharge [address]       Complaint Diagnosis Description Type Department Provider    22 Extremity Weakness Cerebrovascular accident (CVA), unspecified mechanism ([facility] [address] . .. ED to Hosp-Admission (Discharged) (ADMITTED) Purnima Boyec MD; Mo Danielson . .. Spoke with: Carleen Pallas - patient & is wife. Elin Yañez states that writer can discuss and and all of his PHI with his wife. Facility: [employer] provided:  Obtained and reviewed discharge summary and/or continuity of care documents  Assessment and support for treatment adherence and medication management-medication list reviewed & 1111f completed. Care Transitions 24 Hour Call    Do you have a copy of your discharge instructions?: Yes  Do you have all of your prescriptions and are they filled?: Yes  Have you been contacted by a [address]?: No  Have you scheduled your follow up appointment?: No  Do you feel like you have everything you need to keep you well at home?: Yes  Are you an active caregiver in your home?: No  Care Transitions Interventions         Follow Up  Future Appointments   Date Time Provider Emily Noland   8/15/2022 11:00 AM Sindy Martinez MD [address] Initial Call    Was this an external facility discharge?  No Discharge Facility: N/A    Challenges to be reviewed by the provider   Additional needs identified to be addressed with provider: No               Method of communication with provider : none      Advance Care Planning:   Advance Care Planning   Healthcare Decision Maker:    Primary Decision Maker: Rikki Colin - [phone]    Secondary Decision Maker: Winter Kalli states he does have a Living Will and Healthcare POA. Was this a readmission? No  Patient stated reason for admission: facial droop, left sided weakness, and difficulty speaking    Care Transition Nurse (CTN) contacted the patient by telephone to perform post hospital discharge assessment. Verified name and  with patient as identifiers. Provided introduction to self, and explanation of the CTN role. CTN reviewed discharge instructions, medical action plan and red flags with patient who verbalized understanding. Patient given an opportunity to ask questions and does not have any further questions or concerns at this time. Were discharge instructions available to patient? Yes. Reviewed appropriate site of care based on symptoms and resources available to patient including: PCP  Home health  When to call 911. The patient agrees to contact the PCP office for questions related to their healthcare. Medication reconciliation was performed with family, who verbalizes understanding of administration of home medications. Advised obtaining a 90-day supply of all daily and as-needed medications. Discussed COVID vaccination status: Yes. Tali Adela states Dora Nielsen received the COVID vaccine and 1 additional booster - for a total of 3 shots. CTN provided contact information. Plan for next call: facial droop - left sided weakness - speech     Initial attempt at CT discharge phone call. Dora Nielsen states he is doing \"okay\". He states they have heard from [address]. José Antonio's wife states she was told that he did not need to follow up with  at this time. Dora Nielsen states he sometimes checks his B/P - he did not do that today. He states he uses a walker for ambulation. Dora Nielsen handed the phone to his wife for the rest of the conversation. Tali Senoks states they did not check his BG level this morning but will do so this afternoon.  She states his eye is back to normal as well as his speech. She states they are awaiting the Holter monitor to be sent to them via the mail. Ruddy Rodriguez and Vincent Neely deny any needs for him at this time.     Bernardo Douglas RN BSN  Care Transition Nurse  [phone]

## 2022-04-29 NOTE — TELEPHONE ENCOUNTER
clayton calling from [employer]   Home evaluation completed today  Once a week for four weeks for home occupational therapy       Blood pressure readings  Arrived 170/87 hearrate 60-61   End of session 177/96 hearrate 59   Patient rested five minutes   161/92 hearrate 60     clayton stating he did take his medication today maria del carmen advised patient to call pcp for blood pressure if any symptoms. FYI TO DR. Deven Ramsay

## 2022-05-02 PROBLEM — I62.9 INTRACRANIAL HEMORRHAGE (HCC): Status: ACTIVE | Noted: 2022-01-01

## 2022-05-02 NOTE — ED NOTES
Ashlie Peguero is at bedside at this time praying with family      Adrianna Lemus, PABLO  05/02/22 7568

## 2022-05-02 NOTE — CARE COORDINATION
Patient not seen by Case Management-  Family waiting to withdraw care- waiting for family members to arrive.   Lauren Mckinney  [phone]

## 2022-05-02 NOTE — PROGRESS NOTES
Patient admitted from ED on ventilator. Cardene drip infusing. Family at bedside. Patient's wife states that the plan is to withdraw care tomorrow when her son is here. She verbalized that ED provider discussed prognosis with her. Support provided. Bereavement cart ordered.

## 2022-05-02 NOTE — PROGRESS NOTES
Medication Reconciliation    List of medications patient is currently taking is complete. Source of information: 1.  Epic records / Osmani Warner history    Raina Rea, [facility], PharmD, BCPS  5/2/2022 12:59 PM

## 2022-05-02 NOTE — PLAN OF CARE
NEUROSURGERY PROGRESS NOTE    Kelsey Becerril  [id number]   1935 5/2/2022    ED physician: Cady Rose DO    Reason for call: [address]    History of present illness: Patient is a 81 y.o. male that  has a past medical history of Allergic rhinitis, seasonal, Aortic regurgitation, CAD (coronary artery disease), Cancer ([facility] [address] Closed left hip fracture ([facility] [address] Colon polyps- last colon neg 11/06, Diabetic nephropathy- pos microalb 10/11, on ARB (10/5/2011), DM (diabetes mellitus), type 2 ([facility] [address] Enlarged prostate, GERD (gastroesophageal reflux disease), Holosystolic murmur (5/9/7237), HTN (hypertension), Hypertriglyceridemia, Osteoarthritis, and SVT (supraventricular tachycardia) ([facility] [address] Patient presented on 5/2/2022 to [facility] ED with possible stroke. According to Dr. Celso Guerra, the patient collapsed while talking to somebody 30 minutes prior to arrival to the emergency department. He arrived by EMS. Patient is obtunded and can give no history. GCS was 4 on arrival.  Patient has a history of atrial fibrillation but EMS states that he is not anticoagulated. Physical Exam:     BP (!) 177/102   Pulse 56   Temp 98.9 °F (37.2 °C)   Resp 16   Ht 5' 9\" (1.753 m)   Wt 169 lb 1.5 oz (76.7 kg)   SpO2 100%   BMI 24.97 kg/m²   GCS per ED physician:  GCS:  1 - Does not open eyes  T - ETT in place  2 - Extensor response (decerebrate)    Radiological Findings:  CT HEAD WO CONTRAST  Result Date: 5/2/2022  1. Acute intracranial hemorrhage. 2. Large right frontotemporal hematoma, associated with midline shift of 12 mm. 3. Moderate amount of intraventricular hemorrhage. Subarachnoid hemorrhage also noted in the posterior fossa.     Assessment:  Patient is a 81 y.o. y/o male w/large ICH w/IVH and hydrocephalus  GCS 3-4 = Score 2  Volume (mL) greater than 30 = Score 1  Infratentorial Origin No = Score 0  IVH Yes = Score 1  Age (years) greater than [de-identified] = Score 1  ICH Score:5  The ICH Score helps provide information about the degree of severity of ICH as well as 30-day mortality rates, and a score of 5 represents a 100% mortality rate. Recommendations:  1. No neurosurgical intervention recommended as IPH with IVH extension has resulted in obstruction of right lateral, third and fourth ventricles causing hydrocephalus.   2. Palliative care    DISPO: Remain at [facility]     Electronically signed by: Sunil Prakash, [facility], APR[facility], 5/2/2022 11:51 AM  [phone]

## 2022-05-02 NOTE — H&P
Hospital Medicine History & Physical      PCP: Janet Ge MD    Date of Admission: 5/2/2022    Date of Service: Pt seen/examined, with 1st encounter, on 5/2/2022 and Admitted to Inpatient     Chief Complaint:  Regina Resendez      History Of Present Illness: The patient is a 81 y.o. male with afib not on [facility] who presents to [facility] with acute left side weakness and collapsed with loss of consciousness. He was talking normally 30 minutes prior to that. He remained unresponsive, and upon arrival to  ed he had a gcs of 4. He had decerebrate posturing. In the ed he was found to have a large ivh, ich. He was just in pt from 4/23 - 4/26 with a possible tia, with left hemiplegia and dysphasia. Mri showed no acute pathology. He was discharged on asa 81 mg bid x 2 weeks. ED workup  Vitals bp elevated up to 212/113, was 100% on room air, afebrile  Pertinent labs  Imaging   Ct head:  1. Acute intracranial hemorrhage. 2. Large right frontotemporal hematoma, associated with midline shift of 12   mm. 3. Moderate amount of intraventricular hemorrhage.  Subarachnoid hemorrhage   also noted in the posterior fossa. cta head/neck:  1. No apparent arterial high grade stenosis, occlusion or aneurysm within the   anterior intracranial circulation. 2. Occlusive disease throughout the right V4 segment. 3. No significant stenosis within the cervical ICAs per NASCET criteria. 4.  Again seen is a large right parenchymal hemorrhage centered within the   right basal ganglia with significant mass effect resulting in moderate   leftward midline shift, early obstructive hydrocephalus with extensive   intraventricular hemorrhage, right uncal herniation, as well as effacement of   the foramen magnum. 5. No evidence of intracranial aneurysm or vascular malformation.      He was intubated and started on nicardipine gtt. neurosurgery was called and stated that this was not a survivable hemorrhage. They did not want to transfer him to [facility] as there was nothing they could do. Past Medical History:        Diagnosis Date    Allergic rhinitis, seasonal     Aortic regurgitation     2/12 ECHO EF 60%, mod AI    CAD (coronary artery disease)     Cancer (HCC)     Closed left hip fracture (HCC)     Colon polyps- last colon neg 11/06     Diabetic nephropathy- pos microalb 10/11, on ARB 10/5/2011    DM (diabetes mellitus), type 2 (HCC)     Enlarged prostate     GERD (gastroesophageal reflux disease)     Holosystolic murmur 8/8/2061    HTN (hypertension)     Hypertriglyceridemia     Osteoarthritis     SVT (supraventricular tachycardia) ([address]        Past Surgical History:        Procedure Laterality Date    ABLATION OF DYSRHYTHMIC FOCUS  2013    Dr. 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    APPENDECTOMY  age 30    CATARACT REMOVAL Bilateral 11/2017    COLONOSCOPY  12/19/2017    COLONOSCOPY N/A 12/13/2018    COLONOSCOPY POLYPECTOMY SNARE/COLD BIOPSY performed by Dai Santana MD at [address] Left 2019    JOINT REPLACEMENT      PAIN MANAGEMENT PROCEDURE Right 3/9/2022    COOLIEF RADIOFREQUENCY ABLATION - RIGHT KNEE performed by Miriam Dominique MD at [address] [place] [address]  age 9   [address] Left 11/2015    TURP  01/03/2017    cysto, green light lazer TURP       Medications Prior to Admission:    Prior to Admission medications    Medication Sig Start Date End Date Taking?  Authorizing Provider   donepezil (ARICEPT) 10 MG tablet Take 10 mg by mouth nightly   Yes Historical Provider, MD   aspirin 81 MG EC tablet Take 81 mg by mouth daily    Historical Provider, MD insulin glargine (LANTUS SOLOSTAR) 100 UNIT/ML injection pen Inject 20 Units into the skin nightly 4/11/22   Dipesh Davila MD   metoprolol tartrate (LOPRESSOR) 50 MG tablet Take 1 tablet by mouth twice daily 2/23/22   Dipesh Davila MD   simvastatin (ZOCOR) 20 MG tablet Take 1 tablet by mouth nightly 1/28/22   Dipesh Davila MD   finasteride (PROSCAR) 5 MG tablet Take 1 tablet by mouth once daily 12/9/21   Dipesh Davila MD   metFORMIN (GLUCOPHAGE-XR) 500 MG extended release tablet TAKE 2 TABLETS BY MOUTH IN THE MORNING 1 IN THE EVENING 12/1/21   Dipesh Davila MD   metoprolol tartrate (LOPRESSOR) 50 MG tablet Take 1 tablet by mouth twice daily 1/28/21   Nikolai Boo MD   lisinopril (PRINIVIL;ZESTRIL) 40 MG tablet Take 1 tablet by mouth daily 10/8/19   Dipesh Davila MD   propafenone (RYTHMOL) 150 MG tablet Take 1 tablet by mouth 2 times daily 9/27/19   Jerald Ortega MD   oxybutynin (DITROPAN) 5 MG tablet Take 1 tablet by mouth 2 times daily as needed for Other (bladder). 10/21/13 12/30/16  Dipesh Davila MD       Allergies:  No known allergies    Social History:      TOBACCO:   reports that he has never smoked. He has never used smokeless tobacco.  ETOH:   reports current alcohol use of about 2.0 standard drinks of alcohol per week. Family History:          Problem Relation Age of Onset    Hypertension Mother     Stroke Mother        REVIEW OF SYSTEMS:   Positive for coma and as noted in the HPI. All other systems reviewed and negative. PHYSICAL EXAM:    BP (!) 144/79   Pulse 69   Temp 97.5 °F (36.4 °C) (Oral)   Resp 16   Ht 5' 9\" (1.753 m)   Wt 167 lb 8.8 oz (76 kg)   SpO2 100%   BMI 24.74 kg/m²     General appearance: intubated, no sedated  HEENT Normal cephalic, atraumatic without obvious deformity. Pupils pinpoint an nonreactive bl. Conjunctivae/corneas clear. Neck: Supple, No jugular venous distention/bruits.   Trachea midline   Lungs: Clear to auscultation, bilaterally without Rales/Wheezes/Rhonchi without accessory muscle use. Heart: Regular rate, irregular rhythm with Normal S1/S2   Abdomen: Soft, or non-distended without rigidity or guarding and no bowel sounds   Extremities: No clubbing, cyanosis, or edema bilaterally. Skin: Skin color, texture, turgor normal.  No rashes or lesions. Neurologic: no response to noxious stimuli. No corneal, vor, or pupillary reflexes. + gag reflex. No posturing  Mental status: unable to assess  Peripheral Pulses: +3 Easily felt, not easily obliterated with pressure  Cap refill  +2 sec    CBC   Recent Labs     05/02/22  1109   WBC 10.3   HGB 14.5   HCT 42.3         RENAL  Recent Labs     05/02/22  1109   *   K 3.7   CL 97*   CO2 23   BUN 21*   CREATININE 0.9     LFT'S  Recent Labs     05/02/22  1109   AST 19   ALT 15   BILITOT 0.8   ALKPHOS 92     COAG  Recent Labs     05/02/22  1109   INR 1.03     CARDIAC ENZYMES  Recent Labs     05/02/22  1109   TROPONINI 0.02*       U/A:    Lab Results   Component Value Date    NITRITE NEGATIVE 01/11/2022    COLORU Yellow 05/02/2022    WBCUA 1 05/02/2022    RBCUA 0-2 05/02/2022    BACTERIA None Seen 05/02/2022    CLARITYU Clear 05/02/2022    SPECGRAV 1.009 05/02/2022    LEUKOCYTESUR Negative 05/02/2022    BLOODU Small 05/02/2022    GLUCOSEU 500 05/02/2022       ABG  No results found for: IHY2YQT, BEART, L6FCBAUV, PHART, THGBART, IWK0URT, PO2ART, MXB0PLZ        Active Hospital Problems    Diagnosis Date Noted    Intracranial hemorrhage ([facility] [address] [I62.9] 05/02/2022     Priority: Medium         PHYSICIANS CERTIFICATION:    I certify that Mary Rodriguez is expected to be hospitalized for more than 2 midnights based on the following assessment and plan:      ASSESSMENT/PLAN:    Intracranial hemorrhage  Anoxic brain injury, acute encephalology  Large ivh with 1.2 cm midline shift  - on nicardipine gtt  - neurosurgery consulted, stated nothing to do  This is not survivable.  I discussed understand the prognosis with the family, reviewed the scans. They wanted to change his code status to dnr/dni but have family [place] from out of [place] and do not want to withdrawal care until they arrive tonight vs tomorrow  - comfort care meds ordered for terminal extubation, likely tomorrow  - keep in icu due to mechanical intubation  - cc consulted     Diet: Diet NPO  Code Status: Limited    Dispo - in pt       Allan Andrew,     Thank you Fani Soctt MD for the opportunity to be involved in this patient's care. If you have any questions or concerns please feel free to contact me at [phone].

## 2022-05-02 NOTE — ED NOTES
At 1025 am pt had sudden on set of left side weakness, pt then \"collapsed\" pt arrived with snoring respirations, and does not respond to stimuli of any kind      Aramis Vargas RN  05/02/22 [address], [facility]  05/02/22 1142

## 2022-05-02 NOTE — TELEPHONE ENCOUNTER
Email has been sent to Lauren with [employer] to cancel enrollment for monitor. CAlled/spoke to [facility], instructed to send monitor back in [employer] bag in box.  V/u.

## 2022-05-02 NOTE — ED PROVIDER NOTES
[address] [facility]      Pt Name: Shyann Pan  MRN: [id number]  [place] 1935  Date of evaluation: 5/2/2022  Provider: Adelia Kim, [address] [place] [address]  Chief Complaint   Patient presents with    Loss of Consciousness     pt had left side weakness, and then collapsed to the floor, snorng respirations, unresponsive       I wore personal protective equipment when I was in the room the entire time. This includes gloves, N95 mask, face shield, and a glove over my stethoscope for protection. HPI  Shyann Pan is a 81 y.o. male who presents with loss of consciousness and collapse while talking to somebody 30 minutes prior to arrival to the emergency department. He arrived by EMS. Patient is obtunded and can give no history. GCS is 4. Patient has a history of atrial fibrillation but EMS states that he is not anticoagulated. ? REVIEW OF SYSTEMS  Reviewed Nurses' notes and concur. History limited due to GCS of 4. No LMP for male patient. PAST MEDICAL HISTORY  Past Medical History:   Diagnosis Date    Allergic rhinitis, seasonal     Aortic regurgitation     2/12 ECHO EF 60%, mod AI    CAD (coronary artery disease)     Cancer (HCC)     Closed left hip fracture (HCC)     Colon polyps- last colon neg 11/06     Diabetic nephropathy- pos microalb 10/11, on ARB 10/5/2011    DM (diabetes mellitus), type 2 (HCC)     Enlarged prostate     GERD (gastroesophageal reflux disease)     Holosystolic murmur 4/5/5745    HTN (hypertension)     Hypertriglyceridemia     Osteoarthritis     SVT (supraventricular tachycardia) (HCC)        FAMILY HISTORY  Family History   Problem Relation Age of Onset    Hypertension Mother     Stroke Mother        SOCIAL HISTORY   reports that he has never smoked. He has never used smokeless tobacco. He reports current alcohol use of about 2.0 standard drinks of alcohol per week.  He reports that he does not use drugs.     SURGICAL HISTORY  Past Surgical History:   Procedure Laterality Date    ABLATION OF DYSRHYTHMIC FOCUS  2013    Dr. 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  age 9   [address] Left 11/2015    TURP  01/03/2017    cysto, green light lazer TURP       CURRENT MEDICATIONS  Current Outpatient Rx   Medication Sig Dispense Refill    furosemide (LASIX) 20 MG tablet Take 20 mg by mouth daily      aspirin 81 MG EC tablet Take 81 mg by mouth daily      loratadine (CLARITIN) 10 MG tablet Take 10 mg by mouth daily (Patient not taking: Reported on 4/27/2022)      donepezil (ARICEPT) 5 MG tablet Take 1 tablet by mouth nightly FIRST MONTH 30 tablet 0    insulin glargine (LANTUS SOLOSTAR) 100 UNIT/ML injection pen Inject 20 Units into the skin nightly 5 pen 1    metoprolol tartrate (LOPRESSOR) 50 MG tablet Take 1 tablet by mouth twice daily 180 tablet 3    simvastatin (ZOCOR) 20 MG tablet Take 1 tablet by mouth nightly 90 tablet 3    finasteride (PROSCAR) 5 MG tablet Take 1 tablet by mouth once daily 90 tablet 3    metFORMIN (GLUCOPHAGE-XR) 500 MG extended release tablet TAKE 2 TABLETS BY MOUTH IN THE MORNING 1 IN THE EVENING 270 tablet 1    lisinopril (PRINIVIL;ZESTRIL) 40 MG tablet Take 1 tablet by mouth daily 90 tablet 1    propafenone (RYTHMOL) 150 MG tablet Take 1 tablet by mouth 2 times daily 60 tablet 3    Calcium Carbonate Antacid (TUMS E-X PO) Take 2 tablets by mouth as needed (Patient not taking: Reported on 4/27/2022)         ALLERGIES  Allergies   Allergen Reactions    No Known Allergies        Tetanus vaccination status reviewed: tetanus re-vaccination not indicated. PHYSICAL EXAM  VITAL SIGNS: BP (!) 146/86   Pulse 64   Temp 98.9 °F (37.2 °C)   Resp 16   Ht 5' 9\" (1.753 m)   Wt 169 lb 1.5 oz (76.7 kg)   SpO2 100%   BMI 24.97 kg/m²   Constitutional: Well-developed, well-nourished, appears obtunded with a GCS of 4, nontoxic, activity: Lying on the cart, sonorous respirations, unresponsive, does have decerebrate posturing  HENT: Normocephalic, Atraumatic, Bilateral external ears normal, TM's were normal, Mucus membranes are moist and oropharynx is patent and clear, No oral exudates, Nose normal.  Eyes: PERRLA at 3 mm, EOMI, Conjunctiva normal, No discharge. No scleral icterus. Neck: Normal range of motion, No tenderness, Supple. Lymphatic: No lymphadenopathy noted. Cardiovascular: Normal heart rate, Normal rhythm, no murmurs, no gallops, no rubs. Thorax & Lungs: Normal breath sounds, no respiratory distress, sonorous respirations, no wheezing, no rales, no rhonchi  Abdomen: Soft, Nontender, No hepatosplenomegaly, No masses, No pulsatile masses, No distension, normal bowel sounds  Skin: Warm, Dry, No erythema, No rash. Extremities: No edema, No tenderness, No cyanosis, No clubbing. No amputations, capillary refill less than 2 seconds. Musculoskeletal: Good range of motion in all major joints, No tenderness to palpation, no major deformities noted. Neurologic: GCS 4 with decerebrate posturing, apparent decreased motor function on the right, unable to test sensory function, no other focal deficits noted, no clonus, no tremors. NIH Stroke Scale-30  NIH Stroke Scale  Level of Consciousness (1a): Responds only with reflex motor or autonomic effects  LOC Questions (1b):  Answers neither question correctly  LOC Commands (1c): Performs neither task correctly  Visual (3): (!) Bilateral hemianopia  Facial Palsy (4): (!) Complete paralysis of one or both sides  Motor Arm, Left (5a): No movement  Motor Arm, Right (5b): No movement  Motor Leg, Left (6a): No movement  Motor Leg, Right (6b): No movement  Limb Ataxia (7): Amputation or joint fusion (Explain)  Sensory (8): (!) Severe to total loss  Best Language (9): Severe aphasia  Dysarthria (10): Intubated or other physical barrier (comment)  Extinction and Inattention (11): (!) Profound taurus-inattention or extinction to more than one modality     LABORATORY  Labs Reviewed   COMPREHENSIVE METABOLIC PANEL W/ REFLEX TO MG FOR LOW K - Abnormal; Notable for the following components:       Result Value    Sodium 134 (*)     Chloride 97 (*)     Glucose 245 (*)     BUN 21 (*)     All other components within normal limits   TROPONIN - Abnormal; Notable for the following components:    Troponin 0.02 (*)     All other components within normal limits   URINALYSIS WITH REFLEX TO CULTURE - Abnormal; Notable for the following components:    Glucose, Ur 500 (*)     Protein,  (*)     All other components within normal limits   POCT GLUCOSE - Normal   CBC WITH AUTO DIFFERENTIAL   PROTIME-INR   MICROSCOPIC URINALYSIS     ? EKG  EKG Interpretation    Interpreted by emergency department physician  Time performed: 1101  Time read: 1103    Rhythm: Atrial fibrillation  Ventricular Rate: 60  QRS Axis: -33  Ectopy: Occasional PVC  Conduction: Atrial fibrillation with controlled ventricular response with LVH with early repolarization abnormalities   ST Segments: Consistent with early repolarization abnormalities  T Waves: Consistent with early repolarization abnormalities  Q Waves: None noted    Other findings: Severe motion artifact making it difficult to read EKG    Compared to EKG on: 4/23/2022 and appears unchanged except for occasional PVC today.     Clinical Impression: Atrial fibrillation with controlled ventricular response    CHELSI DAUGHERTY, DO    RADIOLOGY/PROCEDURES  I personally reviewed the images for this case. XR CHEST PORTABLE   Final Result   No acute cardiopulmonary process      Endotracheal tube tip at the thoracic inlet         CT HEAD WO CONTRAST   Final Result   1. Acute intracranial hemorrhage. 2. Large right frontotemporal hematoma, associated with midline shift of 12   mm. 3. Moderate amount of intraventricular hemorrhage. Subarachnoid hemorrhage   also noted in the posterior fossa. Critical results were called by Dr. Manny Still MD to [facility] on 5/2/2022 at 11:31. CTA HEAD NECK W CONTRAST    (Results Pending)       COURSE & MEDICAL DECISION MAKING  Pertinent Labs, EKG, & Imaging studies reviewed. (See chart for details)    Vitals:    05/02/22 1205 05/02/22 1210 05/02/22 1215 05/02/22 1220   BP: (!) 151/90 (!) 149/99 (!) 165/91 (!) 146/86   Pulse: 60 59 59 64   Resp: 16 16 16 16   Temp:       SpO2: 100% 100% 100% 100%   Weight:       Height:           Medications   niCARdipine (CARDENE) 25 mg in sodium chloride 0.9 % 250 mL infusion (2.5 mg/hr IntraVENous New Bag 5/2/22 1136)   etomidate (AMIDATE) injection (20 mg IntraVENous Given 5/2/22 1103)   rocuronium (ZEMURON) injection (100 mg IntraVENous Given 5/2/22 1104)   iopamidol (ISOVUE-370) 76 % injection 75 mL (75 mLs IntraVENous Given 5/2/22 1117)       New Prescriptions    No medications on file       SEP-1 CORE MEASURE DATA  Exclusion criteria: the patient is NOT to be included for sepsis due to: Infection is not suspected    Patient remained stable in the ED. CT scan revealed a large right-sided intracranial hemorrhage with blood in the bilateral ventricles extending down into the third and fourth ventricles. Spoke to neurosurgery who said that this was not a survivable bleed. He has midline shift.   There is intracranial hemorrhage and there is nothing they can do with that at this time. He has blood in the third fourth and lateral ventricles. Therefore, I texted the hospitalist that [phone]. Son is in [facility] and I spoke to him he understands and he is on his way to the hospital.  I discussed this with the family also they are in the room at the bedside. This is his wife and a friend. The patient's blood pressure was found to be elevated according to CMS/Medicare and the Affordable Care Act/ObamaCare criteria. Elevated blood pressure could occur because of pain or anxiety or other reasons and does not mean that they need to have their blood pressure treated or medications otherwise adjusted. However, this could also be a sign that they will need to have their blood pressure treated or medications changed. The patient was instructed to follow up closely with their personal physician to have their blood pressure rechecked. The patient was instructed to take a list of recent blood pressure readings to their next visit with their personal physician. I reviewed old records     (This chart has been completed using [address]. Although attempts have been made to ensure accuracy, words and/or phrases may not be transcribed as intended.)    Patient unable to refuse or request pain medicines at the time of their exam due to his physical condition. IMPRESSION(S):  1. Intracranial hemorrhage ([address]    2. Coma scale finding of extension as best motor response, at arrival to emergency department [facility])    3. Hypertensive emergency        ?   Recheck Times: Multiple times  Critical Care Time: 45 minutes    Diagnostic considerations include but are not limited to:  thrombotic stroke, embolic stroke, hemorrhagic stroke, TIA,  hypoglycemia, mass lesions, metabolic cause, head injury, encephalopathy, multiple sclerosis, seizure, hypoxia, Bell's palsy, Joon's paralysis, infection/abscesses-intracranially or other spinal cord, other       Whit Oven Carito Liang,   05/02/22 1222

## 2022-05-02 NOTE — ED NOTES
Shayy Merino at bedside preparing to intubate pt, pt still having snoring respirations and is posturing     Candace Newman RN  05/02/22 4289

## 2022-05-02 NOTE — FLOWSHEET NOTE
provided ministerial presence, comfort and prayer with patient's wife and friends at bedside.  contacted  for [address].

## 2022-05-02 NOTE — TELEPHONE ENCOUNTER
Patient calling stating they are leaving to go out of the country. Can this be cancelled for now. Please find out and call with further instructions.      Kya Morales  [phone]

## 2022-05-02 NOTE — FLOWSHEET NOTE
provided ministerial presence, comfort, and emotional support for patient's wife Jody Head and friends at bedside. Patient's son Cyndy Infante is coming from [place] and Bhavik Morrissey is coming from [place] as soon as possible.  contacted [facility], Fr. Denise Christiansen, for [address].

## 2022-05-02 NOTE — ED NOTES
Dr Adelita Jose spoke with pt's son, Sanya Castro updated on pt's condition, Azael Collado called to bedside for supportive care     Oralia León RN  05/02/22 1640

## 2022-05-03 NOTE — PROGRESS NOTES
18- wife and two sons in room stating they are ready for terminal extubation and all family is present. This RN pursued orders for terminal extubation. 6369- pt premedicated and extubated onto room air. Will continue to monitor pt for comfort needs.

## 2022-05-03 NOTE — CONSULTS
[facility]  Palliative Care   Consult Note    NAME:  Jimi Bender  MEDICAL RECORD NUMBER:  [id number]  AGE: 81 y.o.    GENDER: male  : 1935  TODAY'S DATE:  5/3/2022    Subjective     Reason for Consult:  goals of care  Visit Type: Initial Consult      Jimi Bender is a 81 y.o. male referred by:   [x] Physician    PAST MEDICAL HISTORY      Diagnosis Date    Allergic rhinitis, seasonal     Aortic regurgitation      ECHO EF 60%, mod AI    CAD (coronary artery disease)     [facility]     Closed left [facility] fracture [facility]     Colon polyps- last colon neg      Diabetic nephropathy- pos microalb 10/11, on ARB 10/5/2011    DM (diabetes mellitus), type 2 (HCC)     Enlarged prostate     GERD (gastroesophageal reflux disease)     Holosystolic murmur 6168    HTN (hypertension)     Hypertriglyceridemia     Osteoarthritis     SVT (supraventricular tachycardia) ([facility]        PAST SURGICAL HISTORY  Past Surgical History:   Procedure Laterality Date    ABLATION OF DYSRHYTHMIC FOCUS      Dr. 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    APPENDECTOMY  age 30    CATARACT REMOVAL Bilateral 2017    COLONOSCOPY  2017    COLONOSCOPY N/A 2018    COLONOSCOPY POLYPECTOMY SNARE/COLD BIOPSY performed by Feli Zheng MD [address] Left     JOINT REPLACEMENT      PAIN MANAGEMENT PROCEDURE Right 3/9/2022    COOLIEF RADIOFREQUENCY ABLATION - RIGHT KNEE performed by Tiana Beaulieu MD at [facility]  age 9   [address] Left 2015    TURP  2017    cysto, green light lazer TURP       FAMILY HISTORY  Family History   Problem Relation Age of Onset    Hypertension Mother     Stroke Mother        SOCIAL HISTORY  Social History Tobacco Use    Smoking status: Never Smoker    Smokeless tobacco: Never Used    Tobacco comment: congrats, advised not to start   Vaping Use    Vaping Use: Never used   Substance Use Topics    Alcohol use: Yes     Alcohol/week: 2.0 standard drinks     Types: 2 Standard drinks or equivalent per week     Comment: 1-2 beers/week    Drug use: No       ALLERGIES  Allergies   Allergen Reactions    No Known Allergies        MEDICATIONS  No current facility-administered medications on file prior to encounter. Current Outpatient Medications on File Prior to Encounter   Medication Sig Dispense Refill    donepezil (ARICEPT) 10 MG tablet Take 10 mg by mouth nightly      aspirin 81 MG EC tablet Take 81 mg by mouth daily      insulin glargine (LANTUS SOLOSTAR) 100 UNIT/ML injection pen Inject 20 Units into the skin nightly 5 pen 1    metoprolol tartrate (LOPRESSOR) 50 MG tablet Take 1 tablet by mouth twice daily 180 tablet 3    simvastatin (ZOCOR) 20 MG tablet Take 1 tablet by mouth nightly 90 tablet 3    finasteride (PROSCAR) 5 MG tablet Take 1 tablet by mouth once daily 90 tablet 3    metFORMIN (GLUCOPHAGE-XR) 500 MG extended release tablet TAKE 2 TABLETS BY MOUTH IN THE MORNING 1 IN THE EVENING 270 tablet 1    [DISCONTINUED] metoprolol tartrate (LOPRESSOR) 50 MG tablet Take 1 tablet by mouth twice daily 180 tablet 1    lisinopril (PRINIVIL;ZESTRIL) 40 MG tablet Take 1 tablet by mouth daily 90 tablet 1    propafenone (RYTHMOL) 150 MG tablet Take 1 tablet by mouth 2 times daily 60 tablet 3    [DISCONTINUED] oxybutynin (DITROPAN) 5 MG tablet Take 1 tablet by mouth 2 times daily as needed for Other (bladder).  180 tablet 3       Objective         /85   Pulse 166   Temp 100.1 °F (37.8 °C) (Axillary)   Resp 21   Ht 5' 9\" (1.753 m)   Wt 162 lb 7.7 oz (73.7 kg)   SpO2 92%   BMI 23.99 kg/m²     Code Status: DNR-CC    Advanced Directives: did not ask wife and sons present    Assessment Management and Education    Persons available for education:       [] Self     [] Caregiver       [x] Spouse       [] Other Family Member   []  Other    Spiritual History:  notified: Yes,     Does the patient have a Primary Care Physician? Yes    Level of patient/caregiver understanding able to:       [x] Verbalize Understanding   [] Demonstrate Understanding       [] Teach Back       [] Needs Reinforcement     []  Other:      Teaching Time:  0hours  30 minutes     Plan        Social Service Consult Made:  Yes  Assistance filling out Living Will/HPOA:  No      Discharge Plan:  Education/support to family  Providing support for coping/adaptation/distress of family  Clarification of medical condition to patient and family  Code status clarified: [facility]  Palliative care orders introduced  Provided information about hospice    Discharge Environment:  [x] Hospice Consult Agency: List provided chose [facility]   [] Inpatient Hospice    [] Home with Hospice Care   [] ECF with Hospice  [] ECF skilled care with Hospice to follow   [] Other:    Discussion: I met with family to discuss current situation. We discussed hospice care here at hospital. His family is agreeable to speaking with hospice. During typing of note patient without pulse or resp. Bedside nurse will call to have patient pronounced. I will continue to follow Mr. Adamaris hernandez as needed. Thank you for allowing me to participate in the care of Mr. Ryann Arteaga .      Electronically signed by Whit Mccabe RN, BSN, [facility] on 5/3/2022 at 4:25 PM  [address] [place] [address]  Office: [phone]

## 2022-05-03 NOTE — DISCHARGE SUMMARY
Hospital Medicine Discharge Summary    Patient ID: Carleen Davis      Patient's PCP: Fani Scott MD    Admit Date: 2022     Discharge Date:  Pt  on 5/3/22 at 16:32 pm    Admitting Provider: Allan Andrew DO     Discharge Provider: Todd Casillas MD     Discharge Diagnoses: Active Hospital Problems    Diagnosis     Intracranial hemorrhage ([address] [I62.9]      Priority: Medium       The patient was seen and examined on day of discharge and this discharge summary is in conjunction with any daily progress note from day of discharge. Hospital Course: The patient is a 81 y.o. male with afib not on [facility] who presents to [facility] with acute left side weakness and collapsed with loss of consciousness. He was talking normally 30 minutes prior to that. He remained unresponsive, and upon arrival to  ed he had a gcs of 4. He had decerebrate posturing. In the ed he was found to have a large ivh, ich. He was just in pt from  -  with a possible tia, with left hemiplegia and dysphasia. Mri showed no acute pathology. He was discharged on asa 81 mg bid x 2 weeks.      Intracranial hemorrhage  Anoxic brain injury, acute encephalology  Large ivh with 1.2 cm midline shift  - on nicardipine gtt  - neurosurgery consulted, stated nothing to do  This is not survivable. I discussed understand the prognosis with the family, reviewed the scans. They wanted to change his code status to dnr/dni but have family coming from out of state and do not want to withdrawal care until they arrive tonight vs tomorrow  - comfort care meds ordered for terminal extubation, likely tomorrow  - keep in icu due to mechanical intubation  - cc consulted     After family discussion, plan was to withdraw care after terminal extubation.  Pt  on 5/3/22 at 16:32pm    Time Spent on discharge is more than 30 minutes in the examination, evaluation, counseling and review of medications and discharge plan.      Signed:    Asmita Hercules MD   5/3/2022      Thank you Hilda Saenz MD for the opportunity to be involved in this patient's care. If you have any questions or concerns, please feel free to contact me at [phone].

## 2022-05-03 NOTE — DEATH NOTES
Death Pronouncement Note  Patient's Name: Owen Bolivar   Patient's YOB: 1935  MRN Number: [id number]    Admitting Provider: Vincent Purdy DO  Attending Provider: Germania Shea MD    Patient was examined and the following were absent: Pulses, Blood Pressure and Respiratory effort    I declared the patient dead on 5/3/2022 at 4:32 PM    Preliminary Cause of Death: Intracranial hemorrhage [facility])     Electronically signed by Roger Mirza MD on 5/3/22 at 5:10 PM EDT

## 2022-05-03 NOTE — PROGRESS NOTES
Pulmonary/Critical Care Progress Note    Consult received for patient. Patient's family has decided to withdraw care at this time due to current health status and CT head findings. Terminal extubation and comfort medications placed. RN notified.     DENNIS Galeano Pulmonary, Sleep, and Critical Care

## 2022-05-03 NOTE — PROGRESS NOTES
Late entry d/t pt care    0443: Pt vent alarming volumes not delivered. Pt noted to be stacking breaths and belly breathing. HR increasing to 130s-140s in A Fib. Attempt to suction, minimal output. Resp 30-50's. RT called to bedside, Perfect Serve sent to Dr. Bard Hurst regarding pt plan and overall status. Order for 2mg versed received. Lachelle, RT removed mucous plug from tube. 0457-RR improved to 24 following suctioning and Versed administration. HR remains elevated to 130s.      0530 - HR stable in 120's

## 2022-05-04 NOTE — PROGRESS NOTES
Physician Progress Note      PATIENT:               Bertha Fofana  CSN #:                  [id number]  :                       1935  ADMIT DATE:       2022 10:57 AM  [address] DATE:        5/3/2022 6:41 PM  RESPONDING  PROVIDER #:        Chema Garzon MD          QUERY TEXT:    Pt admitted with Intracranial hemorrhage. Pt noted to have significant mass   effect with moderate midline shift, moderate vasogenic edema on CT. If   clinically significant, please document in progress notes and discharge   summary if you are evaluating/treating any of the following: The medical record reflects the following:  Risk Factors: Intracranial hemorrhage  Clinical Indicators: Patient to ED with Left sided weakness, collapse,   unresponsive, GCS of 4, intubated in ED. CT with large right sided   intracranial hemorrhage with blood in the bilateral ventricles, Neurosurgery   consulted and states will not be a survivable bleed, noted midline shift. CTA   head neck with hemorrhage centered in right basal ganglia with significant   mass effect, moderate leftward midline shift, early obstructive hydrocephalus   w extensive hemorrhage, Rt uncal herniation with effacement of foramen magnum. Family has chosen with withdrawal treatment. Treatment: Imaging, Neurosurgery consult, Intubation, Mechanical ventilation,   Cardene, Terminal extubation, Comfort care. Options provided:  -- Cerebral edema  -- Brain compression  -- Cerebral edema and Brain compression  -- Other - I will add my own diagnosis  -- Disagree - Not applicable / Not valid  -- Disagree - Clinically unable to determine / Unknown  -- Refer to Clinical Documentation Reviewer    PROVIDER RESPONSE TEXT:    This patient has cerebral edema.     Query created by: Roxana Mejia on 5/3/2022 3:01 PM      Electronically signed by:  Chema Garzon MD 2022 4:43 PM

## 2023-01-01 NOTE — PROGRESS NOTES
Medicare Annual Wellness Visit  Name: Jeffry Ryan  YOB: 1935  Age: 81 y.o. Sex: male  MRN: [id number]     Date of Service:  4/11/2022    Chief Complaint:   Jeffry Ryan is a 81 y.o. male who presents for Medicare Annual Wellness Visit and check-up for:  1. Medicare annual wellness visit, subsequent    2. Type 2 diabetes mellitus with diabetic nephropathy, without long-term current use of insulin ([facility]    3. Diabetic nephropathy associated with type 2 diabetes mellitus ([facility]    4. Malignant neoplasm of descending colon ([facility]- 1/18 left colectomy, T3N0    5. S/P aortic valve replacement with porcine valve    6. Essential hypertension    7. Coronary artery disease, non-occlusive    8. Dementia without behavioral disturbance, unspecified dementia type ([facility]    9. BPH with obstruction/lower urinary tract symptoms    10. Laceration of right upper extremity, initial encounter      HPI    Chief Complaint   Patient presents with    Medicare AW   Complaints: here with WOP, forgetting things like appointments, people names. Memory worse past 3-4 months. · In PT for legs and strength, refusing or forgetting to do home exercise program   · A1c 8.3 today  · Nocturia 2-3x. Saw urology. 5 yrs ago  · Couple falls when at home- slid out of chair. In PT in now. · Got skin tear right elbow    Review of Systems - unremarable except as noted above    HISTORY:  Patient's medications, allergies, past medical, and social histories were reviewed and updated as appropriate.      CHART REVIEW  Health Maintenance   Topic Date Due    Colorectal Cancer Screen  12/17/2020    COVID-19 Vaccine (3 - Booster for Dashawn Deist series) 07/14/2021    Diabetic foot exam  01/18/2022    Shingles Vaccine (1 of 2) 10/25/2030 (Originally 11/11/1986)    DTaP/Tdap/Td vaccine (1 - Tdap) 04/12/2022    Potassium monitoring  10/06/2022    Creatinine monitoring  10/06/2022    A1C test (Diabetic or Prediabetic)  10/11/2022    Diabetic [Parents] : parents retinal exam  11/19/2022    Lipid screen  11/30/2022    Depression Screen  01/11/2023    Annual Wellness Visit (AWV)  01/12/2023    Flu vaccine  Completed    Pneumococcal 65+ years Vaccine  Completed    Hepatitis A vaccine  Aged Out    Hib vaccine  Aged Out    Meningococcal (ACWY) vaccine  Aged Out     The ASCVD Risk score (Cherry Everett, et al., 2013) failed to calculate for the following reasons: The 2013 ASCVD risk score is only valid for ages 36 to 78  Current Outpatient Medications   Medication Instructions    aspirin EC 81 mg, Oral, 2 TIMES DAILY, Take for DVT blood clot prophylaxis. Please avoid missing doses.     Blood Glucose Monitoring Suppl (ONE TOUCH ULTRA 2) w/Device KIT 1 kit, Does not apply, DAILY    blood glucose test strips (ONETOUCH ULTRA) strip USE DAILY OR AS NEEDED    Calcium Carbonate Antacid (TUMS E-X PO) 2 tablets, Oral, PRN    donepezil (ARICEPT) 10 mg, Oral, NIGHTLY, AFTER 1 MONTH ON 5 MG    donepezil (ARICEPT) 5 mg, Oral, NIGHTLY, FIRST MONTH    doxazosin (CARDURA) 1 mg, Oral, DAILY    finasteride (PROSCAR) 5 MG tablet Take 1 tablet by mouth once daily    Insulin Pen Needle (PEN NEEDLES 3/16\") 31G X 5 MM MISC 1 each, Does not apply, DAILY    Lancet Devices MISC 1 x daily for the [employer] lancets    Lantus SoloStar 20 Units, SubCUTAneous, NIGHTLY    LIFESCAN UNISTIK II LANCETS MISC 1 each, Does not apply, DAILY    lisinopril (PRINIVIL;ZESTRIL) 40 mg, Oral, DAILY    metFORMIN (GLUCOPHAGE-XR) 500 MG extended release tablet TAKE 2 TABLETS BY MOUTH IN THE MORNING 1 IN THE EVENING    metoprolol tartrate (LOPRESSOR) 50 MG tablet Take 1 tablet by mouth twice daily    propafenone (RYTHMOL) 150 mg, Oral, 2 TIMES DAILY    simvastatin (ZOCOR) 20 MG tablet Take 1 tablet by mouth nightly      Family History   Problem Relation Age of Onset    Hypertension Mother     Stroke Mother      Social History     Tobacco Use    Smoking status: Never Smoker    Smokeless tobacco: Never Used    Tobacco comment: congrats, advised not to start   Vaping Use    Vaping Use: Never used   Substance Use Topics    Alcohol use:  Yes     Alcohol/week: 2.0 standard drinks     Types: 2 Standard drinks or equivalent per week     Comment: 1-2 beers/week    Drug use: No      Immunization History   Administered Date(s) Administered    COVID-19, Moderna, Primary or Immunocompromised, PF, 100mcg/0.5mL 01/19/2021, 02/14/2021    Influenza, High Dose (Fluzone 65 yrs and older) 01/31/2018, 09/12/2018    Influenza, Quadv, adjuvanted, 65 yrs +, IM, PF (Fluad) 10/21/2020, 09/16/2021    Influenza, Triv, inactivated, subunit, adjuvanted, IM (Fluad 65 yrs and older) 10/08/2019    Pneumococcal Conjugate 13-valent ([id number]) 02/16/2016    Pneumococcal Polysaccharide ([id number]) 10/24/2008    Td (Adult), 2 Lf Tetanus Toxoid, Pf (Td, Absorbed) 04/11/2022    Tetanus 08/01/2003     LAST LABS  Cholesterol, Total   Date Value Ref Range Status   11/30/2021 146 0 - 199 mg/dL Final     LDL Calculated   Date Value Ref Range Status   11/30/2021 58 <100 mg/dL Final     HDL   Date Value Ref Range Status   11/30/2021 48 40 - 60 mg/dL Final     Triglycerides   Date Value Ref Range Status   11/30/2021 199 (H) 0 - 150 mg/dL Final     Lab Results   Component Value Date    GLUCOSE 200 (H) 10/06/2021     Lab Results   Component Value Date     10/06/2021    K 4.6 10/06/2021    CREATININE 1.1 10/06/2021     Lab Results   Component Value Date    WBC 8.0 10/06/2021    HGB 15.4 10/06/2021    HCT 46.0 10/06/2021    MCV 90.5 10/06/2021     10/06/2021     Lab Results   Component Value Date    ALT 13 10/06/2021    AST 13 (L) 10/06/2021    ALKPHOS 85 10/06/2021    BILITOT 0.6 10/06/2021     TSH (uIU/mL)   Date Value   11/04/2020 1.24     Lab Results   Component Value Date    LABA1C 8.3 04/11/2022      CareTeam (Including outside providers/suppliers regularly involved in providing care):   Patient Care Team:  Rodrigo Sparks MD as PCP - General (Family Medicine)  Clifton Diaz MD as PCP - [facility] Empaneled Provider  Derek Murphy MD as Consulting Physician (Cardiology)    The following problems were reviewed today and where indicated follow up appointments were made and/or referrals ordered.     Positive Risk Factor Screenings with Interventions:     Fall Risk:  Do you feel unsteady or are you worried about falling? : (!) yes  2 or more falls in past year?: no  Fall with injury in past year?: no     Fall Risk Interventions:    · Home safety tips provided    Health Habits/Nutrition:     Physical Activity: Sufficiently Active    Days of Exercise per Week: 7 days    Minutes of Exercise per Session: 60 min     Have you lost any weight without trying in the past 3 months?: (!) Yes  Body mass index: (!) 25.21  Have you seen the dentist within the past year?: Yes    Health Habits/Nutrition Interventions:  · Nutritional issues:  patient is not ready to address his/her nutritional/weight issues at this time         Safety:  Do you have working smoke detectors?: Yes  Do you have any tripping hazards - loose or unsecured carpets or rugs?: (!) Yes  Do you have any tripping hazards - clutter in doorways, halls, or stairs?: No  Do you have either shower bars, grab bars, non-slip mats or non-slip surfaces in your shower or bathtub?: Yes  Do all of your stairways have a railing or banister?: Yes  Do you always fasten your seatbelt when you are in a car?: Yes    Safety Interventions:  · Home safety tips provided      ADLs:  In the past 7 days, did you need help from others to perform any of the following everyday activities: Eating, dressing, grooming, bathing, toileting, or walking/balance?: (!) Yes  Select all that apply: (!) Bathing  In the past 7 days, did you need help from others to take care of any of the following: Laundry, housekeeping, banking/finances, shopping, telephone use, food preparation, transportation, or taking medications?: No    ADL Interventions:  · Patient declines any further evaluation/treatment for this issue  · WOP providing at this time      Current Health Maintenance Status  Recommendations for Preventive Services Due: see orders. Recommended screening schedule for the next 5-10 years is provided to the patient in written form: see Patient Instructions/AVS.    PHYSICAL EXAM:  VITALS:  BP (!) 160/98 (Site: Right Upper Arm, Position: Sitting, Cuff Size: Medium Adult)   Pulse 83   Resp 18   Ht 5' 7\" (1.702 m)   Wt 161 lb (73 kg)   SpO2 97%   BMI 25.22 kg/m²   BP Readings from Last 5 Encounters:   04/11/22 (!) 160/98   03/09/22 (!) 186/107   01/11/22 126/78   11/18/21 136/84   10/06/21 (!) 144/84     Wt Readings from Last 5 Encounters:   04/11/22 161 lb (73 kg)   03/09/22 164 lb (74.4 kg)   02/22/22 163 lb (73.9 kg)   01/25/22 163 lb (73.9 kg)   01/11/22 165 lb (74.8 kg)   Body mass index is 25.22 kg/m². GENERAL: well-developed, well-nourished, alert, no distress, calm   EYES: negative findings: lids and lashes normal and conjunctivae and sclerae normal  ENT: normal TM's and external ear canals both ears  · External nose and ears appear normal  · Pharynx: normal. Exudates: None  · Lips, mucosa, and tongue normal  · Hearing grossly normal.    NECK: No adenopathy, supple, symmetrical, trachea midline  · Thyroid not enlarged, symmetric, no tenderness/mass/nodules  · no cervical nodes, no supraclavicular nodes  LUNGS:  Breathing unlabored  · clear to auscultation bilaterally and good air movement  CARDIOVASC: regular rate and rhythm, S1, S2 normal   LEGS:  Lower extremity edema: none     No carotid bruits  SKIN: warm and dry  · Forearms multiple bruises.  R below elbow 1 cm lac/tear thru dermis, no signs of infection    PSYCH:  Alert and oriented  · Normal reasoning, insight good  · Facial expressions full, mood appropriate  · No memory disturbance noted  MUSCULOSKEL:  No significant finger or nail findings  · Spine symmetric, no deformities, no kyphosis   GAIT: UP and Go test: <30 seconds with gait: stiff. Speed Decreasedl. No significant balance checks. Extra steps on turn around. Assistive device: walker        Assessment and Plan:      Diagnosis Orders   1. Medicare annual wellness visit, subsequent     2. Type 2 diabetes mellitus with diabetic nephropathy, without long-term current use of insulin (HCC)  POCT glycosylated hemoglobin (Hb A1C)    insulin glargine (LANTUS SOLOSTAR) 100 UNIT/ML injection pen   3. Diabetic nephropathy associated with type 2 diabetes mellitus (HCC)  POCT glycosylated hemoglobin (Hb A1C)   4. Malignant neoplasm of descending colon ([facility]- 1/18 left colectomy, T3N0     5. S/P aortic valve replacement with porcine valve     6. Essential hypertension     7. Coronary artery disease, non-occlusive     8. Dementia without behavioral disturbance, unspecified dementia type (HCC)  donepezil (ARICEPT) 10 MG tablet    donepezil (ARICEPT) 5 MG tablet   9. BPH with obstruction/lower urinary tract symptoms  Amb External Referral To Urology   10. Laceration of right upper extremity, initial encounter  Td (adult), 2 Lf Tetanus Toxoid, PF (Td, absorbed)   Stable. Plan as above and below. Fair diabetes and HTN control. Current treatment plan is effective, continue same. · steristrips placed on elbow lac    FYI: While Medicare provides you with a FREE ANNUAL PREVENTIVE PHYSICAL, this visit does NOT include management of chronic medical problems or physical examination. Dr. Danii Crenshaw usually does a combination visit if you have other medical problems so you don't have to come back for another visit. However, this means that there will be a co-pay. INSTRUCTIONS  NEXT APPOINTMENT: Please schedule check-up in 4 months with Kasey Jean (Nurse Practitioner). · Dr. Danii Crenshaw and Kasey Jean (Nurse Practitioner) are using a team approach for managing diabetics in our practice.   Office visits will alternate between these providers while we continue to maintain high quality of care. · PLEASE TAKE THIS FORM TO CHECK-OUT WINDOW TO SCHEDULE NEXT VISIT. · See urology about other ideas for bladder. · INCREASE Lantus to 20 units. · Bring in [employer] card next time with booster date. · Gently wash with mild soap and water over the strips on arm. Can remove if still attached in 10-14 days.

## 2023-06-29 NOTE — PROGRESS NOTES
NAME:  Jimi Bender  YOB: 1935  MEDICAL RECORD NUMBER:  [id number]  TODAYS DATE:  4/23/2022    Discussed personal risk factors for Stroke /TIA with patient/family, and ways to reduce the risk for a recurrent stroke. Patient's personal risk factors which were identified are:     [] Alcohol Abuse: check with your physician before any alcohol consumption. [] Atrial fibrillation: may cause blood clots. [] Drug Abuse: Seek help, talk with your doctor  [] Clotting Disorder  [x] Diabetes  [] Family history of stroke or heart disease  [x] High Blood Pressure/Hypertension: work with your physician. [x] High cholesterol: monitor cholesterol levels with your physician.   [] Overweight/Obesity: work with your physician for your ideal body weight. [x] Physical Inactivity: get regular exercise as directed by your physician. [] Personal history of previous TIA or stroke  [] Poor Diet; decrease salt (sodium) in your diet, follow diet directed by physician. [] Smoking: Cigarette/Cigar: stop smoking. Advised pt. that you can reduce your risk for stroke/TIA by modifying/controlling the risk factors that you have. Pt.advised to take the medications as prescribed, which will be detailed in the discharge instructions, and to not stop taking them without consulting their physician. In addition, pt. advised to maintain a healthy diet, exercise regularly and to not smoke. [facility]'s Stroke treatment and prevention, Managing your recovery  notebook  provided and/or reviewed  with patient/family. The notebook includes, but not limited to, sections addressing warning signs & symptoms of a stroke, which are: sudden numbness or weakness especially on one side of the body, sudden confusion, difficulty speaking or understanding, sudden changes in vision, sudden dizziness or loss of balance/ coordination, sudden severe headache, syncope and seizure.   The need to call EMS (911) immediately if signs & symptoms occur is emphasized . The notebook also provides education on Stroke community resources and stroke advocacy. The need for follow-up after discharge was highlighted with patient/family with them being able to repeat understanding of the importance of this.       Electronically signed by Marcela Mathias RN on 4/23/2022 at 6:43 PM Rhofade Counseling: Rhofade is a topical medication which can decrease superficial blood flow where applied. Side effects are uncommon and include stinging, redness and allergic reactions.

## (undated) DEVICE — MARKER,SKIN,WI/RULER AND LABELS: Brand: MEDLINE

## (undated) DEVICE — STOCKINETTE,DOUBLE PLY,4X48,STERILE: Brand: MEDLINE

## (undated) DEVICE — SOLUTION IRRIG 250ML STRL H2O PLAS POUR BTL USP

## (undated) DEVICE — T-DRAPE,EXTREMITY,STERILE: Brand: MEDLINE

## (undated) DEVICE — NDL CNTR 20CT FM MAG: Brand: MEDLINE INDUSTRIES, INC.

## (undated) DEVICE — FORCEPS BX 240CM 2.4MM L NDL RAD JAW 4 M00513334

## (undated) DEVICE — NEEDLE HYPO 25GA L1.5IN BLU POLYPR HUB S STL REG BVL STR

## (undated) DEVICE — NEEDLE, QUINCKE, 25GX3.5": Brand: MEDLINE

## (undated) DEVICE — COVER,MAYO STAND,STERILE: Brand: MEDLINE

## (undated) DEVICE — SYRINGE, LUER LOCK, 5ML: Brand: MEDLINE

## (undated) DEVICE — GOWN SIRUS NONREIN LG W/TWL: Brand: MEDLINE INDUSTRIES, INC.

## (undated) DEVICE — PAD GRND FOR RF PAIN MGMT DISP

## (undated) DEVICE — GLOVE SURG SZ 6 CRM LTX FREE POLYISOPRENE POLYMER BEAD ANTI

## (undated) DEVICE — COVER US PRB W10XL61CM W/ GEL RUBBERBAND TAPE STRP FLD GEN

## (undated) DEVICE — APPLICATOR MEDICATED 10.5 CC SOLUTION CLR STRL CHLORAPREP

## (undated) DEVICE — COOLIEF* COOLED RADIOFREQUENCY PROBE KIT: Brand: AVANOS

## (undated) DEVICE — Z DISCONTINUED USE 2272117 DRAPE SURG 3 QTR N INVASIVE 2 LAYR DISP

## (undated) DEVICE — BANDAGE,SELF ADHRNT,COFLEX,4"X5YD,STRL: Brand: COLABEL

## (undated) DEVICE — GEL US 20GM NONIRRITATING OVERWRAPPED FILE PCH TRNSMIT

## (undated) DEVICE — GLOVE SURG SZ 75 CRM LTX FREE POLYISOPRENE POLYMER BEAD ANTI